# Patient Record
Sex: MALE | Race: WHITE | NOT HISPANIC OR LATINO | Employment: FULL TIME | URBAN - METROPOLITAN AREA
[De-identification: names, ages, dates, MRNs, and addresses within clinical notes are randomized per-mention and may not be internally consistent; named-entity substitution may affect disease eponyms.]

---

## 2017-01-18 ENCOUNTER — APPOINTMENT (EMERGENCY)
Dept: RADIOLOGY | Facility: HOSPITAL | Age: 42
End: 2017-01-18
Payer: COMMERCIAL

## 2017-01-18 ENCOUNTER — HOSPITAL ENCOUNTER (EMERGENCY)
Facility: HOSPITAL | Age: 42
Discharge: HOME | End: 2017-01-18
Attending: EMERGENCY MEDICINE | Admitting: EMERGENCY MEDICINE
Payer: COMMERCIAL

## 2017-01-18 VITALS
HEART RATE: 104 BPM | DIASTOLIC BLOOD PRESSURE: 60 MMHG | RESPIRATION RATE: 16 BRPM | TEMPERATURE: 99.7 F | WEIGHT: 207.23 LBS | OXYGEN SATURATION: 98 % | SYSTOLIC BLOOD PRESSURE: 128 MMHG

## 2017-01-18 DIAGNOSIS — R05.4 COUGH SYNCOPE: Primary | ICD-10-CM

## 2017-01-18 DIAGNOSIS — R74.8 ELEVATED LIVER ENZYMES: ICD-10-CM

## 2017-01-18 DIAGNOSIS — J11.1 INFLUENZA-LIKE ILLNESS: ICD-10-CM

## 2017-01-18 DIAGNOSIS — E86.0 DEHYDRATION: ICD-10-CM

## 2017-01-18 DIAGNOSIS — R55 COUGH SYNCOPE: Primary | ICD-10-CM

## 2017-01-18 LAB
ALBUMIN SERPL BCP-MCNC: 3.9 G/DL (ref 3.5–5)
ALP SERPL-CCNC: 70 U/L (ref 46–116)
ALT SERPL W P-5'-P-CCNC: 98 U/L (ref 12–78)
ANION GAP SERPL CALCULATED.3IONS-SCNC: 12 MMOL/L (ref 4–13)
AST SERPL W P-5'-P-CCNC: 46 U/L (ref 5–45)
BASOPHILS # BLD AUTO: 0.02 THOUSANDS/ΜL (ref 0–0.1)
BASOPHILS NFR BLD AUTO: 0 % (ref 0–1)
BILIRUB SERPL-MCNC: 0.5 MG/DL (ref 0.2–1)
BUN SERPL-MCNC: 15 MG/DL (ref 5–25)
CALCIUM SERPL-MCNC: 8.9 MG/DL (ref 8.3–10.1)
CHLORIDE SERPL-SCNC: 101 MMOL/L (ref 100–108)
CO2 SERPL-SCNC: 25 MMOL/L (ref 21–32)
CREAT SERPL-MCNC: 1.14 MG/DL (ref 0.6–1.3)
EOSINOPHIL # BLD AUTO: 0.01 THOUSAND/ΜL (ref 0–0.61)
EOSINOPHIL NFR BLD AUTO: 0 % (ref 0–6)
ERYTHROCYTE [DISTWIDTH] IN BLOOD BY AUTOMATED COUNT: 13.1 % (ref 11.6–15.1)
EXT BILIRUBIN, UA: NORMAL
EXT BLOOD URINE: NORMAL
EXT GLUCOSE, UA: NORMAL
EXT KETONES: NORMAL
EXT NITRITE, UA: NORMAL
EXT PH, UA: 5
EXT PROTEIN, UA: NORMAL
EXT SPECIFIC GRAVITY, UA: 1.02
EXT UROBILINOGEN: NORMAL
FLUAV AG SPEC QL IA: NEGATIVE
FLUBV AG SPEC QL IA: NEGATIVE
GFR SERPL CREATININE-BSD FRML MDRD: >60 ML/MIN/1.73SQ M
GLUCOSE SERPL-MCNC: 137 MG/DL (ref 65–140)
HCT VFR BLD AUTO: 44.3 % (ref 36.5–49.3)
HGB BLD-MCNC: 15.2 G/DL (ref 12–17)
LIPASE SERPL-CCNC: 113 U/L (ref 73–393)
LYMPHOCYTES # BLD AUTO: 0.58 THOUSANDS/ΜL (ref 0.6–4.47)
LYMPHOCYTES NFR BLD AUTO: 8 % (ref 14–44)
MCH RBC QN AUTO: 29 PG (ref 26.8–34.3)
MCHC RBC AUTO-ENTMCNC: 34.3 G/DL (ref 31.4–37.4)
MCV RBC AUTO: 84 FL (ref 82–98)
MONOCYTES # BLD AUTO: 0.77 THOUSAND/ΜL (ref 0.17–1.22)
MONOCYTES NFR BLD AUTO: 11 % (ref 4–12)
NEUTROPHILS # BLD AUTO: 5.65 THOUSANDS/ΜL (ref 1.85–7.62)
NEUTS SEG NFR BLD AUTO: 81 % (ref 43–75)
PLATELET # BLD AUTO: 201 THOUSANDS/UL (ref 149–390)
PMV BLD AUTO: 9.1 FL (ref 8.9–12.7)
POTASSIUM SERPL-SCNC: 3.9 MMOL/L (ref 3.5–5.3)
PROT SERPL-MCNC: 8.2 G/DL (ref 6.4–8.2)
RBC # BLD AUTO: 5.25 MILLION/UL (ref 3.88–5.62)
SODIUM SERPL-SCNC: 138 MMOL/L (ref 136–145)
WBC # BLD AUTO: 7.03 THOUSAND/UL (ref 4.31–10.16)
WBC # BLD EST: NORMAL 10*3/UL

## 2017-01-18 PROCEDURE — 87798 DETECT AGENT NOS DNA AMP: CPT | Performed by: EMERGENCY MEDICINE

## 2017-01-18 PROCEDURE — 96374 THER/PROPH/DIAG INJ IV PUSH: CPT

## 2017-01-18 PROCEDURE — 87400 INFLUENZA A/B EACH AG IA: CPT | Performed by: EMERGENCY MEDICINE

## 2017-01-18 PROCEDURE — 96361 HYDRATE IV INFUSION ADD-ON: CPT

## 2017-01-18 PROCEDURE — 36415 COLL VENOUS BLD VENIPUNCTURE: CPT | Performed by: EMERGENCY MEDICINE

## 2017-01-18 PROCEDURE — 85025 COMPLETE CBC W/AUTO DIFF WBC: CPT | Performed by: EMERGENCY MEDICINE

## 2017-01-18 PROCEDURE — 83690 ASSAY OF LIPASE: CPT | Performed by: EMERGENCY MEDICINE

## 2017-01-18 PROCEDURE — 81002 URINALYSIS NONAUTO W/O SCOPE: CPT | Performed by: EMERGENCY MEDICINE

## 2017-01-18 PROCEDURE — 71010 HB CHEST X-RAY 1 VIEW FRONTAL (PORTABLE): CPT

## 2017-01-18 PROCEDURE — 80053 COMPREHEN METABOLIC PANEL: CPT | Performed by: EMERGENCY MEDICINE

## 2017-01-18 PROCEDURE — 99285 EMERGENCY DEPT VISIT HI MDM: CPT

## 2017-01-18 PROCEDURE — 96375 TX/PRO/DX INJ NEW DRUG ADDON: CPT

## 2017-01-18 RX ORDER — ONDANSETRON 4 MG/1
4 TABLET, ORALLY DISINTEGRATING ORAL EVERY 8 HOURS PRN
Qty: 10 TABLET | Refills: 0 | Status: SHIPPED | OUTPATIENT
Start: 2017-01-18 | End: 2018-07-30 | Stop reason: ALTCHOICE

## 2017-01-18 RX ORDER — LOPERAMIDE HYDROCHLORIDE 2 MG/1
2 TABLET ORAL 4 TIMES DAILY PRN
COMMUNITY
End: 2019-06-23

## 2017-01-18 RX ORDER — KETOROLAC TROMETHAMINE 30 MG/ML
15 INJECTION, SOLUTION INTRAMUSCULAR; INTRAVENOUS ONCE
Status: COMPLETED | OUTPATIENT
Start: 2017-01-18 | End: 2017-01-18

## 2017-01-18 RX ORDER — ACETAMINOPHEN 325 MG/1
650 TABLET ORAL ONCE
Status: COMPLETED | OUTPATIENT
Start: 2017-01-18 | End: 2017-01-18

## 2017-01-18 RX ORDER — OSELTAMIVIR PHOSPHATE 75 MG/1
75 CAPSULE ORAL EVERY 12 HOURS
Qty: 10 CAPSULE | Refills: 0 | Status: SHIPPED | OUTPATIENT
Start: 2017-01-18 | End: 2017-01-23

## 2017-01-18 RX ORDER — ONDANSETRON 2 MG/ML
4 INJECTION INTRAMUSCULAR; INTRAVENOUS ONCE
Status: COMPLETED | OUTPATIENT
Start: 2017-01-18 | End: 2017-01-18

## 2017-01-18 RX ADMIN — ONDANSETRON 4 MG: 2 INJECTION INTRAMUSCULAR; INTRAVENOUS at 15:57

## 2017-01-18 RX ADMIN — ACETAMINOPHEN 650 MG: 325 TABLET ORAL at 15:56

## 2017-01-18 RX ADMIN — KETOROLAC TROMETHAMINE 15 MG: 30 INJECTION, SOLUTION INTRAMUSCULAR at 16:52

## 2017-01-18 RX ADMIN — SODIUM CHLORIDE 1000 ML: 0.9 INJECTION, SOLUTION INTRAVENOUS at 17:31

## 2017-01-18 RX ADMIN — SODIUM CHLORIDE 1000 ML: 0.9 INJECTION, SOLUTION INTRAVENOUS at 15:51

## 2017-01-19 LAB
FLUAV AG SPEC QL: DETECTED
FLUBV AG SPEC QL: ABNORMAL
RSV B RNA SPEC QL NAA+PROBE: ABNORMAL

## 2017-02-01 ENCOUNTER — APPOINTMENT (EMERGENCY)
Dept: RADIOLOGY | Facility: HOSPITAL | Age: 42
End: 2017-02-01
Payer: COMMERCIAL

## 2017-02-01 ENCOUNTER — HOSPITAL ENCOUNTER (EMERGENCY)
Facility: HOSPITAL | Age: 42
Discharge: HOME | End: 2017-02-02
Attending: EMERGENCY MEDICINE
Payer: COMMERCIAL

## 2017-02-01 ENCOUNTER — APPOINTMENT (EMERGENCY)
Dept: CT IMAGING | Facility: HOSPITAL | Age: 42
End: 2017-02-01
Payer: COMMERCIAL

## 2017-02-01 VITALS
TEMPERATURE: 98 F | OXYGEN SATURATION: 98 % | RESPIRATION RATE: 18 BRPM | SYSTOLIC BLOOD PRESSURE: 138 MMHG | WEIGHT: 220 LBS | DIASTOLIC BLOOD PRESSURE: 71 MMHG | HEART RATE: 81 BPM

## 2017-02-01 DIAGNOSIS — S06.0X9A CONCUSSION: ICD-10-CM

## 2017-02-01 DIAGNOSIS — S16.1XXA CERVICAL STRAIN, INITIAL ENCOUNTER: ICD-10-CM

## 2017-02-01 DIAGNOSIS — S00.03XA CONTUSION OF SCALP, INITIAL ENCOUNTER: ICD-10-CM

## 2017-02-01 DIAGNOSIS — S20.219A CHEST WALL CONTUSION: ICD-10-CM

## 2017-02-01 DIAGNOSIS — R07.81 PLEURODYNIA: ICD-10-CM
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
## 2017-02-01 DIAGNOSIS — S06.0X9A CONCUSSION WITH LOSS OF CONSCIOUSNESS: ICD-10-CM

## 2017-02-01 DIAGNOSIS — S22.32XA LEFT RIB FRACTURE: ICD-10-CM

## 2017-02-01 LAB
ANION GAP SERPL CALCULATED.3IONS-SCNC: 9 MMOL/L (ref 4–13)
BASOPHILS # BLD AUTO: 0.01 THOUSANDS/ΜL (ref 0–0.1)
BASOPHILS NFR BLD AUTO: 0 % (ref 0–1)
BUN SERPL-MCNC: 15 MG/DL (ref 5–25)
CALCIUM SERPL-MCNC: 8.7 MG/DL (ref 8.3–10.1)
CHLORIDE SERPL-SCNC: 104 MMOL/L (ref 100–108)
CO2 SERPL-SCNC: 25 MMOL/L (ref 21–32)
CREAT SERPL-MCNC: 0.95 MG/DL (ref 0.6–1.3)
EOSINOPHIL # BLD AUTO: 0.03 THOUSAND/ΜL (ref 0–0.61)
EOSINOPHIL NFR BLD AUTO: 0 % (ref 0–6)
ERYTHROCYTE [DISTWIDTH] IN BLOOD BY AUTOMATED COUNT: 13 % (ref 11.6–15.1)
GFR SERPL CREATININE-BSD FRML MDRD: >60 ML/MIN/1.73SQ M
GLUCOSE SERPL-MCNC: 147 MG/DL (ref 65–140)
HCT VFR BLD AUTO: 42.8 % (ref 36.5–49.3)
HGB BLD-MCNC: 15 G/DL (ref 12–17)
LYMPHOCYTES # BLD AUTO: 1.21 THOUSANDS/ΜL (ref 0.6–4.47)
LYMPHOCYTES NFR BLD AUTO: 9 % (ref 14–44)
MCH RBC QN AUTO: 30.2 PG (ref 26.8–34.3)
MCHC RBC AUTO-ENTMCNC: 35 G/DL (ref 31.4–37.4)
MCV RBC AUTO: 86 FL (ref 82–98)
MONOCYTES # BLD AUTO: 0.88 THOUSAND/ΜL (ref 0.17–1.22)
MONOCYTES NFR BLD AUTO: 6 % (ref 4–12)
NEUTROPHILS # BLD AUTO: 11.62 THOUSANDS/ΜL (ref 1.85–7.62)
NEUTS SEG NFR BLD AUTO: 85 % (ref 43–75)
NRBC BLD AUTO-RTO: 0 /100 WBCS
PLATELET # BLD AUTO: 235 THOUSANDS/UL (ref 149–390)
PMV BLD AUTO: 9 FL (ref 8.9–12.7)
POTASSIUM SERPL-SCNC: 3.7 MMOL/L (ref 3.5–5.3)
RBC # BLD AUTO: 4.97 MILLION/UL (ref 3.88–5.62)
SODIUM SERPL-SCNC: 138 MMOL/L (ref 136–145)
WBC # BLD AUTO: 13.75 THOUSAND/UL (ref 4.31–10.16)

## 2017-02-01 PROCEDURE — 72125 CT NECK SPINE W/O DYE: CPT

## 2017-02-01 PROCEDURE — 96374 THER/PROPH/DIAG INJ IV PUSH: CPT

## 2017-02-01 PROCEDURE — 71260 CT THORAX DX C+: CPT

## 2017-02-01 PROCEDURE — 73110 X-RAY EXAM OF WRIST: CPT

## 2017-02-01 PROCEDURE — 86901 BLOOD TYPING SEROLOGIC RH(D): CPT | Performed by: EMERGENCY MEDICINE

## 2017-02-01 PROCEDURE — 86900 BLOOD TYPING SEROLOGIC ABO: CPT | Performed by: EMERGENCY MEDICINE

## 2017-02-01 PROCEDURE — 96375 TX/PRO/DX INJ NEW DRUG ADDON: CPT

## 2017-02-01 PROCEDURE — 85025 COMPLETE CBC W/AUTO DIFF WBC: CPT | Performed by: EMERGENCY MEDICINE

## 2017-02-01 PROCEDURE — 36415 COLL VENOUS BLD VENIPUNCTURE: CPT | Performed by: EMERGENCY MEDICINE

## 2017-02-01 PROCEDURE — 74177 CT ABD & PELVIS W/CONTRAST: CPT

## 2017-02-01 PROCEDURE — 86850 RBC ANTIBODY SCREEN: CPT | Performed by: EMERGENCY MEDICINE

## 2017-02-01 PROCEDURE — 80048 BASIC METABOLIC PNL TOTAL CA: CPT | Performed by: EMERGENCY MEDICINE

## 2017-02-01 PROCEDURE — 70450 CT HEAD/BRAIN W/O DYE: CPT

## 2017-02-01 RX ORDER — FENTANYL CITRATE 50 UG/ML
50 INJECTION, SOLUTION INTRAMUSCULAR; INTRAVENOUS ONCE
Status: COMPLETED | OUTPATIENT
Start: 2017-02-01 | End: 2017-02-01

## 2017-02-01 RX ORDER — KETOROLAC TROMETHAMINE 30 MG/ML
INJECTION, SOLUTION INTRAMUSCULAR; INTRAVENOUS
Status: COMPLETED
Start: 2017-02-01 | End: 2017-02-01

## 2017-02-01 RX ORDER — KETOROLAC TROMETHAMINE 30 MG/ML
30 INJECTION, SOLUTION INTRAMUSCULAR; INTRAVENOUS ONCE
Status: COMPLETED | OUTPATIENT
Start: 2017-02-01 | End: 2017-02-01

## 2017-02-01 RX ADMIN — KETOROLAC TROMETHAMINE 30 MG: 30 INJECTION, SOLUTION INTRAMUSCULAR; INTRAVENOUS at 23:43

## 2017-02-01 RX ADMIN — FENTANYL CITRATE 50 MCG: 50 INJECTION INTRAMUSCULAR; INTRAVENOUS at 22:39

## 2017-02-01 RX ADMIN — IOHEXOL 100 ML: 350 INJECTION, SOLUTION INTRAVENOUS at 23:20

## 2017-02-01 RX ADMIN — KETOROLAC TROMETHAMINE 30 MG: 30 INJECTION, SOLUTION INTRAMUSCULAR at 23:43

## 2017-02-02 LAB
ABO GROUP BLD: NORMAL
BLD GP AB SCN SERPL QL: NEGATIVE
RH BLD: POSITIVE

## 2017-02-02 PROCEDURE — 99285 EMERGENCY DEPT VISIT HI MDM: CPT

## 2017-02-02 RX ORDER — METHOCARBAMOL 750 MG/1
750 TABLET, FILM COATED ORAL 3 TIMES DAILY PRN
Qty: 42 TABLET | Refills: 0 | Status: SHIPPED | OUTPATIENT
Start: 2017-02-02 | End: 2018-07-30 | Stop reason: ALTCHOICE

## 2017-02-02 RX ORDER — TRAMADOL HYDROCHLORIDE 50 MG/1
50 TABLET ORAL EVERY 6 HOURS PRN
Qty: 10 TABLET | Refills: 0 | Status: SHIPPED | OUTPATIENT
Start: 2017-02-02 | End: 2017-02-02

## 2017-02-02 RX ORDER — NAPROXEN 500 MG/1
500 TABLET ORAL 2 TIMES DAILY PRN
Qty: 14 TABLET | Refills: 0 | Status: SHIPPED | OUTPATIENT
Start: 2017-02-02 | End: 2018-07-30 | Stop reason: ALTCHOICE

## 2017-02-02 RX ORDER — OXYCODONE HYDROCHLORIDE AND ACETAMINOPHEN 5; 325 MG/1; MG/1
1 TABLET ORAL EVERY 4 HOURS PRN
Qty: 20 TABLET | Refills: 0 | Status: SHIPPED | OUTPATIENT
Start: 2017-02-02 | End: 2017-02-12

## 2017-02-07 ENCOUNTER — ALLSCRIPTS OFFICE VISIT (OUTPATIENT)
Dept: OTHER | Facility: OTHER | Age: 42
End: 2017-02-07

## 2017-02-21 ENCOUNTER — ALLSCRIPTS OFFICE VISIT (OUTPATIENT)
Dept: OTHER | Facility: OTHER | Age: 42
End: 2017-02-21

## 2017-03-28 ENCOUNTER — ALLSCRIPTS OFFICE VISIT (OUTPATIENT)
Dept: OTHER | Facility: OTHER | Age: 42
End: 2017-03-28

## 2017-03-28 DIAGNOSIS — S06.0X9A CONCUSSION WITH LOSS OF CONSCIOUSNESS: ICD-10-CM

## 2017-05-09 ENCOUNTER — ALLSCRIPTS OFFICE VISIT (OUTPATIENT)
Dept: OTHER | Facility: OTHER | Age: 42
End: 2017-05-09

## 2017-05-09 DIAGNOSIS — M79.645 PAIN OF FINGER OF LEFT HAND: ICD-10-CM

## 2017-05-09 DIAGNOSIS — M79.672 PAIN OF LEFT FOOT: ICD-10-CM

## 2017-05-09 DIAGNOSIS — M79.644 PAIN OF FINGER OF RIGHT HAND: ICD-10-CM

## 2017-05-09 DIAGNOSIS — M54.16 RADICULOPATHY OF LUMBAR REGION: ICD-10-CM

## 2017-05-16 ENCOUNTER — TRANSCRIBE ORDERS (OUTPATIENT)
Dept: ADMINISTRATIVE | Facility: HOSPITAL | Age: 42
End: 2017-05-16

## 2017-05-16 DIAGNOSIS — M54.5 LOW BACK PAIN, UNSPECIFIED BACK PAIN LATERALITY, UNSPECIFIED CHRONICITY, WITH SCIATICA PRESENCE UNSPECIFIED: Primary | ICD-10-CM

## 2017-05-29 ENCOUNTER — GENERIC CONVERSION - ENCOUNTER (OUTPATIENT)
Dept: OTHER | Facility: OTHER | Age: 42
End: 2017-05-29

## 2017-05-30 ENCOUNTER — HOSPITAL ENCOUNTER (OUTPATIENT)
Dept: RADIOLOGY | Age: 42
Discharge: HOME | End: 2017-05-30
Payer: COMMERCIAL

## 2017-05-30 ENCOUNTER — TRANSCRIBE ORDERS (OUTPATIENT)
Dept: ADMINISTRATIVE | Age: 42
End: 2017-05-30

## 2017-05-30 DIAGNOSIS — M79.645 PAIN OF FINGER OF LEFT HAND: ICD-10-CM

## 2017-05-30 DIAGNOSIS — M79.644 PAIN OF FINGER OF RIGHT HAND: ICD-10-CM

## 2017-05-30 DIAGNOSIS — M54.5 LOW BACK PAIN, UNSPECIFIED BACK PAIN LATERALITY, UNSPECIFIED CHRONICITY, WITH SCIATICA PRESENCE UNSPECIFIED: ICD-10-CM

## 2017-05-30 DIAGNOSIS — M79.672 PAIN OF LEFT FOOT: ICD-10-CM

## 2017-05-30 PROCEDURE — 73630 X-RAY EXAM OF FOOT: CPT

## 2017-05-30 PROCEDURE — 73140 X-RAY EXAM OF FINGER(S): CPT

## 2017-05-30 PROCEDURE — 72148 MRI LUMBAR SPINE W/O DYE: CPT

## 2017-05-31 ENCOUNTER — GENERIC CONVERSION - ENCOUNTER (OUTPATIENT)
Dept: OTHER | Facility: OTHER | Age: 42
End: 2017-05-31

## 2017-06-12 ENCOUNTER — ALLSCRIPTS OFFICE VISIT (OUTPATIENT)
Dept: OTHER | Facility: OTHER | Age: 42
End: 2017-06-12

## 2017-07-31 ENCOUNTER — ALLSCRIPTS OFFICE VISIT (OUTPATIENT)
Dept: OTHER | Facility: OTHER | Age: 42
End: 2017-07-31

## 2017-08-07 ENCOUNTER — GENERIC CONVERSION - ENCOUNTER (OUTPATIENT)
Dept: OTHER | Facility: OTHER | Age: 42
End: 2017-08-07
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
## 2018-01-11 NOTE — MISCELLANEOUS
Message  Return to work or school:   Ketty Peters is under my professional care  He was seen in my office on 07-31-17       EXCUSE FROM WORK 07-31 [facility] 08-04-17          Signatures   Electronically signed by : Viktor Foster, ; Aug  7 2017  1:29PM EST                       (Author)

## 2018-01-12 VITALS
RESPIRATION RATE: 16 BRPM | TEMPERATURE: 97.5 F | HEART RATE: 74 BPM | DIASTOLIC BLOOD PRESSURE: 72 MMHG | BODY MASS INDEX: 32.89 KG/M2 | WEIGHT: 197.4 LBS | HEIGHT: 65 IN | SYSTOLIC BLOOD PRESSURE: 130 MMHG

## 2018-01-13 VITALS
TEMPERATURE: 98.2 F | RESPIRATION RATE: 12 BRPM | WEIGHT: 201 LBS | BODY MASS INDEX: 33.49 KG/M2 | SYSTOLIC BLOOD PRESSURE: 118 MMHG | HEART RATE: 64 BPM | HEIGHT: 65 IN | DIASTOLIC BLOOD PRESSURE: 72 MMHG

## 2018-01-13 NOTE — MISCELLANEOUS
Message  Return to work or school:   Dimitri Guzman is under my professional care  He was seen in my office on 2/21/17    He is not able to return to work until 2/23/17   He is able to work with limitations (light duty only)  May return to work for 3 hours a day of light duty  No lifting greater than 5-10 lbs  No driving until follow up cognitive evaluation or while taking pain medication  Mary Mitchell DNP, NICKIE  Future Appointments    Signatures   Electronically signed by :  Evander Kawasaki; Feb 22 2017  3:18PM EST                       (Author)

## 2018-01-14 VITALS
SYSTOLIC BLOOD PRESSURE: 122 MMHG | HEART RATE: 96 BPM | BODY MASS INDEX: 32.15 KG/M2 | WEIGHT: 193 LBS | DIASTOLIC BLOOD PRESSURE: 80 MMHG | HEIGHT: 65 IN | TEMPERATURE: 98.2 F | RESPIRATION RATE: 16 BRPM

## 2018-01-14 VITALS
TEMPERATURE: 96.2 F | DIASTOLIC BLOOD PRESSURE: 72 MMHG | BODY MASS INDEX: 32.99 KG/M2 | WEIGHT: 198 LBS | RESPIRATION RATE: 16 BRPM | SYSTOLIC BLOOD PRESSURE: 118 MMHG | HEIGHT: 65 IN | HEART RATE: 78 BPM

## 2018-01-14 VITALS
SYSTOLIC BLOOD PRESSURE: 130 MMHG | HEART RATE: 68 BPM | DIASTOLIC BLOOD PRESSURE: 85 MMHG | OXYGEN SATURATION: 97 % | RESPIRATION RATE: 16 BRPM

## 2018-01-15 VITALS
HEART RATE: 68 BPM | TEMPERATURE: 97.2 F | RESPIRATION RATE: 12 BRPM | BODY MASS INDEX: 34.66 KG/M2 | HEIGHT: 65 IN | DIASTOLIC BLOOD PRESSURE: 74 MMHG | WEIGHT: 208 LBS | SYSTOLIC BLOOD PRESSURE: 122 MMHG

## 2018-01-15 NOTE — RESULT NOTES
Verified Results  * MRI LUMBAR SPINE WO CONTRAST [id number] 01:03PM Avie Crew     Test Name Result Flag Reference   MRI LUMBAR SPINE [address] (Report)     This is a summary report  The complete report is available in the patient's medical record  If you cannot access the medical record, please contact the sending organization for a detailed fax or copy  MRI LUMBAR SPINE WITHOUT CONTRAST     INDICATION: Low back pain  Left-sided buttock pain  Radiculopathy  COMPARISON: None  TECHNIQUE: Sagittal T1, sagittal T2, sagittal inversion recovery, axial T1 and axial T2, coronal T2       IMAGE QUALITY: Diagnostic     FINDINGS:     ALIGNMENT: Normal alignment of the lumbar spine  No compression fracture  No spondylolysis or spondylolisthesis  No scoliosis  MARROW SIGNAL: Normal marrow signal is identified within the visualized bony structures  No discrete marrow lesion  DISTAL CORD AND CONUS: Normal size and signal within the distal cord and conus  The conus ends at the L1 level  PARASPINAL SOFT TISSUES: Paraspinal soft tissues are unremarkable  SACRUM: Normal signal within the sacrum  No evidence of insufficiency or stress fracture  LOWER THORACIC DISC SPACES: Normal disc height and signal  No disc herniation, canal stenosis or foraminal narrowing  LUMBAR DISC SPACES:        L1-L2: Normal      L2-L3: Normal      L3-L4: Normal      L4-L5: Normal      L5-S1: Small central protrusion type disc herniation with mild facet hypertrophy  No significant central or foraminal narrowing  IMPRESSION:     Small central protrusion type disc herniation L5-S1 identified without significant central or foraminal stenosis         Workstation performed: [id number]     Signed by:   Michel Albrecht DO   5/31/17       Signatures   Electronically signed by : JEMMA Parker ; Jun 5 2017 12:33PM EST                       (Author)

## 2018-07-30 ENCOUNTER — OFFICE VISIT (OUTPATIENT)
Dept: FAMILY MEDICINE CLINIC | Facility: CLINIC | Age: 43
End: 2018-07-30
Payer: COMMERCIAL

## 2018-07-30 VITALS
HEART RATE: 80 BPM | TEMPERATURE: 98.6 F | WEIGHT: 203 LBS | SYSTOLIC BLOOD PRESSURE: 102 MMHG | DIASTOLIC BLOOD PRESSURE: 62 MMHG | BODY MASS INDEX: 33.78 KG/M2

## 2018-07-30 DIAGNOSIS — J06.9 UPPER RESPIRATORY TRACT INFECTION, UNSPECIFIED TYPE: Primary | ICD-10-CM

## 2018-07-30 PROCEDURE — 99213 OFFICE O/P EST LOW 20 MIN: CPT | Performed by: FAMILY MEDICINE

## 2018-07-30 RX ORDER — AZITHROMYCIN 250 MG/1
TABLET, FILM COATED ORAL
Qty: 6 TABLET | Refills: 0 | Status: SHIPPED | OUTPATIENT
Start: 2018-07-30 | End: 2018-08-03

## 2018-07-30 NOTE — PROGRESS NOTES
Assessment/Plan:     Diagnoses and all orders for this visit:    Upper respiratory tract infection, unspecified type  -     azithromycin (ZITHROMAX) 250 mg tablet; 2 tablets day 1  1 tablet daily for days 2 through 5        Continue with symptom treatment for cough and nasal congestion  Recheck prn      Patient ID: Jerome Velasquez is a 43 y o  male  4 day history of nasal congestion with sinus pressure  Cough productive of yellow phlegm  He has been using prn DayQuil and NyQuil  Non smoker  No seasonal allergies  The following portions of the patient's history were reviewed and updated as appropriate: allergies, current medications, past family history, past medical history, past social history, past surgical history and problem list     Review of Systems   Constitutional: Positive for appetite change (decreased appetite)  Negative for chills and fever  HENT: Positive for rhinorrhea (yellow rhinorrhea)  Negative for ear pain and sore throat  See HPI   Respiratory: Negative for shortness of breath and wheezing  Cardiovascular: Negative for chest pain and palpitations  Gastrointestinal: Negative for diarrhea, nausea and vomiting  Musculoskeletal: Negative for myalgias  Skin: Negative for rash  Neurological: Negative for headaches  Hematological: Negative for adenopathy  Objective:      /62 (BP Location: Left arm, Patient Position: Sitting, Cuff Size: Large)   Pulse 80   Temp 98 6 °F (37 °C)   Wt 92 1 kg (203 lb)   BMI 33 78 kg/m²          Physical Exam   Constitutional: He appears well-developed and well-nourished  No distress  HENT:   Right Ear: Tympanic membrane normal    Left Ear: Tympanic membrane normal    Nose: Right sinus exhibits no maxillary sinus tenderness and no frontal sinus tenderness  Left sinus exhibits no maxillary sinus tenderness and no frontal sinus tenderness  Mouth/Throat: No posterior oropharyngeal erythema     Eyes: Conjunctivae are normal    Neck: No thyroid mass and no thyromegaly present  Cardiovascular: Normal rate, regular rhythm and normal heart sounds  Exam reveals no gallop  No murmur heard  Pulmonary/Chest: Breath sounds normal  He has no wheezes  He has no rales  Lymphadenopathy:     He has no cervical adenopathy  Skin: No rash noted  Nursing note and vitals reviewed
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
## 2018-08-13 ENCOUNTER — OFFICE VISIT (OUTPATIENT)
Dept: FAMILY MEDICINE CLINIC | Facility: CLINIC | Age: 43
End: 2018-08-13
Payer: COMMERCIAL

## 2018-08-13 VITALS
WEIGHT: 201 LBS | DIASTOLIC BLOOD PRESSURE: 76 MMHG | HEART RATE: 68 BPM | TEMPERATURE: 96.8 F | SYSTOLIC BLOOD PRESSURE: 110 MMHG | RESPIRATION RATE: 16 BRPM | BODY MASS INDEX: 33.45 KG/M2

## 2018-08-13 DIAGNOSIS — R74.01 ELEVATED ALT MEASUREMENT: ICD-10-CM

## 2018-08-13 DIAGNOSIS — R20.2 PARESTHESIAS: Primary | ICD-10-CM

## 2018-08-13 DIAGNOSIS — G89.29 CHRONIC BILATERAL LOW BACK PAIN WITH BILATERAL SCIATICA: ICD-10-CM

## 2018-08-13 DIAGNOSIS — M54.42 CHRONIC BILATERAL LOW BACK PAIN WITH BILATERAL SCIATICA: ICD-10-CM

## 2018-08-13 DIAGNOSIS — M51.9 LUMBAR DISC DISEASE: ICD-10-CM

## 2018-08-13 DIAGNOSIS — E78.1 HYPERTRIGLYCERIDEMIA: ICD-10-CM

## 2018-08-13 DIAGNOSIS — M47.816 FACET ARTHRITIS OF LUMBAR REGION: ICD-10-CM

## 2018-08-13 DIAGNOSIS — M54.41 CHRONIC BILATERAL LOW BACK PAIN WITH BILATERAL SCIATICA: ICD-10-CM

## 2018-08-13 PROBLEM — F07.81 POST CONCUSSION SYNDROME: Status: ACTIVE | Noted: 2017-05-09

## 2018-08-13 PROBLEM — M54.16 LEFT LUMBAR RADICULOPATHY: Status: ACTIVE | Noted: 2017-05-09

## 2018-08-13 PROBLEM — K76.0 FATTY LIVER: Status: ACTIVE | Noted: 2017-05-09

## 2018-08-13 PROBLEM — M65.4 DE QUERVAIN'S TENOSYNOVITIS: Status: ACTIVE | Noted: 2017-06-12

## 2018-08-13 PROCEDURE — 99214 OFFICE O/P EST MOD 30 MIN: CPT | Performed by: FAMILY MEDICINE

## 2018-08-25 LAB
ALBUMIN SERPL-MCNC: 4.5 G/DL (ref 3.6–5.1)
ALBUMIN/GLOB SERPL: 1.6 (CALC) (ref 1–2.5)
ALP SERPL-CCNC: 68 U/L (ref 40–115)
ALT SERPL-CCNC: 46 U/L (ref 9–46)
AST SERPL-CCNC: 26 U/L (ref 10–40)
BASOPHILS # BLD AUTO: 29 CELLS/UL (ref 0–200)
BASOPHILS NFR BLD AUTO: 0.5 %
BILIRUB SERPL-MCNC: 0.5 MG/DL (ref 0.2–1.2)
BUN SERPL-MCNC: 17 MG/DL (ref 7–25)
BUN/CREAT SERPL: ABNORMAL (CALC) (ref 6–22)
CALCIUM SERPL-MCNC: 9.5 MG/DL (ref 8.6–10.3)
CHLORIDE SERPL-SCNC: 104 MMOL/L (ref 98–110)
CHOLEST SERPL-MCNC: 183 MG/DL
CHOLEST/HDLC SERPL: 6.3 (CALC)
CO2 SERPL-SCNC: 26 MMOL/L (ref 20–32)
CREAT SERPL-MCNC: 0.91 MG/DL (ref 0.6–1.35)
EOSINOPHIL # BLD AUTO: 97 CELLS/UL (ref 15–500)
EOSINOPHIL NFR BLD AUTO: 1.7 %
ERYTHROCYTE [DISTWIDTH] IN BLOOD BY AUTOMATED COUNT: 13.3 % (ref 11–15)
GLOBULIN SER CALC-MCNC: 2.8 G/DL (CALC) (ref 1.9–3.7)
GLUCOSE SERPL-MCNC: 114 MG/DL (ref 65–99)
HCT VFR BLD AUTO: 45.2 % (ref 38.5–50)
HDLC SERPL-MCNC: 29 MG/DL
HGB BLD-MCNC: 14.8 G/DL (ref 13.2–17.1)
LYMPHOCYTES # BLD AUTO: 1459 CELLS/UL (ref 850–3900)
LYMPHOCYTES NFR BLD AUTO: 25.6 %
MCH RBC QN AUTO: 28 PG (ref 27–33)
MCHC RBC AUTO-ENTMCNC: 32.7 G/DL (ref 32–36)
MCV RBC AUTO: 85.6 FL (ref 80–100)
MONOCYTES # BLD AUTO: 479 CELLS/UL (ref 200–950)
MONOCYTES NFR BLD AUTO: 8.4 %
NEUTROPHILS # BLD AUTO: 3637 CELLS/UL (ref 1500–7800)
NEUTROPHILS NFR BLD AUTO: 63.8 %
NONHDLC SERPL-MCNC: 154 MG/DL (CALC)
PLATELET # BLD AUTO: 218 THOUSAND/UL (ref 140–400)
PMV BLD REES-ECKER: 9.2 FL (ref 7.5–12.5)
POTASSIUM SERPL-SCNC: 4.4 MMOL/L (ref 3.5–5.3)
PROT SERPL-MCNC: 7.3 G/DL (ref 6.1–8.1)
RBC # BLD AUTO: 5.28 MILLION/UL (ref 4.2–5.8)
SL AMB EGFR AFRICAN AMERICAN: 119 ML/MIN/1.73M2
SL AMB EGFR NON AFRICAN AMERICAN: 103 ML/MIN/1.73M2
SODIUM SERPL-SCNC: 138 MMOL/L (ref 135–146)
TRIGL SERPL-MCNC: 519 MG/DL
WBC # BLD AUTO: 5.7 THOUSAND/UL (ref 3.8–10.8)

## 2018-08-28 ENCOUNTER — HOSPITAL ENCOUNTER (OUTPATIENT)
Dept: MRI IMAGING | Facility: HOSPITAL | Age: 43
Discharge: HOME | End: 2018-08-28
Attending: FAMILY MEDICINE
Payer: COMMERCIAL

## 2018-08-28 DIAGNOSIS — M51.9 LUMBAR DISC DISEASE: ICD-10-CM

## 2018-08-28 DIAGNOSIS — M54.41 CHRONIC BILATERAL LOW BACK PAIN WITH BILATERAL SCIATICA: ICD-10-CM

## 2018-08-28 DIAGNOSIS — R20.2 PARESTHESIAS: ICD-10-CM

## 2018-08-28 DIAGNOSIS — M54.42 CHRONIC BILATERAL LOW BACK PAIN WITH BILATERAL SCIATICA: ICD-10-CM

## 2018-08-28 DIAGNOSIS — G89.29 CHRONIC BILATERAL LOW BACK PAIN WITH BILATERAL SCIATICA: ICD-10-CM

## 2018-08-28 PROCEDURE — 72148 MRI LUMBAR SPINE W/O DYE: CPT

## 2018-08-31 ENCOUNTER — TELEPHONE (OUTPATIENT)
Dept: FAMILY MEDICINE CLINIC | Facility: CLINIC | Age: 43
End: 2018-08-31

## 2018-08-31 NOTE — TELEPHONE ENCOUNTER
Call re: MRI Lumbar spine  Small central disc herniation at  L5-S1  Similar to appearance from 05/2017 he is scheduled for EMG is of lower extremities  He would have the option of seen pain management    Regarding his labs triglycerides 519 normal less than 150 blood sugar elevated at 114 less than 100 normal I would schedule a follow-up to review his labs and discuss treatment

## 2018-09-18 ENCOUNTER — OFFICE VISIT (OUTPATIENT)
Dept: FAMILY MEDICINE CLINIC | Facility: CLINIC | Age: 43
End: 2018-09-18
Payer: COMMERCIAL

## 2018-09-18 VITALS
HEART RATE: 72 BPM | RESPIRATION RATE: 16 BRPM | HEIGHT: 66 IN | SYSTOLIC BLOOD PRESSURE: 104 MMHG | DIASTOLIC BLOOD PRESSURE: 60 MMHG | TEMPERATURE: 96.7 F | WEIGHT: 205 LBS | BODY MASS INDEX: 32.95 KG/M2

## 2018-09-18 DIAGNOSIS — M54.16 LEFT LUMBAR RADICULOPATHY: ICD-10-CM

## 2018-09-18 DIAGNOSIS — G47.19 EXCESSIVE DAYTIME SLEEPINESS: ICD-10-CM

## 2018-09-18 DIAGNOSIS — M51.26 LUMBAR DISC HERNIATION: ICD-10-CM

## 2018-09-18 DIAGNOSIS — E78.1 HYPERTRIGLYCERIDEMIA: Primary | ICD-10-CM

## 2018-09-18 DIAGNOSIS — R73.01 IMPAIRED FASTING GLUCOSE: ICD-10-CM

## 2018-09-18 PROCEDURE — 99214 OFFICE O/P EST MOD 30 MIN: CPT | Performed by: FAMILY MEDICINE

## 2018-09-18 PROCEDURE — 3008F BODY MASS INDEX DOCD: CPT | Performed by: FAMILY MEDICINE

## 2018-09-18 PROCEDURE — 1036F TOBACCO NON-USER: CPT | Performed by: FAMILY MEDICINE

## 2018-09-18 PROCEDURE — 3725F SCREEN DEPRESSION PERFORMED: CPT | Performed by: FAMILY MEDICINE

## 2018-09-18 RX ORDER — OMEGA-3-ACID ETHYL ESTERS 1 G/1
2 CAPSULE, LIQUID FILLED ORAL 2 TIMES DAILY
Qty: 120 CAPSULE | Refills: 3 | Status: SHIPPED | OUTPATIENT
Start: 2018-09-18 | End: 2018-09-24 | Stop reason: ALTCHOICE

## 2018-09-18 RX ORDER — GABAPENTIN 300 MG/1
300 CAPSULE ORAL
Qty: 30 CAPSULE | Refills: 2 | Status: SHIPPED | OUTPATIENT
Start: 2018-09-18 | End: 2019-06-23
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
## 2018-09-21 ENCOUNTER — TELEPHONE (OUTPATIENT)
Dept: FAMILY MEDICINE CLINIC | Facility: CLINIC | Age: 43
End: 2018-09-21

## 2018-09-24 DIAGNOSIS — E78.1 HYPERTRIGLYCERIDEMIA: Primary | ICD-10-CM

## 2018-09-24 RX ORDER — FENOFIBRATE 145 MG/1
145 TABLET, COATED ORAL DAILY
Qty: 30 TABLET | Refills: 5 | Status: SHIPPED | OUTPATIENT
Start: 2018-09-24 | End: 2019-06-23

## 2018-09-24 NOTE — TELEPHONE ENCOUNTER
Call patient I sent in a script for Fenofibrate 145 mg once a day since Lovaza is not on his formulary

## 2018-10-15 ENCOUNTER — OFFICE VISIT (OUTPATIENT)
Dept: PAIN MEDICINE | Facility: CLINIC | Age: 43
End: 2018-10-15
Payer: COMMERCIAL

## 2018-10-15 VITALS
HEART RATE: 74 BPM | SYSTOLIC BLOOD PRESSURE: 116 MMHG | HEIGHT: 65 IN | TEMPERATURE: 98.2 F | DIASTOLIC BLOOD PRESSURE: 78 MMHG | BODY MASS INDEX: 33.82 KG/M2 | WEIGHT: 203 LBS

## 2018-10-15 DIAGNOSIS — M47.816 FACET DEGENERATION OF LUMBAR REGION: ICD-10-CM

## 2018-10-15 DIAGNOSIS — M51.17 INTERVERTEBRAL DISC DISORDER WITH RADICULOPATHY OF LUMBOSACRAL REGION: Primary | ICD-10-CM

## 2018-10-15 PROCEDURE — 99244 OFF/OP CNSLTJ NEW/EST MOD 40: CPT | Performed by: ANESTHESIOLOGY

## 2018-10-15 RX ORDER — DICLOFENAC SODIUM 75 MG/1
75 TABLET, DELAYED RELEASE ORAL 2 TIMES DAILY
Qty: 60 TABLET | Refills: 0 | Status: SHIPPED | OUTPATIENT
Start: 2018-10-15 | End: 2019-06-25 | Stop reason: ALTCHOICE
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
## 2018-10-17 ENCOUNTER — HOSPITAL ENCOUNTER (OUTPATIENT)
Dept: RADIOLOGY | Facility: CLINIC | Age: 43
Discharge: HOME | End: 2018-10-17
Payer: COMMERCIAL

## 2018-10-17 DIAGNOSIS — R20.2 PARESTHESIAS: ICD-10-CM

## 2018-10-17 DIAGNOSIS — M51.9 LUMBAR DISC DISEASE: ICD-10-CM

## 2018-10-17 PROCEDURE — 95909 NRV CNDJ TST 5-6 STUDIES: CPT | Performed by: PHYSICAL MEDICINE & REHABILITATION

## 2018-10-17 PROCEDURE — 95886 MUSC TEST DONE W/N TEST COMP: CPT | Performed by: PHYSICAL MEDICINE & REHABILITATION

## 2018-10-17 NOTE — PROCEDURES
Copy received from Patient Care     Procedures      Electromyogram and Nerve Conduction Velocity Procedure Note    HX:    This is a 78-year-old male referred by primary care for electrodiagnostic study of both lower extremities  He complains of low back pain and numbness in both legs which includes the lateral calves and shins and feet  Symptoms are worse on the left than the right symptoms have been present for about 9 months  He presents today for electrodiagnostic study he denies any weakness     PMH:    remote history of colon cancer treated on with surgery elevated blood glucos  Exam:   E diminished Achilles reflexes patellar reflexes symmetric there is no long tract signs, no motor deficits, no sensory deficits, no atrophy, fasciculations, or tremors are noted  Procedure:  Verbal informed consent was obtained as with all electrodiagnostic medicine patients  As with all patients this patient was informed that they may terminate the  examine at any time  Patient tolerated the procedure well with no adverse effects reported or observed  Findings:  Please see the [employer] data printout for any details    Studies of the bilateral peroneal motor fibers in the bilateral sural sensory fibers were entirely normal with temperature correction  The bilateral tibial H reflexes were easily obtained symmetric but both prolonged  Electromyography:  Paraspinals, gluteus medius, vastus lateralis, biceps femoris -long head, tibialis anterior, gastrocnemius -lateral head motor units were normal all muscles as was recruitment monopolar needle exploration failed to reveal any abnormal spontaneous potentials the following muscles bilaterally:  Conclusion:    1  There is no electrophysiologic dated indicated suggest an acute lumbosacral radiculopathy or plexopathy  2  The delayed S-1 /  Sciatic nerve mediated H reflexes are of uncertain origin    This can be seen in a very early polyneuropathy or a mild bilateral S1 root compression syndrome without axonal injury  Recommendations:       Careful clinical correlation but especially correlation with structure studies lumbar spine is advised    He may be reasonable to repeat study in 6-12 months to assess for any interval change    Keerthi Encarnacion DO  [employer], [employer]

## 2018-10-22 ENCOUNTER — HOSPITAL ENCOUNTER (OUTPATIENT)
Dept: SLEEP CENTER | Facility: CLINIC | Age: 43
Discharge: HOME | End: 2018-10-22
Payer: COMMERCIAL

## 2018-10-22 DIAGNOSIS — G47.19 EXCESSIVE DAYTIME SLEEPINESS: ICD-10-CM

## 2018-10-22 PROCEDURE — 95810 POLYSOM 6/> YRS 4/> PARAM: CPT

## 2018-10-23 DIAGNOSIS — G47.33 OSA (OBSTRUCTIVE SLEEP APNEA): Primary | ICD-10-CM

## 2018-10-23 NOTE — PROGRESS NOTES
Sleep Study Documentation    Pre-Sleep Study       Sleep testing procedure explained to patient:YES    Patient napped prior to study:NO    Caffeine:Dayshift worker after 12PM   Caffeine use:YES- soda  12 to 26 ounces    Alcohol:Dayshift workers after 5PM: Alcohol use:NO    Typical day for patient:YES       Study Documentation  Diagnostic   Snore:Mild  Supplemental O2: no    Minimum SaO2  86%  Baseline SaO2  94%    Obstructive respiratory events during REM sleep noted  EKG abnormalities: no     EEG abnormalities: no    Study Terminated:no    Patient classification: employed       Post-Sleep Study    Medication used at bedtime or during sleep study:NO    Patient reports time it took to fall asleep:30 to 60 minutes    Patient reports waking up during study:3 or more times  Patient reports returning to sleep in greater than 30 minutes  Patient reports sleeping 4 to 6 hours with dreaming  Patient reports sleep during study:typical    Patient rated sleepiness: Very sleepy or tired    PAP treatment:no

## 2018-10-25 ENCOUNTER — TELEPHONE (OUTPATIENT)
Dept: SLEEP CENTER | Facility: CLINIC | Age: 43
End: 2018-10-25

## 2018-10-25 NOTE — TELEPHONE ENCOUNTER
I spoke with pt, advised that sleep study reveals mild sleep apnea, Dr No Barraza recommend CON and CPAP machine      Scheduled CON with Dr No Barraza in [place] 12/5/18 at 9 am, DME set up 12/5 at 930 am     Script, study and data sheets given to [facility]

## 2018-10-26 ENCOUNTER — TELEPHONE (OUTPATIENT)
Dept: FAMILY MEDICINE CLINIC | Facility: CLINIC | Age: 43
End: 2018-10-26

## 2018-10-26 NOTE — TELEPHONE ENCOUNTER
Patient called back & was notified of message  He said sleep clinic reached out and he has appointment to be fitted with CPAP machine

## 2018-10-26 NOTE — TELEPHONE ENCOUNTER
Call sleep study shows mild obstructive sleep apnea   It is recommended he follow up with sleep clinic to discuss CPAP

## 2018-11-28 ENCOUNTER — TELEPHONE (OUTPATIENT)
Dept: FAMILY MEDICINE CLINIC | Facility: CLINIC | Age: 43
End: 2018-11-28

## 2018-11-28 DIAGNOSIS — G47.19 TRANSIENT DISORDER OF INITIATING OR MAINTAINING WAKEFULNESS: Primary | ICD-10-CM

## 2018-11-28 NOTE — TELEPHONE ENCOUNTER
Please enter ambulatory referral to sleep med for patient using DX [id number] [phone]   He is seeing them 12/05/18

## 2018-12-05 ENCOUNTER — OFFICE VISIT (OUTPATIENT)
Dept: SLEEP CENTER | Facility: CLINIC | Age: 43
End: 2018-12-05
Payer: COMMERCIAL

## 2018-12-05 VITALS
HEART RATE: 66 BPM | HEIGHT: 65 IN | WEIGHT: 201 LBS | BODY MASS INDEX: 33.49 KG/M2 | DIASTOLIC BLOOD PRESSURE: 64 MMHG | SYSTOLIC BLOOD PRESSURE: 120 MMHG

## 2018-12-05 DIAGNOSIS — G47.19 TRANSIENT DISORDER OF INITIATING OR MAINTAINING WAKEFULNESS: ICD-10-CM

## 2018-12-05 PROCEDURE — 99244 OFF/OP CNSLTJ NEW/EST MOD 40: CPT | Performed by: INTERNAL MEDICINE
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
## 2018-12-12 LAB — HBA1C MFR BLD HPLC: 6.2 %

## 2019-01-15 ENCOUNTER — TELEPHONE (OUTPATIENT)
Dept: SLEEP CENTER | Facility: CLINIC | Age: 44
End: 2019-01-15

## 2019-01-15 NOTE — TELEPHONE ENCOUNTER
PT called me, he feels like he cant breathe while using the machine  I asked if it was in the beginning or throughout the night, he said it was throughout the night he is having a hard time with the pressure  PT has not been using the machine due to this   Please advise, he is on 4-20cm

## 2019-01-24 DIAGNOSIS — G47.33 OSA (OBSTRUCTIVE SLEEP APNEA): Primary | ICD-10-CM

## 2019-01-24 NOTE — TELEPHONE ENCOUNTER
Advised patient that he should stop using his Apap until titration study, per Dr Carolina Yost  Patient requests Monday or Sunday nights for study  Scheduled first available Sunday night   Will place patient on CX list

## 2019-01-24 NOTE — TELEPHONE ENCOUNTER
The patient will need a positive airway pressure titration study  He can stop his APAP in the meantime

## 2019-02-03 ENCOUNTER — HOSPITAL ENCOUNTER (OUTPATIENT)
Dept: SLEEP CENTER | Facility: CLINIC | Age: 44
Discharge: HOME | End: 2019-02-03
Payer: COMMERCIAL

## 2019-02-03 DIAGNOSIS — G47.33 OSA (OBSTRUCTIVE SLEEP APNEA): ICD-10-CM

## 2019-02-03 PROCEDURE — 95811 POLYSOM 6/>YRS CPAP 4/> PARM: CPT

## 2019-02-04 NOTE — PROGRESS NOTES
Sleep Study Documentation    Pre-Sleep Study       Sleep testing procedure explained to patient:YES    Patient napped prior to study:YES- less than 30 minutes  Napped after 2PM: no    Caffeine:Dayshift worker after 12PM   Caffeine use:NO    Alcohol:Dayshift workers after 5PM: Alcohol use:NO    Typical day for patient:NO       Study Documentation  Treatment   Optimal PAP pressure: 5  Leak:Small  Snore:Eliminated  REM Obtained:yes  Supplemental O2: no    Minimum SaO2 90  Baseline SaO2 96  PAP mask tried (list all)standard res med mirage fx  PAP mask choice (final)standard res med mirage fx  PAP mask type:nasal  PAP pressure at which snoring was eliminated 5  Minimum SaO2 at final PAP pressure 90  Mode of Therapy:CPAP  ETCO2:No  CPAP changed to BiPAP:No    Mode of Therapy:CPAP    EKG abnormalities: no     EEG abnormalities: no    Study Terminated:no    Patient classification: employed       Post-Sleep Study    Medication used at bedtime or during sleep study:NO    Patient reports time it took to fall asleep:less than 20 minutes    Patient reports waking up during study:3 or more times  Patient reports returning to sleep in greater than 30 minutes  Patient reports sleeping 2 to 4 hours with dreaming  Patient reports sleep during study:typical    Patient rated sleepiness: Very sleepy or tired    PAP treatment:yes: Post PAP treatment patient reports feeling unchanged and would wear PAP mask at home

## 2019-02-06 ENCOUNTER — TELEPHONE (OUTPATIENT)
Dept: SLEEP CENTER | Facility: CLINIC | Age: 44
End: 2019-02-06

## 2019-02-06 DIAGNOSIS — G47.33 OSA (OBSTRUCTIVE SLEEP APNEA): Primary | ICD-10-CM

## 2019-02-06 NOTE — TELEPHONE ENCOUNTER
Discussed study results w/ pt- will give script for pressure change to [facility], scheduled compliance f/u for 3/19 @ 1:45 in [place]

## 2019-06-22 PROCEDURE — 99284 EMERGENCY DEPT VISIT MOD MDM: CPT

## 2019-06-22 PROCEDURE — 99284 EMERGENCY DEPT VISIT MOD MDM: CPT | Performed by: EMERGENCY MEDICINE

## 2019-06-23 ENCOUNTER — HOSPITAL ENCOUNTER (EMERGENCY)
Facility: HOSPITAL | Age: 44
Discharge: HOME | End: 2019-06-23
Attending: EMERGENCY MEDICINE | Admitting: EMERGENCY MEDICINE
Payer: COMMERCIAL

## 2019-06-23 ENCOUNTER — APPOINTMENT (EMERGENCY)
Dept: CT IMAGING | Facility: HOSPITAL | Age: 44
End: 2019-06-23
Payer: COMMERCIAL

## 2019-06-23 VITALS
HEIGHT: 65 IN | WEIGHT: 211.2 LBS | TEMPERATURE: 98.2 F | HEART RATE: 77 BPM | SYSTOLIC BLOOD PRESSURE: 117 MMHG | OXYGEN SATURATION: 97 % | DIASTOLIC BLOOD PRESSURE: 68 MMHG | RESPIRATION RATE: 18 BRPM | BODY MASS INDEX: 35.19 KG/M2

## 2019-06-23 DIAGNOSIS — R20.2 PARESTHESIAS: Primary | ICD-10-CM

## 2019-06-23 DIAGNOSIS — M54.5 ACUTE MIDLINE LOW BACK PAIN, WITH SCIATICA PRESENCE UNSPECIFIED: ICD-10-CM

## 2019-06-23 PROCEDURE — 96374 THER/PROPH/DIAG INJ IV PUSH: CPT

## 2019-06-23 PROCEDURE — 72128 CT CHEST SPINE W/O DYE: CPT

## 2019-06-23 PROCEDURE — NC001 PR NO CHARGE: Performed by: EMERGENCY MEDICINE

## 2019-06-23 PROCEDURE — 72131 CT LUMBAR SPINE W/O DYE: CPT

## 2019-06-23 RX ORDER — ACETAMINOPHEN 325 MG/1
975 TABLET ORAL ONCE
Status: COMPLETED | OUTPATIENT
Start: 2019-06-23 | End: 2019-06-23

## 2019-06-23 RX ORDER — KETOROLAC TROMETHAMINE 30 MG/ML
15 INJECTION, SOLUTION INTRAMUSCULAR; INTRAVENOUS ONCE
Status: COMPLETED | OUTPATIENT
Start: 2019-06-23 | End: 2019-06-23

## 2019-06-23 RX ORDER — IBUPROFEN 200 MG
600 TABLET ORAL EVERY 6 HOURS PRN
COMMUNITY

## 2019-06-23 RX ADMIN — DICLOFENAC 2 G: 10 GEL TOPICAL at 05:55

## 2019-06-23 RX ADMIN — KETOROLAC TROMETHAMINE 15 MG: 30 INJECTION, SOLUTION INTRAMUSCULAR at 05:59

## 2019-06-23 RX ADMIN — ACETAMINOPHEN 975 MG: 325 TABLET, FILM COATED ORAL at 05:54

## 2019-06-25 ENCOUNTER — OFFICE VISIT (OUTPATIENT)
Dept: FAMILY MEDICINE CLINIC | Facility: CLINIC | Age: 44
End: 2019-06-25
Payer: COMMERCIAL

## 2019-06-25 VITALS
WEIGHT: 209 LBS | RESPIRATION RATE: 16 BRPM | DIASTOLIC BLOOD PRESSURE: 66 MMHG | TEMPERATURE: 96.7 F | SYSTOLIC BLOOD PRESSURE: 114 MMHG | HEIGHT: 65 IN | BODY MASS INDEX: 34.82 KG/M2 | HEART RATE: 78 BPM
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
## 2019-06-25 DIAGNOSIS — M54.50 LUMBAR PAIN WITH RADIATION DOWN BOTH LEGS: Primary | ICD-10-CM

## 2019-06-25 DIAGNOSIS — M79.605 LUMBAR PAIN WITH RADIATION DOWN BOTH LEGS: Primary | ICD-10-CM

## 2019-06-25 DIAGNOSIS — M79.604 LUMBAR PAIN WITH RADIATION DOWN BOTH LEGS: Primary | ICD-10-CM

## 2019-06-25 DIAGNOSIS — Z23 NEED FOR VACCINATION: ICD-10-CM

## 2019-06-25 PROCEDURE — 90471 IMMUNIZATION ADMIN: CPT

## 2019-06-25 PROCEDURE — 3008F BODY MASS INDEX DOCD: CPT | Performed by: PHYSICIAN ASSISTANT

## 2019-06-25 PROCEDURE — 90715 TDAP VACCINE 7 YRS/> IM: CPT

## 2019-06-25 PROCEDURE — 99214 OFFICE O/P EST MOD 30 MIN: CPT | Performed by: PHYSICIAN ASSISTANT

## 2019-06-25 PROCEDURE — 1036F TOBACCO NON-USER: CPT | Performed by: PHYSICIAN ASSISTANT

## 2020-07-16 ENCOUNTER — OFFICE VISIT (OUTPATIENT)
Dept: FAMILY MEDICINE CLINIC | Facility: CLINIC | Age: 45
End: 2020-07-16
Payer: COMMERCIAL

## 2020-07-16 VITALS
HEART RATE: 92 BPM | SYSTOLIC BLOOD PRESSURE: 112 MMHG | TEMPERATURE: 97.2 F | WEIGHT: 213 LBS | RESPIRATION RATE: 16 BRPM | BODY MASS INDEX: 35.45 KG/M2 | DIASTOLIC BLOOD PRESSURE: 66 MMHG

## 2020-07-16 DIAGNOSIS — M72.2 PLANTAR FASCIITIS OF LEFT FOOT: Primary | ICD-10-CM

## 2020-07-16 DIAGNOSIS — E78.1 HYPERTRIGLYCERIDEMIA: ICD-10-CM

## 2020-07-16 DIAGNOSIS — R73.01 IMPAIRED FASTING GLUCOSE: ICD-10-CM

## 2020-07-16 DIAGNOSIS — N52.9 ERECTILE DYSFUNCTION, UNSPECIFIED ERECTILE DYSFUNCTION TYPE: ICD-10-CM

## 2020-07-16 DIAGNOSIS — R63.5 WEIGHT GAIN: ICD-10-CM

## 2020-07-16 PROCEDURE — 1036F TOBACCO NON-USER: CPT | Performed by: FAMILY MEDICINE

## 2020-07-16 PROCEDURE — 99214 OFFICE O/P EST MOD 30 MIN: CPT | Performed by: FAMILY MEDICINE

## 2020-07-16 RX ORDER — TADALAFIL 10 MG/1
10 TABLET ORAL DAILY PRN
Qty: 6 TABLET | Refills: 5 | Status: SHIPPED | OUTPATIENT
Start: 2020-07-16 | End: 2021-01-25 | Stop reason: SDUPTHER
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
## 2020-07-17 ENCOUNTER — TELEPHONE (OUTPATIENT)
Dept: FAMILY MEDICINE CLINIC | Facility: CLINIC | Age: 45
End: 2020-07-17

## 2020-07-17 NOTE — TELEPHONE ENCOUNTER
Call addressed multiple issues at his last visit  Not diarrhea issue  There are other tests for recurrent diarrhea including colonoscopy   Can try daily Cholestyramine powder daily this may allow him to cut down on his Imodium use

## 2020-07-18 LAB
ALBUMIN SERPL-MCNC: 4.5 G/DL (ref 3.6–5.1)
ALBUMIN/GLOB SERPL: 1.6 (CALC) (ref 1–2.5)
ALP SERPL-CCNC: 68 U/L (ref 36–130)
ALT SERPL-CCNC: 50 U/L (ref 9–46)
AST SERPL-CCNC: 27 U/L (ref 10–40)
BILIRUB SERPL-MCNC: 0.7 MG/DL (ref 0.2–1.2)
BUN SERPL-MCNC: 12 MG/DL (ref 7–25)
BUN/CREAT SERPL: ABNORMAL (CALC) (ref 6–22)
CALCIUM SERPL-MCNC: 9.1 MG/DL (ref 8.6–10.3)
CHLORIDE SERPL-SCNC: 104 MMOL/L (ref 98–110)
CHOLEST SERPL-MCNC: 182 MG/DL
CHOLEST/HDLC SERPL: 4.9 (CALC)
CO2 SERPL-SCNC: 28 MMOL/L (ref 20–32)
CREAT SERPL-MCNC: 0.97 MG/DL (ref 0.6–1.35)
GLOBULIN SER CALC-MCNC: 2.8 G/DL (CALC) (ref 1.9–3.7)
GLUCOSE SERPL-MCNC: 127 MG/DL (ref 65–99)
HBA1C MFR BLD: 7.3 % OF TOTAL HGB
HDLC SERPL-MCNC: 37 MG/DL
LDLC SERPL CALC-MCNC: 104 MG/DL (CALC)
NONHDLC SERPL-MCNC: 145 MG/DL (CALC)
POTASSIUM SERPL-SCNC: 4.2 MMOL/L (ref 3.5–5.3)
PROT SERPL-MCNC: 7.3 G/DL (ref 6.1–8.1)
SL AMB EGFR AFRICAN AMERICAN: 110 ML/MIN/1.73M2
SL AMB EGFR NON AFRICAN AMERICAN: 95 ML/MIN/1.73M2
SODIUM SERPL-SCNC: 138 MMOL/L (ref 135–146)
TRIGL SERPL-MCNC: 310 MG/DL
TSH SERPL-ACNC: 1.26 MIU/L (ref 0.4–4.5)

## 2020-07-18 PROCEDURE — 3051F HG A1C>EQUAL 7.0%<8.0%: CPT | Performed by: FAMILY MEDICINE

## 2020-07-20 RX ORDER — CHOLESTYRAMINE 4 G/9G
1 POWDER, FOR SUSPENSION ORAL
Qty: 100 PACKET | Refills: 5 | Status: CANCELLED | OUTPATIENT
Start: 2020-07-20

## 2020-07-20 RX ORDER — CHOLESTYRAMINE 4 G/9G
1 POWDER, FOR SUSPENSION ORAL
COMMUNITY
End: 2020-07-21

## 2020-07-20 NOTE — TELEPHONE ENCOUNTER
Call re labs  FBS and A1c consistent with type 2 DM  A1c 7 3  TGs 310 < 150 decreased from 519  Chemistry profileto  slightly elevated liver enzyme AST at 50 normal 9 to 46 likely secondary to diabetes and high triglycerides  Plan options start medication for type 2 DM versus diet therapy x 3 months  Repeat FBS, A1c LFTs and lipid profile  in 3 months  start Omega 3 fatty acids 1,000 mg  2 capsules/day to help lower triglycerides       Recent Results (from the past 168 hour(s))   Lipid panel    Collection Time: 07/17/20 10:50 AM   Result Value Ref Range    Total Cholesterol 182 <200 mg/dL    HDL 37 (L) > OR = 40 mg/dL    Triglycerides 310 (H) <150 mg/dL    LDL Calculated 104 (H) mg/dL (calc)    Chol HDLC Ratio 4 9 <5 0 (calc)    Non-HDL Cholesterol 145 (H) <130 mg/dL (calc)   Comprehensive metabolic panel    Collection Time: 07/17/20 10:50 AM   Result Value Ref Range    Glucose, Random 127 (H) 65 - 99 mg/dL    BUN 12 7 - 25 mg/dL    Creatinine 0 97 0 60 - 1 35 mg/dL    eGFR Non  95 > OR = 60 mL/min/1 73m2    eGFR  110 > OR = 60 mL/min/1 73m2    SL AMB BUN/CREATININE RATIO NOT APPLICABLE 6 - 22 (calc)    Sodium 138 135 - 146 mmol/L    Potassium 4 2 3 5 - 5 3 mmol/L    Chloride 104 98 - 110 mmol/L    CO2 28 20 - 32 mmol/L    Calcium 9 1 8 6 - 10 3 mg/dL    Protein, Total 7 3 6 1 - 8 1 g/dL    Albumin 4 5 3 6 - 5 1 g/dL    Globulin 2 8 1 9 - 3 7 g/dL (calc)    Albumin/Globulin Ratio 1 6 1 0 - 2 5 (calc)    TOTAL BILIRUBIN 0 7 0 2 - 1 2 mg/dL    Alkaline Phosphatase 68 36 - 130 U/L    AST 27 10 - 40 U/L    ALT 50 (H) 9 - 46 U/L   TSH, 3rd generation with Free T4 reflex    Collection Time: 07/17/20 10:50 AM   Result Value Ref Range    TSH W/RFX TO FREE T4 1 26 0 40 - 4 50 mIU/L   Hemoglobin A1c (w/out EAG)    Collection Time: 07/17/20 10:50 AM   Result Value Ref Range    Hemoglobin A1C 7 3 (H) <5 7 % of total Hgb     Lab Results   Component Value Date    CHOLESTEROL 182 07/17/2020 CHOLESTEROL 183 08/24/2018     Lab Results   Component Value Date    HDL 37 (L) 07/17/2020    HDL 29 (L) 08/24/2018     Lab Results   Component Value Date    TRIG 310 (H) 07/17/2020    TRIG 519 (H) 08/24/2018     No results found for: [facility]

## 2020-07-20 NOTE — TELEPHONE ENCOUNTER
Patient agreeable to the DM2 medications  Please send to [facility] in [place], I also prepped the Cholestyramine packets as well  He also scheduled an appointment with Rocky King to go over the  Newly diagnosed  Diabetes  Please place lab orders  Thank you

## 2020-07-21 ENCOUNTER — CLINICAL SUPPORT (OUTPATIENT)
Dept: FAMILY MEDICINE CLINIC | Facility: CLINIC | Age: 45
End: 2020-07-21

## 2020-07-21 DIAGNOSIS — E11.9 TYPE 2 DIABETES MELLITUS WITHOUT COMPLICATION, WITHOUT LONG-TERM CURRENT USE OF INSULIN (HCC): ICD-10-CM

## 2020-07-21 DIAGNOSIS — K52.9 CHRONIC DIARRHEA: Primary | ICD-10-CM

## 2020-07-21 RX ORDER — CHOLESTYRAMINE LIGHT 4 G/5.7G
4 POWDER, FOR SUSPENSION ORAL DAILY
Qty: 30 PACKET | Refills: 3 | Status: SHIPPED | OUTPATIENT
Start: 2020-07-21 | End: 2021-06-21

## 2020-07-21 RX ORDER — METFORMIN HYDROCHLORIDE 500 MG/1
500 TABLET, EXTENDED RELEASE ORAL
Qty: 90 TABLET | Refills: 0 | Status: SHIPPED | OUTPATIENT
Start: 2020-07-21 | End: 2022-06-21 | Stop reason: ALTCHOICE

## 2020-07-21 NOTE — PROGRESS NOTES
[address] [facility]   Ashanti Cruz, PharmD       Reason for visit: Appointment with 40y o  year old for management of T2DM monitoring efficacy and tolerability of anti-diabetic medications  Current DM Regimen:   None - just diagnosed yesterday    ASSESSMENT/PLAN                                                                                     1  Type 2 diabetes: goal A1c <6 5 based on age, duration, co-morbidities, and microvascular complications  Most recent A1c above goal 7 3 (July 2020) but not reflective of current treatment  Duration: 1 day since diagnosis  Microvascular complications: none noted  Macrovascular complications: none noted  (No) Aspirin (No) Statin (No) ACEI/ARB  Eye Exam:  overdue  Foot Exam: overdue     Most recent labs and diabetes goals discussed with patient in clinic today   MEDICATIONS: If PCP is in agreeance, start metformin 500mg once daily with dinner  o Will pend rx for PCP signature/concurrence   SMBG: not conducting at the moment   TLCs: Re-enforced the importance of a Diabetic Diet, exercise 30 minutes 5 days a week as tolerated, weight loss/control    REFERRALS PLACED: clinical pharmacy per CPA      2  Hyperlipidemia without clinical ASCVD event: 2018 ACC/AHA lipid guidelines recommend no statin  The 10-year ASCVD risk score (Dalila Irizarry et al , 2013) is: 1 8%    Values used to calculate the score:      Age: 40 years      Sex: Male      Is Non- : No      Diabetic: No      Tobacco smoker: No      Systolic Blood Pressure: 012 mmHg      Is BP treated: No      HDL Cholesterol: 37 mg/dL      Total Cholesterol: 182 mg/dL     Continue omega 3 for triglyceride reduction as prescribed      4  Medication Reconciliation: Medication list reviewed with pt at today's visit  Discrepancies as discussed below  - Medication list updated to reflect medications pt is currently taking      5   TLC:  - Medication adherence is excellent  - Eating habits are fair  - Exercise habits are poor    6  Preventative Care  Immunizations:seasonal flu in Fall at next PCP appt - October, PPSV23 at next PCP appt  Referrals:none  Labs:October repeat BMP, A1C, lipid panel, and urine microalbumin    Patient-specific goals to achieve prior to next visit:  1  Start metformin 500mg daily with dinner  2  Start journaling your meals to track patterns and identify easy changes that can be made    Follow-up with pharmacy in 2 weeks        SUBJECTIVE                                                                                                            Diagnosed with diabetes: July 2020  ASCVD History: none    Previous DM medications/tolerability:    none  He denies hypoglycemia and admits hyperglycemia symptoms  Recent symptoms include: increased fatigue, polydipsia and polyphagia  Past symptoms include: increased fatigue, polydipsia, polyphagia and polyuria Hypoglycemia treatment includes: glass of milk or juice        Eating habits:He has not seen dietician/nutritionist     Barriers to improving diet: works as a , around lots of carbs, irregular eating schedule    Exercise habits: He is not active  (Goal at least 150 minutes per week)  Previous habits: none   Barriers to improving exercise: busy, irregular work schedule       reports that he has never smoked  He has never used smokeless tobacco     Immunization History   Administered Date(s) Administered    Tdap 06/25/2019       Allergies:      Allergies   Allergen Reactions    Asa [Aspirin] GI Intolerance    Penicillin G     Penicillins        Current Outpatient Medications on File Prior to Visit   Medication Sig Dispense Refill    cholestyramine (QUESTRAN) 4 g packet Take 1 packet by mouth 3 (three) times a day with meals      OMEGA-3 FATTY ACIDS PO Take 1,000 mg by mouth 2 (two) times a day      diclofenac sodium (VOLTAREN) 1 % Apply 2 g topically 3 (three) times a day as needed (low back pain) 100 g 0    ibuprofen (MOTRIN) 200 mg tablet Take 600 mg by mouth every 6 (six) hours as needed for mild pain or moderate pain      Loperamide HCl (IMODIUM A-D PO) Take 1 tablet by mouth 4 (four) times a day      tadalafil (CIALIS) 10 MG tablet Take 1 tablet (10 mg total) by mouth daily as needed for erectile dysfunction 6 tablet 5     No current facility-administered medications on file prior to visit  I have reviewed the patient's allergies, medications and history as noted in the electronic medical record and updated as necessary  1  Medication Adherence: Medication list reviewed with patient, reports the following discrepancies/problems:   None noted    Misses doses:  no      2  Medication Efficacy:      SMBG readings not conducted         Social History     Tobacco Use   Smoking Status Never Smoker   Smokeless Tobacco Never Used     Social History     Substance and Sexual Activity   Alcohol Use No          OBJECTIVE                                                                                                          Vital Signs:   Wt Readings from Last 3 Encounters:   07/16/20 96 6 kg (213 lb)   06/25/19 94 8 kg (209 lb)   06/23/19 95 8 kg (211 lb 3 2 oz)      BP Readings from Last 3 Encounters:   07/16/20 112/66   06/25/19 114/66   06/23/19 117/68      Pulse Readings from Last 3 Encounters:   07/16/20 92   06/25/19 78   06/23/19 77           Pertinent Lab Data:   Patient was fasting for most recent labs  Hemoglobin A1C   Date Value   07/17/2020 7 3 % of total Hgb (H)   12/12/2018 6 2 % (H)   12/12/2018 6 2     Glucose (mg/dL)   Date Value   12/13/2014 126      Total Cholesterol (mg/dL)   Date Value   07/17/2020 182   08/24/2018 183     Triglycerides (mg/dL)   Date Value   07/17/2020 310 (H)   08/24/2018 519 (H)     HDL (mg/dL)   Date Value   07/17/2020 37 (L)   08/24/2018 29 (L)     LDL Calculated (mg/dL (calc))   Date Value   07/17/2020 104 (H)   08/24/2018      Comment:        LDL cholesterol not calculated  Triglyceride levels  greater than 400 mg/dL invalidate calculated LDL results  Reference range: <100     Desirable range <100 mg/dL for primary prevention;    <70 mg/dL for patients with CHD or diabetic patients   with > or = 2 CHD risk factors  LDL-C is now calculated using the Alex-Puente   calculation, which is a validated novel method providing   better accuracy than the Friedewald equation in the   estimation of LDL-C  Bert Zhao  [place] [id number];[phone]): [phone]   (http://[employer]/faq/ZKU487)       No components found for: CREATSERUM, CREATFLUID, CREATADULT, MICROALBUMIN, MICROALBUPOC, POTASSIUM  ALT (U/L)   Date Value   07/17/2020 50 (H)     AST (U/L)   Date Value   07/17/2020 27     Alkaline Phosphatase (U/L)   Date Value   07/17/2020 68      No results found for: [id number]    Microalbumin/Creatinine: No results found for: [facility]          Preventative Care:  Last dilated eye exam (annually): unknown  Last foot exam (annually): unknown  Last dental exam (every 6 months): unknown  Last microalbumin (annually, goal <30 mg/gm): unknown  Influenza vaccination status: Influenza: Risks and Benefits Discussed  Influenza Recommended Annually  Tetanus vaccination status:  last tetanus booster within 10 years  Pneumonia vaccination status: Pneumococcal: Risks and Benefits Discussed  Prevnar 13 due today  Hepatitis B vaccination status: unknown        Demographics  Interaction Method: Phone  Type of Intervention: New    Topic(s) Addressed  Diabetes    Intervention(s) Made    Pharmacologic:  Medication Initiation    Prevent or Manage Adverse Drug Reaction    Drug Interaction    Non-Pharmacologic:  Adherence Addressed    Preventive Care Gap Closure Recommendation    Other    Tool(s) Used  Not Applicable    Time Spent:   Time Spent in Direct Patient Care: 30 minutes    Time Spent in Care Coordination: 10 minutes    Recommendation(s) Accepted by the Patient/Caregiver:  All Accepted

## 2020-07-27 LAB
TESTOST FREE SERPL-MCNC: 56 PG/ML (ref 35–155)
TESTOST SERPL-MCNC: 249 NG/DL (ref 250–1100)

## 2020-08-03 ENCOUNTER — TELEPHONE (OUTPATIENT)
Dept: FAMILY MEDICINE CLINIC | Facility: CLINIC | Age: 45
End: 2020-08-03

## 2020-08-03 DIAGNOSIS — K52.9 CHRONIC DIARRHEA: ICD-10-CM

## 2020-08-03 DIAGNOSIS — R79.89 LOW TESTOSTERONE IN MALE: Primary | ICD-10-CM

## 2020-08-03 NOTE — TELEPHONE ENCOUNTER
Call re labs low normal testosterone at 249 range 250 to 1,100  "Free" testosterone normal at 56  This is a better indication of testosterone production  I would not recommend testosterone replacement  I would recheck levels in 3 months   Alternatively could see endocrinology     Recent Results (from the past 336 hour(s))   Testosterone, free, total    Collection Time: 07/22/20  8:11 AM   Result Value Ref Range    Testosterone, Total, LC/ (L) 250 - 1,100 ng/dL    Testosterone, Free 56 0 35 0 - 155 0 pg/mL

## 2020-08-03 NOTE — TELEPHONE ENCOUNTER
Patient called back & notified of message  He understands instructions  He wants a referral for ENDO & also for colonoscopy as discussed at last OV

## 2020-08-03 NOTE — TELEPHONE ENCOUNTER
Left message for patient to call the office  Referrals placed to Endocrinology-Dr Neftali Espinosa and to GastroenterologyDr  Yolis Nagy

## 2020-08-11 ENCOUNTER — CLINICAL SUPPORT (OUTPATIENT)
Dept: FAMILY MEDICINE CLINIC | Facility: CLINIC | Age: 45
End: 2020-08-11

## 2020-08-11 DIAGNOSIS — E11.9 TYPE 2 DIABETES MELLITUS WITHOUT COMPLICATION, WITHOUT LONG-TERM CURRENT USE OF INSULIN (HCC): Primary | ICD-10-CM

## 2020-08-11 NOTE — PROGRESS NOTES
[address]   Mukund Johnston, PharmD       Reason for visit: Appointment with 40y o  year old for management of T2DM monitoring efficacy and tolerability of anti-diabetic medications  Current DM Regimen:   Metformin 500mg daily at dinner    ASSESSMENT/PLAN                                                                                     1  Type 2 diabetes: goal A1c <6 5 based on age, duration, co-morbidities, and microvascular complications  Most recent A1c above goal 7 3 (July 2020) but not reflective of current treatment  Duration: July 2020  Microvascular complications: none noted  Macrovascular complications: none noted  (No) Aspirin (No) Statin (No) ACEI/ARB  Eye Exam:  overdue  Foot Exam: overdue     Most recent labs and diabetes goals discussed with patient in clinic today  o MEDICATIONS: Continue metformin 500mg once daily - patient wants to remain on low dose due to chronic diarrhea until next A1C in October   SMBG: not conducting at the moment   TLCs: Re-enforced the importance of a Diabetic Diet, exercise 30 minutes 5 days a week as tolerated, weight loss/control    REFERRALS PLACED: clinical pharmacy per CPA      2  Hyperlipidemia without clinical ASCVD event: 2018 ACC/AHA lipid guidelines recommend no statin  The 10-year ASCVD risk score (James Puentes et al , 2013) is: 3 4%    Values used to calculate the score:      Age: 40 years      Sex: Male      Is Non- : No      Diabetic: Yes      Tobacco smoker: No      Systolic Blood Pressure: 667 mmHg      Is BP treated: No      HDL Cholesterol: 37 mg/dL      Total Cholesterol: 182 mg/dL     Continue omega 3 for triglyceride reduction as prescribed      4  Medication Reconciliation: Medication list reviewed with pt at today's visit  Discrepancies as discussed below  - Medication list updated to reflect medications pt is currently taking      5   TLC:  - Medication adherence is excellent  - Eating habits are fair  - Exercise habits are poor    6  Preventative Care  Immunizations:seasonal flu in Fall at next PCP appt - October, PPSV23 at next PCP appt  Referrals:none  Labs:October repeat BMP, A1C, lipid panel, and urine microalbumin    Patient-specific goals to achieve prior to next visit:  1  Continue metformin 500mg daily with dinner  2  Start journaling your meals to track patterns and identify easy changes that can be made    Follow-up with pharmacy in 3 weeks          SUBJECTIVE                                                                                                            Diagnosed with diabetes: July 2020  ASCVD History: none    Previous DM medications/tolerability:    none  He denies hypoglycemia and admits hyperglycemia symptoms  Recent symptoms include: increased fatigue, polydipsia and polyphagia  Past symptoms include: increased fatigue, polydipsia, polyphagia and polyuria Hypoglycemia treatment includes: glass of milk or juice      Reports tolerating medication well, feels very tired after dose but is working on his portion control, limiting white/carbs/sugary things  Diarrhea is more management, patient has used Questran 2 times and it worked very well and is now only taking Immodium 4/day (previously was 6/day)      Eating habits:He has not seen dietician/nutritionist     Barriers to improving diet: works as a , around lots of carbs, irregular eating schedule    Exercise habits: He is not active  (Goal at least 150 minutes per week)  Previous habits: none   Barriers to improving exercise: busy, irregular work schedule       reports that he has never smoked  He has never used smokeless tobacco     Immunization History   Administered Date(s) Administered    Tdap 06/25/2019       Allergies:      Allergies   Allergen Reactions    Asa [Aspirin] GI Intolerance    Penicillin G     Penicillins        Current Outpatient Medications on File Prior to Visit   Medication Sig Dispense Refill    cholestyramine sugar free (QUESTRAN LIGHT) 4 g packet Take 1 packet (4 g total) by mouth daily 30 packet 3    ibuprofen (MOTRIN) 200 mg tablet Take 600 mg by mouth every 6 (six) hours as needed for mild pain or moderate pain      Loperamide HCl (IMODIUM A-D PO) Take 1 tablet by mouth 4 (four) times a day      OMEGA-3 FATTY ACIDS PO Take 1,000 mg by mouth 2 (two) times a day      tadalafil (CIALIS) 10 MG tablet Take 1 tablet (10 mg total) by mouth daily as needed for erectile dysfunction 6 tablet 5    [DISCONTINUED] metFORMIN (GLUCOPHAGE) 500 mg tablet Take 1 tablet (500 mg total) by mouth daily with breakfast 30 tablet 3    diclofenac sodium (VOLTAREN) 1 % Apply 2 g topically 3 (three) times a day as needed (low back pain) (Patient not taking: Reported on 8/11/2020) 100 g 0    metFORMIN (GLUCOPHAGE-XR) 500 mg 24 hr tablet Take 1 tablet (500 mg total) by mouth daily with dinner 90 tablet 0     No current facility-administered medications on file prior to visit  I have reviewed the patient's allergies, medications and history as noted in the electronic medical record and updated as necessary  1  Medication Adherence: Medication list reviewed with patient, reports the following discrepancies/problems:   None noted    Misses doses:  no      2  Medication Efficacy:      SMBG readings not conducted         Social History     Tobacco Use   Smoking Status Never Smoker   Smokeless Tobacco Never Used     Social History     Substance and Sexual Activity   Alcohol Use No          OBJECTIVE                                                                                                          Vital Signs:   Wt Readings from Last 3 Encounters:   07/16/20 96 6 kg (213 lb)   06/25/19 94 8 kg (209 lb)   06/23/19 95 8 kg (211 lb 3 2 oz)      BP Readings from Last 3 Encounters:   07/16/20 112/66   06/25/19 114/66   06/23/19 117/68      Pulse Readings from Last 3 Encounters:   07/16/20 92 06/25/19 78   06/23/19 77           Pertinent Lab Data:   Patient was fasting for most recent labs  Hemoglobin A1C   Date Value   07/17/2020 7 3 % of total Hgb (H)   12/12/2018 6 2 % (H)   12/12/2018 6 2     Glucose (mg/dL)   Date Value   12/13/2014 126      Total Cholesterol (mg/dL)   Date Value   07/17/2020 182   08/24/2018 183     Triglycerides (mg/dL)   Date Value   07/17/2020 310 (H)   08/24/2018 519 (H)     HDL (mg/dL)   Date Value   07/17/2020 37 (L)   08/24/2018 29 (L)     LDL Calculated (mg/dL (calc))   Date Value   07/17/2020 104 (H)   08/24/2018      Comment:        LDL cholesterol not calculated  Triglyceride levels  greater than 400 mg/dL invalidate calculated LDL results  Reference range: <100     Desirable range <100 mg/dL for primary prevention;    <70 mg/dL for patients with CHD or diabetic patients   with > or = 2 CHD risk factors  LDL-C is now calculated using the Alex-Puente   calculation, which is a validated novel method providing   better accuracy than the Friedewald equation in the   estimation of LDL-C  Leonardo Caballero et al  [facility]  9977;851(51): 3216-5562   (http://Cellfire/faq/XDP015)       No components found for: CREATSERUM, CREATFLUID, CREATADULT, MICROALBUMIN, MICROALBUPOC, POTASSIUM  ALT (U/L)   Date Value   07/17/2020 50 (H)     AST (U/L)   Date Value   07/17/2020 27     Alkaline Phosphatase (U/L)   Date Value   07/17/2020 68      No results found for: [id number]    Microalbumin/Creatinine: No results found for: [facility]          Preventative Care:  Last dilated eye exam (annually): unknown  Last foot exam (annually): unknown  Last dental exam (every 6 months): unknown  Last microalbumin (annually, goal <30 mg/gm): unknown  Influenza vaccination status: Influenza: Risks and Benefits Discussed  Influenza Recommended Annually  Tetanus vaccination status:  last tetanus booster within 10 years  Pneumonia vaccination status: Pneumococcal: Risks and Benefits Discussed  Prevnar 13 due today  Hepatitis B vaccination status: unknown    Demographics  Interaction Method: Phone  Type of Intervention: Follow-Up    Topic(s) Addressed  Diabetes    Intervention(s) Made    Pharmacologic:      Prevent or Manage Adverse Drug Reaction    Non-Pharmacologic:  Adherence Addressed    Preventive Care Gap Closure Recommendation    Other    Tool(s) Used  Not Applicable    Time Spent:   Time Spent in Direct Patient Care: 30 minutes    Time Spent in Care Coordination: 15 minutes    Recommendation(s) Accepted by the Patient/Caregiver:  All Accepted

## 2021-01-25 DIAGNOSIS — N52.9 ERECTILE DYSFUNCTION, UNSPECIFIED ERECTILE DYSFUNCTION TYPE: ICD-10-CM

## 2021-01-25 RX ORDER — TADALAFIL 10 MG/1
10 TABLET ORAL DAILY PRN
Qty: 6 TABLET | Refills: 5 | Status: SHIPPED | OUTPATIENT
Start: 2021-01-25 | End: 2021-04-18

## 2021-02-25 ENCOUNTER — TELEPHONE (OUTPATIENT)
Dept: PSYCHIATRY | Facility: CLINIC | Age: 46
End: 2021-02-25

## 2021-03-30 ENCOUNTER — APPOINTMENT (OUTPATIENT)
Dept: RADIOLOGY | Facility: MEDICAL CENTER | Age: 46
End: 2021-03-30
Payer: COMMERCIAL

## 2021-03-30 ENCOUNTER — OFFICE VISIT (OUTPATIENT)
Dept: URGENT CARE | Facility: MEDICAL CENTER | Age: 46
End: 2021-03-30
Payer: COMMERCIAL

## 2021-03-30 VITALS
DIASTOLIC BLOOD PRESSURE: 62 MMHG | HEIGHT: 65 IN | WEIGHT: 201 LBS | SYSTOLIC BLOOD PRESSURE: 113 MMHG | TEMPERATURE: 97.9 F | BODY MASS INDEX: 33.49 KG/M2 | RESPIRATION RATE: 18 BRPM | OXYGEN SATURATION: 99 % | HEART RATE: 69 BPM

## 2021-03-30 DIAGNOSIS — S89.91XA INJURY OF RIGHT KNEE, INITIAL ENCOUNTER: ICD-10-CM

## 2021-03-30 DIAGNOSIS — M25.461 KNEE EFFUSION, RIGHT: Primary | ICD-10-CM

## 2021-03-30 PROCEDURE — 99283 EMERGENCY DEPT VISIT LOW MDM: CPT | Performed by: PHYSICIAN ASSISTANT

## 2021-03-30 PROCEDURE — G0382 LEV 3 HOSP TYPE B ED VISIT: HCPCS | Performed by: PHYSICIAN ASSISTANT

## 2021-03-30 PROCEDURE — 99203 OFFICE O/P NEW LOW 30 MIN: CPT | Performed by: PHYSICIAN ASSISTANT

## 2021-03-30 PROCEDURE — 73564 X-RAY EXAM KNEE 4 OR MORE: CPT

## 2021-03-30 RX ORDER — NAPROXEN 500 MG/1
500 TABLET ORAL 2 TIMES DAILY WITH MEALS
Qty: 20 TABLET | Refills: 0 | Status: SHIPPED | OUTPATIENT
Start: 2021-03-30 | End: 2022-01-06

## 2021-03-30 NOTE — PATIENT INSTRUCTIONS
Right knee effusion  Rest, ice, elevate  Over the counter ibuprofen  Follow up with PCP in 3-5 days  Proceed to  ER if symptoms worsen  Ear Infection   WHAT YOU NEED TO KNOW:   An ear infection is also called otitis media  An ear infection may be caused by blocked or swollen eustachian tubes  Eustachian tubes connect the middle ear to the back of the nose and throat  They drain fluid from the middle ear  With an ear infection, fluid builds up and is infected by germs  The germs grow easily in fluid trapped behind the eardrum  DISCHARGE INSTRUCTIONS:   Call 911 or have someone call 911 for the following:   · You have a seizure  Return to the emergency department if:   · You have a fever and a stiff neck  Contact your healthcare provider if:   · Your ear pain gets worse or does not go away, even after treatment  · The outside of your ear is red or swollen  · You are vomiting or have diarrhea  · You have fluid coming from your ear  · You have questions or concerns about your condition or care  Medicines: You may  need any of the following:  · Acetaminophen  decreases pain and fever  It is available without a doctor's order  Ask how much to take and how often to take it  Follow directions  Read the labels of all other medicines you are using to see if they also contain acetaminophen, or ask your doctor or pharmacist  Acetaminophen can cause liver damage if not taken correctly  Do not use more than 4 grams (4,000 milligrams) total of acetaminophen in one day  · NSAIDs , such as ibuprofen, help decrease swelling, pain, and fever  This medicine is available with or without a doctor's order  NSAIDs can cause stomach bleeding or kidney problems in certain people  If you take blood thinner medicine, always ask your healthcare provider if NSAIDs are safe for you  Always read the medicine label and follow directions  · Ear drops  help treat your ear pain      · Antibiotics  help treat a bacterial infection that caused your ear infection  · Take your medicine as directed  Contact your healthcare provider if you think your medicine is not helping or if you have side effects  Tell him or her if you are allergic to any medicine  Keep a list of the medicines, vitamins, and herbs you take  Include the amounts, and when and why you take them  Bring the list or the pill bottles to follow-up visits  Carry your medicine list with you in case of an emergency  Heat or ice:   · Apply heat  on your ear for 15 to 20 minutes, 3 to 4 times a day or as directed  Heat helps decrease pain  · Apply ice  on your ear for 15 to 20 minutes, 3 to 4 times a day for 2 days or as directed  Use an ice pack, or put crushed ice in a plastic bag  Cover it with a towel before you apply it to your ear  Ice decreases swelling and pain  Prevent an ear infection:   · Wash your hands often  Use soap and water  Wash your hands after you use the bathroom, change a child's diapers, or sneeze  Wash your hands before you prepare or eat food  · Stay away from people who are ill  Some germs are easily and quickly spread through contact  Return to work or school: You may return to work or school when your fever is gone  Follow up with your healthcare provider as directed:  Write down your questions so you remember to ask them during your visits  © Copyright [address] Information is for End User's use only and may not be sold, redistributed or otherwise used for commercial purposes  All illustrations and images included in CareNotes® are the copyrighted property of A D A M , Inc  or [facility] Luis M Monique   The above information is an  only  It is not intended as medical advice for individual conditions or treatments  Talk to your doctor, nurse or pharmacist before following any medical regimen to see if it is safe and effective for you
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
## 2021-04-17 DIAGNOSIS — N52.9 ERECTILE DYSFUNCTION, UNSPECIFIED ERECTILE DYSFUNCTION TYPE: ICD-10-CM

## 2021-04-18 RX ORDER — TADALAFIL 10 MG/1
TABLET ORAL
Qty: 6 TABLET | Refills: 5 | Status: SHIPPED | OUTPATIENT
Start: 2021-04-18 | End: 2021-09-02

## 2021-06-20 DIAGNOSIS — K52.9 CHRONIC DIARRHEA: ICD-10-CM

## 2021-06-21 RX ORDER — CHOLESTYRAMINE LIGHT 4 G/5.7G
POWDER, FOR SUSPENSION ORAL
Qty: 30 PACKET | Refills: 3 | Status: SHIPPED | OUTPATIENT
Start: 2021-06-21 | End: 2022-01-18 | Stop reason: SDUPTHER

## 2021-09-02 ENCOUNTER — OFFICE VISIT (OUTPATIENT)
Dept: FAMILY MEDICINE CLINIC | Facility: CLINIC | Age: 46
End: 2021-09-02
Payer: COMMERCIAL

## 2021-09-02 VITALS
SYSTOLIC BLOOD PRESSURE: 118 MMHG | HEART RATE: 82 BPM | RESPIRATION RATE: 18 BRPM | DIASTOLIC BLOOD PRESSURE: 80 MMHG | OXYGEN SATURATION: 97 % | HEIGHT: 65 IN | TEMPERATURE: 97.3 F | BODY MASS INDEX: 34.32 KG/M2 | WEIGHT: 206 LBS

## 2021-09-02 DIAGNOSIS — E11.9 TYPE 2 DIABETES MELLITUS WITHOUT COMPLICATION, WITHOUT LONG-TERM CURRENT USE OF INSULIN (HCC): Primary | ICD-10-CM

## 2021-09-02 DIAGNOSIS — D22.9 ATYPICAL NEVUS: ICD-10-CM

## 2021-09-02 DIAGNOSIS — E66.09 CLASS 1 OBESITY DUE TO EXCESS CALORIES WITH SERIOUS COMORBIDITY AND BODY MASS INDEX (BMI) OF 34.0 TO 34.9 IN ADULT: ICD-10-CM

## 2021-09-02 DIAGNOSIS — N52.9 ERECTILE DYSFUNCTION, UNSPECIFIED ERECTILE DYSFUNCTION TYPE: ICD-10-CM

## 2021-09-02 DIAGNOSIS — Z11.59 NEED FOR HEPATITIS C SCREENING TEST: ICD-10-CM

## 2021-09-02 DIAGNOSIS — E78.1 HYPERTRIGLYCERIDEMIA: ICD-10-CM

## 2021-09-02 DIAGNOSIS — M25.561 CHRONIC PAIN OF RIGHT KNEE: ICD-10-CM

## 2021-09-02 DIAGNOSIS — G89.29 CHRONIC PAIN OF RIGHT KNEE: ICD-10-CM

## 2021-09-02 DIAGNOSIS — Z00.00 ROUTINE GENERAL MEDICAL EXAMINATION AT A HEALTH CARE FACILITY: ICD-10-CM

## 2021-09-02 PROBLEM — E66.811 CLASS 1 OBESITY DUE TO EXCESS CALORIES WITH SERIOUS COMORBIDITY AND BODY MASS INDEX (BMI) OF 34.0 TO 34.9 IN ADULT: Status: ACTIVE | Noted: 2021-09-02

## 2021-09-02 LAB — SL AMB POCT HEMOGLOBIN AIC: 6.4 (ref ?–6.5)

## 2021-09-02 PROCEDURE — 83036 HEMOGLOBIN GLYCOSYLATED A1C: CPT | Performed by: PHYSICIAN ASSISTANT

## 2021-09-02 PROCEDURE — 11300 SHAVE SKIN LESION 0.5 CM/<: CPT | Performed by: PHYSICIAN ASSISTANT

## 2021-09-02 PROCEDURE — 88305 TISSUE EXAM BY PATHOLOGIST: CPT | Performed by: PATHOLOGY

## 2021-09-02 PROCEDURE — 99396 PREV VISIT EST AGE 40-64: CPT | Performed by: PHYSICIAN ASSISTANT

## 2021-09-02 RX ORDER — TADALAFIL 10 MG/1
10 TABLET ORAL DAILY PRN
Qty: 20 TABLET | Refills: 1 | Status: SHIPPED | OUTPATIENT
Start: 2021-09-02 | End: 2021-10-08 | Stop reason: SDUPTHER

## 2021-09-02 NOTE — ASSESSMENT & PLAN NOTE
Currently diet controlled  Lab Results   Component Value Date    HGBA1C 6 4 09/02/2021   Pt self d/c metformin   Agreed to d/c for time being until A1C worsens  - Referral to Nutritionist to discuss diet changes  Performed foot exam today  Directed to get DM eye exam and request records

## 2021-09-02 NOTE — ASSESSMENT & PLAN NOTE
Wt Readings from Last 3 Encounters:   09/02/21 93 4 kg (206 lb)   03/30/21 91 2 kg (201 lb)   07/16/20 96 6 kg (213 lb)   Currently attempting diet change  Referral to Nutrionist  BMI Counseling: Body mass index is 34 28 kg/m²  The BMI is above normal  Nutrition recommendations include reducing portion sizes and decreasing overall calorie intake  Exercise recommendations include moderate aerobic physical activity for 150 minutes/week  Patient referred to nutritionist due to patient being obese
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
## 2021-09-08 LAB
ALBUMIN SERPL-MCNC: 4.5 G/DL (ref 3.6–5.1)
ALBUMIN/CREAT UR: 11 MCG/MG CREAT
ALBUMIN/GLOB SERPL: 1.5 (CALC) (ref 1–2.5)
ALP SERPL-CCNC: 53 U/L (ref 36–130)
ALT SERPL-CCNC: 39 U/L (ref 9–46)
AST SERPL-CCNC: 19 U/L (ref 10–40)
BASOPHILS # BLD AUTO: 29 CELLS/UL (ref 0–200)
BASOPHILS NFR BLD AUTO: 0.5 %
BILIRUB SERPL-MCNC: 0.4 MG/DL (ref 0.2–1.2)
BUN SERPL-MCNC: 13 MG/DL (ref 7–25)
BUN/CREAT SERPL: ABNORMAL (CALC) (ref 6–22)
CALCIUM SERPL-MCNC: 9.2 MG/DL (ref 8.6–10.3)
CHLORIDE SERPL-SCNC: 104 MMOL/L (ref 98–110)
CHOLEST SERPL-MCNC: 185 MG/DL
CHOLEST/HDLC SERPL: 5.1 (CALC)
CO2 SERPL-SCNC: 25 MMOL/L (ref 20–32)
CREAT SERPL-MCNC: 0.79 MG/DL (ref 0.6–1.35)
CREAT UR-MCNC: 148 MG/DL (ref 20–320)
EOSINOPHIL # BLD AUTO: 99 CELLS/UL (ref 15–500)
EOSINOPHIL NFR BLD AUTO: 1.7 %
ERYTHROCYTE [DISTWIDTH] IN BLOOD BY AUTOMATED COUNT: 13.1 % (ref 11–15)
GLOBULIN SER CALC-MCNC: 3 G/DL (CALC) (ref 1.9–3.7)
GLUCOSE SERPL-MCNC: 129 MG/DL (ref 65–99)
HCT VFR BLD AUTO: 46 % (ref 38.5–50)
HCV AB S/CO SERPL IA: 0.14
HCV AB SERPL QL IA: NORMAL
HDLC SERPL-MCNC: 36 MG/DL
HGB BLD-MCNC: 15.9 G/DL (ref 13.2–17.1)
LDLC SERPL DIRECT ASSAY-MCNC: 76 MG/DL
LYMPHOCYTES # BLD AUTO: 1508 CELLS/UL (ref 850–3900)
LYMPHOCYTES NFR BLD AUTO: 26 %
MCH RBC QN AUTO: 29.7 PG (ref 27–33)
MCHC RBC AUTO-ENTMCNC: 34.6 G/DL (ref 32–36)
MCV RBC AUTO: 85.8 FL (ref 80–100)
MICROALBUMIN UR-MCNC: 1.7 MG/DL
MONOCYTES # BLD AUTO: 441 CELLS/UL (ref 200–950)
MONOCYTES NFR BLD AUTO: 7.6 %
NEUTROPHILS # BLD AUTO: 3724 CELLS/UL (ref 1500–7800)
NEUTROPHILS NFR BLD AUTO: 64.2 %
NONHDLC SERPL-MCNC: 149 MG/DL (CALC)
PLATELET # BLD AUTO: 213 THOUSAND/UL (ref 140–400)
PMV BLD REES-ECKER: 9.1 FL (ref 7.5–12.5)
POTASSIUM SERPL-SCNC: 4 MMOL/L (ref 3.5–5.3)
PROT SERPL-MCNC: 7.5 G/DL (ref 6.1–8.1)
RBC # BLD AUTO: 5.36 MILLION/UL (ref 4.2–5.8)
SL AMB EGFR AFRICAN AMERICAN: 125 ML/MIN/1.73M2
SL AMB EGFR NON AFRICAN AMERICAN: 108 ML/MIN/1.73M2
SODIUM SERPL-SCNC: 137 MMOL/L (ref 135–146)
TRIGL SERPL-MCNC: 455 MG/DL
WBC # BLD AUTO: 5.8 THOUSAND/UL (ref 3.8–10.8)

## 2021-09-09 ENCOUNTER — TELEPHONE (OUTPATIENT)
Dept: ADMINISTRATIVE | Facility: OTHER | Age: 46
End: 2021-09-09

## 2021-09-09 NOTE — TELEPHONE ENCOUNTER
----- Message from Jamison De Souza PA-C sent at 9/9/2021  8:08 AM EDT -----  Diabetic foot exam care gap is showing not completed  A foot exam was performed at last visit on 7/2 and is seen in note  It was performed like all others which typically work  If there a reason why it is not working with this note? I would like to know if I am doing anything wrong

## 2021-09-09 NOTE — TELEPHONE ENCOUNTER
Upon review of the In Basket request we have noted that the Foot exam was done on 9/2/21  The Hm was not updated correctly  Because of this we are requesting that you forward this request/concern to the [employer] email  The Quality team members assigned to this email will be more than happy to assist you  Any additional questions or concerns should be emailed to the Practice Liaisons via [employer] email, please do not reply via In Basket      Thank you  Pam Dasilva

## 2021-09-13 ENCOUNTER — TELEPHONE (OUTPATIENT)
Dept: ADMINISTRATIVE | Facility: OTHER | Age: 46
End: 2021-09-13

## 2021-09-13 ENCOUNTER — EVALUATION (OUTPATIENT)
Dept: PHYSICAL THERAPY | Facility: CLINIC | Age: 46
End: 2021-09-13
Payer: COMMERCIAL

## 2021-09-13 DIAGNOSIS — M25.561 CHRONIC PAIN OF RIGHT KNEE: ICD-10-CM

## 2021-09-13 DIAGNOSIS — G89.29 CHRONIC PAIN OF RIGHT KNEE: ICD-10-CM

## 2021-09-13 PROCEDURE — 97162 PT EVAL MOD COMPLEX 30 MIN: CPT | Performed by: PHYSICAL THERAPIST

## 2021-09-13 PROCEDURE — 97110 THERAPEUTIC EXERCISES: CPT | Performed by: PHYSICAL THERAPIST

## 2021-09-13 NOTE — PROGRESS NOTES
PT EVALUATION    Today's date: 21  Patient name: Juana Altman  : 1975  MRN: [id number]  Referring provider: Mabel Wilkinson PA-C  Dx:   1  Chronic pain of right knee        Juana Altman is a 55 y o  male who presents with signs and symptoms consistent with referring diagnosis of subacute R knee pain  Patient presents with pain, decreased strength, decreased ROM and decreased joint mobility  Patients pain seems to be consistent with a meniscal pathology based on subjection information and objective testing as well as some medial ligamentous involvement  Will trial therapy and if unsuccessful considered imaging for further diagnosis and treatment  Due to these impairments, Patient has difficulty performing a/iadls, recreational activities and work-related activities  Patient would benefit from skilled physical therapy to address the impairments, improve their level of function, and to improve their overall quality of life  Impairments:    restricted ROM    decreased strength   pain with function   activity intolerance   weight bearing intolerance   abnormal gait     Prognosis:  Good  Positive and negative prognostic indicator(s):  none    Goals:    Short Term Goals: to be achieved by 4 weeks  1) Patient to be independent with basic HEP  2) Decrease pain to 3 and 4/10 at its worst   3) Increase LE strength by 1/2 MMT grade in all deficient planes  Long Term Goals: to be achieved by discharge  1) FOTO equal to or greater than indicating improvements with overall function  2) Patient will able to return to soccer with limited pain/discomfort with kicking in order to improve quality of life  3) Patient will able to return to higher level mountain biking with limited pain/discomfort improve quality of life  4) Stair negociation will improve to maximal level of function      Planned interventions:  home exercise program, patient education, manual therapy, flexibility, functional range of motion exercises, strengthening, abdominal trunk stabilization and balance and weight bearing training    Duration in visits:  8  Frequency: 1-2visits per week  Duration in weeks:  4-6    History of Current Injury: Patient notes that he was playing soccer with 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 July  Patient notes that he was teaching them to kick on the outside of the foot and gt his foot caught on the ground and he heard and felt a pop and then then had sharp pain that went up his leg  Patient notes that about 3 month prior to that I fell on the R knee while playing soccer and had fluid build up for a little while  Patientnotes that he got a weird spot on the top of the knee cap that was filled with pus  Patient denies getting it drained and has be resolving on its own  It the swelling is still there but it is about half the size  Patient notes that now he primarily has localized medial knee pain with some radiating pain up into the thigh  Patient notes that he has some points of the knee giving out when he pivots and turn at work  Patient denies locking or catching  Pain location: R medial   Pain descriptors: dull ache and with pressure/tightness  Pain at Currently: 3 9/10  Pain at Best: 2/10  Pain at Worst:  7-7 5/10       Aggravating factors: pivot and turning from a standing position, sleeping on his side, prolong standing/walking, kicking/passing soccer ball, mountain biking when he is putting weight through it, ascending/descending stairs, standing after sitting for awhile  Easing factors: ice, Biofreeze, hemp oil roller     Imaging: x-ray unremarkable not MRI at this time   Special Questions: Patient notes have numbness and burning on the medial knee  Hobbies/Interests: mountain biking, motorcycle riding, playing soccer  Occupation: [employer]   Patient goals: Patient reports goals for physical therapy would be decreased pain, increased strength, improved balance and return to recreation           Objective Observations     Additional Observation Details  Right knee Observation:   (+) slight swelling anterior knee   (+) scarring like area the size of quarter with a cluster of white pus looking substance; non-draining  (-) redness    (-) temperature changes       Tenderness     Right Knee   Tenderness in the MCL (distal), MCL (proximal) and medial joint line  Neurological Testing     Additional Neurological Details  (+) numbness anterior knee around the scar like structure R knee    Passive Range of Motion     Right Knee   Flexion: WFL  Extension: [facility]    Additional Passive Range of Motion Details  Pain in the medial R knee from max flexion to extension     Strength/Myotome Testing     Right Hip   Planes of Motion   Flexion: 5  Extension: 4  Abduction: 4  Adduction: 4  External rotation: 4    Isolated Muscles   Gluteus maximums: 4  Gluteus medius: 4    Left Knee   Flexion: 4+  Extension: 4+    Right Knee   Flexion: 4+  Extension: 4+    Tests     Right Knee   Positive Apley's compression, medial Bernardo, Thessaly's test at 5 degrees, Thessaly's test at 20 degrees and valgus stress test at 30 degrees  Negative anterior drawer, posterior drawer, varus stress test at 0 degrees and varus stress test at 30 degrees  Additional Tests Details  Meniscus Clusters:  History of Catching/Locking:   negative  JLT:   positive  Pain With OP Hyperextension:  positive  Pain With Max Passive Flexion:     positive  Pain or click with Bernardo's:   positive  If 5/5 92 3% chance, 3/5 75%     Bernardo's:    positive  JLT:   positive  91% SN/SP if both positive    Thessaly's:    positive  JLT:   positive  93/92%  SN/SP if both positive      Functional Assessment      Squat    Left tibial anterior translation beyond toes and right tibial anterior translation beyond toes  Precautions: cancer;  History of TBI (2017)       Manuals             Tibiofemoral distraction             patellar mobs              IASTM Neuro Re-Ed             SLS             SLS with hip iso on wall                                                                               Ther Ex             3-way hip              Leg extension machine              Knee flexion machine              bilateral leg press              Single leg press              Slider lunges                                        Ther Activity             Hip dips              Eccentric step downs              Gait Training                                       Modalities

## 2021-09-13 NOTE — TELEPHONE ENCOUNTER
Upon review of the In Basket request we were able to locate, review, and update the patient chart as requested for Diabetic Foot Exam     Any additional questions or concerns should be emailed to the Practice Liaisons via [email or web address] email, please do not reply via In Basket      Thank you  Zain Guillermo

## 2021-09-13 NOTE — TELEPHONE ENCOUNTER
----- Message from Kailey Coleman sent at 9/10/2021  3:38 PM EDT -----  Regarding: diabetic foot exam  09/10/21 3:39 PM    Hello, our patient Jing Bryan has had Diabetic Foot Exam completed/performed   Please assist in updating the patient chart by reviewing office notes in encounter section of EPIC The date of service is 09/02/2021    Thank you,  Kailey PEREYRA

## 2021-09-16 ENCOUNTER — APPOINTMENT (OUTPATIENT)
Dept: PHYSICAL THERAPY | Facility: CLINIC | Age: 46
End: 2021-09-16
Payer: COMMERCIAL

## 2021-09-20 ENCOUNTER — APPOINTMENT (OUTPATIENT)
Dept: PHYSICAL THERAPY | Facility: CLINIC | Age: 46
End: 2021-09-20
Payer: COMMERCIAL

## 2021-09-23 ENCOUNTER — OFFICE VISIT (OUTPATIENT)
Dept: PHYSICAL THERAPY | Facility: CLINIC | Age: 46
End: 2021-09-23
Payer: COMMERCIAL

## 2021-09-23 DIAGNOSIS — G89.29 CHRONIC PAIN OF RIGHT KNEE: Primary | ICD-10-CM

## 2021-09-23 DIAGNOSIS — M25.561 CHRONIC PAIN OF RIGHT KNEE: Primary | ICD-10-CM

## 2021-09-23 PROCEDURE — 97140 MANUAL THERAPY 1/> REGIONS: CPT | Performed by: PHYSICAL THERAPIST

## 2021-09-23 PROCEDURE — 97110 THERAPEUTIC EXERCISES: CPT | Performed by: PHYSICAL THERAPIST

## 2021-09-23 PROCEDURE — 97112 NEUROMUSCULAR REEDUCATION: CPT | Performed by: PHYSICAL THERAPIST

## 2021-09-23 NOTE — PROGRESS NOTES
Daily Note     Today's date: 2021  Patient name: Mauri Son  : 1975  MRN: [id number]  Referring provider: Iqra Khoury  Dx:   Encounter Diagnosis     ICD-10-CM    1  Chronic pain of right knee  M25 561     G89 29        Start Time: 830  Stop Time: 915  Total time in clinic (min): 45 minutes    Subjective: Patient reports, "I was able to do the exercises some of the days  The knee still hurts when I sleep on that side and when I pivot at work "       Objective: See treatment diary below      Assessment: Patient tolerated treatment well  Patient responded well to the introduction of exercise program this date  Patient had mild pain throughout session  Focused on knee stability and proximal hip strengthening in order to decrease pain and tension on medial knee  Patient noted decreased pain and discomfort post manual treatment  Patient would benefit from continued PT      Plan: Continue per plan of care  Precautions: cancer;  History of TBI (2017)       Manuals ACL            Tibiofemoral distraction             patellar mobs              IASTM              LLL 14W 4 min             Neuro Re-Ed             SLS             SLS with hip iso on wall  10x 5" hold                                                                              Ther Ex             3-way hip              Leg extension machine  22# single leg  3x10             Knee flexion machine  22# single leg  3x10             bilateral leg press              Single leg press  3x10 50#            Slider lunges  2x10 BWD    10x LAT            Side stepping  GTB 6x full // bars                          Ther Activity             Hip dips              Eccentric step downs              Gait Training                                       Modalities

## 2021-09-27 ENCOUNTER — OFFICE VISIT (OUTPATIENT)
Dept: PHYSICAL THERAPY | Facility: CLINIC | Age: 46
End: 2021-09-27
Payer: COMMERCIAL

## 2021-09-27 ENCOUNTER — APPOINTMENT (OUTPATIENT)
Dept: RADIOLOGY | Facility: AMBULARY SURGERY CENTER | Age: 46
End: 2021-09-27
Attending: ORTHOPAEDIC SURGERY
Payer: COMMERCIAL

## 2021-09-27 ENCOUNTER — CONSULT (OUTPATIENT)
Dept: OBGYN CLINIC | Facility: CLINIC | Age: 46
End: 2021-09-27
Payer: COMMERCIAL

## 2021-09-27 VITALS
BODY MASS INDEX: 34.32 KG/M2 | HEART RATE: 86 BPM | HEIGHT: 65 IN | SYSTOLIC BLOOD PRESSURE: 130 MMHG | DIASTOLIC BLOOD PRESSURE: 78 MMHG | WEIGHT: 206 LBS

## 2021-09-27 DIAGNOSIS — G89.29 CHRONIC PAIN OF RIGHT KNEE: ICD-10-CM

## 2021-09-27 DIAGNOSIS — M25.561 RIGHT KNEE PAIN, UNSPECIFIED CHRONICITY: Primary | ICD-10-CM

## 2021-09-27 DIAGNOSIS — G89.29 CHRONIC PAIN OF RIGHT KNEE: Primary | ICD-10-CM

## 2021-09-27 DIAGNOSIS — M25.561 CHRONIC PAIN OF RIGHT KNEE: Primary | ICD-10-CM

## 2021-09-27 DIAGNOSIS — M25.561 RIGHT KNEE PAIN, UNSPECIFIED CHRONICITY: ICD-10-CM

## 2021-09-27 DIAGNOSIS — M25.561 CHRONIC PAIN OF RIGHT KNEE: ICD-10-CM

## 2021-09-27 DIAGNOSIS — M17.11 ARTHRITIS OF RIGHT KNEE: ICD-10-CM

## 2021-09-27 PROCEDURE — 73562 X-RAY EXAM OF KNEE 3: CPT

## 2021-09-27 PROCEDURE — 97110 THERAPEUTIC EXERCISES: CPT | Performed by: PHYSICAL THERAPIST

## 2021-09-27 PROCEDURE — 97140 MANUAL THERAPY 1/> REGIONS: CPT | Performed by: PHYSICAL THERAPIST

## 2021-09-27 PROCEDURE — 99204 OFFICE O/P NEW MOD 45 MIN: CPT | Performed by: ORTHOPAEDIC SURGERY

## 2021-09-27 PROCEDURE — 3075F SYST BP GE 130 - 139MM HG: CPT | Performed by: ORTHOPAEDIC SURGERY

## 2021-09-27 PROCEDURE — 97530 THERAPEUTIC ACTIVITIES: CPT | Performed by: PHYSICAL THERAPIST

## 2021-09-27 PROCEDURE — 3078F DIAST BP <80 MM HG: CPT | Performed by: ORTHOPAEDIC SURGERY

## 2021-09-27 PROCEDURE — 97112 NEUROMUSCULAR REEDUCATION: CPT | Performed by: PHYSICAL THERAPIST

## 2021-09-27 NOTE — PROGRESS NOTES
Assessment:  1  Right knee pain, unspecified chronicity  XR knee 3 vw right non injury    T-Rom  Post Op Knee Brace   2  Chronic pain of right knee  Ambulatory referral to Orthopedic Surgery    T-Rom  Post Op Knee Brace   3  Arthritis of right knee       Patient Active Problem List   Diagnosis    Chronic diarrhea    De Quervain's tenosynovitis    Elevated ALT measurement    Fatty liver    Hypertriglyceridemia    Impingement syndrome of left shoulder    Left lumbar radiculopathy    Male erectile dysfunction    Plantar warts    Post concussion syndrome    Paresthesias    Excessive daytime sleepiness    PETTY (obstructive sleep apnea)    Type 2 diabetes mellitus without complication, without long-term current use of insulin (HCC)    Atypical nevus    Class 1 obesity due to excess calories with serious comorbidity and body mass index (BMI) of 34 0 to 34 9 in adult    Arthritis of right knee    Chronic pain of right knee           Plan        Highly suspicious of a medial collateral ligament tear there is a story that could the wound side with a possible medial meniscus tear but his exam today does not show that to be true  Talked about cortisone injection for diagnostic and treatment purposes we will hold off on that cortisone injection for now  In the meantime I have suggested that we use a T ROM brace locked in extension for 4 weeks he can put as much weight as he can tolerate on the leg but he has to wear the brace as much as possible including sleeping  He will come back to see us in 4 weeks will reexamine see if we can unlock the brace at that time at that time he will wear the brace for 2 more weeks at a unlock position and then eventually get away from the brace in its entirety  At the next visit we will also see if therapy is needed to rehabilitate his muscular strength because of being in the brace for the 4 weeks              Subjective:     Patient ID:    Chief Complaint:Jc Johnston 55 y o  male      HPI    Patient comes in today with regards to Knee  Patient was playing soccer leg rolled off the ball and all of his weight landed on the anterior superior aspect of the patella and quadriceps  This occurred back in April  Patient went to [facility] shortly thereafter  Patient was practicing soccer with his son in July and went to the ball and felt his knee pop  Medial aspect of the knee is where he felt that and he also felt numbness at that time  The patient reports that the pain is in the   Medial aspect the knee and has been going on for  Since July  The pain is rated at3 at its best and6 at its worst   The pain is described as  Heavy burning pin  Needle going up a hill on his bike pivoting on the knee and kicking the ball makes it worse  Biofreeze  CBD oil has made better  The patient has taken  nothing for treatment  The following portions of the patient's history were reviewed and updated as appropriate: allergies, current medications, past family history, past social history, past surgical history and problem list         Social History     Socioeconomic History    Marital status: /Civil Union     Spouse name: Not on file    Number of children: Not on file    Years of education: Not on file    Highest education level: Not on file   Occupational History    Not on file   Tobacco Use    Smoking status: Never Smoker    Smokeless tobacco: Never Used   Substance and Sexual Activity    Alcohol use:  Yes     Alcohol/week: 1 0 standard drinks     Types: 1 Cans of beer per week    Drug use: No    Sexual activity: Yes     Partners: Female     Birth control/protection: None   Other Topics Concern    Not on file   Social History Narrative    Not on file     Social Determinants of Health     Financial Resource Strain:     Difficulty of Paying Living Expenses:    Food Insecurity:     Worried About Running Out of Food in the Last Year:     [address] in the Last Year:    Transportation Needs:     Lack of Transportation (Medical):  Lack of Transportation (Non-Medical):    Physical Activity:     Days of Exercise per Week:     Minutes of Exercise per Session:    Stress:     Feeling of Stress :    Social Connections:     Frequency of Communication with Friends and Family:     Frequency of Social Gatherings with Friends and Family:     Attends Ten[employer] Services:     Active Member of Clubs or Organizations:     Attends Club or Organization Meetings:     Marital Status:    Intimate Partner Violence:     Fear of Current or Ex-Partner:     Emotionally Abused:     Physically Abused:     Sexually Abused:      Past Medical History:   Diagnosis Date    Cancer ([facility] )     colon    Traumatic brain injury ([facility] ) 2017    R/S 2017     Past Surgical History:   Procedure Laterality Date    APPENDECTOMY      COLON SURGERY       Allergies   Allergen Reactions    Asa [Aspirin] GI Intolerance    Penicillin G     Penicillins      Current Outpatient Medications on File Prior to Visit   Medication Sig Dispense Refill    cholestyramine sugar free (QUESTRAN LIGHT) 4 g packet TAKE 1 PACKET DAILY 30 packet 3    diclofenac sodium (VOLTAREN) 1 % Apply 2 g topically 3 (three) times a day as needed (low back pain) 100 g 0    ibuprofen (MOTRIN) 200 mg tablet Take 600 mg by mouth every 6 (six) hours as needed for mild pain or moderate pain      Loperamide HCl (IMODIUM A-D PO) Take 1 tablet by mouth 4 (four) times a day      metFORMIN (GLUCOPHAGE-XR) 500 mg 24 hr tablet Take 1 tablet (500 mg total) by mouth daily with dinner 90 tablet 0    naproxen (NAPROSYN) 500 mg tablet Take 1 tablet (500 mg total) by mouth 2 (two) times a day with meals for 10 days 20 tablet 0    OMEGA-3 FATTY ACIDS PO Take 1,000 mg by mouth 2 (two) times a day (Patient not taking: Reported on 9/2/2021)       No current facility-administered medications on file prior to visit                Objective:    Review of Systems   Constitutional: Positive for unexpected weight change  HENT: Negative  Eyes: Negative  Respiratory: Negative  Cardiovascular: Negative  Gastrointestinal: Negative  Negative for vomiting  Genitourinary: Negative  Musculoskeletal:        Please refer to HPI   Skin: Negative  Neurological: Positive for numbness  Hematological: Negative  Psychiatric/Behavioral: Negative  All other systems reviewed and are negative  Right Knee Exam     Tenderness   The patient is experiencing tenderness in the medial joint line  Range of Motion   The patient has normal right knee ROM  Tests   Bernardo:  Medial - positive Lateral - negative  Varus: negative Valgus: positive  Patellar apprehension: negative    Other   Erythema: absent  Sensation: normal  Pulse: present  Swelling: none  Effusion: effusion present    Comments: Thessaly test was negative bounce test causes some pain Bernardo's test pain was medially  Palpation was anterior medial and over the medial collateral ligament  Valgus stress also cause the same pain  Left Knee Exam     Muscle Strength   The patient has normal left knee strength  Range of Motion   The patient has normal left knee ROM  Physical Exam  Vitals and nursing note reviewed  Constitutional:       Appearance: He is well-developed  HENT:      Head: Normocephalic  Eyes:      Pupils: Pupils are equal, round, and reactive to light  Pulmonary:      Effort: Pulmonary effort is normal    Abdominal:      General: There is no distension  Musculoskeletal:      Cervical back: Neck supple  Right knee: Effusion present  Instability Tests: Medial Bernardo test positive  Lateral Bernardo test negative  Skin:     General: Skin is warm  Neurological:      Mental Status: He is alert and oriented to person, place, and time           Procedures  No Procedures performed today    I have personally reviewed pertinent films in PACS and my interpretation is No osseous deformity  Portions of the record may have been created with voice recognition software   Occasional wrong word or "sound a like" substitutions may have occurred due to the inherent limitations of voice recognition software   Read the chart carefully and recognize, using context, where substitutions have occurred

## 2021-09-27 NOTE — PROGRESS NOTES
Daily Note     Today's date: 2021  Patient name: Azra Amaya  : 1975  MRN: [id number]  Referring provider: Enrique Bennett  Dx:   Encounter Diagnosis     ICD-10-CM    1  Chronic pain of right knee  M25 561     G89 29        Start Time: 845  Stop Time: 930  Total time in clinic (min): 45 minutes    Subjective: Patient reports, "It was little tight after last time but nothing too terrible  I did have a mishap at work where I bumped into someone else and my knee twisted and then it was very painful the rest of the time  I am also having a lot of low back pain especially at night and I get numbness into my legs "       Objective: See treatment diary below      Assessment: Patient tolerated treatment well  Patient responded well to new exercises this date  Patient had mild pain throughout session  Focused on knee stability and proximal hip strengthening in order to decrease pain and tension on medial knee  Patient challenged with dynamic and single limb tasks  Patient noted decreased pain and discomfort post manual treatment  Will evaluate the lumbar spine next week for management of chronic symptoms and dysfunction  Patient would benefit from continued PT      Plan: Continue per plan of care  Precautions: cancer;  History of TBI (2017)       Manuals            Tibiofemoral distraction             patellar mobs              IASTM              LLL 14W 4 min  14W 4 min            Neuro Re-Ed             SLS  NV           SLS with hip iso on wall  10x 5" hold  10x 5" hold            SLS clamshells  RTB   2x10  RTB   2x10                                                                Ther Ex             3-way hip              Leg extension machine  22# single leg  3x10  22# single leg  3x10            Knee flexion machine  22# single leg  3x10  22# single leg  3x10            bilateral leg press              Single leg press  3x10 50# 3x10 50#           Slider lunges  2x10 BWD    10x LAT 2x10 BWD    10x LAT           Side stepping  GTB 6x full // bars  GTB 6x full // bars                         Ther Activity             Hip dips              Eccentric step downs              Bosu squats   2x10            Gait Training                                       Modalities

## 2021-10-01 ENCOUNTER — OFFICE VISIT (OUTPATIENT)
Dept: PHYSICAL THERAPY | Facility: CLINIC | Age: 46
End: 2021-10-01
Payer: COMMERCIAL

## 2021-10-01 DIAGNOSIS — G89.29 CHRONIC PAIN OF RIGHT KNEE: ICD-10-CM

## 2021-10-01 DIAGNOSIS — M25.561 CHRONIC PAIN OF RIGHT KNEE: ICD-10-CM

## 2021-10-01 DIAGNOSIS — G89.29 CHRONIC MIDLINE LOW BACK PAIN WITH BILATERAL SCIATICA: Primary | ICD-10-CM

## 2021-10-01 DIAGNOSIS — M54.42 CHRONIC MIDLINE LOW BACK PAIN WITH BILATERAL SCIATICA: Primary | ICD-10-CM

## 2021-10-01 DIAGNOSIS — M54.41 CHRONIC MIDLINE LOW BACK PAIN WITH BILATERAL SCIATICA: Primary | ICD-10-CM

## 2021-10-01 PROCEDURE — 97110 THERAPEUTIC EXERCISES: CPT | Performed by: PHYSICAL THERAPIST

## 2021-10-01 PROCEDURE — 97112 NEUROMUSCULAR REEDUCATION: CPT | Performed by: PHYSICAL THERAPIST

## 2021-10-01 PROCEDURE — 97140 MANUAL THERAPY 1/> REGIONS: CPT | Performed by: PHYSICAL THERAPIST

## 2021-10-04 ENCOUNTER — APPOINTMENT (OUTPATIENT)
Dept: PHYSICAL THERAPY | Facility: CLINIC | Age: 46
End: 2021-10-04
Payer: COMMERCIAL

## 2021-10-07 ENCOUNTER — OFFICE VISIT (OUTPATIENT)
Dept: PHYSICAL THERAPY | Facility: CLINIC | Age: 46
End: 2021-10-07
Payer: COMMERCIAL

## 2021-10-07 DIAGNOSIS — M54.42 CHRONIC MIDLINE LOW BACK PAIN WITH BILATERAL SCIATICA: ICD-10-CM

## 2021-10-07 DIAGNOSIS — M25.561 CHRONIC PAIN OF RIGHT KNEE: Primary | ICD-10-CM

## 2021-10-07 DIAGNOSIS — G89.29 CHRONIC MIDLINE LOW BACK PAIN WITH BILATERAL SCIATICA: ICD-10-CM

## 2021-10-07 DIAGNOSIS — M54.41 CHRONIC MIDLINE LOW BACK PAIN WITH BILATERAL SCIATICA: ICD-10-CM

## 2021-10-07 DIAGNOSIS — G89.29 CHRONIC PAIN OF RIGHT KNEE: Primary | ICD-10-CM

## 2021-10-07 PROCEDURE — 97110 THERAPEUTIC EXERCISES: CPT | Performed by: PHYSICAL THERAPIST

## 2021-10-07 PROCEDURE — 97530 THERAPEUTIC ACTIVITIES: CPT | Performed by: PHYSICAL THERAPIST

## 2021-10-07 PROCEDURE — 97112 NEUROMUSCULAR REEDUCATION: CPT | Performed by: PHYSICAL THERAPIST

## 2021-10-08 ENCOUNTER — OFFICE VISIT (OUTPATIENT)
Dept: PHYSICAL THERAPY | Facility: CLINIC | Age: 46
End: 2021-10-08
Payer: COMMERCIAL

## 2021-10-08 DIAGNOSIS — G89.29 CHRONIC MIDLINE LOW BACK PAIN WITH BILATERAL SCIATICA: ICD-10-CM

## 2021-10-08 DIAGNOSIS — G89.29 CHRONIC PAIN OF RIGHT KNEE: Primary | ICD-10-CM

## 2021-10-08 DIAGNOSIS — N52.9 ERECTILE DYSFUNCTION, UNSPECIFIED ERECTILE DYSFUNCTION TYPE: ICD-10-CM

## 2021-10-08 DIAGNOSIS — M54.42 CHRONIC MIDLINE LOW BACK PAIN WITH BILATERAL SCIATICA: ICD-10-CM

## 2021-10-08 DIAGNOSIS — M54.41 CHRONIC MIDLINE LOW BACK PAIN WITH BILATERAL SCIATICA: ICD-10-CM

## 2021-10-08 DIAGNOSIS — M25.561 CHRONIC PAIN OF RIGHT KNEE: Primary | ICD-10-CM

## 2021-10-08 PROCEDURE — 97530 THERAPEUTIC ACTIVITIES: CPT | Performed by: PHYSICAL THERAPIST

## 2021-10-08 PROCEDURE — 97110 THERAPEUTIC EXERCISES: CPT | Performed by: PHYSICAL THERAPIST

## 2021-10-08 PROCEDURE — 97112 NEUROMUSCULAR REEDUCATION: CPT | Performed by: PHYSICAL THERAPIST

## 2021-10-08 RX ORDER — TADALAFIL 10 MG/1
10 TABLET ORAL DAILY PRN
Qty: 20 TABLET | Refills: 1 | Status: SHIPPED | OUTPATIENT
Start: 2021-10-08 | End: 2021-12-23 | Stop reason: SDUPTHER

## 2021-10-12 ENCOUNTER — OFFICE VISIT (OUTPATIENT)
Dept: PHYSICAL THERAPY | Facility: CLINIC | Age: 46
End: 2021-10-12
Payer: COMMERCIAL

## 2021-10-12 ENCOUNTER — APPOINTMENT (OUTPATIENT)
Dept: PHYSICAL THERAPY | Facility: CLINIC | Age: 46
End: 2021-10-12
Payer: COMMERCIAL

## 2021-10-12 DIAGNOSIS — G89.29 CHRONIC MIDLINE LOW BACK PAIN WITH BILATERAL SCIATICA: ICD-10-CM

## 2021-10-12 DIAGNOSIS — M25.561 CHRONIC PAIN OF RIGHT KNEE: Primary | ICD-10-CM

## 2021-10-12 DIAGNOSIS — M54.41 CHRONIC MIDLINE LOW BACK PAIN WITH BILATERAL SCIATICA: ICD-10-CM

## 2021-10-12 DIAGNOSIS — G89.29 CHRONIC PAIN OF RIGHT KNEE: Primary | ICD-10-CM

## 2021-10-12 DIAGNOSIS — M54.42 CHRONIC MIDLINE LOW BACK PAIN WITH BILATERAL SCIATICA: ICD-10-CM

## 2021-10-12 PROCEDURE — 97112 NEUROMUSCULAR REEDUCATION: CPT

## 2021-10-12 PROCEDURE — 97110 THERAPEUTIC EXERCISES: CPT

## 2021-10-12 PROCEDURE — 97530 THERAPEUTIC ACTIVITIES: CPT

## 2021-10-15 ENCOUNTER — APPOINTMENT (OUTPATIENT)
Dept: PHYSICAL THERAPY | Facility: CLINIC | Age: 46
End: 2021-10-15
Payer: COMMERCIAL

## 2021-10-18 PROBLEM — M17.11 ARTHRITIS OF RIGHT KNEE: Status: ACTIVE | Noted: 2021-10-18

## 2021-10-18 PROBLEM — G89.29 CHRONIC PAIN OF RIGHT KNEE: Status: ACTIVE | Noted: 2021-10-18

## 2021-10-18 PROBLEM — M25.561 CHRONIC PAIN OF RIGHT KNEE: Status: ACTIVE | Noted: 2021-10-18

## 2021-10-19 ENCOUNTER — OFFICE VISIT (OUTPATIENT)
Dept: PHYSICAL THERAPY | Facility: CLINIC | Age: 46
End: 2021-10-19
Payer: COMMERCIAL

## 2021-10-19 DIAGNOSIS — G89.29 CHRONIC MIDLINE LOW BACK PAIN WITH BILATERAL SCIATICA: ICD-10-CM

## 2021-10-19 DIAGNOSIS — M54.42 CHRONIC MIDLINE LOW BACK PAIN WITH BILATERAL SCIATICA: ICD-10-CM

## 2021-10-19 DIAGNOSIS — M54.41 CHRONIC MIDLINE LOW BACK PAIN WITH BILATERAL SCIATICA: ICD-10-CM

## 2021-10-19 DIAGNOSIS — M25.561 CHRONIC PAIN OF RIGHT KNEE: Primary | ICD-10-CM

## 2021-10-19 DIAGNOSIS — G89.29 CHRONIC PAIN OF RIGHT KNEE: Primary | ICD-10-CM

## 2021-10-19 PROCEDURE — 97112 NEUROMUSCULAR REEDUCATION: CPT | Performed by: PHYSICAL THERAPIST

## 2021-10-19 PROCEDURE — 97530 THERAPEUTIC ACTIVITIES: CPT | Performed by: PHYSICAL THERAPIST

## 2021-10-19 PROCEDURE — 97110 THERAPEUTIC EXERCISES: CPT | Performed by: PHYSICAL THERAPIST

## 2021-10-21 ENCOUNTER — OFFICE VISIT (OUTPATIENT)
Dept: PHYSICAL THERAPY | Facility: CLINIC | Age: 46
End: 2021-10-21
Payer: COMMERCIAL

## 2021-10-21 DIAGNOSIS — M25.561 CHRONIC PAIN OF RIGHT KNEE: Primary | ICD-10-CM

## 2021-10-21 DIAGNOSIS — M54.42 CHRONIC MIDLINE LOW BACK PAIN WITH BILATERAL SCIATICA: ICD-10-CM

## 2021-10-21 DIAGNOSIS — G89.29 CHRONIC PAIN OF RIGHT KNEE: Primary | ICD-10-CM

## 2021-10-21 DIAGNOSIS — M54.41 CHRONIC MIDLINE LOW BACK PAIN WITH BILATERAL SCIATICA: ICD-10-CM

## 2021-10-21 DIAGNOSIS — G89.29 CHRONIC MIDLINE LOW BACK PAIN WITH BILATERAL SCIATICA: ICD-10-CM

## 2021-10-21 PROCEDURE — 97530 THERAPEUTIC ACTIVITIES: CPT | Performed by: PHYSICAL THERAPIST

## 2021-10-21 PROCEDURE — 97110 THERAPEUTIC EXERCISES: CPT | Performed by: PHYSICAL THERAPIST

## 2021-10-21 PROCEDURE — 97112 NEUROMUSCULAR REEDUCATION: CPT | Performed by: PHYSICAL THERAPIST

## 2021-10-26 ENCOUNTER — APPOINTMENT (OUTPATIENT)
Dept: PHYSICAL THERAPY | Facility: CLINIC | Age: 46
End: 2021-10-26
Payer: COMMERCIAL

## 2021-10-28 ENCOUNTER — OFFICE VISIT (OUTPATIENT)
Dept: PHYSICAL THERAPY | Facility: CLINIC | Age: 46
End: 2021-10-28
Payer: COMMERCIAL

## 2021-10-28 DIAGNOSIS — G89.29 CHRONIC MIDLINE LOW BACK PAIN WITH BILATERAL SCIATICA: ICD-10-CM

## 2021-10-28 DIAGNOSIS — M54.42 CHRONIC MIDLINE LOW BACK PAIN WITH BILATERAL SCIATICA: ICD-10-CM

## 2021-10-28 DIAGNOSIS — M25.561 CHRONIC PAIN OF RIGHT KNEE: Primary | ICD-10-CM

## 2021-10-28 DIAGNOSIS — G89.29 CHRONIC PAIN OF RIGHT KNEE: Primary | ICD-10-CM

## 2021-10-28 DIAGNOSIS — M54.41 CHRONIC MIDLINE LOW BACK PAIN WITH BILATERAL SCIATICA: ICD-10-CM

## 2021-10-28 PROCEDURE — 97112 NEUROMUSCULAR REEDUCATION: CPT | Performed by: PHYSICAL THERAPIST

## 2021-10-28 PROCEDURE — 97140 MANUAL THERAPY 1/> REGIONS: CPT | Performed by: PHYSICAL THERAPIST

## 2021-10-28 PROCEDURE — 97110 THERAPEUTIC EXERCISES: CPT | Performed by: PHYSICAL THERAPIST

## 2021-11-01 ENCOUNTER — OFFICE VISIT (OUTPATIENT)
Dept: PHYSICAL THERAPY | Facility: CLINIC | Age: 46
End: 2021-11-01
Payer: COMMERCIAL

## 2021-11-01 DIAGNOSIS — M54.42 CHRONIC MIDLINE LOW BACK PAIN WITH BILATERAL SCIATICA: Primary | ICD-10-CM

## 2021-11-01 DIAGNOSIS — G89.29 CHRONIC MIDLINE LOW BACK PAIN WITH BILATERAL SCIATICA: Primary | ICD-10-CM

## 2021-11-01 DIAGNOSIS — M54.41 CHRONIC MIDLINE LOW BACK PAIN WITH BILATERAL SCIATICA: Primary | ICD-10-CM

## 2021-11-01 PROCEDURE — 97112 NEUROMUSCULAR REEDUCATION: CPT | Performed by: PHYSICAL THERAPIST

## 2021-11-01 PROCEDURE — 97140 MANUAL THERAPY 1/> REGIONS: CPT | Performed by: PHYSICAL THERAPIST

## 2021-11-01 PROCEDURE — 97110 THERAPEUTIC EXERCISES: CPT | Performed by: PHYSICAL THERAPIST

## 2021-11-04 ENCOUNTER — OFFICE VISIT (OUTPATIENT)
Dept: PHYSICAL THERAPY | Facility: CLINIC | Age: 46
End: 2021-11-04
Payer: COMMERCIAL

## 2021-11-04 DIAGNOSIS — G89.29 CHRONIC MIDLINE LOW BACK PAIN WITH BILATERAL SCIATICA: Primary | ICD-10-CM

## 2021-11-04 DIAGNOSIS — M54.41 CHRONIC MIDLINE LOW BACK PAIN WITH BILATERAL SCIATICA: Primary | ICD-10-CM

## 2021-11-04 DIAGNOSIS — M25.561 CHRONIC PAIN OF RIGHT KNEE: ICD-10-CM

## 2021-11-04 DIAGNOSIS — M54.42 CHRONIC MIDLINE LOW BACK PAIN WITH BILATERAL SCIATICA: Primary | ICD-10-CM

## 2021-11-04 DIAGNOSIS — G89.29 CHRONIC PAIN OF RIGHT KNEE: ICD-10-CM

## 2021-11-04 PROCEDURE — 97112 NEUROMUSCULAR REEDUCATION: CPT | Performed by: PHYSICAL THERAPIST

## 2021-11-04 PROCEDURE — 97110 THERAPEUTIC EXERCISES: CPT | Performed by: PHYSICAL THERAPIST

## 2021-11-04 PROCEDURE — 97530 THERAPEUTIC ACTIVITIES: CPT | Performed by: PHYSICAL THERAPIST

## 2021-11-05 ENCOUNTER — APPOINTMENT (OUTPATIENT)
Dept: PHYSICAL THERAPY | Facility: CLINIC | Age: 46
End: 2021-11-05
Payer: COMMERCIAL

## 2021-11-08 ENCOUNTER — APPOINTMENT (OUTPATIENT)
Dept: PHYSICAL THERAPY | Facility: CLINIC | Age: 46
End: 2021-11-08
Payer: COMMERCIAL

## 2021-11-11 ENCOUNTER — APPOINTMENT (OUTPATIENT)
Dept: PHYSICAL THERAPY | Facility: CLINIC | Age: 46
End: 2021-11-11
Payer: COMMERCIAL

## 2021-11-29 ENCOUNTER — EVALUATION (OUTPATIENT)
Dept: PHYSICAL THERAPY | Facility: CLINIC | Age: 46
End: 2021-11-29
Payer: COMMERCIAL

## 2021-11-29 DIAGNOSIS — M54.41 CHRONIC MIDLINE LOW BACK PAIN WITH BILATERAL SCIATICA: Primary | ICD-10-CM

## 2021-11-29 DIAGNOSIS — G89.29 CHRONIC MIDLINE LOW BACK PAIN WITH BILATERAL SCIATICA: Primary | ICD-10-CM

## 2021-11-29 DIAGNOSIS — M54.42 CHRONIC MIDLINE LOW BACK PAIN WITH BILATERAL SCIATICA: Primary | ICD-10-CM

## 2021-11-29 DIAGNOSIS — M25.561 CHRONIC PAIN OF RIGHT KNEE: ICD-10-CM

## 2021-11-29 DIAGNOSIS — G89.29 CHRONIC PAIN OF RIGHT KNEE: ICD-10-CM

## 2021-11-29 PROCEDURE — 97110 THERAPEUTIC EXERCISES: CPT | Performed by: PHYSICAL THERAPIST

## 2021-11-29 PROCEDURE — 97112 NEUROMUSCULAR REEDUCATION: CPT | Performed by: PHYSICAL THERAPIST

## 2021-11-29 PROCEDURE — 97530 THERAPEUTIC ACTIVITIES: CPT | Performed by: PHYSICAL THERAPIST

## 2021-12-02 ENCOUNTER — OFFICE VISIT (OUTPATIENT)
Dept: PHYSICAL THERAPY | Facility: CLINIC | Age: 46
End: 2021-12-02
Payer: COMMERCIAL

## 2021-12-02 DIAGNOSIS — G89.29 CHRONIC PAIN OF RIGHT KNEE: ICD-10-CM

## 2021-12-02 DIAGNOSIS — G89.29 CHRONIC MIDLINE LOW BACK PAIN WITH BILATERAL SCIATICA: Primary | ICD-10-CM

## 2021-12-02 DIAGNOSIS — M54.42 CHRONIC MIDLINE LOW BACK PAIN WITH BILATERAL SCIATICA: Primary | ICD-10-CM

## 2021-12-02 DIAGNOSIS — M54.41 CHRONIC MIDLINE LOW BACK PAIN WITH BILATERAL SCIATICA: Primary | ICD-10-CM

## 2021-12-02 DIAGNOSIS — M25.561 CHRONIC PAIN OF RIGHT KNEE: ICD-10-CM

## 2021-12-02 PROCEDURE — 97112 NEUROMUSCULAR REEDUCATION: CPT | Performed by: PHYSICAL THERAPIST

## 2021-12-02 PROCEDURE — 97140 MANUAL THERAPY 1/> REGIONS: CPT | Performed by: PHYSICAL THERAPIST

## 2021-12-02 PROCEDURE — 97110 THERAPEUTIC EXERCISES: CPT | Performed by: PHYSICAL THERAPIST

## 2021-12-07 ENCOUNTER — OFFICE VISIT (OUTPATIENT)
Dept: PHYSICAL THERAPY | Facility: CLINIC | Age: 46
End: 2021-12-07
Payer: COMMERCIAL

## 2021-12-07 DIAGNOSIS — M54.41 CHRONIC MIDLINE LOW BACK PAIN WITH BILATERAL SCIATICA: Primary | ICD-10-CM

## 2021-12-07 DIAGNOSIS — G89.29 CHRONIC MIDLINE LOW BACK PAIN WITH BILATERAL SCIATICA: Primary | ICD-10-CM

## 2021-12-07 DIAGNOSIS — G89.29 CHRONIC PAIN OF RIGHT KNEE: ICD-10-CM

## 2021-12-07 DIAGNOSIS — M54.42 CHRONIC MIDLINE LOW BACK PAIN WITH BILATERAL SCIATICA: Primary | ICD-10-CM

## 2021-12-07 DIAGNOSIS — M25.561 CHRONIC PAIN OF RIGHT KNEE: ICD-10-CM

## 2021-12-07 PROCEDURE — 97140 MANUAL THERAPY 1/> REGIONS: CPT | Performed by: PHYSICAL THERAPIST

## 2021-12-07 PROCEDURE — 97110 THERAPEUTIC EXERCISES: CPT | Performed by: PHYSICAL THERAPIST

## 2021-12-07 PROCEDURE — 97112 NEUROMUSCULAR REEDUCATION: CPT | Performed by: PHYSICAL THERAPIST

## 2021-12-09 ENCOUNTER — OFFICE VISIT (OUTPATIENT)
Dept: PHYSICAL THERAPY | Facility: CLINIC | Age: 46
End: 2021-12-09
Payer: COMMERCIAL

## 2021-12-09 DIAGNOSIS — M54.41 CHRONIC MIDLINE LOW BACK PAIN WITH BILATERAL SCIATICA: ICD-10-CM

## 2021-12-09 DIAGNOSIS — M54.42 CHRONIC MIDLINE LOW BACK PAIN WITH BILATERAL SCIATICA: ICD-10-CM

## 2021-12-09 DIAGNOSIS — G89.29 CHRONIC PAIN OF RIGHT KNEE: Primary | ICD-10-CM

## 2021-12-09 DIAGNOSIS — M25.561 CHRONIC PAIN OF RIGHT KNEE: Primary | ICD-10-CM

## 2021-12-09 DIAGNOSIS — G89.29 CHRONIC MIDLINE LOW BACK PAIN WITH BILATERAL SCIATICA: ICD-10-CM

## 2021-12-09 PROCEDURE — 97112 NEUROMUSCULAR REEDUCATION: CPT | Performed by: PHYSICAL THERAPIST

## 2021-12-09 PROCEDURE — 97140 MANUAL THERAPY 1/> REGIONS: CPT | Performed by: PHYSICAL THERAPIST

## 2021-12-09 PROCEDURE — 97110 THERAPEUTIC EXERCISES: CPT | Performed by: PHYSICAL THERAPIST

## 2021-12-10 ENCOUNTER — TELEPHONE (OUTPATIENT)
Dept: OBGYN CLINIC | Facility: CLINIC | Age: 46
End: 2021-12-10

## 2021-12-10 LAB
LEFT EYE DIABETIC RETINOPATHY: NORMAL
RIGHT EYE DIABETIC RETINOPATHY: NORMAL
SEVERITY (EYE EXAM): NORMAL

## 2021-12-10 NOTE — TELEPHONE ENCOUNTER
Patient left a voicemail today stating that he is going to be 15-20 minutes late for his appt on Monday 12/13    Please advise  Pt can be reached at [phone]

## 2021-12-13 ENCOUNTER — OFFICE VISIT (OUTPATIENT)
Dept: OBGYN CLINIC | Facility: CLINIC | Age: 46
End: 2021-12-13
Payer: COMMERCIAL

## 2021-12-13 ENCOUNTER — APPOINTMENT (OUTPATIENT)
Dept: PHYSICAL THERAPY | Facility: CLINIC | Age: 46
End: 2021-12-13
Payer: COMMERCIAL

## 2021-12-13 VITALS — HEIGHT: 65 IN | WEIGHT: 206 LBS | BODY MASS INDEX: 34.32 KG/M2

## 2021-12-13 DIAGNOSIS — M17.11 ARTHRITIS OF RIGHT KNEE: Primary | ICD-10-CM

## 2021-12-13 PROCEDURE — 20610 DRAIN/INJ JOINT/BURSA W/O US: CPT | Performed by: ORTHOPAEDIC SURGERY

## 2021-12-13 PROCEDURE — 99212 OFFICE O/P EST SF 10 MIN: CPT | Performed by: ORTHOPAEDIC SURGERY

## 2021-12-13 RX ORDER — BETAMETHASONE SODIUM PHOSPHATE AND BETAMETHASONE ACETATE 3; 3 MG/ML; MG/ML
6 INJECTION, SUSPENSION INTRA-ARTICULAR; INTRALESIONAL; INTRAMUSCULAR; SOFT TISSUE
Status: COMPLETED | OUTPATIENT
Start: 2021-12-13 | End: 2021-12-13

## 2021-12-13 RX ORDER — BUPIVACAINE HYDROCHLORIDE 2.5 MG/ML
1 INJECTION, SOLUTION EPIDURAL; INFILTRATION; INTRACAUDAL
Status: COMPLETED | OUTPATIENT
Start: 2021-12-13 | End: 2021-12-13

## 2021-12-13 RX ORDER — LIDOCAINE HYDROCHLORIDE 10 MG/ML
1 INJECTION, SOLUTION INFILTRATION; PERINEURAL
Status: COMPLETED | OUTPATIENT
Start: 2021-12-13 | End: 2021-12-13

## 2021-12-13 RX ADMIN — LIDOCAINE HYDROCHLORIDE 1 ML: 10 INJECTION, SOLUTION INFILTRATION; PERINEURAL at 16:20

## 2021-12-13 RX ADMIN — BETAMETHASONE SODIUM PHOSPHATE AND BETAMETHASONE ACETATE 6 MG: 3; 3 INJECTION, SUSPENSION INTRA-ARTICULAR; INTRALESIONAL; INTRAMUSCULAR; SOFT TISSUE at 16:20

## 2021-12-13 RX ADMIN — BUPIVACAINE HYDROCHLORIDE 1 ML: 2.5 INJECTION, SOLUTION EPIDURAL; INFILTRATION; INTRACAUDAL at 16:20

## 2021-12-16 ENCOUNTER — OFFICE VISIT (OUTPATIENT)
Dept: PHYSICAL THERAPY | Facility: CLINIC | Age: 46
End: 2021-12-16
Payer: COMMERCIAL

## 2021-12-16 DIAGNOSIS — M54.42 CHRONIC MIDLINE LOW BACK PAIN WITH BILATERAL SCIATICA: ICD-10-CM

## 2021-12-16 DIAGNOSIS — M25.561 CHRONIC PAIN OF RIGHT KNEE: Primary | ICD-10-CM

## 2021-12-16 DIAGNOSIS — G89.29 CHRONIC MIDLINE LOW BACK PAIN WITH BILATERAL SCIATICA: ICD-10-CM

## 2021-12-16 DIAGNOSIS — G89.29 CHRONIC PAIN OF RIGHT KNEE: Primary | ICD-10-CM

## 2021-12-16 DIAGNOSIS — M54.41 CHRONIC MIDLINE LOW BACK PAIN WITH BILATERAL SCIATICA: ICD-10-CM

## 2021-12-16 PROCEDURE — 97110 THERAPEUTIC EXERCISES: CPT | Performed by: PHYSICAL THERAPIST

## 2021-12-16 PROCEDURE — 97112 NEUROMUSCULAR REEDUCATION: CPT | Performed by: PHYSICAL THERAPIST

## 2021-12-16 PROCEDURE — 97140 MANUAL THERAPY 1/> REGIONS: CPT | Performed by: PHYSICAL THERAPIST

## 2021-12-21 ENCOUNTER — APPOINTMENT (OUTPATIENT)
Dept: PHYSICAL THERAPY | Facility: CLINIC | Age: 46
End: 2021-12-21
Payer: COMMERCIAL

## 2021-12-23 ENCOUNTER — TELEMEDICINE (OUTPATIENT)
Dept: FAMILY MEDICINE CLINIC | Facility: CLINIC | Age: 46
End: 2021-12-23
Payer: COMMERCIAL

## 2021-12-23 DIAGNOSIS — Z20.822 EXPOSURE TO COVID-19 VIRUS: ICD-10-CM

## 2021-12-23 DIAGNOSIS — N52.9 ERECTILE DYSFUNCTION, UNSPECIFIED ERECTILE DYSFUNCTION TYPE: ICD-10-CM

## 2021-12-23 DIAGNOSIS — B34.9 VIRAL INFECTION, UNSPECIFIED: ICD-10-CM

## 2021-12-23 PROCEDURE — 99213 OFFICE O/P EST LOW 20 MIN: CPT | Performed by: PHYSICIAN ASSISTANT

## 2021-12-23 PROCEDURE — 87636 SARSCOV2 & INF A&B AMP PRB: CPT | Performed by: PHYSICIAN ASSISTANT

## 2021-12-23 RX ORDER — TADALAFIL 10 MG/1
10 TABLET ORAL DAILY PRN
Qty: 20 TABLET | Refills: 1 | Status: SHIPPED | OUTPATIENT
Start: 2021-12-23 | End: 2022-01-19 | Stop reason: SDUPTHER

## 2021-12-25 ENCOUNTER — NURSE TRIAGE (OUTPATIENT)
Dept: OTHER | Facility: OTHER | Age: 46
End: 2021-12-25

## 2021-12-27 ENCOUNTER — APPOINTMENT (EMERGENCY)
Dept: RADIOLOGY | Facility: HOSPITAL | Age: 46
End: 2021-12-27
Payer: COMMERCIAL

## 2021-12-27 ENCOUNTER — NURSE TRIAGE (OUTPATIENT)
Dept: OTHER | Facility: OTHER | Age: 46
End: 2021-12-27

## 2021-12-27 ENCOUNTER — HOSPITAL ENCOUNTER (EMERGENCY)
Facility: HOSPITAL | Age: 46
Discharge: HOME | End: 2021-12-27
Attending: EMERGENCY MEDICINE | Admitting: EMERGENCY MEDICINE
Payer: COMMERCIAL

## 2021-12-27 ENCOUNTER — TELEPHONE (OUTPATIENT)
Dept: FAMILY MEDICINE CLINIC | Facility: CLINIC | Age: 46
End: 2021-12-27

## 2021-12-27 VITALS
HEART RATE: 85 BPM | DIASTOLIC BLOOD PRESSURE: 81 MMHG | TEMPERATURE: 98.1 F | BODY MASS INDEX: 34.28 KG/M2 | SYSTOLIC BLOOD PRESSURE: 130 MMHG | HEIGHT: 65 IN | RESPIRATION RATE: 22 BRPM | OXYGEN SATURATION: 97 %

## 2021-12-27 DIAGNOSIS — U07.1 COVID-19: Primary | ICD-10-CM

## 2021-12-27 LAB
ALBUMIN SERPL BCP-MCNC: 3.3 G/DL (ref 3.5–5)
ALP SERPL-CCNC: 50 U/L (ref 46–116)
ALT SERPL W P-5'-P-CCNC: 39 U/L (ref 12–78)
ANION GAP SERPL CALCULATED.3IONS-SCNC: 12 MMOL/L (ref 4–13)
AST SERPL W P-5'-P-CCNC: 36 U/L (ref 5–45)
BASOPHILS # BLD AUTO: 0 THOUSANDS/ΜL (ref 0–0.1)
BASOPHILS NFR BLD AUTO: 0 % (ref 0–1)
BILIRUB SERPL-MCNC: 0.45 MG/DL (ref 0.2–1)
BUN SERPL-MCNC: 18 MG/DL (ref 5–25)
CALCIUM ALBUM COR SERPL-MCNC: 9.5 MG/DL (ref 8.3–10.1)
CALCIUM SERPL-MCNC: 8.9 MG/DL (ref 8.3–10.1)
CHLORIDE SERPL-SCNC: 101 MMOL/L (ref 100–108)
CO2 SERPL-SCNC: 24 MMOL/L (ref 21–32)
CREAT SERPL-MCNC: 1.08 MG/DL (ref 0.6–1.3)
EOSINOPHIL # BLD AUTO: 0 THOUSAND/ΜL (ref 0–0.61)
EOSINOPHIL NFR BLD AUTO: 0 % (ref 0–6)
ERYTHROCYTE [DISTWIDTH] IN BLOOD BY AUTOMATED COUNT: 12.9 % (ref 11.6–15.1)
GFR SERPL CREATININE-BSD FRML MDRD: 81 ML/MIN/1.73SQ M
GLUCOSE SERPL-MCNC: 250 MG/DL (ref 65–140)
HCT VFR BLD AUTO: 47.8 % (ref 36.5–49.3)
HGB BLD-MCNC: 16 G/DL (ref 12–17)
HOLD SPECIMEN: NORMAL
IMM GRANULOCYTES # BLD AUTO: 0.01 THOUSAND/UL (ref 0–0.2)
IMM GRANULOCYTES NFR BLD AUTO: 0 % (ref 0–2)
LYMPHOCYTES # BLD AUTO: 0.89 THOUSANDS/ΜL (ref 0.6–4.47)
LYMPHOCYTES NFR BLD AUTO: 19 % (ref 14–44)
MCH RBC QN AUTO: 28.8 PG (ref 26.8–34.3)
MCHC RBC AUTO-ENTMCNC: 33.5 G/DL (ref 31.4–37.4)
MCV RBC AUTO: 86 FL (ref 82–98)
MONOCYTES # BLD AUTO: 0.28 THOUSAND/ΜL (ref 0.17–1.22)
MONOCYTES NFR BLD AUTO: 6 % (ref 4–12)
NEUTROPHILS # BLD AUTO: 3.46 THOUSANDS/ΜL (ref 1.85–7.62)
NEUTS SEG NFR BLD AUTO: 75 % (ref 43–75)
NRBC BLD AUTO-RTO: 0 /100 WBCS
PLATELET # BLD AUTO: 186 THOUSANDS/UL (ref 149–390)
PMV BLD AUTO: 9.1 FL (ref 8.9–12.7)
POTASSIUM SERPL-SCNC: 3.5 MMOL/L (ref 3.5–5.3)
PROT SERPL-MCNC: 8 G/DL (ref 6.4–8.2)
RBC # BLD AUTO: 5.55 MILLION/UL (ref 3.88–5.62)
SODIUM SERPL-SCNC: 137 MMOL/L (ref 136–145)
WBC # BLD AUTO: 4.64 THOUSAND/UL (ref 4.31–10.16)

## 2021-12-27 PROCEDURE — 71045 X-RAY EXAM CHEST 1 VIEW: CPT

## 2021-12-27 PROCEDURE — 80053 COMPREHEN METABOLIC PANEL: CPT | Performed by: EMERGENCY MEDICINE

## 2021-12-27 PROCEDURE — 99284 EMERGENCY DEPT VISIT MOD MDM: CPT | Performed by: EMERGENCY MEDICINE

## 2021-12-27 PROCEDURE — 99283 EMERGENCY DEPT VISIT LOW MDM: CPT

## 2021-12-27 PROCEDURE — 36415 COLL VENOUS BLD VENIPUNCTURE: CPT

## 2021-12-27 PROCEDURE — 85025 COMPLETE CBC W/AUTO DIFF WBC: CPT | Performed by: EMERGENCY MEDICINE

## 2021-12-27 RX ORDER — DEXTROMETHORPHAN HYDROBROMIDE AND PROMETHAZINE HYDROCHLORIDE 15; 6.25 MG/5ML; MG/5ML
5 SOLUTION ORAL 4 TIMES DAILY PRN
Qty: 100 ML | Refills: 0 | Status: SHIPPED | OUTPATIENT
Start: 2021-12-27 | End: 2022-01-01

## 2021-12-27 NOTE — DISCHARGE INSTRUCTIONS
Please call your doctor within 24 hours to arrange for follow-up  You can purchase a home pulse-oximeter to monitor your oxygen levels at home  Return to the Emergency Department immediately if your find that your oxygen level drops below 92% for greater than 5 minutes  Regardless of your oxygen level, you should return for re-evaluation if your shortness of breath worsens       We would recommend taking the following medications:  - Vitamin D3 2000 IU daily  - Vitamin C 1g every 12 hours  - Multivitamin daily

## 2021-12-27 NOTE — ED PROVIDER NOTES
History  Chief Complaint   Patient presents with    Cough     states coughing so much he vomits, having a hard time keeping anything down, covid+ since 23rd, cough since friday      HPI     77-year-old unvaccinated male presenting for evaluation of worsening cough with post-tussive emesis in the setting of COVID-19  He tested positive 4 days ago  States that he has had fevers as high as 103°  Reports diffuse body aches and chest and back pain with coughing only  Reports mild shortness of breath  He has had a few episodes of posttussive emesis but was able to eat pizza immediately prior to arrival and has been drinking fluids  Denies diarrhea  No calf swelling or tenderness  Cough is dry and nonproductive  Reports a waxing and waning frontal headache  Prior to Admission Medications   Prescriptions Last Dose Informant Patient Reported? Taking?    Loperamide HCl (IMODIUM A-D PO)  Self Yes No   Sig: Take 1 tablet by mouth 4 (four) times a day   OMEGA-3 FATTY ACIDS PO   Yes No   Sig: Take 1,000 mg by mouth 2 (two) times a day   Patient not taking: Reported on 9/2/2021   cholestyramine sugar free (QUESTRAN LIGHT) 4 g packet   No No   Sig: TAKE 1 PACKET DAILY   diclofenac sodium (VOLTAREN) 1 %  Self No No   Sig: Apply 2 g topically 3 (three) times a day as needed (low back pain)   ibuprofen (MOTRIN) 200 mg tablet  Self Yes No   Sig: Take 600 mg by mouth every 6 (six) hours as needed for mild pain or moderate pain   metFORMIN (GLUCOPHAGE-XR) 500 mg 24 hr tablet   No No   Sig: Take 1 tablet (500 mg total) by mouth daily with dinner   naproxen (NAPROSYN) 500 mg tablet   No No   Sig: Take 1 tablet (500 mg total) by mouth 2 (two) times a day with meals for 10 days   tadalafil (CIALIS) 10 MG tablet   No No   Sig: Take 1 tablet (10 mg total) by mouth daily as needed for erectile dysfunction      Facility-Administered Medications: None       Past Medical History:   Diagnosis Date    [facility] )     colon    Traumatic brain injury ([facility] ) 2017    R/S 2017       Past Surgical History:   Procedure Laterality Date    APPENDECTOMY      COLON SURGERY         Family History   Problem Relation Age of Onset    Coronary artery disease Mother     Diabetes Mother     Hypertension Mother     Hyperlipidemia Mother     Coronary artery disease Father     Diabetes Father     Hypertension Father     Hyperlipidemia Father     Heart attack Father     Anemia Brother     No Known Problems Son     No Known Problems Son     No Known Problems Son      I have reviewed and agree with the history as documented  E-Cigarette/Vaping     E-Cigarette/Vaping Substances     Social History     Tobacco Use    Smoking status: Never Smoker    Smokeless tobacco: Never Used   Substance Use Topics    Alcohol use: Yes     Alcohol/week: 1 0 standard drink     Types: 1 Cans of beer per week    Drug use: No       Review of Systems   Constitutional: Positive for chills and fever  HENT: Negative for congestion  Eyes: Negative for visual disturbance  Respiratory: Positive for cough and shortness of breath (Mild)  Cardiovascular: Positive for chest pain ( with coughing only)  Negative for leg swelling  Gastrointestinal: Positive for vomiting ( posttussive only)  Negative for abdominal pain, diarrhea and nausea  Genitourinary: Negative for dysuria, flank pain and frequency  Musculoskeletal: Positive for myalgias ( diffuse)  Negative for arthralgias, back pain, neck pain and neck stiffness  Skin: Negative for rash  Neurological: Positive for headaches  Negative for weakness and numbness  Psychiatric/Behavioral: Negative for agitation, behavioral problems and confusion  Physical Exam  Physical Exam  Constitutional:       General: He is not in acute distress  Appearance: He is well-developed  He is not diaphoretic  HENT:      Head: Normocephalic and atraumatic        Right Ear: External ear normal       Left Ear: External ear normal       Nose: Nose normal    Eyes:      Conjunctiva/sclera: Conjunctivae normal    Cardiovascular:      Rate and Rhythm: Normal rate and regular rhythm  Heart sounds: Normal heart sounds  No murmur heard  No friction rub  No gallop  Pulmonary:      Effort: Pulmonary effort is normal  No respiratory distress  Breath sounds: Rales (Scattered) present  No wheezing  Comments: SpO2 97% at rest, 95% with ambulation  Abdominal:      General: Bowel sounds are normal  There is no distension  Palpations: Abdomen is soft  Tenderness: There is no abdominal tenderness  There is no guarding  Musculoskeletal:         General: No deformity  Normal range of motion  Cervical back: Normal range of motion and neck supple  Comments: No calf swelling or tenderness   Skin:     General: Skin is warm and dry  Neurological:      Mental Status: He is alert and oriented to person, place, and time  Motor: No abnormal muscle tone  Psychiatric:         Mood and Affect: Mood normal          Vital Signs  ED Triage Vitals [12/27/21 1131]   Temperature Pulse Respirations Blood Pressure SpO2   98 1 °F (36 7 °C) 85 22 130/81 97 %      Temp Source Heart Rate Source Patient Position - Orthostatic VS BP Location FiO2 (%)   Oral Monitor Sitting Left arm --      Pain Score       9           Vitals:    12/27/21 1131   BP: 130/81   Pulse: 85   Patient Position - Orthostatic VS: Sitting         Visual Acuity      ED Medications  Medications - No data to display    Diagnostic Studies  Results Reviewed     Procedure Component Value Units Date/Time    [place] draw [[id number]] Collected: 12/27/21 1139    Lab Status: Final result Specimen: Blood from Arm, Left Updated: 12/27/21 1301    Narrative: The following orders were created for panel order [place] draw    Procedure                               Abnormality         Status                     --------- -----------         ------                     Isabela Chavis Top on QVUA[[id number]]                           Final result               Naheed Schlein / Black tube on WDEV[[id number]]                       Final result                 Please view results for these tests on the individual orders      Comprehensive metabolic panel [[id number]]  (Abnormal) Collected: 12/27/21 1139    Lab Status: Final result Specimen: Blood from Arm, Left Updated: 12/27/21 1224     Sodium 137 mmol/L      Potassium 3 5 mmol/L      Chloride 101 mmol/L      CO2 24 mmol/L      ANION GAP 12 mmol/L      BUN 18 mg/dL      Creatinine 1 08 mg/dL      Glucose 250 mg/dL      Calcium 8 9 mg/dL      Corrected Calcium 9 5 mg/dL      AST 36 U/L      ALT 39 U/L      Alkaline Phosphatase 50 U/L      Total Protein 8 0 g/dL      Albumin 3 3 g/dL      Total Bilirubin 0 45 mg/dL      eGFR 81 ml/min/1 73sq m     Narrative:      National Kidney Disease Foundation guidelines for Chronic Kidney Disease (CKD):     Stage 1 with normal or high GFR (GFR > 90 mL/min/1 73 square meters)    Stage 2 Mild CKD (GFR = 60-89 mL/min/1 73 square meters)    Stage 3A Moderate CKD (GFR = 45-59 mL/min/1 73 square meters)    Stage 3B Moderate CKD (GFR = 30-44 mL/min/1 73 square meters)    Stage 4 Severe CKD (GFR = 15-29 mL/min/1 73 square meters)    Stage 5 End Stage CKD (GFR <15 mL/min/1 73 square meters)  Note: GFR calculation is accurate only with a steady state creatinine    CBC and differential [[id number]] Collected: 12/27/21 1139    Lab Status: Final result Specimen: Blood from Arm, Left Updated: 12/27/21 1158     WBC 4 64 Thousand/uL      RBC 5 55 Million/uL      Hemoglobin 16 0 g/dL      Hematocrit 47 8 %      MCV 86 fL      MCH 28 8 pg      MCHC 33 5 g/dL      RDW 12 9 %      MPV 9 1 fL      Platelets 314 Thousands/uL      nRBC 0 /100 WBCs      Neutrophils Relative 75 %      Immat GRANS % 0 %      Lymphocytes Relative 19 %      Monocytes Relative 6 %      Eosinophils Relative 0 %      Basophils Relative 0 %      Neutrophils Absolute 3 46 Thousands/µL      Immature Grans Absolute 0 01 Thousand/uL      Lymphocytes Absolute 0 89 Thousands/µL      Monocytes Absolute 0 28 Thousand/µL      Eosinophils Absolute 0 00 Thousand/µL      Basophils Absolute 0 00 Thousands/µL                  XR chest 1 view portable   Final Result by Kiko Manning MD (12/27 1644)      Bilateral opacities compatible with COVID-19                  Workstation performed: [id number]                    Procedures  Procedures         ED Course                               SBIRT 20yo+      Most Recent Value   SBIRT (25 yo +)    In order to provide better care to our patients, we are screening all of our patients for alcohol and drug use  Would it be okay to ask you these screening questions? Unable to answer at this time Filed at: 12/27/2021 1132                    MDM  Number of Diagnoses or Management Options  COVID-19: new and requires workup  Diagnosis management comments: Nontoxic  Afebrile and hemodynamically stable  Patient is satting well on room air both at rest and with ambulation without increased work of breathing  There are scattered rales on lung exam and faint infiltrates on chest x-ray consistent with COVID  Patient is tolerating oral intake and appears well hydrated  Labs are remarkable for hyperglycemia to 250 without evidence of DKA  Patient tells me that he has been diagnosed with type 2 diabetes in the past but that he has been managing it with his diet and recent blood work showed a normal blood glucose  I have encouraged him to follow-up with his primary care physician as soon as his quarantine period is over to have his blood sugar rechecked to assess need for starting an oral anti glycemic agent  No indication for hospital admission at this time  Patient does not meet current criteria for monoclonal antibody infusion  Will prescribe promethazine DM for management of cough    Return precautions and supportive care discussed  Amount and/or Complexity of Data Reviewed  Clinical lab tests: ordered and reviewed  Tests in the radiology section of CPT®: reviewed and ordered  Review and summarize past medical records: yes  Independent visualization of images, tracings, or specimens: yes    Patient Progress  Patient progress: stable           Disposition  Final diagnoses:   COVID-19     Time reflects when diagnosis was documented in both MDM as applicable and the Disposition within this note     Time User Action Codes Description Comment    12/27/2021  3:52 PM Kori Raymundo Add [U07 1] COVID-19       ED Disposition     ED Disposition Condition Date/Time Comment    Discharge Stable Mon Dec 27, 2021  3:52 PM 19 Melissa Doshi discharge to home/self care  Follow-up Information     Follow up With Specialties Details Why Contact Info Additional Information    [address] Emergency Department Emergency Medicine  As we discussed, return to the Emergency Department immediately for chest pain, trouble breathing, or failure to tolerate oral intake  [address] [facility] [id number] Emergency Department, [place], [place], [address] Beatrice Corley,Jeff [address], MD Family Medicine  Please follow-up with your doctor in 1 week for recheck of your blood sugar  If your blood sugar remains elevated you may need to be started on a medication for diabetes   [facility] [address]             Discharge Medication List as of 12/27/2021  4:04 PM      START taking these medications    Details   Promethazine-DM (PHENERGAN-DM) 6 25-15 mg/5 mL oral syrup Take 5 mL by mouth 4 (four) times a day as needed for cough for up to 5 days, Starting Mon 12/27/2021, Until Sat 1/1/2022 at 2359, Normal         CONTINUE these medications which have NOT CHANGED    Details   cholestyramine sugar free (QUESTRAN LIGHT) 4 g packet TAKE 1 PACKET DAILY, Normal      diclofenac sodium (VOLTAREN) 1 % Apply 2 g topically 3 (three) times a day as needed (low back pain), Starting Sun 6/23/2019, Print      ibuprofen (MOTRIN) 200 mg tablet Take 600 mg by mouth every 6 (six) hours as needed for mild pain or moderate pain, Historical Med      Loperamide HCl (IMODIUM A-D PO) Take 1 tablet by mouth 4 (four) times a day, Historical Med      metFORMIN (GLUCOPHAGE-XR) 500 mg 24 hr tablet Take 1 tablet (500 mg total) by mouth daily with dinner, Starting Tue 7/21/2020, Until Mon 10/19/2020, Normal      naproxen (NAPROSYN) 500 mg tablet Take 1 tablet (500 mg total) by mouth 2 (two) times a day with meals for 10 days, Starting Tue 3/30/2021, Until Fri 4/9/2021, Normal      OMEGA-3 FATTY ACIDS PO Take 1,000 mg by mouth 2 (two) times a day, Historical Med      tadalafil (CIALIS) 10 MG tablet Take 1 tablet (10 mg total) by mouth daily as needed for erectile dysfunction, Starting u 12/23/2021, Normal             No discharge procedures on file      PDMP Review     None          ED Provider  Electronically Signed by           Juan Frank MD  12/28/21 6102

## 2021-12-30 ENCOUNTER — TELEMEDICINE (OUTPATIENT)
Dept: FAMILY MEDICINE CLINIC | Facility: CLINIC | Age: 46
End: 2021-12-30
Payer: COMMERCIAL

## 2021-12-30 DIAGNOSIS — U07.1 COVID-19: Primary | ICD-10-CM

## 2021-12-30 PROCEDURE — G2012 BRIEF CHECK IN BY MD/QHP: HCPCS | Performed by: PHYSICIAN ASSISTANT

## 2021-12-30 RX ORDER — ALBUTEROL SULFATE 90 UG/1
2 AEROSOL, METERED RESPIRATORY (INHALATION) EVERY 6 HOURS PRN
Qty: 6.7 G | Refills: 5 | Status: SHIPPED | OUTPATIENT
Start: 2021-12-30

## 2021-12-30 RX ORDER — BUDESONIDE 180 UG/1
2 AEROSOL, POWDER RESPIRATORY (INHALATION) 2 TIMES DAILY
Qty: 1 EACH | Refills: 1 | Status: SHIPPED | OUTPATIENT
Start: 2021-12-30

## 2021-12-30 RX ORDER — BENZONATATE 200 MG/1
200 CAPSULE ORAL 3 TIMES DAILY PRN
Qty: 30 CAPSULE | Refills: 0 | Status: SHIPPED | OUTPATIENT
Start: 2021-12-30

## 2022-01-06 ENCOUNTER — OFFICE VISIT (OUTPATIENT)
Dept: FAMILY MEDICINE CLINIC | Facility: CLINIC | Age: 47
End: 2022-01-06

## 2022-01-06 VITALS
DIASTOLIC BLOOD PRESSURE: 74 MMHG | OXYGEN SATURATION: 98 % | HEIGHT: 65 IN | WEIGHT: 193.5 LBS | SYSTOLIC BLOOD PRESSURE: 122 MMHG | TEMPERATURE: 96.3 F | BODY MASS INDEX: 32.24 KG/M2 | HEART RATE: 77 BPM

## 2022-01-06 DIAGNOSIS — U07.1 COVID-19 VIRUS INFECTION: Primary | ICD-10-CM

## 2022-01-06 DIAGNOSIS — R25.1 TREMOR: ICD-10-CM

## 2022-01-06 DIAGNOSIS — R05.8 RECURRENT COUGH: ICD-10-CM

## 2022-01-06 PROCEDURE — 99214 OFFICE O/P EST MOD 30 MIN: CPT | Performed by: FAMILY MEDICINE

## 2022-01-06 PROCEDURE — 3078F DIAST BP <80 MM HG: CPT | Performed by: FAMILY MEDICINE

## 2022-01-06 PROCEDURE — 3074F SYST BP LT 130 MM HG: CPT | Performed by: FAMILY MEDICINE

## 2022-01-06 RX ORDER — GUAIFENESIN AND CODEINE PHOSPHATE 100; 10 MG/5ML; MG/5ML
10 SOLUTION ORAL 4 TIMES DAILY PRN
Qty: 118 ML | Refills: 0 | Status: SHIPPED | OUTPATIENT
Start: 2022-01-06

## 2022-01-06 RX ORDER — AZITHROMYCIN 250 MG/1
TABLET, FILM COATED ORAL
Qty: 6 TABLET | Refills: 0 | Status: SHIPPED | OUTPATIENT
Start: 2022-01-06 | End: 2022-01-10

## 2022-01-06 NOTE — PROGRESS NOTES
Assessment/Plan:     Diagnoses and all orders for this visit:    COVID-19 virus infection  -     azithromycin (ZITHROMAX) 250 mg tablet; Take 2 tablets today then 1 tablet daily x 4 days    Recurrent cough  -     guaifenesin-codeine (GUAIFENESIN AC) 100-10 MG/5ML liquid; Take 10 mL by mouth 4 (four) times a day as needed for cough    Tremor  -     TSH, 3rd generation with Free T4 reflex; Future          Continue with steroid inhaler daily along with p r n  albuterol MDI  Antibiotics to cover secondary infection script for as needed codeine cough suppressant  No work this week  He may return on 01/13/2022 if cough has improved  Check TSH evaluation of tremor if normal consider MRI of brain- suspect essential tremor     Patient ID: Wil Mesa is a 55 y o  male  Follow up visit  COV 19 infection with positive test 12/23/2021  He was seen in ER 12/27/2021 with persistent cough and vomiting  CXR showed Bilateral opacities compatible with COVID-19  He reports a persistent nonproductive cough  Short of breath with going up steps  He is currently using inhaled steroid along with as needed albuterol inhaler  Loss of taste and smell slowly improving  Decreased appetite  No further vomiting  Current medications reviewed  Type 2 DM on Metformin  mg daily  09/2021 A1c 6 4       Recent Results (from the past 504 hour(s))   COVID/FLU-Office Collect    Collection Time: 12/23/21  1:22 PM    Specimen: Nose; Nares   Result Value Ref Range    SARS-CoV-2 Positive (A) Negative    INFLUENZA A PCR Negative Negative    INFLUENZA B PCR Negative Negative   CBC and differential    Collection Time: 12/27/21 11:39 AM   Result Value Ref Range    WBC 4 64 4 31 - 10 16 Thousand/uL    RBC 5 55 3 88 - 5 62 Million/uL    Hemoglobin 16 0 12 0 - 17 0 g/dL    Hematocrit 47 8 36 5 - 49 3 %    MCV 86 82 - 98 fL    MCH 28 8 26 8 - 34 3 pg    MCHC 33 5 31 4 - 37 4 g/dL    RDW 12 9 11 6 - 15 1 %    MPV 9 1 8 9 - 12 7 fL Platelets 027 723 - 673 Thousands/uL    nRBC 0 /100 WBCs    Neutrophils Relative 75 43 - 75 %    Immat GRANS % 0 0 - 2 %    Lymphocytes Relative 19 14 - 44 %    Monocytes Relative 6 4 - 12 %    Eosinophils Relative 0 0 - 6 %    Basophils Relative 0 0 - 1 %    Neutrophils Absolute 3 46 1 85 - 7 62 Thousands/µL    Immature Grans Absolute 0 01 0 00 - 0 20 Thousand/uL    Lymphocytes Absolute 0 89 0 60 - 4 47 Thousands/µL    Monocytes Absolute 0 28 0 17 - 1 22 Thousand/µL    Eosinophils Absolute 0 00 0 00 - 0 61 Thousand/µL    Basophils Absolute 0 00 0 00 - 0 10 Thousands/µL   Comprehensive metabolic panel    Collection Time: 12/27/21 11:39 AM   Result Value Ref Range    Sodium 137 136 - 145 mmol/L    Potassium 3 5 3 5 - 5 3 mmol/L    Chloride 101 100 - 108 mmol/L    CO2 24 21 - 32 mmol/L    ANION GAP 12 4 - 13 mmol/L    BUN 18 5 - 25 mg/dL    Creatinine 1 08 0 60 - 1 30 mg/dL    Glucose 250 (H) 65 - 140 mg/dL    Calcium 8 9 8 3 - 10 1 mg/dL    Corrected Calcium 9 5 8 3 - 10 1 mg/dL    AST 36 5 - 45 U/L    ALT 39 12 - 78 U/L    Alkaline Phosphatase 50 46 - 116 U/L    Total Protein 8 0 6 4 - 8 2 g/dL    Albumin 3 3 (L) 3 5 - 5 0 g/dL    Total Bilirubin 0 45 0 20 - 1 00 mg/dL    eGFR 81 ml/min/1 73sq m   Green / Black tube on hold    Collection Time: 12/27/21 11:39 AM   Result Value Ref Range    Extra Tube Hold for add-ons  Procedure: XR chest 1 view portable    Result Date: 12/27/2021  Narrative: CHEST INDICATION:   cough, COVID +  COMPARISON:  X-ray 1/18/17 EXAM PERFORMED/VIEWS:  XR CHEST PORTABLE FINDINGS: Cardiomediastinal silhouette appears unremarkable  Scattered patchy hazy opacities noted in throughout  No pneumothorax  No large pleural effusion  Osseous structures appear within normal limits for patient age       Impression: Bilateral opacities compatible with COVID-19 Workstation performed: [id number]     Lab Results   Component Value Date    HGBA1C 6 4 09/02/2021           The following portions of the patient's history were reviewed and updated as appropriate: allergies, current medications, past family history, past medical history, past social history, past surgical history and problem list     Review of Systems   Constitutional: Positive for appetite change (see HPI ) and unexpected weight change (7 lb weight loss from last month )  Negative for chills and fever  HENT: Positive for congestion  Negative for ear pain, postnasal drip, rhinorrhea, sinus pain and sore throat  Respiratory: Positive for cough and shortness of breath  Negative for wheezing  See HPI    Cardiovascular: Negative for chest pain and palpitations  Gastrointestinal: Negative for diarrhea, nausea and vomiting  Musculoskeletal: Negative for myalgias  Skin: Negative for rash  Neurological: Positive for tremors  Negative for headaches  Persistent hand tremor right greater than left starting before COVID  Symptoms worse with trying to eat, write  No head tremor  No FH tremor  Hematological: Negative for adenopathy  Psychiatric/Behavioral: Positive for sleep disturbance (cough affecting sleep )  Objective:      /74 (BP Location: Left arm, Patient Position: Sitting, Cuff Size: Standard)   Pulse 77   Temp (!) 96 3 °F (35 7 °C)   Ht 5' 5" (1 651 m)   Wt 87 8 kg (193 lb 8 oz)   SpO2 98%   BMI 32 20 kg/m²     Wt Readings from Last 3 Encounters:   01/06/22 87 8 kg (193 lb 8 oz)   12/13/21 93 4 kg (206 lb)   09/27/21 93 4 kg (206 lb)      Physical Exam  Vitals and nursing note reviewed  Constitutional:       General: He is not in acute distress  Appearance: He is well-developed  HENT:      Right Ear: Tympanic membrane and ear canal normal       Left Ear: Tympanic membrane and ear canal normal       Nose:      Right Sinus: No maxillary sinus tenderness or frontal sinus tenderness  Left Sinus: No maxillary sinus tenderness or frontal sinus tenderness        Mouth/Throat:      Mouth: No oral lesions  Pharynx: No posterior oropharyngeal erythema  Eyes:      General: No scleral icterus  Conjunctiva/sclera: Conjunctivae normal    Neck:      Thyroid: No thyroid mass or thyromegaly  Cardiovascular:      Rate and Rhythm: Normal rate and regular rhythm  Heart sounds: Normal heart sounds  No murmur heard  No gallop  Pulmonary:      Effort: No respiratory distress  Breath sounds: Normal breath sounds  No wheezing or rales  Lymphadenopathy:      Cervical: No cervical adenopathy  Skin:     Findings: No rash  Neurological:      Mental Status: He is alert and oriented to person, place, and time  Motor: Tremor present  Coordination: Finger-Nose-Finger Test normal       Comments: + postural tremor  No rest tremor  No cog wheeling or rigidity   No head tremor    Psychiatric:         Mood and Affect: Mood normal          Behavior: Behavior normal

## 2022-01-10 ENCOUNTER — TELEPHONE (OUTPATIENT)
Dept: FAMILY MEDICINE CLINIC | Facility: CLINIC | Age: 47
End: 2022-01-10

## 2022-01-10 NOTE — TELEPHONE ENCOUNTER
Patient calling because we were supposed to write a letter regarding how he hurt his knee and back and how he needed light duty   patient would like to pick it up so we need to call him when done

## 2022-01-10 NOTE — TELEPHONE ENCOUNTER
Patient had only seen Abdirashid Kitchen for a virtual regarding covid can you please address this and see if we can do the letter or not?

## 2022-01-17 DIAGNOSIS — N52.9 ERECTILE DYSFUNCTION, UNSPECIFIED ERECTILE DYSFUNCTION TYPE: ICD-10-CM

## 2022-01-17 DIAGNOSIS — K52.9 CHRONIC DIARRHEA: ICD-10-CM

## 2022-01-18 ENCOUNTER — EVALUATION (OUTPATIENT)
Dept: PHYSICAL THERAPY | Facility: CLINIC | Age: 47
End: 2022-01-18
Payer: COMMERCIAL

## 2022-01-18 DIAGNOSIS — M54.42 CHRONIC LOW BACK PAIN WITH BILATERAL SCIATICA, UNSPECIFIED BACK PAIN LATERALITY: Primary | ICD-10-CM

## 2022-01-18 DIAGNOSIS — M54.41 CHRONIC LOW BACK PAIN WITH BILATERAL SCIATICA, UNSPECIFIED BACK PAIN LATERALITY: Primary | ICD-10-CM

## 2022-01-18 DIAGNOSIS — G89.29 CHRONIC LOW BACK PAIN WITH BILATERAL SCIATICA, UNSPECIFIED BACK PAIN LATERALITY: Primary | ICD-10-CM

## 2022-01-18 PROCEDURE — 97110 THERAPEUTIC EXERCISES: CPT | Performed by: PHYSICAL THERAPIST

## 2022-01-18 PROCEDURE — 97112 NEUROMUSCULAR REEDUCATION: CPT | Performed by: PHYSICAL THERAPIST

## 2022-01-18 PROCEDURE — 97140 MANUAL THERAPY 1/> REGIONS: CPT | Performed by: PHYSICAL THERAPIST

## 2022-01-18 RX ORDER — CHOLESTYRAMINE LIGHT 4 G/5.7G
4 POWDER, FOR SUSPENSION ORAL DAILY
Qty: 30 PACKET | Refills: 3 | Status: SHIPPED | OUTPATIENT
Start: 2022-01-18 | End: 2022-03-29 | Stop reason: SDUPTHER

## 2022-01-18 RX ORDER — CHOLESTYRAMINE LIGHT 4 G/5.7G
POWDER, FOR SUSPENSION ORAL
Qty: 30 PACKET | Refills: 3 | OUTPATIENT
Start: 2022-01-18

## 2022-01-18 NOTE — PROGRESS NOTES
Daily Note/Re-Evaluation     Today's date: 2022  Patient name: Mason Miguel  : 1975  MRN: [id number]  Referring provider: Meeta Mccarty  Dx:   Encounter Diagnosis     ICD-10-CM    1  Chronic low back pain with bilateral sciatica, unspecified back pain laterality  M54 41     G89 29     M54 42        Start Time: 1700  Stop Time: 1745  Total time in clinic (min): 45 minutes  ASSESSMENT:   Mason Miguel is a 55 y o  male who presents with signs and symptoms consistent with referring diagnosis of subacute R knee pain and chronic bilateral low back pain  Patient returns since last appointment on   Patient was unable to attend therapy secondary to him and his son being sick with covid  Re-evaluation was performed this date for the back since it is his primary complaint  Secondary to being inactive for a few weeks patient is having increased pain and dysfunction  Patient continues to have residual pain with recreational activities  Patient demonstrates anterior pelvic tilt and increased lumbar lordosis which may be contributing to patients pain  Patient demonstrates decreased lumbopelvic awareness as well as control which will be incorporated into POC  Due to these impairments, Patient has difficulty performing a/iadls, recreational activities and work-related activities  Patient would benefit from skilled physical therapy to address the impairments, improve their level of function, and to improve their overall quality of life  Impairments:    restricted ROM    decreased strength   pain with function   activity intolerance   weight bearing intolerance   abnormal gait     Prognosis:  Good  Positive and negative prognostic indicator(s):  none      Chronic LBP:   Short Term Goals: to be achieved by 4 weeks  1) Patient to be independent with basic HEP  MET  2) Decrease pain to 5/10 at its worst  PROGRESSING  3) Increase lumbar spine ROM by 25% in all deficient planes   MET  4) Improve flexibility to mild restrictions PROGRESSING    LTG: To be achieved at Discharge  1)Patient is independent with HEP  PROGRESSING  2)Return to pre-morbid work related activities  PROGRESSING  3)Improve tolerance to prolonged sitting to prior level of function  PROGRESSING  4) FOTO equal to or greater than target score indicating improvements with overall function PROGRESSING      Planned interventions:  home exercise program, patient education, manual therapy, flexibility, functional range of motion exercises, strengthening, abdominal trunk stabilization and balance and weight bearing training    Duration in visits:  8  Frequency: 1-2 visits per week  Duration in weeks:  4-6    Low Back Subjective: Patient reports that he was feeling pretty good until he got covid and wasn't doing much and wasn't able to do his exercises  Patient notes that his pain has increased and he continues to have pain with most daily and work activities  Pain location: central low back pain with radiating numbness/tingling into the bilateral buttocks, hamstring, and stops at the knee  Pain descriptors: sharp; shooting  Pain at Currently: 6 5/10  Pain at Best: 4/10  Pain at Worst: 8/10  Aggravating factors: bending over a lot; twisting; heavy activities, sitting for long periods of time   Easing factors: biofreeze (Bengay)     Patient goals: Patient reports goals for physical therapy would be decreased pain, increased strength, improved balance and return to recreation           Objective     Observations       Objective: See treatment diary below    Lumbar ROM:   Extension: 12 degrees  Flexion: 55 full; 45 degrees pain starts  Sidebeing R: 13 degrees; mild restriction; with tightness  Sidebending L: 10 degrees; moderate restriction; with tightness   Rotation R: mild restriction; without pain   Rotation L: moderate restriction; with out pain     Repeated movements:   Standing extension: peripheralization  Standing flexion: centralized; decreased numbness     TESTING:   Sensation: intact  Reflexes: WFL  Clonus: negative  Babinski: negative bilaterally  Myotomes: WNL    Slump: positive bilaterally   SLR R: negative   SLR L: positive for central LBP  Crossed SLR: negative bilaterally     90-90 hamstring length: 40 degrees bilaterally     Lumbar Joint Mobility:   Hypomobile throughout lumbar spine      Hypertonicity in the bilateral paraspinals          Plan: Continue per plan of care  Precautions: cancer;  History of TBI (2017)     Manuals 11/29 12/2 12/7 12/9 12/16 1/18 11/4   Gapping   GR 4-5     ACL Gr V JK  ACL ACL ACL                 ACL   Bolster thoracolumbar junction mob   ACL Gr V JK ACL  ACL                    Neuro Re-Ed             Multifidi press             SLR+ABD          3x10    pball bridges           3x10    Dead bugs           2x10 with red ball   core push throughs (mini crunches)             Posterior pelvic tilts in supine   5" 10x 5" 10x 5" 10x 5" 10x 5" 10x 5" 10x                    Ther Ex             Bicycle    5' lvl 3           Single knee to chest    10" hold 10x          90-90 hamstring stretch              LTR on ball With pball 10x10" With pball 10x10" With pball 10x10" With pball 10x10" With pball 10x10" With pball 10x10"    With pball 10x10"   pball roll outs FWD and lat 20x 10" hold  FWD and lat 20x 10" hold FWD and lat 20x 10" hold FWD and lat 20x 10" hold FWD and lat 20x 10" hold FWD and lat 20x 10" hold    FWD 20x 10" hold    pedro luis pose BWD and LAT           10" 10x ea    bridges             pball roll ins  5" 30x 5" 30x 5" x 30 5" x 30 5" x 30 5" x 30    5" 30x   Piriformis stretch              zulema rotations             Ther Activity             Dead lifts              Anuradha Larve carries              Gait Training                                       Modalities

## 2022-01-19 DIAGNOSIS — N52.9 ERECTILE DYSFUNCTION, UNSPECIFIED ERECTILE DYSFUNCTION TYPE: ICD-10-CM

## 2022-01-19 RX ORDER — TADALAFIL 10 MG/1
10 TABLET ORAL DAILY PRN
Qty: 20 TABLET | Refills: 1 | Status: SHIPPED | OUTPATIENT
Start: 2022-01-19 | End: 2022-03-29 | Stop reason: SDUPTHER

## 2022-01-20 ENCOUNTER — APPOINTMENT (OUTPATIENT)
Dept: PHYSICAL THERAPY | Facility: CLINIC | Age: 47
End: 2022-01-20
Payer: COMMERCIAL

## 2022-01-25 ENCOUNTER — OFFICE VISIT (OUTPATIENT)
Dept: PHYSICAL THERAPY | Facility: CLINIC | Age: 47
End: 2022-01-25
Payer: COMMERCIAL

## 2022-01-25 DIAGNOSIS — M54.41 CHRONIC LOW BACK PAIN WITH BILATERAL SCIATICA, UNSPECIFIED BACK PAIN LATERALITY: Primary | ICD-10-CM

## 2022-01-25 DIAGNOSIS — M54.42 CHRONIC LOW BACK PAIN WITH BILATERAL SCIATICA, UNSPECIFIED BACK PAIN LATERALITY: Primary | ICD-10-CM

## 2022-01-25 DIAGNOSIS — G89.29 CHRONIC LOW BACK PAIN WITH BILATERAL SCIATICA, UNSPECIFIED BACK PAIN LATERALITY: Primary | ICD-10-CM

## 2022-01-25 PROCEDURE — 97112 NEUROMUSCULAR REEDUCATION: CPT | Performed by: PHYSICAL THERAPIST

## 2022-01-25 PROCEDURE — 97110 THERAPEUTIC EXERCISES: CPT | Performed by: PHYSICAL THERAPIST

## 2022-01-25 PROCEDURE — 97530 THERAPEUTIC ACTIVITIES: CPT | Performed by: PHYSICAL THERAPIST

## 2022-01-25 NOTE — PROGRESS NOTES
Daily Note    Today's date: 2022  Patient name: Luis Alberto Huynh  : 1975  MRN: [id number]  Referring provider: Lazarus Otter  Dx:   Encounter Diagnosis     ICD-10-CM    1  Chronic low back pain with bilateral sciatica, unspecified back pain laterality  M54 41     G89 29     M54 42        Start Time: 1615  Stop Time: 1700  Total time in clinic (min): 45 minutes  Subjective: Pt reports, "Honestly the back feels pretty good  The knee I think I tweaked it a little bit and now its sore "       Objective: See treatment diary below      Assessment: Patient tolerated treatment well  Focused on lumbar POC    Patient had no complaints of pain throughout session  Patient would benefit from continued PT  Plan: Continue per plan of care  Precautions: cancer;  History of TBI (2017)     Manuals    Gapping   GR 4-5     ACL Gr V JK  ACL ACL ACL                 ACL   Bolster thoracolumbar junction mob   ACL Gr V JK ACL  ACL                    Neuro Re-Ed             Multifidi press             SLR+ABD          3x10    pball bridges           3x10    Dead bugs           2x10 with red ball   core push throughs (mini crunches)             Posterior pelvic tilts in supine   5" 10x 5" 10x 5" 10x 5" 10x 5" 10x 5" 10x 5" 10x                   Ther Ex             Bicycle    5' lvl 3           Single knee to chest    10" hold 10x          90-90 hamstring stretch              LTR on ball With pball 10x10" With pball 10x10" With pball 10x10" With pball 10x10" With pball 10x10" With pball 10x10" With pball 10x10"   With pball 10x10"   pball roll outs FWD and lat 20x 10" hold  FWD and lat 20x 10" hold FWD and lat 20x 10" hold FWD and lat 20x 10" hold FWD and lat 20x 10" hold FWD and lat 20x 10" hold FWD and lat 20x 10" hold   FWD 20x 10" hold    pedro luis pose BWD and LAT           10" 10x ea    bridges             pball roll ins  5" 30x 5" 30x 5" x 30 5" x 30 5" x 30 5" x 30 5" x 30   5" 30x   Piriformis stretch              zulema rotations             Ther Activity             Dead lifts              Elvin Chill carries              Squats with TrA       On bosu 3x10       Gait Training                                       Modalities

## 2022-02-01 ENCOUNTER — OFFICE VISIT (OUTPATIENT)
Dept: PHYSICAL THERAPY | Facility: CLINIC | Age: 47
End: 2022-02-01
Payer: COMMERCIAL

## 2022-02-01 DIAGNOSIS — M54.42 CHRONIC LOW BACK PAIN WITH BILATERAL SCIATICA, UNSPECIFIED BACK PAIN LATERALITY: Primary | ICD-10-CM

## 2022-02-01 DIAGNOSIS — G89.29 CHRONIC LOW BACK PAIN WITH BILATERAL SCIATICA, UNSPECIFIED BACK PAIN LATERALITY: Primary | ICD-10-CM

## 2022-02-01 DIAGNOSIS — M54.41 CHRONIC LOW BACK PAIN WITH BILATERAL SCIATICA, UNSPECIFIED BACK PAIN LATERALITY: Primary | ICD-10-CM

## 2022-02-01 PROCEDURE — 97112 NEUROMUSCULAR REEDUCATION: CPT | Performed by: PHYSICAL THERAPIST

## 2022-02-01 PROCEDURE — 97530 THERAPEUTIC ACTIVITIES: CPT | Performed by: PHYSICAL THERAPIST

## 2022-02-01 PROCEDURE — 97110 THERAPEUTIC EXERCISES: CPT | Performed by: PHYSICAL THERAPIST

## 2022-02-01 NOTE — PROGRESS NOTES
Daily Note    Today's date: 2022  Patient name: Espnioza Vaughn  : 1975  MRN: [id number]  Referring provider: Shiloh Dunbar  Dx:   Encounter Diagnosis     ICD-10-CM    1  Chronic low back pain with bilateral sciatica, unspecified back pain laterality  M54 41     G89 29     M54 42        Start Time: 1615  Stop Time: 1700  Total time in clinic (min): 45 minutes  Subjective: Pt reports, "My back is really really stiff today  The knee is doing okay as well, I wasn't on it a lot today "       Objective: See treatment diary below      Assessment: Patient tolerated treatment well  Focused on lumbar mobility this date  Patient noted decreased stiffness and pain post session  Patient would benefit from continued PT  Plan: Continue per plan of care  Precautions: cancer;  History of TBI (2017)     Manuals      Gapping   GR 4-5     ACL Gr V JK  ACL ACL ACL  ACL                  Bolster thoracolumbar junction mob   ACL Gr V JK ACL  ACL                    Neuro Re-Ed             Multifidi press             SLR+ABD             pball bridges              Dead bugs              core push throughs (mini crunches)             Posterior pelvic tilts in supine   5" 10x 5" 10x 5" 10x 5" 10x 5" 10x 5" 10x 5" 10x 5" 10x                  Ther Ex             Bicycle    5' lvl 3      recumbent lvl 3 5 min      Single knee to chest    10" hold 10x          90-90 hamstring stretch              LTR on ball With pball 10x10" With pball 10x10" With pball 10x10" With pball 10x10" With pball 10x10" With pball 10x10" With pball 10x10" With pball 10x10"     pball roll outs FWD and lat 20x 10" hold  FWD and lat 20x 10" hold FWD and lat 20x 10" hold FWD and lat 20x 10" hold FWD and lat 20x 10" hold FWD and lat 20x 10" hold FWD and lat 20x 10" hold FWD and lat 20x 10" hold     pedro luis pose BWD and LAT         10" 10x ea      bridges             pball roll ins  5" 30x 5" 30x 5" x 30 5" x 30 5" x 30 5" x 30 5" x 30 5" x 30     Piriformis stretch              zulema rotations             Ther Activity             Dead lifts              Zack Plaster carries              Squats with TrA       On bosu 3x10  On bosu 3x10      Gait Training                                       Modalities

## 2022-02-07 ENCOUNTER — TELEPHONE (OUTPATIENT)
Dept: OBGYN CLINIC | Facility: HOSPITAL | Age: 47
End: 2022-02-07

## 2022-02-07 NOTE — TELEPHONE ENCOUNTER
Patient is calling to speak with the fitter from [facility] in reference to a knee brace        Lena Rhoades [phone] after 345 pm

## 2022-02-08 ENCOUNTER — OFFICE VISIT (OUTPATIENT)
Dept: PHYSICAL THERAPY | Facility: CLINIC | Age: 47
End: 2022-02-08
Payer: COMMERCIAL

## 2022-02-08 ENCOUNTER — TELEPHONE (OUTPATIENT)
Dept: FAMILY MEDICINE CLINIC | Facility: CLINIC | Age: 47
End: 2022-02-08

## 2022-02-08 DIAGNOSIS — M54.42 CHRONIC LOW BACK PAIN WITH BILATERAL SCIATICA, UNSPECIFIED BACK PAIN LATERALITY: Primary | ICD-10-CM

## 2022-02-08 DIAGNOSIS — G89.29 CHRONIC LOW BACK PAIN WITH BILATERAL SCIATICA, UNSPECIFIED BACK PAIN LATERALITY: Primary | ICD-10-CM

## 2022-02-08 DIAGNOSIS — M54.41 CHRONIC LOW BACK PAIN WITH BILATERAL SCIATICA, UNSPECIFIED BACK PAIN LATERALITY: Primary | ICD-10-CM

## 2022-02-08 PROCEDURE — 97110 THERAPEUTIC EXERCISES: CPT | Performed by: PHYSICAL THERAPIST

## 2022-02-08 PROCEDURE — 97140 MANUAL THERAPY 1/> REGIONS: CPT | Performed by: PHYSICAL THERAPIST

## 2022-02-08 NOTE — PROGRESS NOTES
Daily Note    Today's date: 2022  Patient name: Nancy Noriega  : 1975  MRN: [id number]  Referring provider: Ashwini Gilbert  Dx:   Encounter Diagnosis     ICD-10-CM    1  Chronic low back pain with bilateral sciatica, unspecified back pain laterality  M54 41     G89 29     M54 42        Start Time: 1615  Stop Time: 1700  Total time in clinic (min): 45 minutes  Subjective: Pt reports, "I am in so much pain  My back has been killing me  At work I have been working on a machine that I have to stand all day  It was been making my my legs numb in the back and all the way down to my feet  I don't know how much ill be able to do today "       Objective: See treatment diary below      Assessment: Patient tolerated treatment well  Focused on pain management this date with manual treatment  Patient noted decreased stiffness and pain post session  Will continue to progress as able  Patient would benefit from continued PT  Plan: Continue per plan of care  Precautions: cancer;  History of TBI (2017)     Manuals     Gapping   GR 4-5     ACL Gr V JK  ACL ACL ACL  ACL ACL                 Bolster thoracolumbar junction mob   ACL Gr V JK ACL  ACL                    Neuro Re-Ed             Multifidi press             SLR+ABD             pball bridges              Dead bugs              core push throughs (mini crunches)             Posterior pelvic tilts in supine   5" 10x 5" 10x 5" 10x 5" 10x 5" 10x 5" 10x 5" 10x 5" 10x                  Ther Ex             Bicycle    5' lvl 3      recumbent lvl 3 5 min  recumbent lvl 3 5 min     Single knee to chest    10" hold 10x          90-90 hamstring stretch              LTR on ball With pball 10x10" With pball 10x10" With pball 10x10" With pball 10x10" With pball 10x10" With pball 10x10" With pball 10x10" With pball 10x10"     pball roll outs FWD and lat 20x 10" hold  FWD and lat 20x 10" hold FWD and lat 20x 10" hold FWD and lat 20x 10" hold FWD and lat 20x 10" hold FWD and lat 20x 10" hold FWD and lat 20x 10" hold FWD and lat 20x 10" hold     pedro luis pose BWD and LAT         10" 10x ea      bridges             pball roll ins  5" 30x 5" 30x 5" x 30 5" x 30 5" x 30 5" x 30 5" x 30 5" x 30     Piriformis stretch              zulema rotations             Ther Activity             Dead lifts              Dortha Brad carries              Squats with TrA       On bosu 3x10  On bosu 3x10      Gait Training                                       Modalities

## 2022-02-15 ENCOUNTER — OFFICE VISIT (OUTPATIENT)
Dept: PHYSICAL THERAPY | Facility: CLINIC | Age: 47
End: 2022-02-15
Payer: COMMERCIAL

## 2022-02-15 DIAGNOSIS — M54.42 CHRONIC LOW BACK PAIN WITH BILATERAL SCIATICA, UNSPECIFIED BACK PAIN LATERALITY: Primary | ICD-10-CM

## 2022-02-15 DIAGNOSIS — G89.29 CHRONIC LOW BACK PAIN WITH BILATERAL SCIATICA, UNSPECIFIED BACK PAIN LATERALITY: Primary | ICD-10-CM

## 2022-02-15 DIAGNOSIS — M54.41 CHRONIC LOW BACK PAIN WITH BILATERAL SCIATICA, UNSPECIFIED BACK PAIN LATERALITY: Primary | ICD-10-CM

## 2022-02-15 DIAGNOSIS — M17.11 ARTHRITIS OF RIGHT KNEE: ICD-10-CM

## 2022-02-15 PROCEDURE — 97110 THERAPEUTIC EXERCISES: CPT | Performed by: PHYSICAL THERAPIST

## 2022-02-15 PROCEDURE — 97112 NEUROMUSCULAR REEDUCATION: CPT | Performed by: PHYSICAL THERAPIST

## 2022-02-15 PROCEDURE — 97140 MANUAL THERAPY 1/> REGIONS: CPT | Performed by: PHYSICAL THERAPIST

## 2022-02-15 NOTE — PROGRESS NOTES
Daily Note    Today's date: 2/15/2022  Patient name: Tl Bertrand  : 1975  MRN: [id number]  Referring provider: Sarika Claros  Dx:   Encounter Diagnosis     ICD-10-CM    1  Chronic low back pain with bilateral sciatica, unspecified back pain laterality  M54 41     G89 29     M54 42    2  Arthritis of right knee  M17 11        Start Time: 1615  Stop Time: 1700  Total time in clinic (min): 45 minutes  Subjective: Pt reports, "IThe back is doing better just tight  And I got the brace for the knee so it seems to be doing better  I think overall Im ready to see if I can manage with doing the exercises and stretches at home and see how it goes "       Objective: See treatment diary below      Assessment: Patient tolerated treatment well  Patient noted decreased stiffness and pain post session  Patient will be discharged at this time and be seen on an as needed basis  Plan: Continue per plan of care  Precautions: cancer;  History of TBI (2017)     Manuals 11/29  12/2 12/7 12/9 12/16 1/18  1/25  2/1 2/8 2/15    Gapping   GR 4-5     ACL Gr V JK  ACL ACL ACL  ACL ACL ACL                 Bolster thoracolumbar junction mob   ACL Gr V JK ACL  ACL  ACL                  Neuro Re-Ed             Single leg stance standing clamshells           BTB 3x10 ea   SLR+ABD             pball bridges              Dead bugs              core push throughs (mini crunches)             Posterior pelvic tilts in supine   5" 10x 5" 10x 5" 10x 5" 10x 5" 10x 5" 10x 5" 10x 5" 10x                  Ther Ex             Bicycle    5' lvl 3      recumbent lvl 3 5 min  recumbent lvl 3 5 min  recumbent lvl 3 5 min    Single knee to chest    10" hold 10x          90-90 hamstring stretch              LTR on ball With pball 10x10" With pball 10x10" With pball 10x10" With pball 10x10" With pball 10x10" With pball 10x10" With pball 10x10" With pball 10x10"  With pball 10x10"   pball roll outs FWD and lat 20x 10" hold  FWD and lat 20x 10" hold FWD and lat 20x 10" hold FWD and lat 20x 10" hold FWD and lat 20x 10" hold FWD and lat 20x 10" hold FWD and lat 20x 10" hold FWD and lat 20x 10" hold  FWD and lat 20x 10" hold   pedro luis pose BWD and LAT         10" 10x ea      bridges             pball roll ins  5" 30x 5" 30x 5" x 30 5" x 30 5" x 30 5" x 30 5" x 30 5" x 30  5" x 30   Piriformis stretch              zulema rotations             Ther Activity             Dead lifts              Nori Saber carries              Squats with TrA       On bosu 3x10  On bosu 3x10   On bosu 3x10    Gait Training                                       Modalities

## 2022-02-22 ENCOUNTER — OFFICE VISIT (OUTPATIENT)
Dept: OBGYN CLINIC | Facility: MEDICAL CENTER | Age: 47
End: 2022-02-22
Payer: COMMERCIAL

## 2022-02-22 VITALS
HEART RATE: 85 BPM | BODY MASS INDEX: 33.49 KG/M2 | SYSTOLIC BLOOD PRESSURE: 120 MMHG | HEIGHT: 65 IN | DIASTOLIC BLOOD PRESSURE: 78 MMHG | WEIGHT: 201 LBS

## 2022-02-22 DIAGNOSIS — R20.8 NUMBNESS OF LEFT FOOT: ICD-10-CM

## 2022-02-22 DIAGNOSIS — M54.16 LEFT LUMBAR RADICULOPATHY: Primary | ICD-10-CM

## 2022-02-22 DIAGNOSIS — M51.26 BULGING LUMBAR DISC: ICD-10-CM

## 2022-02-22 PROCEDURE — 3078F DIAST BP <80 MM HG: CPT | Performed by: EMERGENCY MEDICINE

## 2022-02-22 PROCEDURE — 3074F SYST BP LT 130 MM HG: CPT | Performed by: EMERGENCY MEDICINE

## 2022-02-22 PROCEDURE — 99214 OFFICE O/P EST MOD 30 MIN: CPT | Performed by: EMERGENCY MEDICINE

## 2022-02-22 RX ORDER — METHYLPREDNISOLONE 4 MG/1
TABLET ORAL
Qty: 1 EACH | Refills: 0 | Status: SHIPPED | OUTPATIENT
Start: 2022-02-22 | End: 2022-03-29 | Stop reason: ALTCHOICE

## 2022-02-22 NOTE — PROGRESS NOTES
Assessment/Plan:    Diagnoses and all orders for this visit:    Left lumbar radiculopathy  -     Ambulatory Referral to Orthopedic Surgery  -     methylPREDNISolone 4 MG tablet therapy pack; Use as directed on package  -     Ambulatory Referral to Pain Management; Future    Bulging lumbar disc  -     XR spine lumbar 2 or 3 views injury; Future    Numbness of left foot  -     methylPREDNISolone 4 MG tablet therapy pack; Use as directed on package  -     Ambulatory Referral to Pain Management; Future    Patient to f/u with Pain Management as MARIE L5 was previously recommended but patient did not follow through  Reviewed previous MRI Lumbar spine  Start medrol dose pack    Return if symptoms worsen or fail to improve  Chief Complaint:     Chief Complaint   Patient presents with    Spine - Pain       Subjective:   Patient ID: Minoo Flores is a 55 y o  male  Patient new to me presents referred by PCP Dr Shannan Hicks for Left lumbar radiculopathy  He notes pain across his lower back and radiating down left leg,  N/T left foot   He has been participating in PT for his chronic back pain  Taking Ibuprofen   Hx Pain Management evaluation for lower back pain and b/l leg symptoms AMRIE of L5 was ordered  Last HA1C 6 4      Review of Systems    The following portions of the patient's chart were reviewed and updated as appropriate:      Allergie  Allergies   Allergen Reactions    Asa [Aspirin] GI Intolerance    Penicillin G     Penicillins    s   Allergen Reactions    Asa [Aspirin] GI Intolerance    Penicillin G     Penicillins       Diagnosis Date    Cancer [facility])     colon    Traumatic brain injury ([facility] [address] ) 2017    R/S 2017       Past Surgical History:   Procedure Laterality Date    APPENDECTOMY      COLON SURGERY         Social History     Socioeconomic History    Marital status: /Civil Union     Spouse name: Not on file    Number of children: Not on file    Years of education: Not on file   Guy Pert Highest education level: Not on file   Occupational History    Not on file   Tobacco Use    Smoking status: Never Smoker    Smokeless tobacco: Never Used   Substance and Sexual Activity    Alcohol use:  Yes     Alcohol/week: 1 0 standard drink     Types: 1 Cans of beer per week    Drug use: No    Sexual activity: Yes     Partners: Female     Birth control/protection: None   Other Topics Concern    Not on file   Social History Narrative    Not on file     Social Determinants of Health     Financial Resource Strain: Not on file   Food Insecurity: Not on file   Transportation Needs: Not on file   Physical Activity: Not on file   Stress: Not on file   Social Connections: Not on file   Intimate Partner Violence: Not on file   Housing Stability: Not on file       Family History   Problem Relation Age of Onset    Coronary artery disease Mother     Diabetes Mother     Hypertension Mother     Hyperlipidemia Mother     Coronary artery disease Father     Diabetes Father     Hypertension Father     Hyperlipidemia Father     Heart attack Father     Anemia Brother     No Known Problems Son     No Known Problems Son     No Known Problems Son        Medications:    Current Outpatient Medications:     albuterol (Proventil HFA) 90 mcg/act inhaler, Inhale 2 puffs every 6 (six) hours as needed for wheezing, Disp: 6 7 g, Rfl: 5    benzonatate (TESSALON) 200 MG capsule, Take 1 capsule (200 mg total) by mouth 3 (three) times a day as needed for cough, Disp: 30 capsule, Rfl: 0    budesonide (Pulmicort Flexhaler) 180 MCG/ACT inhaler, Inhale 2 puffs 2 (two) times a day Rinse mouth after use , Disp: 1 each, Rfl: 1    cholestyramine sugar free (QUESTRAN LIGHT) 4 g packet, Take 1 packet (4 g total) by mouth daily, Disp: 30 packet, Rfl: 3    diclofenac sodium (VOLTAREN) 1 %, Apply 2 g topically 3 (three) times a day as needed (low back pain), Disp: 100 g, Rfl: 0    guaifenesin-codeine (GUAIFENESIN AC) 100-10 MG/5ML liquid, Take 10 mL by mouth 4 (four) times a day as needed for cough, Disp: 118 mL, Rfl: 0    ibuprofen (MOTRIN) 200 mg tablet, Take 600 mg by mouth every 6 (six) hours as needed for mild pain or moderate pain, Disp: , Rfl:     Loperamide HCl (IMODIUM A-D PO), Take 1 tablet by mouth 4 (four) times a day, Disp: , Rfl:     OMEGA-3 FATTY ACIDS PO, Take 1,000 mg by mouth 2 (two) times a day  , Disp: , Rfl:     tadalafil (CIALIS) 10 MG tablet, Take 1 tablet (10 mg total) by mouth daily as needed for erectile dysfunction, Disp: 20 tablet, Rfl: 1    metFORMIN (GLUCOPHAGE-XR) 500 mg 24 hr tablet, Take 1 tablet (500 mg total) by mouth daily with dinner, Disp: 90 tablet, Rfl: 0    methylPREDNISolone 4 MG tablet therapy pack, Use as directed on package, Disp: 1 each, Rfl: 0    Patient Active Problem List   Diagnosis    Chronic diarrhea    De Quervain's tenosynovitis    Elevated ALT measurement    Fatty liver    Hypertriglyceridemia    Impingement syndrome of left shoulder    Left lumbar radiculopathy    Male erectile dysfunction    Plantar warts    Post concussion syndrome    Paresthesias    Excessive daytime sleepiness    PETTY (obstructive sleep apnea)    Type 2 diabetes mellitus without complication, without long-term current use of insulin (HCC)    Atypical nevus    Class 1 obesity due to excess calories with serious comorbidity and body mass index (BMI) of 34 0 to 34 9 in adult    Arthritis of right knee    Chronic pain of right knee       Objective:  /78   Pulse 85   Ht 5' 5" (1 651 m)   Wt 91 2 kg (201 lb)   BMI 33 45 kg/m²     Back Exam     Muscle Strength   The patient has normal back strength      Tests   Straight leg raise left: negative    Other   Toe walk: normal  Heel walk: normal  Gait: normal             Physical Exam  Musculoskeletal:      Lumbar back: Negative left straight leg raise test            Neurologic Exam    Procedures    I have personally reviewed the written report of the pertinent studies  MRI Lumbar Spine 2018  IMPRESSION:     Small central disc protrusion without canal stenosis or foraminal narrowing    No significant change

## 2022-03-03 ENCOUNTER — OFFICE VISIT (OUTPATIENT)
Dept: OBGYN CLINIC | Facility: CLINIC | Age: 47
End: 2022-03-03
Payer: COMMERCIAL

## 2022-03-03 VITALS
WEIGHT: 201 LBS | HEART RATE: 87 BPM | BODY MASS INDEX: 33.49 KG/M2 | DIASTOLIC BLOOD PRESSURE: 80 MMHG | SYSTOLIC BLOOD PRESSURE: 120 MMHG | HEIGHT: 65 IN

## 2022-03-03 DIAGNOSIS — G89.29 CHRONIC PAIN OF RIGHT KNEE: ICD-10-CM

## 2022-03-03 DIAGNOSIS — M25.561 CHRONIC PAIN OF RIGHT KNEE: ICD-10-CM

## 2022-03-03 DIAGNOSIS — M17.11 ARTHRITIS OF RIGHT KNEE: Primary | ICD-10-CM

## 2022-03-03 PROCEDURE — 99213 OFFICE O/P EST LOW 20 MIN: CPT | Performed by: ORTHOPAEDIC SURGERY

## 2022-03-03 PROCEDURE — 20610 DRAIN/INJ JOINT/BURSA W/O US: CPT | Performed by: ORTHOPAEDIC SURGERY

## 2022-03-03 RX ORDER — LIDOCAINE HYDROCHLORIDE 10 MG/ML
1 INJECTION, SOLUTION INFILTRATION; PERINEURAL
Status: COMPLETED | OUTPATIENT
Start: 2022-03-03 | End: 2022-03-03

## 2022-03-03 RX ORDER — BUPIVACAINE HYDROCHLORIDE 2.5 MG/ML
2 INJECTION, SOLUTION INFILTRATION; PERINEURAL
Status: COMPLETED | OUTPATIENT
Start: 2022-03-03 | End: 2022-03-03

## 2022-03-03 RX ORDER — BETAMETHASONE SODIUM PHOSPHATE AND BETAMETHASONE ACETATE 3; 3 MG/ML; MG/ML
12 INJECTION, SUSPENSION INTRA-ARTICULAR; INTRALESIONAL; INTRAMUSCULAR; SOFT TISSUE
Status: COMPLETED | OUTPATIENT
Start: 2022-03-03 | End: 2022-03-03

## 2022-03-03 RX ADMIN — BETAMETHASONE SODIUM PHOSPHATE AND BETAMETHASONE ACETATE 12 MG: 3; 3 INJECTION, SUSPENSION INTRA-ARTICULAR; INTRALESIONAL; INTRAMUSCULAR; SOFT TISSUE at 15:02

## 2022-03-03 RX ADMIN — BUPIVACAINE HYDROCHLORIDE 2 ML: 2.5 INJECTION, SOLUTION INFILTRATION; PERINEURAL at 15:02

## 2022-03-03 RX ADMIN — LIDOCAINE HYDROCHLORIDE 1 ML: 10 INJECTION, SOLUTION INFILTRATION; PERINEURAL at 15:02

## 2022-03-03 NOTE — PROGRESS NOTES
Assessment/Plan:  1  Arthritis of right knee     2  Chronic pain of right knee         Scribe Attestation    I,:  Tonny Vaughan am acting as a scribe while in the presence of the attending physician :       I,:  Chiquis Blackmon, DO personally performed the services described in this documentation    as scribed in my presence :         Abdirashid Bone is a pleasant 55year old who presents the office today for a follow-up evaluation of his right knee osteoarthritis  He underwent a right knee corticosteroid injection on 12/13/2021 which provided him with 2 5 months of pain relief  A repeat right knee corticosteroid injection was offered to the patient at today's visit  We discussed the risks and benefits of corticosteroid injection today in the office and he did elect to proceed  The right knee corticosteroid injection was administered without issue and well tolerated by the patient  This was done under sterile technique  Post injection instructions were provided  He understands can be repeated no sooner than three months  I discussed with the patient that if he sees no relief from this corticosteroid injection, he should call the office and we will submit for pre authorization for  viscosupplementation of Euflexxa  He may perform his activities of daily living as tolerated  I will follow-up with him as needed  He understood and had no further questions  Subjective:   Patient ID: Espinoza Vaughn is a 55 y o  male who presents the office today for a follow-up evaluation of his right knee osteoarthritis  He underwent a right knee corticosteroid injection on 12/13/2021  He states that he was provided with 2 5 months of pain relief  He states that he will experience a daily intermittent sharp pain over the medial aspect of his right knee  He states that he would like a repeat right knee corticosteroid injection at today's visit  Review of Systems   Constitutional: Positive for activity change   Negative for chills, fever and unexpected weight change  HENT: Negative for hearing loss, nosebleeds and sore throat  Eyes: Negative for pain, redness and visual disturbance  Respiratory: Negative for cough, shortness of breath and wheezing  Cardiovascular: Negative for chest pain, palpitations and leg swelling  Gastrointestinal: Negative for abdominal pain, nausea and vomiting  Endocrine: Negative for polyphagia and polyuria  Genitourinary: Negative for dysuria and hematuria  Musculoskeletal: Positive for arthralgias and myalgias  Negative for joint swelling  Skin: Negative for rash and wound  Neurological: Negative for dizziness, numbness and headaches  Psychiatric/Behavioral: Negative for decreased concentration  The patient is not nervous/anxious  Past Medical History:   Diagnosis Date    Cancer [facility])     colon    Traumatic brain injury ([facility] [address] ) 2017    R/S 2017       Past Surgical History:   Procedure Laterality Date    APPENDECTOMY      COLON SURGERY         Family History   Problem Relation Age of Onset    Coronary artery disease Mother     Diabetes Mother     Hypertension Mother     Hyperlipidemia Mother     Coronary artery disease Father     Diabetes Father     Hypertension Father     Hyperlipidemia Father     Heart attack Father     Anemia Brother     No Known Problems Son     No Known Problems Son     No Known Problems Son        Social History     Occupational History    Not on file   Tobacco Use    Smoking status: Never Smoker    Smokeless tobacco: Never Used   Vaping Use    Vaping Use: Not on file   Substance and Sexual Activity    Alcohol use:  Yes     Alcohol/week: 1 0 standard drink     Types: 1 Cans of beer per week    Drug use: No    Sexual activity: Yes     Partners: Female     Birth control/protection: None         Current Outpatient Medications:     albuterol (Proventil HFA) 90 mcg/act inhaler, Inhale 2 puffs every 6 (six) hours as needed for wheezing, Disp: 6 7 g, Rfl: 5    benzonatate (TESSALON) 200 MG capsule, Take 1 capsule (200 mg total) by mouth 3 (three) times a day as needed for cough, Disp: 30 capsule, Rfl: 0    budesonide (Pulmicort Flexhaler) 180 MCG/ACT inhaler, Inhale 2 puffs 2 (two) times a day Rinse mouth after use , Disp: 1 each, Rfl: 1    cholestyramine sugar free (QUESTRAN LIGHT) 4 g packet, Take 1 packet (4 g total) by mouth daily, Disp: 30 packet, Rfl: 3    diclofenac sodium (VOLTAREN) 1 %, Apply 2 g topically 3 (three) times a day as needed (low back pain), Disp: 100 g, Rfl: 0    guaifenesin-codeine (GUAIFENESIN AC) 100-10 MG/5ML liquid, Take 10 mL by mouth 4 (four) times a day as needed for cough, Disp: 118 mL, Rfl: 0    ibuprofen (MOTRIN) 200 mg tablet, Take 600 mg by mouth every 6 (six) hours as needed for mild pain or moderate pain, Disp: , Rfl:     Loperamide HCl (IMODIUM A-D PO), Take 1 tablet by mouth 4 (four) times a day, Disp: , Rfl:     methylPREDNISolone 4 MG tablet therapy pack, Use as directed on package, Disp: 1 each, Rfl: 0    OMEGA-3 FATTY ACIDS PO, Take 1,000 mg by mouth 2 (two) times a day  , Disp: , Rfl:     tadalafil (CIALIS) 10 MG tablet, Take 1 tablet (10 mg total) by mouth daily as needed for erectile dysfunction, Disp: 20 tablet, Rfl: 1    metFORMIN (GLUCOPHAGE-XR) 500 mg 24 hr tablet, Take 1 tablet (500 mg total) by mouth daily with dinner, Disp: 90 tablet, Rfl: 0    Allergies   Allergen Reactions    Asa [Aspirin] GI Intolerance    Penicillin G     Penicillins        Objective:  Vitals:    03/03/22 1440   BP: 120/80   Pulse: 87       Right Knee Exam     Tenderness   The patient is experiencing tenderness in the medial joint line      Range of Motion   Extension: 0   Flexion: 120     Tests   Varus: negative Valgus: negative    Other   Erythema: absent  Scars: absent  Sensation: normal  Pulse: present  Swelling: none  Effusion: no effusion present    Comments:  Negative patellofemoral grind            Physical Exam  Vitals reviewed  Constitutional:       Appearance: He is well-developed  HENT:      Head: Normocephalic and atraumatic  Eyes:      Conjunctiva/sclera: Conjunctivae normal       Pupils: Pupils are equal, round, and reactive to light  Cardiovascular:      Rate and Rhythm: Normal rate  Pulmonary:      Effort: Pulmonary effort is normal  No respiratory distress  Musculoskeletal:      Cervical back: Normal range of motion and neck supple  Right knee: No effusion  Comments: As noted in HPI   Skin:     General: Skin is warm and dry  Neurological:      Mental Status: He is alert and oriented to person, place, and time  Psychiatric:         Behavior: Behavior normal      Large joint arthrocentesis: R knee  Universal Protocol:  Consent: Verbal consent obtained  Risks and benefits: risks, benefits and alternatives were discussed  Consent given by: patient  Time out: Immediately prior to procedure a "time out" was called to verify the correct patient, procedure, equipment, support staff and site/side marked as required  Timeout called at: 3/3/2022 3:01 PM   Site marked: the operative site was marked  Supporting Documentation  Indications: pain   Procedure Details  Location: knee - R knee  Preparation: Patient was prepped and draped in the usual sterile fashion  Needle size: 22 G  Ultrasound guidance: no  Approach: anterolateral  Medications administered: 2 mL bupivacaine 0 25 %; 1 mL lidocaine 1 %; 12 mg betamethasone acetate-betamethasone sodium phosphate 6 (3-3) mg/mL    Patient tolerance: patient tolerated the procedure well with no immediate complications  Dressing:  Sterile dressing applied          I have personally reviewed pertinent films in PACS and my interpretation is as follows: No new images reviewed today

## 2022-03-29 ENCOUNTER — TELEPHONE (OUTPATIENT)
Dept: OBGYN CLINIC | Facility: CLINIC | Age: 47
End: 2022-03-29

## 2022-03-29 ENCOUNTER — OFFICE VISIT (OUTPATIENT)
Dept: FAMILY MEDICINE CLINIC | Facility: CLINIC | Age: 47
End: 2022-03-29
Payer: COMMERCIAL

## 2022-03-29 VITALS
DIASTOLIC BLOOD PRESSURE: 70 MMHG | TEMPERATURE: 96.5 F | SYSTOLIC BLOOD PRESSURE: 120 MMHG | BODY MASS INDEX: 33.24 KG/M2 | HEIGHT: 65 IN | WEIGHT: 199.5 LBS | OXYGEN SATURATION: 97 % | HEART RATE: 76 BPM

## 2022-03-29 DIAGNOSIS — L30.9 ECZEMA, UNSPECIFIED TYPE: Primary | ICD-10-CM

## 2022-03-29 DIAGNOSIS — M17.11 ARTHRITIS OF RIGHT KNEE: Primary | ICD-10-CM

## 2022-03-29 DIAGNOSIS — E11.9 TYPE 2 DIABETES MELLITUS WITHOUT COMPLICATION, WITHOUT LONG-TERM CURRENT USE OF INSULIN (HCC): ICD-10-CM

## 2022-03-29 DIAGNOSIS — K52.9 CHRONIC DIARRHEA: ICD-10-CM

## 2022-03-29 DIAGNOSIS — L29.9 ITCHING: ICD-10-CM

## 2022-03-29 DIAGNOSIS — N52.9 ERECTILE DYSFUNCTION, UNSPECIFIED ERECTILE DYSFUNCTION TYPE: ICD-10-CM

## 2022-03-29 LAB — SL AMB POCT HEMOGLOBIN AIC: 6.3 (ref ?–6.5)

## 2022-03-29 PROCEDURE — 3078F DIAST BP <80 MM HG: CPT | Performed by: NURSE PRACTITIONER

## 2022-03-29 PROCEDURE — 3074F SYST BP LT 130 MM HG: CPT | Performed by: NURSE PRACTITIONER

## 2022-03-29 PROCEDURE — 83036 HEMOGLOBIN GLYCOSYLATED A1C: CPT | Performed by: NURSE PRACTITIONER

## 2022-03-29 PROCEDURE — 3044F HG A1C LEVEL LT 7.0%: CPT | Performed by: NURSE PRACTITIONER

## 2022-03-29 PROCEDURE — 99214 OFFICE O/P EST MOD 30 MIN: CPT | Performed by: NURSE PRACTITIONER

## 2022-03-29 RX ORDER — PREDNISONE 10 MG/1
TABLET ORAL
Qty: 26 TABLET | Refills: 0 | Status: SHIPPED | OUTPATIENT
Start: 2022-03-29 | End: 2022-06-21 | Stop reason: ALTCHOICE

## 2022-03-29 RX ORDER — HYDROXYZINE HYDROCHLORIDE 10 MG/1
10 TABLET, FILM COATED ORAL EVERY 6 HOURS PRN
Qty: 30 TABLET | Refills: 0 | Status: SHIPPED | OUTPATIENT
Start: 2022-03-29

## 2022-03-29 RX ORDER — TADALAFIL 10 MG/1
10 TABLET ORAL DAILY PRN
Qty: 20 TABLET | Refills: 1 | Status: SHIPPED | OUTPATIENT
Start: 2022-03-29

## 2022-03-29 RX ORDER — CHOLESTYRAMINE LIGHT 4 G/5.7G
4 POWDER, FOR SUSPENSION ORAL DAILY
Qty: 30 PACKET | Refills: 3 | Status: SHIPPED | OUTPATIENT
Start: 2022-03-29

## 2022-03-29 NOTE — TELEPHONE ENCOUNTER
Dr Barnhart  RE: Gel Injections  #: [phone]      Patient called into the office , he is looking to see if the gel injections can be ordered for his right knee       Please call patient back

## 2022-03-29 NOTE — PROGRESS NOTES
Assessment/Plan:     Diagnoses and all orders for this visit:    Eczema, unspecified type  -     predniSONE 10 mg tablet; Take 3 tabs twice a day x2 days, then 2 tabs twice a day x2 days, then 1 tab twice a day x2 days, then 1 tablet daily x2 days    Itching  -     hydrOXYzine HCL (ATARAX) 10 mg tablet; Take 1 tablet (10 mg total) by mouth every 6 (six) hours as needed for itching    Type 2 diabetes mellitus without complication, without long-term current use of insulin ([facility])  -     POCT hemoglobin A1c    Erectile dysfunction, unspecified erectile dysfunction type  -     tadalafil (CIALIS) 10 MG tablet; Take 1 tablet (10 mg total) by mouth daily as needed for erectile dysfunction    Chronic diarrhea  -     cholestyramine sugar free (QUESTRAN LIGHT) 4 g packet; Take 1 packet (4 g total) by mouth daily    #1 Eczema  Discussed with patient plan to treat with a tapering dose of steroids to decrease inflammation systematically  #2 Itching  Discussed with patient plan to treat with hydroxyzine as needed for itching - patient made aware that it may cause drowsiness  #3 Erectile dysfunction  Patient requesting refill on tadalafil - medication continues to be helpful without adverse reactions  #4 Chronic diarrhea  Patient requesting refill on cholestyramine for chronic diarrhea - medication continues to be helpful without adverse reactions    Patient instructed to call if no improvement in 72 hours or symptoms worsen    Subjective:      Patient ID: Espinoza Vaughn is a 55 y o  male  55 y o male presenting with rash on top of bilateral feet, legs, arms, chest and left side of buttocks for the past week  He has been using moisturizer but the rash is itchy so there are multiple open sores from scratching          Family History   Problem Relation Age of Onset    Coronary artery disease Mother     Diabetes Mother     Hypertension Mother     Hyperlipidemia Mother     Coronary artery disease Father     Diabetes Father  Hypertension Father     Hyperlipidemia Father     Heart attack Father     Anemia Brother     No Known Problems Son     No Known Problems Son     No Known Problems Son      Social History     Socioeconomic History    Marital status: /Civil Union     Spouse name: Not on file    Number of children: Not on file    Years of education: Not on file    Highest education level: Not on file   Occupational History    Not on file   Tobacco Use    Smoking status: Never Smoker    Smokeless tobacco: Never Used   Vaping Use    Vaping Use: Not on file   Substance and Sexual Activity    Alcohol use:  Yes     Alcohol/week: 1 0 standard drink     Types: 1 Cans of beer per week    Drug use: No    Sexual activity: Yes     Partners: Female     Birth control/protection: None   Other Topics Concern    Not on file   Social History Narrative    Not on file     Social Determinants of Health     Financial Resource Strain: Not on file   Food Insecurity: Not on file   Transportation Needs: Not on file   Physical Activity: Not on file   Stress: Not on file   Social Connections: Not on file   Intimate Partner Violence: Not on file   Housing Stability: Not on file     E-Cigarette/Vaping     E-Cigarette/Vaping Substances    Nicotine No     THC No     CBD No     Flavoring No     Other No     Unknown No      Past Medical History:   Diagnosis Date    Cancer ([place] [address] )     colon    Traumatic brain injury ([place] [address] ) 2017    R/S 2017     Past Surgical History:   Procedure Laterality Date    APPENDECTOMY      COLON SURGERY       Allergies   Allergen Reactions    Asa [Aspirin] GI Intolerance    Penicillin G     Penicillins        Current Outpatient Medications:     albuterol (Proventil HFA) 90 mcg/act inhaler, Inhale 2 puffs every 6 (six) hours as needed for wheezing, Disp: 6 7 g, Rfl: 5    benzonatate (TESSALON) 200 MG capsule, Take 1 capsule (200 mg total) by mouth 3 (three) times a day as needed for cough, Disp: 30 capsule, Rfl: 0    budesonide (Pulmicort Flexhaler) 180 MCG/ACT inhaler, Inhale 2 puffs 2 (two) times a day Rinse mouth after use , Disp: 1 each, Rfl: 1    cholestyramine sugar free (QUESTRAN LIGHT) 4 g packet, Take 1 packet (4 g total) by mouth daily, Disp: 30 packet, Rfl: 3    diclofenac sodium (VOLTAREN) 1 %, Apply 2 g topically 3 (three) times a day as needed (low back pain), Disp: 100 g, Rfl: 0    guaifenesin-codeine (GUAIFENESIN AC) 100-10 MG/5ML liquid, Take 10 mL by mouth 4 (four) times a day as needed for cough, Disp: 118 mL, Rfl: 0    hydrOXYzine HCL (ATARAX) 10 mg tablet, Take 1 tablet (10 mg total) by mouth every 6 (six) hours as needed for itching, Disp: 30 tablet, Rfl: 0    ibuprofen (MOTRIN) 200 mg tablet, Take 600 mg by mouth every 6 (six) hours as needed for mild pain or moderate pain, Disp: , Rfl:     Loperamide HCl (IMODIUM A-D PO), Take 1 tablet by mouth 4 (four) times a day, Disp: , Rfl:     metFORMIN (GLUCOPHAGE-XR) 500 mg 24 hr tablet, Take 1 tablet (500 mg total) by mouth daily with dinner, Disp: 90 tablet, Rfl: 0    OMEGA-3 FATTY ACIDS PO, Take 1,000 mg by mouth 2 (two) times a day  , Disp: , Rfl:     predniSONE 10 mg tablet, Take 3 tabs twice a day x2 days, then 2 tabs twice a day x2 days, then 1 tab twice a day x2 days, then 1 tablet daily x2 days, Disp: 26 tablet, Rfl: 0    tadalafil (CIALIS) 10 MG tablet, Take 1 tablet (10 mg total) by mouth daily as needed for erectile dysfunction, Disp: 20 tablet, Rfl: 1    Review of Systems   Constitutional: Negative  Respiratory: Negative  Cardiovascular: Negative  Gastrointestinal: Negative  Musculoskeletal: Negative  Skin: Positive for rash  Neurological: Negative  Psychiatric/Behavioral: Negative          Objective:    /70 (BP Location: Left arm, Patient Position: Sitting, Cuff Size: Standard)   Pulse 76   Temp (!) 96 5 °F (35 8 °C)   Ht 5' 5" (1 651 m)   Wt 90 5 kg (199 lb 8 oz)   SpO2 97%   BMI 33 20 kg/m² (Reviewed)     Physical Exam  Vitals reviewed  Constitutional:       General: He is not in acute distress  Appearance: He is well-developed and well-groomed  He is not ill-appearing  HENT:      Head: Normocephalic and atraumatic  Eyes:      General: Lids are normal       Extraocular Movements: Extraocular movements intact  Conjunctiva/sclera: Conjunctivae normal       Pupils: Pupils are equal, round, and reactive to light  Neck:      Trachea: Trachea and phonation normal    Cardiovascular:      Rate and Rhythm: Normal rate and regular rhythm  Pulses: Normal pulses  Heart sounds: Normal heart sounds  Pulmonary:      Effort: Pulmonary effort is normal       Breath sounds: Normal breath sounds  Skin:     General: Skin is warm and dry  Capillary Refill: Capillary refill takes less than 2 seconds  Findings: Rash present  Neurological:      General: No focal deficit present  Mental Status: He is alert and oriented to person, place, and time  Psychiatric:         Mood and Affect: Mood normal          Behavior: Behavior normal  Behavior is cooperative  Thought Content:  Thought content normal

## 2022-03-29 NOTE — TELEPHONE ENCOUNTER
LM on indentified patient VM to let him know order is in and he should be getting a phone call for scheduling

## 2022-03-30 ENCOUNTER — TELEPHONE (OUTPATIENT)
Dept: ADMINISTRATIVE | Facility: OTHER | Age: 47
End: 2022-03-30

## 2022-03-30 NOTE — TELEPHONE ENCOUNTER
----- Message from [id number] sent at 3/29/2022 11:38 AM EDT -----  Regarding: DM Eye Exam  03/29/22 11:38 AM    Hello, our patient Martinez Epperson has had Diabetic Eye Exam completed/performed  Please assist in updating the patient chart by making an External outreach to [facility] located in [place]  The date of service is December 2021        Thank you,  NICKIE Marr  [facility]

## 2022-03-30 NOTE — LETTER
Diabetic Eye Exam Form    Date Requested: 22  Patient: Mary Nath  Patient : 1975   Referring Provider: Irma Bray MD      DIABETIC Eye Exam Date _______________________________    Type of Exam MUST be documented for Diabetic Eye Exams  Please CHECK ONE  Dilated Retinal Exam      Optomap-Iris Exam      Fundus Photography Completed       Left Eye - Please check Retinopathy AND Type or No Retinopathy      Exam did show retinopathy    Exam did not show retinopathy         Mild     Proliferative           Moderate    Severe            None         Right Eye - Please check Retinopathy AND Type or No Retinopathy     Exam did show retinopathy    Exam did not show retinopathy         Mild     Proliferative        Moderate    Severe        None       Comments _Dec 2021 _________________________________________________________    Practice Providing Exam ______________________________________________    Exam Performed By (print name) _______________________________________      Provider Signature ___________________________________________________    These reports are needed for  compliance  Please fax this completed form and a copy of the Diabetic Eye Exam report to our office located at [place] [address] as soon as possible via [phone] lora Vasquez: Phone [phone]    We thank you for your assistance in treating our mutual patient

## 2022-03-31 NOTE — TELEPHONE ENCOUNTER
Upon review of the In Basket request we were able to locate, review, and update the patient chart as requested for Diabetic Eye Exam     Any additional questions or concerns should be emailed to the Practice Liaisons via [email or web address] email, please do not reply via In Basket      Thank you  Ester Jensen

## 2022-04-22 ENCOUNTER — CONSULT (OUTPATIENT)
Dept: PAIN MEDICINE | Facility: CLINIC | Age: 47
End: 2022-04-22
Payer: COMMERCIAL

## 2022-04-22 VITALS
BODY MASS INDEX: 32.82 KG/M2 | WEIGHT: 197 LBS | SYSTOLIC BLOOD PRESSURE: 144 MMHG | HEIGHT: 65 IN | DIASTOLIC BLOOD PRESSURE: 91 MMHG | RESPIRATION RATE: 18 BRPM | HEART RATE: 77 BPM

## 2022-04-22 DIAGNOSIS — M54.16 LUMBAR RADICULOPATHY: ICD-10-CM

## 2022-04-22 DIAGNOSIS — M51.17 INTERVERTEBRAL DISC DISORDER WITH RADICULOPATHY OF LUMBOSACRAL REGION: Primary | ICD-10-CM

## 2022-04-22 PROCEDURE — 99244 OFF/OP CNSLTJ NEW/EST MOD 40: CPT | Performed by: ANESTHESIOLOGY

## 2022-04-22 PROCEDURE — 3077F SYST BP >= 140 MM HG: CPT | Performed by: ANESTHESIOLOGY

## 2022-04-22 PROCEDURE — 3080F DIAST BP >= 90 MM HG: CPT | Performed by: ANESTHESIOLOGY

## 2022-04-22 RX ORDER — PREGABALIN 75 MG/1
75 CAPSULE ORAL 2 TIMES DAILY
Qty: 60 CAPSULE | Refills: 1 | Status: SHIPPED | OUTPATIENT
Start: 2022-04-22

## 2022-04-22 NOTE — PROGRESS NOTES
Assessment  1  Intervertebral disc disorder with radiculopathy of lumbosacral region    2  Lumbar radiculopathy        Plan  The patient's symptoms, history/physical are consistent with pain that is multifactorial in origin but predominantly the result of his L5-S1 degenerative disc disease which is leading to radicular symptoms into the legs  Given worsening symptoms and lack of response to 6 weeks of physical therapy, I will order an updated MRI of the lumbar spine to evaluate further  Advised him we will call with results and discuss treatment moving forward which may include epidural steroid injection versus proceeding with medial branch blocks in anticipation of radiofrequency ablation  For now, I will start him on Lyrica 75 mg twice daily to help with neuropathic symptoms  He was advised to start at bedtime initially for 5 days before proceeding to the b i d  dosing  My impressions and treatment recommendations were discussed in detail with the patient who verbalized understanding and had no further questions  Discharge instructions were provided  I personally saw and examined the patient and I agree with the above discussed plan of care  Orders Placed This Encounter   Procedures    MRI lumbar spine without contrast     Standing Status:   Future     Standing Expiration Date:   4/22/2026     Scheduling Instructions: There is no preparation for this test  Please leave your jewelry and valuables at home, wedding rings are the exception  Magnetic nail polish must be removed prior to arrival for your test  Please bring your insurance cards, a form of photo ID and a list of your medications with you  Arrive 15 minutes prior to your appointment time in order to register  Please bring any prior CT or MRI studies of this area that were not performed at a [facility] facility  To schedule this appointment, please contact Central Scheduling at [phone]              Prior to your appointment, please make sure you complete the MRI Screening Form when you e-Check in for your appointment  This will be available starting 7 days before your appointment in [address]  You may receive an e-mail with an activation code if you do not have a [employer] account  If you do not have access to a device, we will complete your screening at your appointment  Order Specific Question:   What is the patient's sedation requirement? Answer:   No Sedation     Order Specific Question:   Release to patient through [employer]     Answer:   Immediate     Order Specific Question:   Is order priority selected as STAT? Answer:   No     Order Specific Question:   Reason for Exam (FREE TEXT)     Answer:   worsening lbp into legs unresponsive to 6 weeks of PT     New Medications Ordered This Visit   Medications    pregabalin (LYRICA) 75 mg capsule     Sig: Take 1 capsule (75 mg total) by mouth 2 (two) times a day     Dispense:  60 capsule     Refill:  1       History of Present Illness    Sania Chambers is a 55 y o  male referred by Dr Jannette Sebastian for left lower back and leg pain that has been present for 5 years  Symptoms are moderate to severe rated 6-9/10 on a numeric rating scale located in the lower back and hip and travel down the leg with numbness more on the left  Symptoms are described to be sharp shooting with numbness  He feels weakness at times  Symptoms are aggravated with positioning including standing, bending, sitting, walking  The patient had to leave his job at [employer] because he was unable to the sit all day  He has now been working at a [employer] but is unable to work 40 hours per week because of the pain in his back  Treatment history has included physical therapy which has provided no relief  Heat/ice provides moderate relief  He uses ibuprofen as needed which provided some relief      I have personally reviewed and/or updated the patient's past medical history, past surgical history, family history, social history, current medications, allergies, and vital signs today  Review of Systems   Constitutional: Negative for fever and unexpected weight change  HENT: Negative for trouble swallowing  Eyes: Negative for visual disturbance  Respiratory: Negative for shortness of breath and wheezing  Cardiovascular: Negative for chest pain and palpitations  Gastrointestinal: Negative for constipation, diarrhea, nausea and vomiting  Endocrine: Negative for cold intolerance, heat intolerance and polydipsia  Genitourinary: Negative for difficulty urinating and frequency  Musculoskeletal: Positive for back pain  Negative for arthralgias, gait problem, joint swelling and myalgias  Numbness B/L Foot   Skin: Negative for rash  Neurological: Negative for dizziness, seizures, syncope, weakness and headaches  Hematological: Does not bruise/bleed easily  Psychiatric/Behavioral: Negative for dysphoric mood  All other systems reviewed and are negative        Patient Active Problem List   Diagnosis    Chronic diarrhea    De Quervain's tenosynovitis    Elevated ALT measurement    Fatty liver    Hypertriglyceridemia    Impingement syndrome of left shoulder    Left lumbar radiculopathy    Male erectile dysfunction    Plantar warts    Post concussion syndrome    Paresthesias    Excessive daytime sleepiness    PETTY (obstructive sleep apnea)    Type 2 diabetes mellitus without complication, without long-term current use of insulin (HCC)    Atypical nevus    Class 1 obesity due to excess calories with serious comorbidity and body mass index (BMI) of 34 0 to 34 9 in adult    Arthritis of right knee    Chronic pain of right knee       Past Medical History:   Diagnosis Date    Cancer [facility])     colon    Traumatic brain injury ([facility] [address] ) 2017    R/S 2017       Past Surgical History:   Procedure Laterality Date    APPENDECTOMY      COLON SURGERY         Family History   Problem Relation Age of Onset    Coronary artery disease Mother     Diabetes Mother     Hypertension Mother     Hyperlipidemia Mother     Coronary artery disease Father     Diabetes Father     Hypertension Father     Hyperlipidemia Father     Heart attack Father     Anemia Brother     No Known Problems Son     No Known Problems Son     No Known Problems Son        Social History     Occupational History    Not on file   Tobacco Use    Smoking status: Never Smoker    Smokeless tobacco: Never Used   Vaping Use    Vaping Use: Never used   Substance and Sexual Activity    Alcohol use: Yes     Alcohol/week: 1 0 standard drink     Types: 1 Cans of beer per week    Drug use: No    Sexual activity: Yes     Partners: Female     Birth control/protection: None       Current Outpatient Medications on File Prior to Visit   Medication Sig    albuterol (Proventil HFA) 90 mcg/act inhaler Inhale 2 puffs every 6 (six) hours as needed for wheezing    benzonatate (TESSALON) 200 MG capsule Take 1 capsule (200 mg total) by mouth 3 (three) times a day as needed for cough    budesonide (Pulmicort Flexhaler) 180 MCG/ACT inhaler Inhale 2 puffs 2 (two) times a day Rinse mouth after use      cholestyramine sugar free (QUESTRAN LIGHT) 4 g packet Take 1 packet (4 g total) by mouth daily    diclofenac sodium (VOLTAREN) 1 % Apply 2 g topically 3 (three) times a day as needed (low back pain)    guaifenesin-codeine (GUAIFENESIN AC) 100-10 MG/5ML liquid Take 10 mL by mouth 4 (four) times a day as needed for cough    hydrOXYzine HCL (ATARAX) 10 mg tablet Take 1 tablet (10 mg total) by mouth every 6 (six) hours as needed for itching    ibuprofen (MOTRIN) 200 mg tablet Take 600 mg by mouth every 6 (six) hours as needed for mild pain or moderate pain    Loperamide HCl (IMODIUM A-D PO) Take 1 tablet by mouth 4 (four) times a day    metFORMIN (GLUCOPHAGE-XR) 500 mg 24 hr tablet Take 1 tablet (500 mg total) by mouth daily with dinner  OMEGA-3 FATTY ACIDS PO Take 1,000 mg by mouth 2 (two) times a day      predniSONE 10 mg tablet Take 3 tabs twice a day x2 days, then 2 tabs twice a day x2 days, then 1 tab twice a day x2 days, then 1 tablet daily x2 days    tadalafil (CIALIS) 10 MG tablet Take 1 tablet (10 mg total) by mouth daily as needed for erectile dysfunction     No current facility-administered medications on file prior to visit  Allergies   Allergen Reactions    Asa [Aspirin] GI Intolerance    Penicillin G     Penicillins        Physical Exam    /91   Pulse 77   Resp 18   Ht 5' 5" (1 651 m)   Wt 89 4 kg (197 lb)   BMI 32 78 kg/m²     Constitutional: normal, well developed, well nourished, alert, in no distress and non-toxic and no overt pain behavior  Eyes: anicteric  HEENT: grossly intact  Neck: supple, symmetric, trachea midline and no masses   Pulmonary:even and unlabored  Cardiovascular:No edema or pitting edema present  Skin:Normal without rashes or lesions and well hydrated  Psychiatric:Mood and affect appropriate  Neurologic:Cranial Nerves II-XII grossly intact  Musculoskeletal:normal     Lumbar Spine Exam  Appearance:  Normal lordosis  Palpation/Tenderness:  left lumbar paraspinal tenderness  right lumbar paraspinal tenderness  left sacroiliac joint tenderness  right sacroiliac joint tenderness  Range of Motion:  Flexion:   Moderately limited  with pain  Extension:  Moderately limited  with pain  Motor Strength:  Left hip flexion:  5/5  Left hip extension:  5/5  Right hip flexion:  5/5  Right hip extension:  5/5  Left knee flexion:  5/5  Left knee extension:  5/5  Right knee flexion:  5/5  Right knee extension:  5/5  Left foot dorsiflexion:  5/5  Left foot plantar flexion:  5/5  Right foot dorsiflexion:  5/5  Right foot plantar flexion:  5/5  Reflexes:  Left Patellar:  1+   Right Patellar:  1+   Left Achilles:  1+   Right Achilles:  1+     Imaging    MRI LUMBAR SPINE WITHOUT CONTRAST (8/28/2018)     INDICATION: R20 2: Paresthesia of skin  M54 42: Lumbago with sciatica, left side  M54 41: Lumbago with sciatica, right side  G89 29: Other chronic pain  M51 9: Unspecified thoracic, thoracolumbar and lumbosacral intervertebral disc disorder      COMPARISON:  5/30/2017     TECHNIQUE:  Sagittal T1, sagittal T2, sagittal inversion recovery, axial T1 and axial T2, coronal T2        IMAGE QUALITY:  Diagnostic     FINDINGS:     ALIGNMENT:  Normal alignment of the lumbar spine  No compression fracture  No spondylolysis or spondylolisthesis  No scoliosis      MARROW SIGNAL:  Normal marrow signal is identified within the visualized bony structures  No discrete marrow lesion      DISTAL CORD AND CONUS:  Normal size and signal within the distal cord and conus  The conus ends at the L1 level      PARASPINAL SOFT TISSUES:  Paraspinal soft tissues are unremarkable      SACRUM:  Normal signal within the sacrum  No evidence of insufficiency or stress fracture      LOWER THORACIC DISC SPACES:  Normal disc height and signal   No disc herniation, canal stenosis or foraminal narrowing      LUMBAR DISC SPACES:     L1-L2:  Normal      L2-L3:  Normal      L3-L4:  Normal      L4-L5:  Normal      L5-S1:  There is a small central disc herniation with associated annular fissure, similar to prior examination  Minimal mass effect upon the ventral aspect of the thecal sac without discrete nerve impingement    No foraminal narrowing

## 2022-04-22 NOTE — PATIENT INSTRUCTIONS
Pregabalin (By mouth)   Pregabalin ([id number])  Treats nerve and muscle pain, including fibromyalgia  Also treats partial-onset seizures  Brand Name(s): Lyrica, Lyrica CR   There may be other brand names for this medicine  When This Medicine Should Not Be Used: This medicine is not right for everyone  Do not use it if you had an allergic reaction to pregabalin  How to Use This Medicine:   Capsule, Liquid, Long Acting Tablet  · Take your medicine as directed  Your dose may need to be changed several times to find what works best for you  · Extended-release tablet: Swallow the extended-release tablet whole  Do not crush, break, or chew it  Take it after an evening meal   · Oral liquid: Measure the oral liquid medicine with a marked measuring spoon, oral syringe, or medicine cup  · This medicine should come with a Medication Guide  Ask your pharmacist for a copy if you do not have one  · Missed dose: Take a dose as soon as you remember  If it is almost time for your next dose, wait until then and take a regular dose  Do not take extra medicine to make up for a missed dose  If you miss a dose of the extended-release tablet after your evening meal, take it before bedtime after a snack  If you miss the dose before bedtime, take it after your morning meal  If you do not take the dose the following morning, then take the next dose at your regular time after your evening meal  Do not take 2 doses at the same time  · Store the medicine in a closed container at room temperature, away from heat, moisture, and direct light  Drugs and Foods to Avoid:   Ask your doctor or pharmacist before using any other medicine, including over-the-counter medicines, vitamins, and herbal products  · Some medicines can affect how pregabalin works  Tell your doctor if you are using any of the following:   ? ACE inhibitor (including benazepril, enalapril, lisinopril, quinapril, ramipril)  ?  Oral diabetes medicine (including metformin, pioglitazone, rosiglitazone)  · Do not drink alcohol while you are using this medicine  · Tell your doctor if you use anything else that makes you sleepy  Some examples are allergy medicine, narcotic pain medicine, and alcohol  Tell your doctor if you are also using oxycodone, lorazepam, or zolpidem  Warnings While Using This Medicine:   · Tell your doctor if you are pregnant or breastfeeding, or if you have kidney disease, heart failure, heart rhythm problems, lung or breathing problems, a bleeding disorder, diabetes, sores or skin problems, or a low blood platelet count  Tell your doctor if you have a history of angioedema (severe swelling), alcohol or drug abuse, depression, or other mood problems  · This medicine may cause the following problems:   ? Angioedema (severe swelling), which may be life-threatening  ? Changes in mood or behavior, including suicidal thoughts or behavior  ? Respiratory depression (serious breathing problem that can be life-threatening), when used with narcotic pain medicines  ? Peripheral edema (swelling of your hands, ankles, feet, or lower legs)  ? Increased risk for cancer and bleeding  ? Serious muscle problems  ? Heart rhythm changes  · This medicine may make you dizzy or drowsy  It may also cause blurry or double vision  Do not drive or do anything else that could be dangerous until you know how this medicine affects you  · Do not stop using this medicine suddenly  Your doctor will need to slowly decrease your dose before you stop it completely  · Your doctor will do lab tests at regular visits to check on the effects of this medicine  Keep all appointments  · Keep all medicine out of the reach of children  Never share your medicine with anyone    Possible Side Effects While Using This Medicine:   Call your doctor right away if you notice any of these side effects:  · Allergic reaction: Itching or hives, swelling in your face or hands, swelling or tingling in your mouth or throat, chest tightness, trouble breathing  · Blistering, peeling, red skin rash  · Blue lips, fingernails, or skin, trouble breathing, chest pain  · Blurry or double vision  · Fever, chills, cough, sore throat, body aches  · Muscle pain, tenderness, or weakness, general feeling of illness  · Rapid weight gain, swelling in your hands, ankles, or feet  · Severe dizziness or drowsiness  · Sudden mood changes, unusual moods or behavior, including extreme happiness or depression, thoughts or attempts of killing oneself  · Swelling in your throat, head, or neck  · Uneven heartbeat  · Unusual bleeding, bruising, or weakness  If you notice these less serious side effects, talk with your doctor:   · Confusion, trouble concentrating  · Constipation  · Dry mouth  If you notice other side effects that you think are caused by this medicine, tell your doctor  Call your doctor for medical advice about side effects  You may report side effects to FDA at 9-756-FDA-5568    © Copyright [employer] 2022 Information is for End User's use only and may not be sold, redistributed or otherwise used for commercial purposes  The above information is an  only  It is not intended as medical advice for individual conditions or treatments  Talk to your doctor, nurse or pharmacist before following any medical regimen to see if it is safe and effective for you  Core Strengthening Exercises   WHAT YOU NEED TO KNOW:   What do I need to know about core strengthening exercises? Core strengthening exercises help heal and strengthen muscles of your hips, back, and abdomen to prevent reinjury  They are beginning exercises to help support your spine  Ask your healthcare provider if you need to see a physical therapist for more advanced exercises  · Do the exercises on a mat or firm surface  (not on a bed) to support your spine and avoid low back pain       · Do the exercises in the same order every time  to train your muscles to work together  Your healthcare provider will show you how to perform these exercises  Do them every day, or as directed by your healthcare provider  · Move slowly and smoothly  Avoid fast or jerky motions  · Stop if you feel pain  It is normal to feel some discomfort at first  Regular exercise will help decrease your discomfort over time  How do I perform core strengthening exercises safely? Hold each exercise for 5 seconds  When you can do the exercise without pain for 5 seconds, increase your hold to 10 to 15 seconds  When you can do the exercise without pain for 10 to 15 seconds, add the next exercise  Increase the time you hold each exercise, or repeat the exercises as directed  As you do each exercise, breathe normally  Do not hold your breath  · Abdominal bracing:  Lie on your back with your knees bent and feet flat on the floor  Place your arms in a relaxed position beside your body  Pull your belly button in toward your spine  Do not flatten or arch your back  Tighten the abdominal muscles below your belly button  Hold for 5 seconds  Begin all of your exercises with abdominal bracing  You can also practice abdominal bracing throughout the day while you are sitting or standing  · Bridging:  Lie on your back with your knees bent and feet flat on the floor  Rest your arms at your side  Tighten your buttocks, and then lift your hips 1 inch off the floor  Hold for 5 seconds  When you can do this exercise without pain for 10 seconds, increase the distance you lift your hips  A good goal is to be able to lift your hips so that your shoulders, hips, and knees are in a straight line  · Curl up:  Lie on your back with your knees bent and feet flat on the floor  Place your hands, palms down, underneath the curve in your lower back  Next, with your elbows on the floor, lift your shoulders and chest 2 to 3 inches  Keep your head in line with your shoulders   Hold this position for 5 seconds  When you can do this exercise without pain for 10 to 15 seconds, you may add a rotation  While your shoulders and chest are lifted off the ground, turn slightly to the left and hold  Repeat on the other side  · Dead bug:  Lie on your back with your knees bent and feet flat on the floor  Place your arms in a relaxed position beside your body  Begin with abdominal bracing  Next, raise one leg, keeping your knee bent  Hold for 5 seconds  Repeat with the other leg  When you can do this exercise without pain for 10 to 15 seconds, you may raise one straight leg and hold  Repeat with the other leg  · Quadruped:  Place your hands and knees on the floor  Keep your wrists directly below your shoulders and your knees directly below your hips  Pull your belly button in toward your spine  Do not flatten or arch your back  Tighten your abdominal muscles below your belly button  Hold for 5 seconds  When you can do this exercise without pain for 10 to 15 seconds, you may extend one arm and hold  Repeat on the other side  · Side Bridge:      ¨ Standing side bridge:  Stand next to a wall and extend one arm toward the wall  Place your palm flat on the wall with your fingers pointing upward  Begin with abdominal bracing  Next, without moving your feet, slowly bend your arm to 90 degrees  Hold for 5 seconds  Repeat on the other side  When you can do this exercise without pain for 10 to 15 seconds, you may do the bent leg side bridge on the floor  ¨ Bent leg side bridge:  Lie on one side with your legs, hips, and shoulders in a straight line  Prop yourself up onto your forearm so your elbow is directly below your shoulder  Bend your knees back to 90 degrees  Begin with abdominal bracing  Next, lift your hips and balance yourself on your forearm and knees  Hold for 5 seconds  Repeat on the other side   When you can do this exercise without pain for 10 to 15 seconds, you may do the straight leg side bridge on the floor  ¨ Straight leg side bridge:  Lie on one side with your legs, hips, and shoulders in a straight line  Prop yourself up onto your forearm so your elbow is directly below your shoulder  Begin with abdominal bracing  Lift your hips off the floor and balance yourself on your forearm and the outside of your flexed foot  Do not let your ankle bend sideways  Hold for 5 seconds  Repeat on the other side  When you can do this exercise without pain for 10 to 15 seconds, ask your healthcare provider for more advanced exercises  When should I contact my healthcare provider? · Your pain becomes worse  · You have new pain  · You have questions or concerns about your condition, care, or exercise program   CARE AGREEMENT:   You have the right to help plan your care  Learn about your health condition and how it may be treated  Discuss treatment options with your caregivers to decide what care you want to receive  You always have the right to refuse treatment  The above information is an  only  It is not intended as medical advice for individual conditions or treatments  Talk to your doctor, nurse or pharmacist before following any medical regimen to see if it is safe and effective for you  © 2016 [employer] is for End User's use only and may not be sold, redistributed or otherwise used for commercial purposes  All illustrations and images included in CareNotes® are the copyrighted property of [employer]  or Fredy Osorio

## 2022-05-11 ENCOUNTER — TELEPHONE (OUTPATIENT)
Dept: PSYCHIATRY | Facility: CLINIC | Age: 47
End: 2022-05-11

## 2022-05-11 NOTE — TELEPHONE ENCOUNTER
Patient's name had been listed on talk therapy wait list  This writer contacted patient and lvm advising to return call to Intake Dept

## 2022-05-12 NOTE — TELEPHONE ENCOUNTER
Patient returned phone call  Patient stated that he was not looking for therapy, but instead that he wanted therapy for his son  Patient stated "I am at work, let me call you back " Nothing further to report

## 2022-05-12 NOTE — TELEPHONE ENCOUNTER
Patient returned call on this writer's Teams #  This writer returned call and lvm advising for a call back

## 2022-05-31 ENCOUNTER — HOSPITAL ENCOUNTER (OUTPATIENT)
Dept: MRI IMAGING | Facility: HOSPITAL | Age: 47
Discharge: HOME | End: 2022-05-31
Attending: ANESTHESIOLOGY
Payer: COMMERCIAL

## 2022-05-31 DIAGNOSIS — M51.17 INTERVERTEBRAL DISC DISORDER WITH RADICULOPATHY OF LUMBOSACRAL REGION: ICD-10-CM

## 2022-05-31 PROCEDURE — 72148 MRI LUMBAR SPINE W/O DYE: CPT

## 2022-05-31 PROCEDURE — G1004 CDSM NDSC: HCPCS

## 2022-06-01 ENCOUNTER — TELEPHONE (OUTPATIENT)
Dept: PAIN MEDICINE | Facility: CLINIC | Age: 47
End: 2022-06-01

## 2022-06-01 NOTE — TELEPHONE ENCOUNTER
Let patient know that MRI lumbar spine showed primarily arthritic changes at L3-4, L4-5, L5-S1 with small central disc protrusion L5-S1  Based on his exam and consultation an MRI would recommend proceeding with bilateral L3-5 medial branch blocks in anticipation of radiofrequency ablation

## 2022-06-01 NOTE — TELEPHONE ENCOUNTER
S/w pt, advised of the same  Educated pt about MBB / RFA and mailed information pamphlets to home address  Pt verbalized understanding and agreeable to plan  Please reach out to schedule, thank you

## 2022-06-21 ENCOUNTER — HOSPITAL ENCOUNTER (OUTPATIENT)
Dept: RADIOLOGY | Facility: CLINIC | Age: 47
Discharge: HOME | End: 2022-06-21
Attending: ANESTHESIOLOGY | Admitting: ANESTHESIOLOGY
Payer: COMMERCIAL

## 2022-06-21 VITALS
TEMPERATURE: 98 F | SYSTOLIC BLOOD PRESSURE: 125 MMHG | RESPIRATION RATE: 20 BRPM | DIASTOLIC BLOOD PRESSURE: 80 MMHG | OXYGEN SATURATION: 97 % | HEART RATE: 65 BPM

## 2022-06-21 DIAGNOSIS — M47.816 LUMBAR SPONDYLOSIS: ICD-10-CM

## 2022-06-21 RX ORDER — BUPIVACAINE HCL/PF 2.5 MG/ML
10 VIAL (ML) INJECTION ONCE
Status: COMPLETED | OUTPATIENT
Start: 2022-06-21 | End: 2022-06-21

## 2022-06-21 RX ADMIN — BUPIVACAINE HYDROCHLORIDE 3 ML: 2.5 INJECTION, SOLUTION EPIDURAL; INFILTRATION; INTRACAUDAL at 10:28

## 2022-06-21 NOTE — DISCHARGE INSTR - LAB

## 2022-06-21 NOTE — H&P
History of Present Illness:  The patient is a 55 y o  male who presents with complaints of lower back pain is here today for diagnostic bilateral L3-5 medial branch blocks    Patient Active Problem List   Diagnosis    Chronic diarrhea    De Quervain's tenosynovitis    Elevated ALT measurement    Fatty liver    Hypertriglyceridemia    Impingement syndrome of left shoulder    Left lumbar radiculopathy    Male erectile dysfunction    Plantar warts    Post concussion syndrome    Paresthesias    Excessive daytime sleepiness    PETTY (obstructive sleep apnea)    Type 2 diabetes mellitus without complication, without long-term current use of insulin (HCC)    Atypical nevus    Class 1 obesity due to excess calories with serious comorbidity and body mass index (BMI) of 34 0 to 34 9 in adult    Arthritis of right knee    Chronic pain of right knee       Past Medical History:   Diagnosis Date    Cancer [facility])     colon    Traumatic brain injury ([facility] [address] ) 2017    R/S 2017       Past Surgical History:   Procedure Laterality Date    APPENDECTOMY      COLON SURGERY           Current Outpatient Medications:     albuterol (Proventil HFA) 90 mcg/act inhaler, Inhale 2 puffs every 6 (six) hours as needed for wheezing, Disp: 6 7 g, Rfl: 5    benzonatate (TESSALON) 200 MG capsule, Take 1 capsule (200 mg total) by mouth 3 (three) times a day as needed for cough, Disp: 30 capsule, Rfl: 0    budesonide (Pulmicort Flexhaler) 180 MCG/ACT inhaler, Inhale 2 puffs 2 (two) times a day Rinse mouth after use , Disp: 1 each, Rfl: 1    cholestyramine sugar free (QUESTRAN LIGHT) 4 g packet, Take 1 packet (4 g total) by mouth daily, Disp: 30 packet, Rfl: 3    diclofenac sodium (VOLTAREN) 1 %, Apply 2 g topically 3 (three) times a day as needed (low back pain), Disp: 100 g, Rfl: 0    guaifenesin-codeine (GUAIFENESIN AC) 100-10 MG/5ML liquid, Take 10 mL by mouth 4 (four) times a day as needed for cough, Disp: 118 mL, Rfl: 0   hydrOXYzine HCL (ATARAX) 10 mg tablet, Take 1 tablet (10 mg total) by mouth every 6 (six) hours as needed for itching, Disp: 30 tablet, Rfl: 0    ibuprofen (MOTRIN) 200 mg tablet, Take 600 mg by mouth every 6 (six) hours as needed for mild pain or moderate pain, Disp: , Rfl:     Loperamide HCl (IMODIUM A-D PO), Take 1 tablet by mouth 4 (four) times a day, Disp: , Rfl:     OMEGA-3 FATTY ACIDS PO, Take 1,000 mg by mouth 2 (two) times a day  , Disp: , Rfl:     pregabalin (LYRICA) 75 mg capsule, Take 1 capsule (75 mg total) by mouth 2 (two) times a day (Patient not taking: Reported on 6/21/2022), Disp: 60 capsule, Rfl: 1    tadalafil (CIALIS) 10 MG tablet, Take 1 tablet (10 mg total) by mouth daily as needed for erectile dysfunction, Disp: 20 tablet, Rfl: 1  No current facility-administered medications for this encounter  Allergies   Allergen Reactions    Asa [Aspirin] GI Intolerance    Penicillin G     Penicillins        Physical Exam:   Vitals:    06/21/22 1019   BP: 125/78   Pulse: 65   Resp: 20   Temp: 98 °F (36 7 °C)   SpO2: 95%     General: Awake, Alert, Oriented x 3, Mood and affect appropriate  Respiratory: Respirations even and unlabored  Cardiovascular: Peripheral pulses intact; no edema  Musculoskeletal Exam:  Lower back pain    ASA Score: 3    Patient/Chart Verification  Patient ID Verified: Verbal  Consents Confirmed: To be obtained in the Pre-Procedure area  Interval H&P(within 24 hr) Complete (required for Outpatients and Surgery Admit only): To be obtained in the Pre-Procedure area  Allergies Reviewed: Yes  Anticoag/NSAID held?: NA  Currently on antibiotics?: No    Assessment:   1   Lumbar spondylosis        Plan: B/L L3-5 MBB#1

## 2022-06-23 ENCOUNTER — TELEPHONE (OUTPATIENT)
Dept: PAIN MEDICINE | Facility: CLINIC | Age: 47
End: 2022-06-23

## 2022-06-23 ENCOUNTER — TRANSCRIBE ORDERS (OUTPATIENT)
Dept: PAIN MEDICINE | Facility: CLINIC | Age: 47
End: 2022-06-23

## 2022-06-23 NOTE — TELEPHONE ENCOUNTER
S/w pt, advised of the same  Pt verbalized understanding and agreeable to LESI  Please reach out to schedule, thank you

## 2022-06-23 NOTE — TELEPHONE ENCOUNTER
----- Message from Keyon Barron sent at 6/23/2022  3:34 PM EDT -----  Regarding: Pain diary  Hi Dr Estrella Lomeli,    Pt faxed his pain diary over, it is scanned in media for you      Yogesh Rose

## 2022-06-23 NOTE — TELEPHONE ENCOUNTER
Unfortunately, no relief with the MBB so not a candidate for the RFA  Would recommend proceeding with LESI to help with inflammation and pain    If amenable, send to scheduling team to schedule

## 2022-07-18 NOTE — TELEPHONE ENCOUNTER
Pt scheduled for 8/25/22  Not diabetic or on blood thinners  Gave verbal instructions for the procedure  [facility]   Pt understood

## 2022-08-25 ENCOUNTER — HOSPITAL ENCOUNTER (OUTPATIENT)
Dept: RADIOLOGY | Facility: CLINIC | Age: 47
End: 2022-08-25
Payer: COMMERCIAL

## 2022-08-25 VITALS
DIASTOLIC BLOOD PRESSURE: 76 MMHG | SYSTOLIC BLOOD PRESSURE: 127 MMHG | OXYGEN SATURATION: 93 % | TEMPERATURE: 97.3 F | HEART RATE: 75 BPM | RESPIRATION RATE: 20 BRPM

## 2022-08-25 DIAGNOSIS — M47.816 LUMBAR SPONDYLOSIS: ICD-10-CM

## 2022-08-25 PROCEDURE — 62323 NJX INTERLAMINAR LMBR/SAC: CPT | Performed by: ANESTHESIOLOGY

## 2022-08-25 RX ORDER — BUPIVACAINE HCL/PF 2.5 MG/ML
2 VIAL (ML) INJECTION ONCE
Status: COMPLETED | OUTPATIENT
Start: 2022-08-25 | End: 2022-08-25

## 2022-08-25 RX ORDER — METHYLPREDNISOLONE ACETATE 80 MG/ML
80 INJECTION, SUSPENSION INTRA-ARTICULAR; INTRALESIONAL; INTRAMUSCULAR; PARENTERAL; SOFT TISSUE ONCE
Status: COMPLETED | OUTPATIENT
Start: 2022-08-25 | End: 2022-08-25

## 2022-08-25 RX ADMIN — BUPIVACAINE HYDROCHLORIDE 2 ML: 2.5 INJECTION, SOLUTION EPIDURAL; INFILTRATION; INTRACAUDAL at 10:15

## 2022-08-25 RX ADMIN — METHYLPREDNISOLONE ACETATE 80 MG: 80 INJECTION, SUSPENSION INTRA-ARTICULAR; INTRALESIONAL; INTRAMUSCULAR; PARENTERAL; SOFT TISSUE at 10:15

## 2022-08-25 RX ADMIN — IOHEXOL 1 ML: 300 INJECTION, SOLUTION INTRAVENOUS at 10:14

## 2022-08-25 NOTE — H&P
History of Present Illness:  The patient is a 52 y o  male who presents with complaints of lower back pain is here today for lumbar epidural steroid injection    Patient Active Problem List   Diagnosis    Chronic diarrhea    De Quervain's tenosynovitis    Elevated ALT measurement    Fatty liver    Hypertriglyceridemia    Impingement syndrome of left shoulder    Left lumbar radiculopathy    Male erectile dysfunction    Plantar warts    Post concussion syndrome    Paresthesias    Excessive daytime sleepiness    PETTY (obstructive sleep apnea)    Type 2 diabetes mellitus without complication, without long-term current use of insulin (HCC)    Atypical nevus    Class 1 obesity due to excess calories with serious comorbidity and body mass index (BMI) of 34 0 to 34 9 in adult    Arthritis of right knee    Chronic pain of right knee       Past Medical History:   Diagnosis Date    Cancer [facility])     colon    Traumatic [facility] injury ([facility] [address] ) 2017    R/S 2017       Past Surgical History:   Procedure Laterality Date    APPENDECTOMY      COLON SURGERY           Current Outpatient Medications:     albuterol (Proventil HFA) 90 mcg/act inhaler, Inhale 2 puffs every 6 (six) hours as needed for wheezing, Disp: 6 7 g, Rfl: 5    benzonatate (TESSALON) 200 MG capsule, Take 1 capsule (200 mg total) by mouth 3 (three) times a day as needed for cough, Disp: 30 capsule, Rfl: 0    budesonide (Pulmicort Flexhaler) 180 MCG/ACT inhaler, Inhale 2 puffs 2 (two) times a day Rinse mouth after use , Disp: 1 each, Rfl: 1    cholestyramine sugar free (QUESTRAN LIGHT) 4 g packet, Take 1 packet (4 g total) by mouth daily, Disp: 30 packet, Rfl: 3    diclofenac sodium (VOLTAREN) 1 %, Apply 2 g topically 3 (three) times a day as needed (low back pain), Disp: 100 g, Rfl: 0    guaifenesin-codeine (GUAIFENESIN AC) 100-10 MG/5ML liquid, Take 10 mL by mouth 4 (four) times a day as needed for cough, Disp: 118 mL, Rfl: 0    hydrOXYzine HCL (ATARAX) 10 mg tablet, Take 1 tablet (10 mg total) by mouth every 6 (six) hours as needed for itching, Disp: 30 tablet, Rfl: 0    ibuprofen (MOTRIN) 200 mg tablet, Take 600 mg by mouth every 6 (six) hours as needed for mild pain or moderate pain, Disp: , Rfl:     Loperamide HCl (IMODIUM A-D PO), Take 1 tablet by mouth 4 (four) times a day, Disp: , Rfl:     OMEGA-3 FATTY ACIDS PO, Take 1,000 mg by mouth 2 (two) times a day  , Disp: , Rfl:     pregabalin (LYRICA) 75 mg capsule, Take 1 capsule (75 mg total) by mouth 2 (two) times a day (Patient not taking: Reported on 6/21/2022), Disp: 60 capsule, Rfl: 1    tadalafil (CIALIS) 10 MG tablet, Take 1 tablet (10 mg total) by mouth daily as needed for erectile dysfunction, Disp: 20 tablet, Rfl: 1  No current facility-administered medications for this encounter  Allergies   Allergen Reactions    Asa [Aspirin] GI Intolerance    Penicillin G     Penicillins        Physical Exam:   Vitals:    08/25/22 1002   BP: 120/80   Pulse: 79   Resp: 20   Temp: (!) 97 3 °F (36 3 °C)   SpO2: 95%     General: Awake, Alert, Oriented x 3, Mood and affect appropriate  Respiratory: Respirations even and unlabored  Cardiovascular: Peripheral pulses intact; no edema  Musculoskeletal Exam:  Lower back pain    ASA Score: 3    Patient/Chart Verification  Patient ID Verified: Verbal  Consents Confirmed: To be obtained in the Pre-Procedure area  Interval H&P(within 24 hr) Complete (required for Outpatients and Surgery Admit only): To be obtained in the Pre-Procedure area  Allergies Reviewed: Yes  Anticoag/NSAID held?: NA  Currently on antibiotics?: No    Assessment:   1   Lumbar spondylosis        Plan: MITCH

## 2022-08-25 NOTE — DISCHARGE INSTR - LAB
Epidural Steroid Injection   WHAT YOU NEED TO KNOW:   An epidural steroid injection (MARIE) is a procedure to inject steroid medicine into the epidural space  The epidural space is between your spinal cord and vertebrae  Steroids reduce inflammation and fluid buildup in your spine that may be causing pain  You may be given pain medicine along with the steroids  ACTIVITY  Do not drive or operate machinery today  No strenuous activity today - bending, lifting, etc   You may resume normal activites starting tomorrow - start slowly and as tolerated  You may shower today, but no tub baths or hot tubs  You may have numbness for several hours from the local anesthetic  Please use caution and common sense, especially with weight-bearing activities  CARE OF THE INJECTION SITE  If you have soreness or pain, apply ice to the area today (20 minutes on/20 minutes off)  Starting tomorrow, you may use warm, moist heat or ice if needed  You may have an increase or change in your discomfort for 36-48 hours after your treatment  Apply ice and continue with any pain medication you have been prescribed  Notify the [facility] if you have any of the following: redness, drainage, swelling, headache, stiff neck or fever above 100°F     SPECIAL INSTRUCTIONS  Our office will contact you in approximately 7 days for a progress report  MEDICATIONS  Continue to take all routine medications  Our office may have instructed you to hold some medications  As no general anesthesia was used in today's procedure, you should not experience any side effects related to anesthesia  If you are diabetic, the steroids used in today's injection may temporarily increase your blood sugar levels after the first few days after your injection  Please keep a close eye on your sugars and alert the doctor who manages your diabetes if your sugars are significantly high from your baseline or you are symptomatic       If you have a problem specifically related to your procedure, please call our office at [phone]  Problems not related to your procedure should be directed to your primary care physician

## 2022-09-01 ENCOUNTER — TELEPHONE (OUTPATIENT)
Dept: PAIN MEDICINE | Facility: CLINIC | Age: 47
End: 2022-09-01

## 2022-09-01 NOTE — TELEPHONE ENCOUNTER
1st attempt call,unable to leave msg to call back with % of improvement and pain level  'call could not be completed at this time, hang up and try again later"

## 2022-10-07 DIAGNOSIS — N52.9 ERECTILE DYSFUNCTION, UNSPECIFIED ERECTILE DYSFUNCTION TYPE: ICD-10-CM

## 2022-10-07 DIAGNOSIS — K52.9 CHRONIC DIARRHEA: ICD-10-CM

## 2022-10-07 RX ORDER — CHOLESTYRAMINE LIGHT 4 G/5.7G
4 POWDER, FOR SUSPENSION ORAL DAILY
Qty: 30 PACKET | Refills: 3 | Status: SHIPPED | OUTPATIENT
Start: 2022-10-07

## 2022-10-07 RX ORDER — TADALAFIL 10 MG/1
10 TABLET ORAL DAILY PRN
Qty: 20 TABLET | Refills: 1 | Status: SHIPPED | OUTPATIENT
Start: 2022-10-07

## 2022-10-26 ENCOUNTER — OFFICE VISIT (OUTPATIENT)
Dept: FAMILY MEDICINE CLINIC | Facility: CLINIC | Age: 47
End: 2022-10-26
Payer: COMMERCIAL

## 2022-10-26 VITALS
TEMPERATURE: 97.2 F | BODY MASS INDEX: 34.82 KG/M2 | OXYGEN SATURATION: 97 % | HEART RATE: 76 BPM | SYSTOLIC BLOOD PRESSURE: 132 MMHG | HEIGHT: 65 IN | WEIGHT: 209 LBS | DIASTOLIC BLOOD PRESSURE: 80 MMHG

## 2022-10-26 DIAGNOSIS — Z00.00 ANNUAL PHYSICAL EXAM: Primary | ICD-10-CM

## 2022-10-26 DIAGNOSIS — G47.19 EXCESSIVE DAYTIME SLEEPINESS: ICD-10-CM

## 2022-10-26 DIAGNOSIS — E78.1 HYPERTRIGLYCERIDEMIA: ICD-10-CM

## 2022-10-26 DIAGNOSIS — Z11.4 SCREENING FOR HIV (HUMAN IMMUNODEFICIENCY VIRUS): ICD-10-CM

## 2022-10-26 DIAGNOSIS — Z12.12 SCREENING FOR COLORECTAL CANCER: ICD-10-CM

## 2022-10-26 DIAGNOSIS — Z12.11 SCREENING FOR COLORECTAL CANCER: ICD-10-CM

## 2022-10-26 DIAGNOSIS — E66.09 CLASS 1 OBESITY DUE TO EXCESS CALORIES WITH SERIOUS COMORBIDITY AND BODY MASS INDEX (BMI) OF 34.0 TO 34.9 IN ADULT: ICD-10-CM

## 2022-10-26 DIAGNOSIS — E11.9 TYPE 2 DIABETES MELLITUS WITHOUT COMPLICATION, WITHOUT LONG-TERM CURRENT USE OF INSULIN (HCC): ICD-10-CM

## 2022-10-26 PROBLEM — F07.81 POST CONCUSSION SYNDROME: Status: RESOLVED | Noted: 2017-05-09 | Resolved: 2022-10-26

## 2022-10-26 LAB — SL AMB POCT HEMOGLOBIN AIC: 6.9 (ref ?–6.5)

## 2022-10-26 PROCEDURE — 99396 PREV VISIT EST AGE 40-64: CPT | Performed by: FAMILY MEDICINE

## 2022-10-26 PROCEDURE — 83036 HEMOGLOBIN GLYCOSYLATED A1C: CPT | Performed by: FAMILY MEDICINE

## 2022-10-26 RX ORDER — METFORMIN HYDROCHLORIDE 500 MG/1
1000 TABLET, EXTENDED RELEASE ORAL
Qty: 180 TABLET | Refills: 1 | Status: SHIPPED | OUTPATIENT
Start: 2022-10-26 | End: 2023-04-24

## 2022-10-26 RX ORDER — ATORVASTATIN CALCIUM 20 MG/1
20 TABLET, FILM COATED ORAL EVERY EVENING
Qty: 90 TABLET | Refills: 1 | Status: SHIPPED | OUTPATIENT
Start: 2022-10-26 | End: 2023-04-24

## 2022-10-26 NOTE — PROGRESS NOTES
ADULT ANNUAL [facility]    NAME: Yadi Avina  AGE: 52 y o  SEX: male  : 1975     DATE: 10/26/2022     Assessment and Plan:     Problem List Items Addressed This Visit        Endocrine    Type 2 diabetes mellitus without complication, without long-term current use of insulin (HCC)    Relevant Medications    metFORMIN (GLUCOPHAGE-XR) 500 mg 24 hr tablet    atorvastatin (LIPITOR) 20 mg tablet    Other Relevant Orders    Comprehensive metabolic panel    Microalbumin / creatinine urine ratio    Hemoglobin A1C       Other    Hypertriglyceridemia    Relevant Medications    atorvastatin (LIPITOR) 20 mg tablet    Other Relevant Orders    Lipid panel    Excessive daytime sleepiness    Relevant Orders    TSH, 3rd generation with Free T4 reflex    Class 1 obesity due to excess calories with serious comorbidity and body mass index (BMI) of 34 0 to 34 9 in adult    Relevant Orders    CBC and differential      Other Visit Diagnoses     Annual physical exam    -  Primary    Screening for colorectal cancer        Relevant Orders    Ambulatory referral for Colonoscopy    Screening for HIV (human immunodeficiency virus)        Relevant Orders    HIV 1/2 Antigen/Antibody (4th Generation) w Reflex [facility]    BMI 34 0-34 9,adult              Immunizations and preventive care screenings were discussed with patient today  Appropriate education was printed on patient's after visit summary  Counseling:  Alcohol/drug use: discussed moderation in alcohol intake, the recommendations for healthy alcohol use, and avoidance of illicit drug use  Dental Health: discussed importance of regular tooth brushing, flossing, and dental visits  Injury prevention: discussed safety/seat belts, safety helmets, smoke detectors, carbon dioxide detectors, and smoking near bedding or upholstery    Sexual health: discussed sexually transmitted diseases, partner selection, use of condoms, avoidance of unintended pregnancy, and contraceptive alternatives  Exercise: the importance of regular exercise/physical activity was discussed  Recommend exercise 3-5 times per week for at least 30 minutes  Will start metformin 500 mg daily for 2 weeks  Can then increase to 1000 mg daily if tolerated  Obtain blood work follow-up in 3 months      Return in 3 months (on 1/26/2023)  Chief Complaint:     Chief Complaint   Patient presents with   • Annual Exam      History of Present Illness:     Adult Annual Physical   Patient here for a comprehensive physical exam  The patient reports no problems  Diet and Physical Activity  Diet/Nutrition: poor diet, high fat diet and limited fruits/vegetables  Exercise: no formal exercise  Depression Screening  PHQ-2/9 Depression Screening    Little interest or pleasure in doing things: 1 - several days  Feeling down, depressed, or hopeless: 1 - several days  PHQ-2 Score: 2  PHQ-2 Interpretation: Negative depression screen       General Health  Sleep: gets 7-8 hours of sleep on average and snores loudly  Hearing: normal - bilateral   Vision: no vision problems  Dental: regular dental visits   Health  Symptoms include: erectile dysfunction     Review of Systems:     Review of Systems   Constitutional: Positive for fatigue  Negative for fever  HENT: Negative for sore throat  Eyes: Negative for visual disturbance  Respiratory: Negative for cough, chest tightness and shortness of breath  Cardiovascular: Negative for chest pain, palpitations and leg swelling  Gastrointestinal: Negative for abdominal pain, constipation, diarrhea and nausea  Endocrine: Negative for cold intolerance and heat intolerance  Genitourinary: Negative for flank pain  Musculoskeletal: Negative for back pain and neck pain  Skin: Negative for rash  Neurological: Negative for headaches     Psychiatric/Behavioral: Negative for behavioral problems and confusion  Past Medical History:     Past Medical History:   Diagnosis Date   • Cancer ([facility] [address] )     colon   • Post concussion syndrome 5/9/2017   • Traumatic brain injury 2017    R/S 2017      Past Surgical History:     Past Surgical History:   Procedure Laterality Date   • APPENDECTOMY     • COLON SURGERY        Family History:     Family History   Problem Relation Age of Onset   • Coronary artery disease Mother    • Diabetes Mother    • Hypertension Mother    • Hyperlipidemia Mother    • Coronary artery disease Father    • Diabetes Father    • Hypertension Father    • Hyperlipidemia Father    • Heart attack Father    • Anemia Brother    • No Known Problems Son    • No Known Problems Son    • No Known Problems Son       Social History:     Social History     Socioeconomic History   • Marital status: /Civil Union     Spouse name: None   • Number of children: None   • Years of education: None   • Highest education level: None   Occupational History   • None   Tobacco Use   • Smoking status: Never Smoker   • Smokeless tobacco: Never Used   Vaping Use   • Vaping Use: Never used   Substance and Sexual Activity   • Alcohol use:  Yes     Alcohol/week: 1 0 standard drink     Types: 1 Cans of beer per week   • Drug use: No   • Sexual activity: Yes     Partners: Female     Birth control/protection: None   Other Topics Concern   • None   Social History Narrative   • None     Social Determinants of Health     Financial Resource Strain: Not on file   Food Insecurity: Not on file   Transportation Needs: Not on file   Physical Activity: Not on file   Stress: Not on file   Social Connections: Not on file   Intimate Partner Violence: Not on file   Housing Stability: Not on file      Current Medications:     Current Outpatient Medications   Medication Sig Dispense Refill   • atorvastatin (LIPITOR) 20 mg tablet Take 1 tablet (20 mg total) by mouth every evening 90 tablet 1   • cholestyramine sugar free (QUESTRAN LIGHT) 4 g packet Take 1 packet (4 g total) by mouth daily 30 packet 3   • Loperamide HCl (IMODIUM A-D PO) Take 1 tablet by mouth 4 (four) times a day     • metFORMIN (GLUCOPHAGE-XR) 500 mg 24 hr tablet Take 2 tablets (1,000 mg total) by mouth daily with breakfast 180 tablet 1   • tadalafil (CIALIS) 10 MG tablet Take 1 tablet (10 mg total) by mouth daily as needed for erectile dysfunction 20 tablet 1   • albuterol (Proventil HFA) 90 mcg/act inhaler Inhale 2 puffs every 6 (six) hours as needed for wheezing (Patient not taking: Reported on 10/26/2022) 6 7 g 5   • budesonide (Pulmicort Flexhaler) 180 MCG/ACT inhaler Inhale 2 puffs 2 (two) times a day Rinse mouth after use  (Patient not taking: Reported on 10/26/2022) 1 each 1   • diclofenac sodium (VOLTAREN) 1 % Apply 2 g topically 3 (three) times a day as needed (low back pain) (Patient not taking: Reported on 10/26/2022) 100 g 0   • guaifenesin-codeine (GUAIFENESIN AC) 100-10 MG/5ML liquid Take 10 mL by mouth 4 (four) times a day as needed for cough (Patient not taking: Reported on 10/26/2022) 118 mL 0   • hydrOXYzine HCL (ATARAX) 10 mg tablet Take 1 tablet (10 mg total) by mouth every 6 (six) hours as needed for itching (Patient not taking: Reported on 10/26/2022) 30 tablet 0   • ibuprofen (MOTRIN) 200 mg tablet Take 600 mg by mouth every 6 (six) hours as needed for mild pain or moderate pain (Patient not taking: Reported on 10/26/2022)     • OMEGA-3 FATTY ACIDS PO Take 1,000 mg by mouth 2 (two) times a day   (Patient not taking: Reported on 10/26/2022)     • pregabalin (LYRICA) 75 mg capsule Take 1 capsule (75 mg total) by mouth 2 (two) times a day (Patient not taking: No sig reported) 60 capsule 1     No current facility-administered medications for this visit  Allergies:      Allergies   Allergen Reactions   • Asa [Aspirin] GI Intolerance   • Penicillin G    • Penicillins       Physical Exam:     /80 (BP Location: Left arm, Patient Position: Sitting, Cuff Size: Standard)   Pulse 76   Temp (!) 97 2 °F (36 2 °C)   Ht 5' 5" (1 651 m)   Wt 94 8 kg (209 lb)   SpO2 97%   BMI 34 78 kg/m²     Physical Exam  Vitals and nursing note reviewed  Constitutional:       General: He is not in acute distress  Appearance: Normal appearance  He is well-developed  HENT:      Head: Normocephalic and atraumatic  Right Ear: Tympanic membrane normal  There is no impacted cerumen  Left Ear: Tympanic membrane normal  There is no impacted cerumen  Nose: No congestion  Mouth/Throat:      Mouth: Mucous membranes are moist    Eyes:      Extraocular Movements: Extraocular movements intact  Conjunctiva/sclera: Conjunctivae normal       Pupils: Pupils are equal, round, and reactive to light  Cardiovascular:      Rate and Rhythm: Normal rate and regular rhythm  Heart sounds: Normal heart sounds  No murmur heard  Pulmonary:      Effort: Pulmonary effort is normal       Breath sounds: Normal breath sounds  Abdominal:      General: Abdomen is flat  Bowel sounds are normal       Palpations: Abdomen is soft  Musculoskeletal:         General: No swelling  Normal range of motion  Cervical back: Normal range of motion  Skin:     General: Skin is warm and dry  Findings: No rash  Neurological:      General: No focal deficit present  Mental Status: He is alert and oriented to person, place, and time  Sensory: No sensory deficit  Deep Tendon Reflexes: Reflexes normal    Psychiatric:         Mood and Affect: Mood normal          Speech: Speech normal          Behavior: Behavior normal          Thought Content:  Thought content normal           Traci Johns MD  [id number]

## 2022-10-26 NOTE — PATIENT INSTRUCTIONS
Wellness Visit for Adults   AMBULATORY CARE:   A wellness visit  is when you see your healthcare provider to get screened for health problems  Your healthcare provider will also give you advice on how to stay healthy  Write down your questions so you remember to ask them  Ask your healthcare provider how often you should have a wellness visit  What happens at a wellness visit:  Your healthcare provider will ask about your health, and your family history of health problems  This includes high blood pressure, heart disease, and cancer  He or she will ask if you have symptoms that concern you, if you smoke, and about your mood  You may also be asked about your intake of medicines, supplements, food, and alcohol  Any of the following may be done: Your weight  will be checked  Your height may also be checked so your body mass index (BMI) can be calculated  Your BMI shows if you are at a healthy weight  Your blood pressure  and heart rate will be checked  Your temperature may also be checked  Blood and urine tests  may be done  Blood tests may be done to check your cholesterol levels  Abnormal cholesterol levels increase your risk for heart disease and stroke  You may also need a blood or urine test to check for diabetes if you are at increased risk  Urine tests may be done to look for signs of an infection or kidney disease  A physical exam  includes checking your heartbeat and lungs with a stethoscope  Your healthcare provider may also check your skin to look for sun damage  Screening tests  may be recommended  A screening test is done to check for diseases that may not cause symptoms  The screening tests you may need depend on your age, gender, family history, and lifestyle habits  For example, colorectal screening may be recommended if you are 48years old or older  Screening tests you need if you are a woman:   A Pap smear  is used to screen for cervical cancer   Pap smears are usually done every 3 to 5 years depending on your age  You may need them more often if you have had abnormal Pap smear test results in the past  Ask your healthcare provider how often you should have a Pap smear  A mammogram  is an x-ray of your breasts to screen for breast cancer  Experts recommend mammograms every 2 years starting at age 48 years  You may need a mammogram at age 52 years or younger if you have an increased risk for breast cancer  Talk to your healthcare provider about when you should start having mammograms and how often you need them  Vaccines you may need:   Get an influenza vaccine  every year  The influenza vaccine protects you from the flu  Several types of viruses cause the flu  The viruses change over time, so new vaccines are made each year  Get a tetanus-diphtheria (Td) booster vaccine  every 10 years  This vaccine protects you against tetanus and diphtheria  Tetanus is a severe infection that may cause painful muscle spasms and lockjaw  Diphtheria is a severe bacterial infection that causes a thick covering in the back of your mouth and throat  Get a human papillomavirus (HPV) vaccine  if you are female and aged 23 to 32 or male 23 to 24 and never received it  This vaccine protects you from HPV infection  HPV is the most common infection spread by sexual contact  HPV may also cause vaginal, penile, and anal cancers  Get a pneumococcal vaccine  if you are aged 72 years or older  The pneumococcal vaccine is an injection given to protect you from pneumococcal disease  Pneumococcal disease is an infection caused by pneumococcal bacteria  The infection may cause pneumonia, meningitis, or an ear infection  Get a shingles vaccine  if you are 60 or older, even if you have had shingles before  The shingles vaccine is an injection to protect you from the varicella-zoster virus  This is the same virus that causes chickenpox   Shingles is a painful rash that develops in people who had chickenpox or have been exposed to the virus  How to eat healthy:  My Plate is a model for planning healthy meals  It shows the types and amounts of foods that should go on your plate  Fruits and vegetables make up about half of your plate, and grains and protein make up the other half  A serving of dairy is included on the side of your plate  The amount of calories and serving sizes you need depends on your age, gender, weight, and height  Examples of healthy foods are listed below:  Eat a variety of vegetables  such as dark green, red, and orange vegetables  You can also include canned vegetables low in sodium (salt) and frozen vegetables without added butter or sauces  Eat a variety of fresh fruits , canned fruit in 100% juice, frozen fruit, and dried fruit  Include whole grains  At least half of the grains you eat should be whole grains  Examples include whole-wheat bread, wheat pasta, brown rice, and whole-grain cereals such as oatmeal     Eat a variety of protein foods such as seafood (fish and shellfish), lean meat, and poultry without skin (turkey and chicken)  Examples of lean meats include pork leg, shoulder, or tenderloin, and beef round, sirloin, tenderloin, and extra lean ground beef  Other protein foods include eggs and egg substitutes, beans, peas, soy products, nuts, and seeds  Choose low-fat dairy products such as skim or 1% milk or low-fat yogurt, cheese, and cottage cheese  Limit unhealthy fats  such as butter, hard margarine, and shortening  Exercise:  Exercise at least 30 minutes per day on most days of the week  Some examples of exercise include walking, biking, dancing, and swimming  You can also fit in more physical activity by taking the stairs instead of the elevator or parking farther away from stores  Include muscle strengthening activities 2 days each week  Regular exercise provides many health benefits   It helps you manage your weight, and decreases your risk for type 2 diabetes, heart disease, stroke, and high blood pressure  Exercise can also help improve your mood  Ask your healthcare provider about the best exercise plan for you  General health and safety guidelines:   Do not smoke  Nicotine and other chemicals in cigarettes and cigars can cause lung damage  Ask your healthcare provider for information if you currently smoke and need help to quit  E-cigarettes or smokeless tobacco still contain nicotine  Talk to your healthcare provider before you use these products  Limit alcohol  A drink of alcohol is 12 ounces of beer, 5 ounces of wine, or 1½ ounces of liquor  Lose weight, if needed  Being overweight increases your risk of certain health conditions  These include heart disease, high blood pressure, type 2 diabetes, and certain types of cancer  Protect your skin  Do not sunbathe or use tanning beds  Use sunscreen with a SPF 15 or higher  Apply sunscreen at least 15 minutes before you go outside  Reapply sunscreen every 2 hours  Wear protective clothing, hats, and sunglasses when you are outside  Drive safely  Always wear your seatbelt  Make sure everyone in your car wears a seatbelt  A seatbelt can save your life if you are in an accident  Do not use your cell phone when you are driving  This could distract you and cause an accident  Pull over if you need to make a call or send a text message  Practice safe sex  Use latex condoms if are sexually active and have more than one partner  Your healthcare provider may recommend screening tests for sexually transmitted infections (STIs)  Wear helmets, lifejackets, and protective gear  Always wear a helmet when you ride a bike or motorcycle, go skiing, or play sports that could cause a head injury  Wear protective equipment when you play sports  Wear a lifejacket when you are on a boat or doing water sports      © Copyright [employer] 2022 Information is for End User's use only and may not be sold, redistributed or otherwise used for commercial purposes  All illustrations and images included in CareNotes® are the copyrighted property of A D A M [employer] Inc  or [place]  The above information is an  only  It is not intended as medical advice for individual conditions or treatments  Talk to your doctor, nurse or pharmacist before following any medical regimen to see if it is safe and effective for you  Diabetes and Exercise   AMBULATORY CARE:   How exercise will help you manage diabetes:  Physical activity, such as exercise, can help keep your blood sugar level steady or improve insulin resistance  Activity can help decrease your risk for heart disease, and help you lose weight  Exercise can also help lower your A1c  Your diabetes care team will help you create an exercise plan  The plan will be based on the type of diabetes you have and your starting fitness level  Call your local emergency number (911 in the [address] [facility]) if:   You have chest pain or shortness of breath  Seek care immediately if:   You have a low blood sugar level and it does not improve with treatment  Symptoms are trouble thinking, a pounding heartbeat, and sweating  Your blood sugar level is above 240 mg/dL and does not come down within 15 minutes of treatment  You have blurred or double vision  Your breath has a fruity, sweet smell, or your breathing is shallow  Call your doctor or diabetes care team if:   You have ketones in your blood or urine  You have a fever  Your blood sugar levels are higher than your target goals  You often have low blood sugar levels  Your skin is red, dry, warm, or swollen  You have a wound that does not heal     You have trouble coping with diabetes, or you feel anxious or depressed  You have questions or concerns about your condition or care      Tips to help you create and meet your exercise goals:   Set a goal for 150 minutes (2 5 hours) of moderate to vigorous aerobic activity each week  Aerobic activity helps your heart stay strong  Aerobic activity includes walking, bicycling, dancing, swimming, and raking leaves  Spread aerobic activity over 3 to 5 days  Do not take more than 2 days off in a row  It is best to do at least 10 minutes at a time and 30 minutes each day  You can work up to these goals  Remember that any activity is better than no activity  Over time, you can make exercise more intense or last longer  You can also add more days of exercise as your fitness level improves  Your diabetes care team can help you make a step-by-step plan to achieve your goals  Set a strength training goal of 2 to 3 times a week  Take at least 1 day off in between strength training sessions  Strength training helps you keep the muscles you have and build new muscles  Strength training includes lifting weights, climbing stairs, yoga, and tad chi          Older adults should include balance training 2 to 3 times each week  These include walking backwards, standing on one foot, and walking heel to toe in a straight line  Other healthy exercise tips:   Stretch before and after you exercise to prevent injury  Drink water or liquids that do not contain sugar before, during, and after exercise  Ask your dietitian or healthcare provider which liquids you should drink when you exercise  Do not sit for longer than 30 minutes at a time during your day  If you cannot walk around, at least stand up  This will help you stay active and keep your blood circulating  Exercise and blood sugar levels:  Check your blood sugar level before and after exercise, if you use insulin  Healthcare providers may tell you to change the amount of insulin you take or food you eat  If your blood sugar level is high, check your blood or urine for ketones before you exercise  Do not exercise if your blood sugar level is high and you have ketones       If your blood sugar level is less than 100 mg/dL, have a carbohydrate snack before you exercise  Examples are 4 to 6 crackers, ½ banana, 8 ounces (1 cup) of milk, or 4 ounces (½ cup) of juice  Follow up with your doctor or diabetes care team as directed:  Write down your questions so you remember to ask them during your visits  © Copyright [employer] 2022 Information is for End User's use only and may not be sold, redistributed or otherwise used for commercial purposes  All illustrations and images included in CareNotes® are the copyrighted property of [employer] M , Inc  or [address] [place] [address] [facility]  The above information is an  only  It is not intended as medical advice for individual conditions or treatments  Talk to your doctor, nurse or pharmacist before following any medical regimen to see if it is safe and effective for you  Meal Planning with Diabetes Exchanges   AMBULATORY CARE:   Diabetes exchanges  are servings of food that contain similar amounts of carbohydrate, fat, protein, and calories within a food group  The exchanges can be used to develop a healthy meal plan that helps to keep your blood sugar within the recommended levels  A meal plan with the right amount of carbohydrates is especially important  Your blood sugar naturally rises after you eat carbohydrates  Too many carbohydrates in 1 meal or snack can raise your blood sugar level  Carbohydrates are found in starches, fruit, milk, yogurt, and sweets  Call your doctor if:   You have high blood sugar levels during a certain time of day, or almost all of the time  You often have low blood sugar levels  You have questions or concerns about your condition or care  Create a meal plan with exchanges:  A dietitian will work with you to develop a healthy meal plan that is right for you  This meal plan will include the amount of exchanges you can have from each food group throughout the day   Follow your meal plan by keeping track of the amount of exchanges you eat for each meal and snack  Your meal plan will be based on your age, weight, blood sugar levels, medicine, and activity level  Starch food group exchanges:  Each exchange below contains about 15 grams of carbohydrate , 3 grams of protein, 1 gram of fat, and 80 calories  1 ounce of white, whole wheat or rye bread (1 slice)    1 ounce of bagel (about ¼ of a bagel)    1 6-inch flour or corn tortilla or 1 4-inch pancake (about ¼ inch thick)    ? cup of cooked pasta or rice    ¾ cup of dry, ready-to-eat cereal with no sugar added     ½ cup of cooked cereal, such as oatmeal    3 emmanuel cracker squares or 8 animal crackers    6 saltine-type crackers or     3 cups of popcorn or ¾ ounce of pretzels     Starchy vegetables and cooked legumes:      ½ cup of corn, green peas, sweet potatoes, or mashed potatoes     ¼ of a large baked potato     1 cup of acorn, butternut squash, or pumpkin     ½ cup of beans, lentils, or peas (such as leal, kidney, or black-eyed)    ? cup of lima beans    Fruit group exchanges:  Each exchange contains about 15 grams of carbohydrate  and 60 calories  1 small (4 ounce) apple, banana orange, or nectarine    ½ cup of canned or fresh fruit    ½ cup (4 ounces) of unsweetened fruit juice    2 tablespoons of dried fruit    Milk group exchanges:  Each exchange contains about 12 grams of carbohydrate  and 8 grams of protein  The amount of fat and calories in each serving depends on the type of milk (such as whole, low-fat, or fat-free)  1 cup fat-free or low-fat milk    ¾ cup of plain, nonfat yogurt    1 cup fat-free, flavored yogurt with artificial (no calorie) sweetener    Non-starchy vegetable group exchanges:  Each exchange contains about 5 grams of carbohydrate , 2 grams of protein, and 25 calories  Examples include beets, broccoli, cabbage, carrots, cauliflower, cucumber, mushrooms, tomatoes, and zucchini    ½ cup of cooked vegetables or 1 cup of raw vegetables     ½ cup of vegetable juice    Meat and meat substitute group exchanges:  Each exchange of a lean meat  listed below contains about 7 grams of protein, 0 to 3 grams of fat, and 45 calories  The meat and meat substitutes food group does not contain any carbohydrates  Medium and high-fat meats have more calories  1 ounce of chicken or turkey without skin, or 1 ounce of fish (not breaded or fried)     1 ounce of lean beef, pork, or lamb     1-inch cube or 1 ounce of low-fat cheese     2 egg whites or ¼ cup of egg substitute     ½ cup of tofu    Sweets, desserts, and other carbohydrate group exchanges:   Sweets and other desserts:  Each exchange has about 15 grams of carbohydrate   1 ounce of zaire food cake or 2-inch square cake (unfrosted)    2 small cookies     ½ cup of sugar-free, fat-free ice cream    1 tablespoon of syrup, jam, jelly, table sugar, or honey    Combination foods:     1 cup of an entrée, such as lasagna, spaghetti with meatballs, macaroni and cheese, and chili with beans (each serving counts as 2 carbohydrate exchanges )     1 cup of tomato or vegetable beef soup (each serving counts as 1 carbohydrate exchange )    Fat group exchanges:  Each exchange contains 5 grams of fat and 45 calories  1 teaspoon of oil (such as canola, olive, or corn oil)     6 almonds or cashews, 10 peanuts, or 4 pecan halves     2 tablespoons of avocado     ½ tablespoon of peanut butter     1 teaspoon of regular margarine or 2 teaspoons of low-fat margarine     1 teaspoon of regular butter or 1 tablespoon of low-fat butter     1 teaspoon of regular mayonnaise or 1 tablespoon of low-fat mayonnaise     1 tablespoon of regular salad dressing or 2 tablespoons of low-fat salad dressing    Free foods: The foods on this list are called free foods because they have very few calories  Free foods usually do not increase your blood sugar if you limit them    1 tablespoon of catsup or taco sauce     ¼ cup of salsa     2 tablespoons of sugar-free syrup or 2 teaspoons of light jam or jelly     1 tablespoon of fat-free salad dressing     4 tablespoons of fat-free margarine or fat-free mayonnaise     Sugar-free drinks: diet soda, sugar-free drink mixes, or mineral water     Low-sodium bouillon or fat-free broth     Mustard     Seasonings such as spices, herbs, and garlic     Sugar-free gelatin without added fruit    Other healthy nutrition guidelines:   Limit drinks with sugar substitutes  Your dietitian or healthcare provider will encourage you to drink water  Water helps your kidneys to function properly  Ask how much water you should drink every day  Eat more fiber  Choose foods that are good sources of fiber, such as fruits, vegetables, and whole grains  Cereals that contain 5 or more grams of fiber per serving are good sources of fiber  Legumes such as garbanzo, leal beans, kidney beans, and lentils are also good sources  Limit fat  Ask your dietitian or healthcare provider how much fat you should eat each day  Choose foods low in fat, saturated fat, trans fat, and cholesterol  Examples include turkey or chicken without the skin, fish, lean cuts of meat, and beans  Low-fat dairy foods, such as low-fat or fat-free milk and low-fat yogurt are also good choices  Omega-3 fatty acids are healthy fats that are found in canola oil, soybean oil and fatty fish  [place], albacore tuna, and sardines are good sources of omega 3 fatty acids  Eat 2 servings of these types of fish each week  Do not eat fried fish  Limit sugar  Sugar and sweets must be counted toward the carbohydrate exchanges that you can have within your meal plan  Limit sugar and sweets because they are usually also high in calories and fat  Eat smaller portions of sweets by sharing a dessert or asking for a child-size portion at a restaurant  Limit sodium  (salt) to about 2,300 mg per day  You may need to eat even less sodium if you have certain medical conditions   Foods high in sodium include soy sauce, potato chips, and soup  Limit alcohol  Ask your healthcare provider if it is safe for you to drink alcohol  If alcohol is safe for you to have, eat a meal when you drink alcohol  If you drink alcohol on an empty stomach, your blood sugar may drop to a low level  Women 21 years or older and men 72 years or older should limit alcohol to 1 drink a day  Men aged 24 to 59 years should limit alcohol to 2 drinks a day  A drink of alcohol is 5 ounces of wine, 12 ounces of beer, or 1½ ounces of liquor  Other ways to manage your diabetes:   Control your blood sugar level  Test your blood sugar level regularly and keep a record of the results  Ask your healthcare provider when and how often to test your blood sugar  You may need to check your blood sugar level at least 3 times each day  Talk to your healthcare provider about your weight  Ask if you need to lose weight, and how much you need to lose  If you are overweight, you may need to make other changes to lose weight  Ask your healthcare provider to help you create a weight loss program      Get regular physical activity  Physical activity can help decrease your blood sugar level  It can also help to decrease your risk for heart disease and help you lose weight  Adults should have moderate intensity physical activity for at least 150 minutes every week  Spread the amount of activity over at least 3 days a week  Do not skip more than 2 days in a row  Children should get at least 60 minutes of moderate physical activity on most days of the week  Examples of moderate physical activity include brisk walking, running, and swimming  Do not sit for longer than 30 minutes  Work with your healthcare provider to create a plan for physical activity  © Copyright [employer] 2022 Information is for End User's use only and may not be sold, redistributed or otherwise used for commercial purposes   All illustrations and images included in [facility] are the copyrighted property of A [employer] M , Inc  or [facility]   The above information is an  only  It is not intended as medical advice for individual conditions or treatments  Talk to your doctor, nurse or pharmacist before following any medical regimen to see if it is safe and effective for you

## 2022-12-06 ENCOUNTER — OFFICE VISIT (OUTPATIENT)
Dept: PAIN MEDICINE | Facility: CLINIC | Age: 47
End: 2022-12-06

## 2022-12-06 VITALS
BODY MASS INDEX: 34.49 KG/M2 | WEIGHT: 207 LBS | RESPIRATION RATE: 16 BRPM | HEIGHT: 65 IN | SYSTOLIC BLOOD PRESSURE: 130 MMHG | HEART RATE: 76 BPM | DIASTOLIC BLOOD PRESSURE: 90 MMHG

## 2022-12-06 DIAGNOSIS — M51.16 INTERVERTEBRAL DISC DISORDER WITH RADICULOPATHY OF LUMBAR REGION: ICD-10-CM

## 2022-12-06 DIAGNOSIS — G89.4 CHRONIC PAIN SYNDROME: Primary | ICD-10-CM

## 2022-12-06 RX ORDER — DULOXETIN HYDROCHLORIDE 30 MG/1
30 CAPSULE, DELAYED RELEASE ORAL DAILY
Qty: 30 CAPSULE | Refills: 2 | Status: SHIPPED | OUTPATIENT
Start: 2022-12-06

## 2022-12-06 NOTE — PROGRESS NOTES
Assessment:  1  Chronic pain syndrome    2  Intervertebral disc disorder with radiculopathy of lumbar region        Plan:  The patient is a 52year old male with a history of chronic pain secondary to low back pain, lumbar intervertebral disc disorder with radiculopathy and lumbar spondylosis who presents to the office with worsening bilateral low back pain and pain, numbness and tingling into the left lower extremity  At this time, to decrease the inflammation and provide him relief, I instructed patient we can repeat a lumbar epidural steroid injection utilizing a transforaminal approach  Patient would like to proceed  I instructed our  will schedule him  I will also start him on Cymbalta 30mg daily to help with his neuropathic pain  Patient was previously on Lyrica, however the patient said this medication caused him excessive drowsiness  I informed the patient about this medication and potential side effects  A script was sent to the patient's pharmacy with instructions to call out office with any adverse effects  My impressions and treatment recommendations were discussed in detail with the patient who verbalized understanding and had no further questions  Discharge instructions were provided  I personally saw and examined the patient and I agree with the above discussed plan of care  Orders Placed This Encounter   Procedures   • [place] spine and pain procedure     Dr Ann Marie Lozoya     Standing Status:   Future     Standing Expiration Date:   12/6/2026     Order Specific Question:   Reason for Exam:     Answer:   Left L5-S1 TFESI     Order Specific Question:   Anticoagulant hold needed?      Answer:   No     New Medications Ordered This Visit   Medications   • DULoxetine (CYMBALTA) 30 mg delayed release capsule     Sig: Take 1 capsule (30 mg total) by mouth daily     Dispense:  30 capsule     Refill:  2       History of Present Illness:  Stephie Hinton is a 52 y o  male with a history of chronic pain secondary to low back pain, lumbar intervertebral disc disorder with radiculopathy and lumbar spondylosis  He was last seen on 8/25/2020 where he underwent an L5 LESI which provided him little relief of his low back pain symptoms  He presents to the office with worsening bilateral low back pain little relief of his low back pain symptoms  He presents to the office with worsening bilateral low back pain and numbness, tingling and cramping into the left lower extremity stopping at the left foot  He states his pain is worse since last office visit and constant  He rates the quality of his pain as sharp, throbbing, shooting, numbness and is currently rating it a 9/10 on a numeric scale  Current pain medications include ibuprofen and Tylenol as needed which is providing him little relief  He also uses heat and Biofreeze  I have personally reviewed and/or updated the patient's past medical history, past surgical history, family history, social history, current medications, allergies, and vital signs today  Review of Systems   Respiratory: Negative for shortness of breath  Cardiovascular: Negative for chest pain  Gastrointestinal: Negative for constipation, diarrhea, nausea and vomiting  Musculoskeletal: Positive for back pain and gait problem  Negative for arthralgias, joint swelling and myalgias  Skin: Negative for rash  Neurological: Negative for dizziness, seizures and weakness  All other systems reviewed and are negative        Patient Active Problem List   Diagnosis   • Chronic diarrhea   • De Quervain's tenosynovitis   • Fatty liver   • Hypertriglyceridemia   • Impingement syndrome of left shoulder   • Left lumbar radiculopathy   • Male erectile dysfunction   • Plantar warts   • Paresthesias   • Excessive daytime sleepiness   • PETTY (obstructive sleep apnea)   • Type 2 diabetes mellitus without complication, without long-term current use of insulin (HCC)   • Class 1 obesity due to excess calories with serious comorbidity and body mass index (BMI) of 34 0 to 34 9 in adult   • Arthritis of right knee   • Chronic pain of right knee       Past Medical History:   Diagnosis Date   • Cancer ([facility] [address] )     colon   • Post concussion syndrome 5/9/2017   • Traumatic brain injury 2017    R/S 2017       Past Surgical History:   Procedure Laterality Date   • APPENDECTOMY     • COLON SURGERY         Family History   Problem Relation Age of Onset   • Coronary artery disease Mother    • Diabetes Mother    • Hypertension Mother    • Hyperlipidemia Mother    • Coronary artery disease Father    • Diabetes Father    • Hypertension Father    • Hyperlipidemia Father    • Heart attack Father    • Anemia Brother    • No Known Problems Son    • No Known Problems Son    • No Known Problems Son        Social History     Occupational History   • Not on file   Tobacco Use   • Smoking status: Never   • Smokeless tobacco: Never   Vaping Use   • Vaping Use: Never used   Substance and Sexual Activity   • Alcohol use:  Yes     Alcohol/week: 1 0 standard drink     Types: 1 Cans of beer per week   • Drug use: No   • Sexual activity: Yes     Partners: Female     Birth control/protection: None       Current Outpatient Medications on File Prior to Visit   Medication Sig   • cholestyramine sugar free (QUESTRAN LIGHT) 4 g packet Take 1 packet (4 g total) by mouth daily   • Loperamide HCl (IMODIUM A-D PO) Take 1 tablet by mouth 4 (four) times a day   • metFORMIN (GLUCOPHAGE-XR) 500 mg 24 hr tablet Take 2 tablets (1,000 mg total) by mouth daily with breakfast   • tadalafil (CIALIS) 10 MG tablet Take 1 tablet (10 mg total) by mouth daily as needed for erectile dysfunction   • albuterol (Proventil HFA) 90 mcg/act inhaler Inhale 2 puffs every 6 (six) hours as needed for wheezing (Patient not taking: Reported on 10/26/2022)   • atorvastatin (LIPITOR) 20 mg tablet Take 1 tablet (20 mg total) by mouth every evening (Patient not taking: Reported on 12/6/2022)   • budesonide (Pulmicort Flexhaler) 180 MCG/ACT inhaler Inhale 2 puffs 2 (two) times a day Rinse mouth after use  (Patient not taking: Reported on 10/26/2022)   • diclofenac sodium (VOLTAREN) 1 % Apply 2 g topically 3 (three) times a day as needed (low back pain) (Patient not taking: Reported on 10/26/2022)   • guaifenesin-codeine (GUAIFENESIN AC) 100-10 MG/5ML liquid Take 10 mL by mouth 4 (four) times a day as needed for cough (Patient not taking: Reported on 10/26/2022)   • hydrOXYzine HCL (ATARAX) 10 mg tablet Take 1 tablet (10 mg total) by mouth every 6 (six) hours as needed for itching (Patient not taking: Reported on 10/26/2022)   • ibuprofen (MOTRIN) 200 mg tablet Take 600 mg by mouth every 6 (six) hours as needed for mild pain or moderate pain (Patient not taking: Reported on 10/26/2022)   • OMEGA-3 FATTY ACIDS PO Take 1,000 mg by mouth 2 (two) times a day   (Patient not taking: Reported on 10/26/2022)   • pregabalin (LYRICA) 75 mg capsule Take 1 capsule (75 mg total) by mouth 2 (two) times a day (Patient not taking: Reported on 6/21/2022)     No current facility-administered medications on file prior to visit  Allergies   Allergen Reactions   • Asa [Aspirin] GI Intolerance   • Penicillin G    • Penicillins        Physical Exam:    /90   Pulse 76   Resp 16   Ht 5' 5" (1 651 m)   Wt 93 9 kg (207 lb)   BMI 34 45 kg/m²     Constitutional:normal, well developed, well nourished, alert, in no distress and non-toxic and no overt pain behavior    Eyes:anicteric  HEENT:grossly intact  Neck:supple, symmetric, trachea midline and no masses   Pulmonary:even and unlabored  Cardiovascular:No edema or pitting edema present  Skin:Normal without rashes or lesions and well hydrated  Psychiatric:Mood and affect appropriate  Neurologic:Cranial Nerves II-XII grossly intact  Musculoskeletal:normal     Lumbar Spine Exam    Appearance:  Normal lordosis  Palpation/Tenderness:  left lumbar paraspinal tenderness  right lumbar paraspinal tenderness  Sensory:  no sensory deficits noted  Range of Motion:  Flexion:  Minimally limited  with pain  Extension:  Minimally limited  with pain  Motor Strength:  Left hip flexion:  5/5  Right hip flexion:  5/5  Left knee flexion:  5/5  Left knee extension:  5/5  Right knee flexion:  5/5  Right knee extension:  5/5  Left foot dorsiflexion:  5/5  Left foot plantar flexion:  5/5  Right foot dorsiflexion:  5/5  Right foot plantar flexion:  5/5    Imaging

## 2022-12-16 ENCOUNTER — HOSPITAL ENCOUNTER (EMERGENCY)
Facility: HOSPITAL | Age: 47
Discharge: HOME | End: 2022-12-16
Attending: EMERGENCY MEDICINE

## 2022-12-16 ENCOUNTER — APPOINTMENT (EMERGENCY)
Dept: RADIOLOGY | Facility: HOSPITAL | Age: 47
End: 2022-12-16

## 2022-12-16 VITALS
RESPIRATION RATE: 16 BRPM | DIASTOLIC BLOOD PRESSURE: 61 MMHG | HEART RATE: 73 BPM | SYSTOLIC BLOOD PRESSURE: 132 MMHG | TEMPERATURE: 98.2 F | OXYGEN SATURATION: 97 %

## 2022-12-16 DIAGNOSIS — S93.409A ANKLE SPRAIN: Primary | ICD-10-CM

## 2022-12-16 DIAGNOSIS — M54.9 BACK PAIN: ICD-10-CM

## 2022-12-16 RX ORDER — NAPROXEN 250 MG/1
500 TABLET ORAL ONCE
Status: COMPLETED | OUTPATIENT
Start: 2022-12-16 | End: 2022-12-16

## 2022-12-16 RX ORDER — NAPROXEN 500 MG/1
500 TABLET ORAL 2 TIMES DAILY WITH MEALS
Qty: 30 TABLET | Refills: 0 | Status: SHIPPED | OUTPATIENT
Start: 2022-12-16 | End: 2023-01-05 | Stop reason: ALTCHOICE

## 2022-12-16 RX ADMIN — NAPROXEN 500 MG: 250 TABLET ORAL at 17:54

## 2022-12-16 NOTE — Clinical Note
Martita Mirza was seen and treated in our emergency department on 12/16/2022  No work until cleared by Family Doctor/Orthopedics    Requires rest of left leg, preferably non weight bearing  Diagnosis:     Abdias Carreon  may return to work on return date  He may return on this date: 12/20/2022         If you have any questions or concerns, please don't hesitate to call        Charly Carter PA-C    ______________________________           _______________          _______________  Hospital Representative                              Date                                Time

## 2022-12-19 ENCOUNTER — HOSPITAL ENCOUNTER (OUTPATIENT)
Dept: RADIOLOGY | Facility: HOSPITAL | Age: 47
Discharge: HOME | End: 2022-12-19

## 2022-12-19 ENCOUNTER — OFFICE VISIT (OUTPATIENT)
Dept: OBGYN CLINIC | Facility: CLINIC | Age: 47
End: 2022-12-19

## 2022-12-19 VITALS
BODY MASS INDEX: 34.45 KG/M2 | HEART RATE: 93 BPM | DIASTOLIC BLOOD PRESSURE: 83 MMHG | SYSTOLIC BLOOD PRESSURE: 137 MMHG | HEIGHT: 65 IN | OXYGEN SATURATION: 97 %

## 2022-12-19 DIAGNOSIS — M79.605 LEFT LEG PAIN: ICD-10-CM

## 2022-12-19 DIAGNOSIS — M79.605 LEFT LEG PAIN: Primary | ICD-10-CM

## 2022-12-19 DIAGNOSIS — S93.409A ANKLE SPRAIN: ICD-10-CM

## 2022-12-19 NOTE — PROGRESS NOTES
Assessment/Plan   Diagnoses and all orders for this visit:    Ankle sprain  - CAM boot - d/c crutches  - Start PT      - wean out of boot  - Ice as needed  - Work: sedentary  - Follow up with Dr Ulysses Finney in 2 weeks          Subjective   Patient ID: Grisel Celestin is a 52 y o  male  Vitals:    12/19/22 1604   BP: 137/83   Pulse: 93   SpO2: 97%     48yo male comes in for an evaluation of his left ankle  He was injured when he fell at work yesterday when he slipped on water  He describes an external rotation motion  He was seen in the ED yesterday, but this note is not available yet  Ankle xrays were normal   He was treated with an aircast and crutches  Allergic to asa  The pain is dull in character, moderate in severity, pain does not radiate and is not associated with numbness  He states he is a  and cannot work          The following portions of the patient's history were reviewed and updated as appropriate: allergies, current medications, past family history, past medical history, past social history, past surgical history and problem list     Review of Systems  Ortho Exam  Past Medical History:   Diagnosis Date   • Cancer ([facility] [address] )     colon   • Post concussion syndrome 5/9/2017   • Traumatic brain injury 2017    R/S 2017     Past Surgical History:   Procedure Laterality Date   • APPENDECTOMY     • COLON SURGERY       Family History   Problem Relation Age of Onset   • Coronary artery disease Mother    • Diabetes Mother    • Hypertension Mother    • Hyperlipidemia Mother    • Coronary artery disease Father    • Diabetes Father    • Hypertension Father    • Hyperlipidemia Father    • Heart attack Father    • Anemia Brother    • No Known Problems Son    • No Known Problems Son    • No Known Problems Son      Social History     Occupational History   • Not on file   Tobacco Use   • Smoking status: Never   • Smokeless tobacco: Never   Vaping Use   • Vaping Use: Never used   Substance and Sexual Activity   • Alcohol use: Yes     Alcohol/week: 1 0 standard drink     Types: 1 Cans of beer per week   • Drug use: No   • Sexual activity: Yes     Partners: Female     Birth control/protection: None       Review of Systems   Constitutional: Negative  HENT: Negative  Eyes: Negative  Respiratory: Negative  Cardiovascular: Negative  Gastrointestinal: Negative  Endocrine: Negative  Genitourinary: Negative  Musculoskeletal: As below      Allergic/Immunologic: Negative  Neurological: Negative  Hematological: Negative  Psychiatric/Behavioral: Negative  Objective   Physical Exam        I have personally reviewed pertinent films in PACS and my interpretation is no acute displaced fracture        · Constitutional: Awake, Alert, Oriented  · Eyes: EOMI  · Psych: Mood and affect appropriate  · Heart: regular rate   · Lungs: No audible wheezing  · Abdomen: No guarding  · Lymph: no lymphedema            • left ankle:  - Appearance  • Swelling: minimal   No ecchymosis, erythema or discoloration   - Palpation  • + non-specific tenderness about the entire ankle area  - ROM  • Dorsiflexion: 5, Plantarflexion: 20, Inversion: 5 and Eversion: 5  - Special Tests  • Negative anterior drawer  - Motor  • normal 5/5 in all planes  - NVI distally

## 2022-12-19 NOTE — PATIENT INSTRUCTIONS
Ice Pack Application   WHAT YOU NEED TO KNOW:   Ice can be used to decrease swelling and pain after an injury or surgery  Common injuries that may benefit from ice therapy are sprains, strains, and bruises  The use of ice is most effective in the first 1 to 3 days after an injury  DISCHARGE INSTRUCTIONS:   How to apply ice:   Fill a bag with crushed ice about half full  Remove the air from the bag before you close it  You can also use a bag of frozen vegetables  Wrap the ice pack in a cloth to protect your skin from frostbite or other injury  Put the ice over the injured area for 20 to 30 minutes or as long as directed  Check your skin after about 30 seconds for color changes or blistering  Remove the ice if you notice skin changes or you feel burning or numbness in the area  Throw the ice pack away after use  Apply ice to your injured area 4 times each day or as directed  Ask your healthcare provider how many days you should apply ice  Contact your healthcare provider if:   You see blisters, whitening of your skin, or a bluish color to your skin after using ice  You feel burning or numbness when using ice  You have questions about the use of ice packs  © 2017 [employer]  Information is for End User's use only and may not be sold, redistributed or otherwise used for commercial purposes  All illustrations and images included in CareNotes® are the copyrighted property of [employer]  or Fredy Osorio  The above information is an  only  It is not intended as medical advice for individual conditions or treatments  Talk to your doctor, nurse or pharmacist before following any medical regimen to see if it is safe and effective for you

## 2022-12-19 NOTE — LETTER
December 19, 2022     Patient: Belén Lake  YOB: 1975  Date of Visit: 12/19/2022      To Whom it May Concern:    Sathish Baer is under my professional care  Smiley Blevins was seen in my office on 12/19/2022  Smiley Blevins may return to work with limitations : sedentary (sitting) duty  If work is unable to accommodate this, then remain out until follow up in 2 weeks       If you have any questions or concerns, please don't hesitate to call  Sincerely,          Melody Triana PA-C        CC: Reanna Dinh

## 2022-12-20 NOTE — ED PROVIDER NOTES
History  Chief Complaint   Patient presents with   • Leg Pain     Pt slipped at work and heard pop in L ankle/knee  Did not hit head/ no loc/ no blood thinners  Pt also c/o lower back pain     52year old male presenting with left ankle and knee pain after slipping at work  Patient denies any history, blood thinners, syncope  Patient states he simply tripped and fell and landed on his left leg and he heard a "pop"  Patient states that he is able to walk on it but it does hurt him quite a bit  Denies any obvious deformities, bleeding, numbness or tingling in the extremity  Denies any other injuries  Denies any chest pain, shortness of breath, nausea, vomiting, abdominal pain  Prior to Admission Medications   Prescriptions Last Dose Informant Patient Reported? Taking? DULoxetine (CYMBALTA) 30 mg delayed release capsule   No No   Sig: Take 1 capsule (30 mg total) by mouth daily   Loperamide HCl (IMODIUM A-D PO)   Yes No   Sig: Take 1 tablet by mouth 4 (four) times a day   OMEGA-3 FATTY ACIDS PO   Yes No   Sig: Take 1,000 mg by mouth 2 (two) times a day     Patient not taking: Reported on 10/26/2022   albuterol (Proventil HFA) 90 mcg/act inhaler   No No   Sig: Inhale 2 puffs every 6 (six) hours as needed for wheezing   Patient not taking: Reported on 10/26/2022   atorvastatin (LIPITOR) 20 mg tablet   No No   Sig: Take 1 tablet (20 mg total) by mouth every evening   Patient not taking: Reported on 12/6/2022   budesonide (Pulmicort Flexhaler) 180 MCG/ACT inhaler   No No   Sig: Inhale 2 puffs 2 (two) times a day Rinse mouth after use     Patient not taking: Reported on 10/26/2022   cholestyramine sugar free (QUESTRAN LIGHT) 4 g packet   No No   Sig: Take 1 packet (4 g total) by mouth daily   diclofenac sodium (VOLTAREN) 1 %   No No   Sig: Apply 2 g topically 3 (three) times a day as needed (low back pain)   Patient not taking: Reported on 10/26/2022   guaifenesin-codeine (GUAIFENESIN AC) 100-10 MG/5ML liquid No No   Sig: Take 10 mL by mouth 4 (four) times a day as needed for cough   Patient not taking: Reported on 10/26/2022   hydrOXYzine HCL (ATARAX) 10 mg tablet   No No   Sig: Take 1 tablet (10 mg total) by mouth every 6 (six) hours as needed for itching   Patient not taking: Reported on 10/26/2022   ibuprofen (MOTRIN) 200 mg tablet   Yes No   Sig: Take 600 mg by mouth every 6 (six) hours as needed for mild pain or moderate pain   Patient not taking: Reported on 10/26/2022   metFORMIN (GLUCOPHAGE-XR) 500 mg 24 hr tablet   No No   Sig: Take 2 tablets (1,000 mg total) by mouth daily with breakfast   tadalafil (CIALIS) 10 MG tablet   No No   Sig: Take 1 tablet (10 mg total) by mouth daily as needed for erectile dysfunction      Facility-Administered Medications: None       Past Medical History:   Diagnosis Date   • Cancer ([facility] [address] )     colon   • Post concussion syndrome 5/9/2017   • Traumatic brain injury 2017    R/S 2017       Past Surgical History:   Procedure Laterality Date   • APPENDECTOMY     • COLON SURGERY         Family History   Problem Relation Age of Onset   • Coronary artery disease Mother    • Diabetes Mother    • Hypertension Mother    • Hyperlipidemia Mother    • Coronary artery disease Father    • Diabetes Father    • Hypertension Father    • Hyperlipidemia Father    • Heart attack Father    • Anemia Brother    • No Known Problems Son    • No Known Problems Son    • No Known Problems Son      I have reviewed and agree with the history as documented  E-Cigarette/Vaping   • E-Cigarette Use Never User      E-Cigarette/Vaping Substances   • Nicotine No    • THC No    • CBD No    • Flavoring No    • Other No    • Unknown No      Social History     Tobacco Use   • Smoking status: Never   • Smokeless tobacco: Never   Vaping Use   • Vaping Use: Never used   Substance Use Topics   • Alcohol use:  Yes     Alcohol/week: 1 0 standard drink     Types: 1 Cans of beer per week   • Drug use: No       Review of Systems Constitutional: Negative for chills and fever  HENT: Negative for ear pain and sore throat  Eyes: Negative for pain and visual disturbance  Respiratory: Negative for cough and shortness of breath  Cardiovascular: Negative for chest pain and palpitations  Gastrointestinal: Negative for abdominal pain, blood in stool, constipation, diarrhea, nausea and vomiting  Genitourinary: Negative for dysuria, flank pain and hematuria  Musculoskeletal: Positive for arthralgias (left knee and ankle)  Negative for back pain  Skin: Negative for color change, pallor, rash and wound  Neurological: Negative for dizziness, tremors, seizures, syncope, weakness, light-headedness, numbness and headaches  All other systems reviewed and are negative  Physical Exam  Physical Exam  Vitals and nursing note reviewed  Constitutional:       General: He is not in acute distress  Appearance: He is well-developed  He is not ill-appearing  HENT:      Head: Normocephalic and atraumatic  Eyes:      Conjunctiva/sclera: Conjunctivae normal    Cardiovascular:      Rate and Rhythm: Normal rate and regular rhythm  Pulses: Normal pulses  Heart sounds: Normal heart sounds  No murmur heard  No friction rub  No gallop  Pulmonary:      Effort: Pulmonary effort is normal  No respiratory distress  Breath sounds: Normal breath sounds  No stridor  No wheezing, rhonchi or rales  Chest:      Chest wall: No tenderness  Abdominal:      Palpations: Abdomen is soft  Tenderness: There is no abdominal tenderness  Musculoskeletal:         General: Tenderness and signs of injury present  No swelling or deformity  Cervical back: Neck supple  Right lower leg: No edema  Left lower leg: No edema  Comments: Left ankle and left knee     Skin:     General: Skin is warm and dry  Capillary Refill: Capillary refill takes less than 2 seconds  Neurological:      Mental Status: He is alert  Psychiatric:         Mood and Affect: Mood normal          Vital Signs  ED Triage Vitals [12/16/22 1531]   Temperature Pulse Respirations Blood Pressure SpO2   98 2 °F (36 8 °C) 73 16 132/61 97 %      Temp Source Heart Rate Source Patient Position - Orthostatic VS BP Location FiO2 (%)   Oral Monitor Sitting Right arm --      Pain Score       9           Vitals:    12/16/22 1531   BP: 132/61   Pulse: 73   Patient Position - Orthostatic VS: Sitting         Visual Acuity      ED Medications  Medications   naproxen (NAPROSYN) tablet 500 mg (500 mg Oral Given 12/16/22 7014)       Diagnostic Studies  Results Reviewed     None                 XR spine lumbar 2 or 3 views injury   ED Interpretation by Andrae Robb PA-C (12/16 1805)   No acute fractures or dislocations      Final Result by Marilyn Bills MD (12/17 7576)         1  No acute osseous abnormality  2   Calcified gallstone  Workstation performed: [id number]         XR knee 4+ vw left injury   ED Interpretation by Andrae Robb PA-C (12/16 1805)   No acute fractures      Final Result by Virgil Dillon MD (12/16 1839)      No acute osseous abnormality  Workstation performed: [id number]         XR ankle 3+ views LEFT   Final Result by Virgil Dillon MD (12/16 1838)      No acute osseous abnormality  Workstation performed: [id number]                    Procedures  Procedures         ED Course                               SBIRT 20yo+    Flowsheet Row Most Recent Value   SBIRT (23 yo +)    In order to provide better care to our patients, we are screening all of our patients for alcohol and drug use  Would it be okay to ask you these screening questions?  Unable to answer at this time Filed at: 12/16/2022 1755                    [facility]  Number of Diagnoses or Management Options  Ankle sprain  Back pain  Diagnosis management comments: 17-year-old male presenting with left ankle, left knee, back pain after falling at work   Bridget Best to rule out fractures  Naproxen for pain  Patient states that he feels better with naproxen  X-ray showed no fractures or dislocations  Patient states that he does not want a splint  Have patient follow-up with PCP and prescribed naproxen to his pharmacy  Patient's vitals, lab/imaging results, diagnosis, and treatment plan were discussed with the patient  All new/changed medications were discussed with patient, specifically, route of administration, how often and when to take, and where they can be picked up  Strict return precautions as well as close follow up with PCP was discussed with the patient and the patient was agreeable to my recommendations  Patient verbally acknowledged understanding of the above communications  Disposition  Final diagnoses: Ankle sprain   Back pain     Time reflects when diagnosis was documented in both MDM as applicable and the Disposition within this note     Time User Action Codes Description Comment    12/16/2022  7:01 PM Reyes Nails Add [S07 846O] Ankle sprain     12/16/2022  7:01 PM Reyes Velezl Add [M54 9] Back pain       ED Disposition     ED Disposition   Discharge    Condition   Stable    Date/Time   Fri Dec 16, 2022  6:59 PM    Comment   19 Melissa Doshi discharge to home/self care                 Follow-up Information     Follow up With Specialties Details Why Contact Info Additional Information    [facility] Emergency Department Emergency Medicine Go to  If symptoms worsen [address] Emergency Department, Mansoor [place] [place]io Cesar, [address] MD [facility] Medicine Schedule an appointment as soon as possible for a visit  As needed [address] [facility] [id number] [place], [place] [address], DO Orthopedic Surgery Schedule an appointment as soon as possible for a visit   [address] [facility] [address] [facility] [address]  [phone]             Discharge Medication List as of 12/16/2022  7:01 PM      START taking these medications    Details   naproxen (Naprosyn) 500 mg tablet Take 1 tablet (500 mg total) by mouth 2 (two) times a day with meals, Starting Fri 12/16/2022, Normal         CONTINUE these medications which have NOT CHANGED    Details   albuterol (Proventil HFA) 90 mcg/act inhaler Inhale 2 puffs every 6 (six) hours as needed for wheezing, Starting Thu 12/30/2021, Normal      atorvastatin (LIPITOR) 20 mg tablet Take 1 tablet (20 mg total) by mouth every evening, Starting Wed 10/26/2022, Until Mon 4/24/2023, Normal      budesonide (Pulmicort Flexhaler) 180 MCG/ACT inhaler Inhale 2 puffs 2 (two) times a day Rinse mouth after use , Starting Thu 12/30/2021, Normal      cholestyramine sugar free (QUESTRAN LIGHT) 4 g packet Take 1 packet (4 g total) by mouth daily, Starting Fri 10/7/2022, Normal      diclofenac sodium (VOLTAREN) 1 % Apply 2 g topically 3 (three) times a day as needed (low back pain), Starting Sun 6/23/2019, Print      DULoxetine (CYMBALTA) 30 mg delayed release capsule Take 1 capsule (30 mg total) by mouth daily, Starting Tue 12/6/2022, Normal      guaifenesin-codeine (GUAIFENESIN AC) 100-10 MG/5ML liquid Take 10 mL by mouth 4 (four) times a day as needed for cough, Starting Thu 1/6/2022, Normal      hydrOXYzine HCL (ATARAX) 10 mg tablet Take 1 tablet (10 mg total) by mouth every 6 (six) hours as needed for itching, Starting Tue 3/29/2022, Normal      ibuprofen (MOTRIN) 200 mg tablet Take 600 mg by mouth every 6 (six) hours as needed for mild pain or moderate pain, Historical Med      Loperamide HCl (IMODIUM A-D PO) Take 1 tablet by mouth 4 (four) times a day, Historical Med      metFORMIN (GLUCOPHAGE-XR) 500 mg 24 hr tablet Take 2 tablets (1,000 mg total) by mouth daily with breakfast, Starting Wed 10/26/2022, Until Mon 4/24/2023, Normal      OMEGA-3 FATTY ACIDS PO Take 1,000 mg by mouth 2 (two) times a day  , Historical Med      tadalafil (CIALIS) 10 MG tablet Take 1 tablet (10 mg total) by mouth daily as needed for erectile dysfunction, Starting Fri 10/7/2022, Normal                 PDMP Review     None          ED Provider  Electronically Signed by           Kenna Boast, PA-C  12/20/22 7205

## 2023-01-03 ENCOUNTER — TELEPHONE (OUTPATIENT)
Dept: OBGYN CLINIC | Facility: HOSPITAL | Age: 48
End: 2023-01-03

## 2023-01-03 NOTE — TELEPHONE ENCOUNTER
Caller: Augustin Dumas from [facility]    Doctor/Office: Adrien Vee    #: [phone]    Work or school note: Work    Return to work/school date: Caller would like a note stating the patient's diagnosis  Also, can he return to full duty now, since it has been 2 weeks? Fax #: [phone]    Is there any restrictions that need to be updated? If so, what?  at your discretion

## 2023-01-04 NOTE — TELEPHONE ENCOUNTER
Caller: mauricio oliveira    Doctor:faisal    Reason for call: calling to inform Employer is able to accommodate any type of sitting situation if need be      Call back#: n/a

## 2023-01-05 ENCOUNTER — OFFICE VISIT (OUTPATIENT)
Dept: PODIATRY | Facility: CLINIC | Age: 48
End: 2023-01-05

## 2023-01-05 VITALS
OXYGEN SATURATION: 96 % | HEART RATE: 80 BPM | BODY MASS INDEX: 34.49 KG/M2 | HEIGHT: 65 IN | DIASTOLIC BLOOD PRESSURE: 87 MMHG | WEIGHT: 207 LBS | SYSTOLIC BLOOD PRESSURE: 135 MMHG

## 2023-01-05 DIAGNOSIS — S93.492A SPRAIN OF ANTERIOR TALOFIBULAR LIGAMENT OF LEFT ANKLE, INITIAL ENCOUNTER: Primary | ICD-10-CM

## 2023-01-05 DIAGNOSIS — S93.402A MODERATE ANKLE SPRAIN, LEFT, INITIAL ENCOUNTER: ICD-10-CM

## 2023-01-05 RX ORDER — DICLOFENAC SODIUM 75 MG/1
75 TABLET, DELAYED RELEASE ORAL 2 TIMES DAILY
Qty: 30 TABLET | Refills: 2 | Status: SHIPPED | OUTPATIENT
Start: 2023-01-05

## 2023-01-05 NOTE — LETTER
January 5, 2023     Patient: Raghavendra Orellana  YOB: 1975  Date of Visit: 1/5/2023      To Whom it May Concern:    Christina Harper is under my professional care  Brandi Hamilton was seen in my office on 1/5/2023  Brandi Hamilton may return to work on February 1st, 2023       If you have any questions or concerns, please don't hesitate to call           Sincerely,          Magdalena Ramires DPM        CC: No Recipients

## 2023-01-05 NOTE — PROGRESS NOTES
Assessment/Plan:    X-rays of the patient's left ankle taken on December 16, 2022 were personally reviewed and interpreted by myself  Some mild scattered arthritic changes noted throughout the ankle and hindfoot  No fractures or dislocations noted  The patient's clinical examination is consistent with a persistent swelling and moderate to severe tenderness to palpation to the lateral ankle structures as well as the Achilles tendon  Given the patient's symptomatology and persistent pain and swelling, and a negative x-ray findings, recommended an MRI of the patient's left ankle to rule out structural injury to the lateral ankle ligaments and the Achilles tendon  He will continue splinting with a cam boot  Use of the cam boot does reduce some of his pain  He was taking naproxen but it did not help with his pain and swelling, we will try diclofenac 75 mg enteric-coated to be taken twice daily as needed for pain of the left foot and ankle  Ace compression was also applied to the patient's left ankle for edema control  Recommend follow-up in 2 to 3 weeks to review results of the MRI  Diagnoses and all orders for this visit:    Sprain of anterior talofibular ligament of left ankle, initial encounter  -     MRI ankle/heel left wo contrast; Future  -     diclofenac (VOLTAREN) 75 mg EC tablet; Take 1 tablet (75 mg total) by mouth 2 (two) times a day    Moderate ankle sprain, left, initial encounter          Subjective:      Patient ID: Rosmery Logan is a 52 y o  male  The patient presents today for his initial consultation with [facility] podiatry with a chief complaint of a painful left lateral ankle sprain sustained in mid December  The injury occurred at work when he slipped on some water on the floor and twisted his left ankle  He was subsequently evaluated in the ER where x-rays were taken and were negative for fracture or dislocation    He was subsequently evaluated by orthopedics and subsequently referred to podiatry for further management  He notes significant pain to his lateral left ankle and is currently taking naproxen which does not seem to help  The boot does relieve some of his pain, but he still notes significant discomfort when he is on his left foot for any significant period of time        The following portions of the patient's history were reviewed and updated as appropriate: allergies, current medications, past family history, past medical history, past social history, past surgical history and problem list       PAST MEDICAL HISTORY:  Past Medical History:   Diagnosis Date   • Cancer ([facility] [address] )     colon   • Post concussion syndrome 5/9/2017   • Traumatic brain injury 2017    R/S 2017       PAST SURGICAL HISTORY:  Past Surgical History:   Procedure Laterality Date   • APPENDECTOMY     • COLON SURGERY          ALLERGIES:  Asa [aspirin], Penicillin g, and Penicillins    MEDICATIONS:  Current Outpatient Medications   Medication Sig Dispense Refill   • cholestyramine sugar free (QUESTRAN LIGHT) 4 g packet Take 1 packet (4 g total) by mouth daily 30 packet 3   • diclofenac (VOLTAREN) 75 mg EC tablet Take 1 tablet (75 mg total) by mouth 2 (two) times a day 30 tablet 2   • DULoxetine (CYMBALTA) 30 mg delayed release capsule Take 1 capsule (30 mg total) by mouth daily 30 capsule 2   • Loperamide HCl (IMODIUM A-D PO) Take 1 tablet by mouth 4 (four) times a day     • metFORMIN (GLUCOPHAGE-XR) 500 mg 24 hr tablet Take 2 tablets (1,000 mg total) by mouth daily with breakfast 180 tablet 1   • tadalafil (CIALIS) 10 MG tablet Take 1 tablet (10 mg total) by mouth daily as needed for erectile dysfunction 20 tablet 1   • albuterol (Proventil HFA) 90 mcg/act inhaler Inhale 2 puffs every 6 (six) hours as needed for wheezing (Patient not taking: Reported on 10/26/2022) 6 7 g 5   • atorvastatin (LIPITOR) 20 mg tablet Take 1 tablet (20 mg total) by mouth every evening (Patient not taking: Reported on 12/6/2022) 90 tablet 1   • budesonide (Pulmicort Flexhaler) 180 MCG/ACT inhaler Inhale 2 puffs 2 (two) times a day Rinse mouth after use  (Patient not taking: Reported on 10/26/2022) 1 each 1   • diclofenac sodium (VOLTAREN) 1 % Apply 2 g topically 3 (three) times a day as needed (low back pain) (Patient not taking: Reported on 10/26/2022) 100 g 0   • guaifenesin-codeine (GUAIFENESIN AC) 100-10 MG/5ML liquid Take 10 mL by mouth 4 (four) times a day as needed for cough (Patient not taking: Reported on 10/26/2022) 118 mL 0   • hydrOXYzine HCL (ATARAX) 10 mg tablet Take 1 tablet (10 mg total) by mouth every 6 (six) hours as needed for itching (Patient not taking: Reported on 10/26/2022) 30 tablet 0   • ibuprofen (MOTRIN) 200 mg tablet Take 600 mg by mouth every 6 (six) hours as needed for mild pain or moderate pain (Patient not taking: Reported on 10/26/2022)     • OMEGA-3 FATTY ACIDS PO Take 1,000 mg by mouth 2 (two) times a day   (Patient not taking: Reported on 10/26/2022)       No current facility-administered medications for this visit  SOCIAL HISTORY:  Social History     Socioeconomic History   • Marital status: Single     Spouse name: None   • Number of children: None   • Years of education: None   • Highest education level: None   Occupational History   • None   Tobacco Use   • Smoking status: Never   • Smokeless tobacco: Never   Vaping Use   • Vaping Use: Never used   Substance and Sexual Activity   • Alcohol use:  Yes     Alcohol/week: 1 0 standard drink     Types: 1 Cans of beer per week   • Drug use: No   • Sexual activity: Yes     Partners: Female     Birth control/protection: None   Other Topics Concern   • None   Social History Narrative   • None     Social Determinants of Health     Financial Resource Strain: Not on file   Food Insecurity: Not on file   Transportation Needs: Not on file   Physical Activity: Not on file   Stress: Not on file   Social Connections: Not on file   Intimate Partner Violence: Not on file   Housing Stability: Not on file        Review of Systems   Constitutional: Negative  HENT: Negative  Eyes: Negative  Respiratory: Negative  Cardiovascular: Negative  Endocrine: Negative  Musculoskeletal: Negative  Neurological: Negative  Hematological: Negative  Psychiatric/Behavioral: Negative  Objective:      /87 (BP Location: Left arm, Patient Position: Standing, Cuff Size: Standard)   Pulse 80   Ht 5' 5" (1 651 m)   Wt 93 9 kg (207 lb)   SpO2 96%   BMI 34 45 kg/m²          Physical Exam  Vitals reviewed  Constitutional:       Appearance: Normal appearance  HENT:      Head: Normocephalic and atraumatic  Nose: Nose normal    Eyes:      Conjunctiva/sclera: Conjunctivae normal       Pupils: Pupils are equal, round, and reactive to light  Cardiovascular:      Pulses:           Dorsalis pedis pulses are 2+ on the left side  Posterior tibial pulses are 2+ on the left side  Pulmonary:      Effort: Pulmonary effort is normal    Feet:      Comments: There is some mild +1 pitting edema diffusely to the lateral left ankle without ecchymosis nor erythema; there is moderate to severe tenderness to palpation of the syndesmosis, the ATFL, CFL, PT FL, the peroneal tendons, and the Achilles tendon  There is no tenderness to the deltoid ligament nor the posterior tibial tendon  Skin:     General: Skin is warm  Capillary Refill: Capillary refill takes less than 2 seconds  Neurological:      General: No focal deficit present  Mental Status: He is alert and oriented to person, place, and time  Psychiatric:         Mood and Affect: Mood normal          Behavior: Behavior normal          Thought Content:  Thought content normal

## 2023-01-05 NOTE — LETTER
January 5, 2023     Donna Ortiz, [address] [address]    Patient: Raghavendra Orellana   YOB: 1975   Date of Visit: 1/5/2023       Dear Dr Nelson Blake:    Thank you for referring Christina Harper to me for evaluation  Below are my notes for this consultation  If you have questions, please do not hesitate to call me  I look forward to following your patient along with you  Sincerely,        Magdalena Ramires DPM        CC: No Recipients  Jozef Szymanski  1/5/2023  1:04 PM  Signed  Assessment/Plan:    X-rays of the patient's left ankle taken on December 16, 2022 were personally reviewed and interpreted by myself  Some mild scattered arthritic changes noted throughout the ankle and hindfoot  No fractures or dislocations noted  The patient's clinical examination is consistent with a persistent swelling and moderate to severe tenderness to palpation to the lateral ankle structures as well as the Achilles tendon  Given the patient's symptomatology and persistent pain and swelling, and a negative x-ray findings, recommended an MRI of the patient's left ankle to rule out structural injury to the lateral ankle ligaments and the Achilles tendon  He will continue splinting with a cam boot  Use of the cam boot does reduce some of his pain  He was taking naproxen but it did not help with his pain and swelling, we will try diclofenac 75 mg enteric-coated to be taken twice daily as needed for pain of the left foot and ankle  Ace compression was also applied to the patient's left ankle for edema control  Recommend follow-up in 2 to 3 weeks to review results of the MRI  Diagnoses and all orders for this visit:    Sprain of anterior talofibular ligament of left ankle, initial encounter  -     MRI ankle/heel left wo contrast; Future  -     diclofenac (VOLTAREN) 75 mg EC tablet;  Take 1 tablet (75 mg total) by mouth 2 (two) times a day    Moderate ankle sprain, left, initial encounter         Subjective:     Patient ID: Shawn Blum is a 52 y o  male  The patient presents today for his initial consultation with [facility] podiatry with a chief complaint of a painful left lateral ankle sprain sustained in mid December  The injury occurred at work when he slipped on some water on the floor and twisted his left ankle  He was subsequently evaluated in the ER where x-rays were taken and were negative for fracture or dislocation  He was subsequently evaluated by orthopedics and subsequently referred to podiatry for further management  He notes significant pain to his lateral left ankle and is currently taking naproxen which does not seem to help  The boot does relieve some of his pain, but he still notes significant discomfort when he is on his left foot for any significant period of time        The following portions of the patient's history were reviewed and updated as appropriate: allergies, current medications, past family history, past medical history, past social history, past surgical history and problem list       PAST MEDICAL HISTORY:  Past Medical History:   Diagnosis Date   • Cancer ([facility] [address] )     colon   • Post concussion syndrome 5/9/2017   • Traumatic brain injury 2017    R/S 2017       PAST SURGICAL HISTORY:  Past Surgical History:   Procedure Laterality Date   • APPENDECTOMY     • COLON SURGERY          ALLERGIES:  Asa [aspirin], Penicillin g, and Penicillins    MEDICATIONS:  Current Outpatient Medications   Medication Sig Dispense Refill   • cholestyramine sugar free (QUESTRAN LIGHT) 4 g packet Take 1 packet (4 g total) by mouth daily 30 packet 3   • diclofenac (VOLTAREN) 75 mg EC tablet Take 1 tablet (75 mg total) by mouth 2 (two) times a day 30 tablet 2   • DULoxetine (CYMBALTA) 30 mg delayed release capsule Take 1 capsule (30 mg total) by mouth daily 30 capsule 2   • Loperamide HCl (IMODIUM A-D PO) Take 1 tablet by mouth 4 (four) times a day     • metFORMIN (GLUCOPHAGE-XR) 500 mg 24 hr tablet Take 2 tablets (1,000 mg total) by mouth daily with breakfast 180 tablet 1   • tadalafil (CIALIS) 10 MG tablet Take 1 tablet (10 mg total) by mouth daily as needed for erectile dysfunction 20 tablet 1   • albuterol (Proventil HFA) 90 mcg/act inhaler Inhale 2 puffs every 6 (six) hours as needed for wheezing (Patient not taking: Reported on 10/26/2022) 6 7 g 5   • atorvastatin (LIPITOR) 20 mg tablet Take 1 tablet (20 mg total) by mouth every evening (Patient not taking: Reported on 12/6/2022) 90 tablet 1   • budesonide (Pulmicort Flexhaler) 180 MCG/ACT inhaler Inhale 2 puffs 2 (two) times a day Rinse mouth after use  (Patient not taking: Reported on 10/26/2022) 1 each 1   • diclofenac sodium (VOLTAREN) 1 % Apply 2 g topically 3 (three) times a day as needed (low back pain) (Patient not taking: Reported on 10/26/2022) 100 g 0   • guaifenesin-codeine (GUAIFENESIN AC) 100-10 MG/5ML liquid Take 10 mL by mouth 4 (four) times a day as needed for cough (Patient not taking: Reported on 10/26/2022) 118 mL 0   • hydrOXYzine HCL (ATARAX) 10 mg tablet Take 1 tablet (10 mg total) by mouth every 6 (six) hours as needed for itching (Patient not taking: Reported on 10/26/2022) 30 tablet 0   • ibuprofen (MOTRIN) 200 mg tablet Take 600 mg by mouth every 6 (six) hours as needed for mild pain or moderate pain (Patient not taking: Reported on 10/26/2022)     • OMEGA-3 FATTY ACIDS PO Take 1,000 mg by mouth 2 (two) times a day   (Patient not taking: Reported on 10/26/2022)       No current facility-administered medications for this visit         SOCIAL HISTORY:  Social History     Socioeconomic History   • Marital status: Single     Spouse name: None   • Number of children: None   • Years of education: None   • Highest education level: None   Occupational History   • None   Tobacco Use   • Smoking status: Never   • Smokeless tobacco: Never   Vaping Use   • Vaping Use: Never used   Substance and Sexual Activity   • Alcohol use: Yes     Alcohol/week: 1 0 standard drink     Types: 1 Cans of beer per week   • Drug use: No   • Sexual activity: Yes     Partners: Female     Birth control/protection: None   Other Topics Concern   • None   Social History Narrative   • None     Social Determinants of Health     Financial Resource Strain: Not on file   Food Insecurity: Not on file   Transportation Needs: Not on file   Physical Activity: Not on file   Stress: Not on file   Social Connections: Not on file   Intimate Partner Violence: Not on file   Housing Stability: Not on file        Review of Systems   Constitutional: Negative  HENT: Negative  Eyes: Negative  Respiratory: Negative  Cardiovascular: Negative  Endocrine: Negative  Musculoskeletal: Negative  Neurological: Negative  Hematological: Negative  Psychiatric/Behavioral: Negative  Objective:      /87 (BP Location: Left arm, Patient Position: Standing, Cuff Size: Standard)   Pulse 80   Ht 5' 5" (1 651 m)   Wt 93 9 kg (207 lb)   SpO2 96%   BMI 34 45 kg/m²         Physical Exam  Vitals reviewed  Constitutional:       Appearance: Normal appearance  HENT:      Head: Normocephalic and atraumatic  Nose: Nose normal    Eyes:      Conjunctiva/sclera: Conjunctivae normal       Pupils: Pupils are equal, round, and reactive to light  Cardiovascular:      Pulses:           Dorsalis pedis pulses are 2+ on the left side  Posterior tibial pulses are 2+ on the left side  Pulmonary:      Effort: Pulmonary effort is normal    Feet:      Comments: There is some mild +1 pitting edema diffusely to the lateral left ankle without ecchymosis nor erythema; there is moderate to severe tenderness to palpation of the syndesmosis, the ATFL, CFL, PT FL, the peroneal tendons, and the Achilles tendon  There is no tenderness to the deltoid ligament nor the posterior tibial tendon  Skin:     General: Skin is warm        Capillary Refill: Capillary refill takes less than 2 seconds  Neurological:      General: No focal deficit present  Mental Status: He is alert and oriented to person, place, and time  Psychiatric:         Mood and Affect: Mood normal          Behavior: Behavior normal          Thought Content:  Thought content normal

## 2023-01-10 ENCOUNTER — HOSPITAL ENCOUNTER (OUTPATIENT)
Dept: RADIOLOGY | Facility: CLINIC | Age: 48
Discharge: HOME | End: 2023-01-10
Admitting: ANESTHESIOLOGY

## 2023-01-10 VITALS
HEART RATE: 76 BPM | TEMPERATURE: 97 F | RESPIRATION RATE: 20 BRPM | SYSTOLIC BLOOD PRESSURE: 131 MMHG | DIASTOLIC BLOOD PRESSURE: 86 MMHG | OXYGEN SATURATION: 93 %

## 2023-01-10 DIAGNOSIS — M51.16 INTERVERTEBRAL DISC DISORDER WITH RADICULOPATHY OF LUMBAR REGION: ICD-10-CM

## 2023-01-10 RX ORDER — BUPIVACAINE HCL/PF 2.5 MG/ML
2 VIAL (ML) INJECTION ONCE
Status: COMPLETED | OUTPATIENT
Start: 2023-01-10 | End: 2023-01-10

## 2023-01-10 RX ORDER — METHYLPREDNISOLONE ACETATE 80 MG/ML
80 INJECTION, SUSPENSION INTRA-ARTICULAR; INTRALESIONAL; INTRAMUSCULAR; PARENTERAL; SOFT TISSUE ONCE
Status: COMPLETED | OUTPATIENT
Start: 2023-01-10 | End: 2023-01-10

## 2023-01-10 RX ORDER — 0.9 % SODIUM CHLORIDE 0.9 %
4 VIAL (ML) INJECTION ONCE
Status: COMPLETED | OUTPATIENT
Start: 2023-01-10 | End: 2023-01-10

## 2023-01-10 RX ADMIN — Medication 4 ML: at 09:41

## 2023-01-10 RX ADMIN — BUPIVACAINE HYDROCHLORIDE 2 ML: 2.5 INJECTION, SOLUTION EPIDURAL; INFILTRATION; INTRACAUDAL at 09:41

## 2023-01-10 RX ADMIN — METHYLPREDNISOLONE ACETATE 80 MG: 80 INJECTION, SUSPENSION INTRA-ARTICULAR; INTRALESIONAL; INTRAMUSCULAR; PARENTERAL; SOFT TISSUE at 09:41

## 2023-01-10 RX ADMIN — IOHEXOL 1 ML: 300 INJECTION, SOLUTION INTRAVENOUS at 09:41

## 2023-01-10 NOTE — H&P
History of Present Illness: The patient is a 52 y o  male who presents with complaints of left low back and leg pain and is here today for left L5-S1 transforaminal epidural steroid injection    Past Medical History:   Diagnosis Date   • Cancer ([facility] [address] )     colon   • Post concussion syndrome 5/9/2017   • Traumatic brain injury 2017    R/S 2017       Past Surgical History:   Procedure Laterality Date   • APPENDECTOMY     • COLON SURGERY           Current Outpatient Medications:   •  albuterol (Proventil HFA) 90 mcg/act inhaler, Inhale 2 puffs every 6 (six) hours as needed for wheezing (Patient not taking: Reported on 10/26/2022), Disp: 6 7 g, Rfl: 5  •  atorvastatin (LIPITOR) 20 mg tablet, Take 1 tablet (20 mg total) by mouth every evening (Patient not taking: Reported on 12/6/2022), Disp: 90 tablet, Rfl: 1  •  budesonide (Pulmicort Flexhaler) 180 MCG/ACT inhaler, Inhale 2 puffs 2 (two) times a day Rinse mouth after use   (Patient not taking: Reported on 10/26/2022), Disp: 1 each, Rfl: 1  •  cholestyramine sugar free (QUESTRAN LIGHT) 4 g packet, Take 1 packet (4 g total) by mouth daily, Disp: 30 packet, Rfl: 3  •  diclofenac (VOLTAREN) 75 mg EC tablet, Take 1 tablet (75 mg total) by mouth 2 (two) times a day, Disp: 30 tablet, Rfl: 2  •  diclofenac sodium (VOLTAREN) 1 %, Apply 2 g topically 3 (three) times a day as needed (low back pain) (Patient not taking: Reported on 10/26/2022), Disp: 100 g, Rfl: 0  •  DULoxetine (CYMBALTA) 30 mg delayed release capsule, Take 1 capsule (30 mg total) by mouth daily, Disp: 30 capsule, Rfl: 2  •  guaifenesin-codeine (GUAIFENESIN AC) 100-10 MG/5ML liquid, Take 10 mL by mouth 4 (four) times a day as needed for cough (Patient not taking: Reported on 10/26/2022), Disp: 118 mL, Rfl: 0  •  hydrOXYzine HCL (ATARAX) 10 mg tablet, Take 1 tablet (10 mg total) by mouth every 6 (six) hours as needed for itching (Patient not taking: Reported on 10/26/2022), Disp: 30 tablet, Rfl: 0  •  ibuprofen (MOTRIN) 200 mg tablet, Take 600 mg by mouth every 6 (six) hours as needed for mild pain or moderate pain (Patient not taking: Reported on 10/26/2022), Disp: , Rfl:   •  Loperamide HCl (IMODIUM A-D PO), Take 1 tablet by mouth 4 (four) times a day, Disp: , Rfl:   •  metFORMIN (GLUCOPHAGE-XR) 500 mg 24 hr tablet, Take 2 tablets (1,000 mg total) by mouth daily with breakfast, Disp: 180 tablet, Rfl: 1  •  OMEGA-3 FATTY ACIDS PO, Take 1,000 mg by mouth 2 (two) times a day   (Patient not taking: Reported on 10/26/2022), Disp: , Rfl:   •  tadalafil (CIALIS) 10 MG tablet, Take 1 tablet (10 mg total) by mouth daily as needed for erectile dysfunction, Disp: 20 tablet, Rfl: 1  No current facility-administered medications for this encounter  Allergies   Allergen Reactions   • Asa [Aspirin] GI Intolerance   • Penicillin G    • Penicillins        Physical Exam:   Vitals:    01/10/23 0919   BP: 121/78   Pulse: 71   Resp: 20   Temp: (!) 97 °F (36 1 °C)   SpO2: 97%     General: Awake, Alert, Oriented x 3, Mood and affect appropriate  Respiratory: Respirations even and unlabored  Cardiovascular: Peripheral pulses intact; no edema  Musculoskeletal Exam: Left lower back pain    ASA Score: 3    Patient/Chart Verification  Patient ID Verified: Verbal  ID Band Applied: No  Consents Confirmed: Procedural  H&P( within 30 days) Verified: To be obtained in the Pre-Procedure area  Interval H&P(within 24 hr) Complete (required for Outpatients and Surgery Admit only): To be obtained in the Pre-Procedure area  Allergies Reviewed: Yes  Anticoag/NSAID held?: No  Currently on antibiotics?: No  Pre-op Lab/Test Results Available: N/A    Assessment:   1   Intervertebral disc disorder with radiculopathy of lumbar region        Plan: Left L5-S1 TFESI

## 2023-01-10 NOTE — DISCHARGE INSTRUCTIONS
Epidural Steroid Injection   WHAT YOU NEED TO KNOW:   An epidural steroid injection (MARIE) is a procedure to inject steroid medicine into the epidural space  The epidural space is between your spinal cord and vertebrae  Steroids reduce inflammation and fluid buildup in your spine that may be causing pain  You may be given pain medicine along with the steroids  ACTIVITY  Do not drive or operate machinery today  No strenuous activity today - bending, lifting, etc   You may resume normal activites starting tomorrow - start slowly and as tolerated  You may shower today, but no tub baths or hot tubs  You may have numbness for several hours from the local anesthetic  Please use caution and common sense, especially with weight-bearing activities  CARE OF THE INJECTION SITE  If you have soreness or pain, apply ice to the area today (20 minutes on/20 minutes off)  Starting tomorrow, you may use warm, moist heat or ice if needed  You may have an increase or change in your discomfort for 36-48 hours after your treatment  Apply ice and continue with any pain medication you have been prescribed  Notify the [facility] if you have any of the following: redness, drainage, swelling, headache, stiff neck or fever above 100°F     SPECIAL INSTRUCTIONS  Our office will contact you in approximately 7 days for a progress report  MEDICATIONS  Continue to take all routine medications  Our office may have instructed you to hold some medications  As no general anesthesia was used in today's procedure, you should not experience any side effects related to anesthesia  If you are diabetic, the steroids used in today's injection may temporarily increase your blood sugar levels after the first few days after your injection  Please keep a close eye on your sugars and alert the doctor who manages your diabetes if your sugars are significantly high from your baseline or you are symptomatic       If you have a problem specifically related to your procedure, please call our office at [phone]  Problems not related to your procedure should be directed to your primary care physician

## 2023-01-17 ENCOUNTER — TELEPHONE (OUTPATIENT)
Dept: RADIOLOGY | Facility: MEDICAL CENTER | Age: 48
End: 2023-01-17

## 2023-01-19 ENCOUNTER — HOSPITAL ENCOUNTER (OUTPATIENT)
Dept: RADIOLOGY | Age: 48
Discharge: HOME | End: 2023-01-19

## 2023-01-19 DIAGNOSIS — S93.492A SPRAIN OF ANTERIOR TALOFIBULAR LIGAMENT OF LEFT ANKLE, INITIAL ENCOUNTER: ICD-10-CM

## 2023-01-25 ENCOUNTER — PREP FOR PROCEDURE (OUTPATIENT)
Dept: OBGYN CLINIC | Facility: CLINIC | Age: 48
End: 2023-01-25

## 2023-01-25 ENCOUNTER — OFFICE VISIT (OUTPATIENT)
Dept: PODIATRY | Facility: CLINIC | Age: 48
End: 2023-01-25

## 2023-01-25 VITALS
HEART RATE: 85 BPM | BODY MASS INDEX: 34.45 KG/M2 | SYSTOLIC BLOOD PRESSURE: 138 MMHG | OXYGEN SATURATION: 97 % | HEIGHT: 65 IN | DIASTOLIC BLOOD PRESSURE: 88 MMHG

## 2023-01-25 DIAGNOSIS — S93.432D SYNDESMOTIC DISRUPTION OF LEFT ANKLE, SUBSEQUENT ENCOUNTER: Primary | ICD-10-CM

## 2023-01-25 DIAGNOSIS — M25.572 PAIN IN LEFT ANKLE AND JOINTS OF LEFT FOOT: ICD-10-CM

## 2023-01-25 DIAGNOSIS — M77.52 BURSITIS OF POSTERIOR HEEL, LEFT: ICD-10-CM

## 2023-01-25 NOTE — PROGRESS NOTES
Assessment/Plan:     The patient's MRI report was reviewed with him in detail  The pertinent images were reviewed with the patient as well  Findings were consistent with syndesmotic injury with torn interosseous membrane and anterior tibiofibular ligament  The images were personally reviewed and interpreted as well  I also noted some mild retrocalcaneal bursitis that is consistent with the discomfort that the patient feels clinically to his posterior heel     's clinical examination is consistent with persistent tenderness to the area of the syndesmotic ligament to the anterior ankle, there is tenderness with palpation and lateral squeeze of the retrocalcaneal bursa consistent with bursitis  Treatment options were discussed with the patient to include continued guarded weightbearing in a cam boot for 4 to 6 weeks followed by course of physical therapy, or surgical intervention with stabilization of the syndesmotic ligament with an internal brace  The patient would like to proceed with surgical intervention  We discussed the potential benefits of a steroid injection to the retrocalcaneal bursa while in the OR and he is agreeable  Informed consent was obtained and we will initiate preop scheduling and testing for left ankle surgery  Diagnoses and all orders for this visit:    Syndesmotic disruption of left ankle, subsequent encounter    Bursitis of posterior heel, left    Pain in left ankle and joints of left foot          Subjective:     Patient ID: Michael White is a 52 y o  male  The patient presents today for follow-up of left lateral ankle pain  The patient has been ambulating in a cam boot which still helps with his left lower extremity pain  He still notes discomfort to the left ankle as well as the posterior aspect of his left heel  He presents for review of his MRI  Review of Systems   Constitutional: Negative  HENT: Negative  Eyes: Negative  Respiratory: Negative  Cardiovascular: Negative  Endocrine: Negative  Musculoskeletal: Negative  Neurological: Negative  Hematological: Negative  Psychiatric/Behavioral: Negative  Objective:     Physical Exam  Vitals reviewed  Constitutional:       Appearance: Normal appearance  HENT:      Head: Normocephalic and atraumatic  Nose: Nose normal    Eyes:      Conjunctiva/sclera: Conjunctivae normal       Pupils: Pupils are equal, round, and reactive to light  Cardiovascular:      Pulses:           Dorsalis pedis pulses are 2+ on the left side  Posterior tibial pulses are 2+ on the left side  Pulmonary:      Effort: Pulmonary effort is normal    Feet:      Comments: There is persistent tenderness palpation of the area of the syndesmosis of the anterolateral left ankle; there is moderate tenderness palpation of the retrocalcaneal bursa of the posterior left heel; there is no erythema nor ecchymosis no active edema nor calor to the left foot and ankle; stress eversion of the left ankle does produce pain at the level of the syndesmosis  Skin:     General: Skin is warm  Capillary Refill: Capillary refill takes less than 2 seconds  Neurological:      General: No focal deficit present  Mental Status: He is alert and oriented to person, place, and time  Psychiatric:         Mood and Affect: Mood normal          Behavior: Behavior normal          Thought Content:  Thought content normal

## 2023-01-25 NOTE — LETTER
January 25, 2023     Patient: John Livingston  YOB: 1975  Date of Visit: 1/25/2023      To Whom it May Concern:    Sallie Urrutia is under my professional care  Christi Roland was seen in my office on 1/25/2023  Christi Roland will be scheduled for left ankle surgery  He will be out of work for 8 weeks from the surgery date  If you have any questions or concerns, please don't hesitate to call           Sincerely,          Mauricio Samuels DPM        CC: No Recipients

## 2023-01-26 ENCOUNTER — TELEPHONE (OUTPATIENT)
Dept: OBGYN CLINIC | Facility: CLINIC | Age: 48
End: 2023-01-26

## 2023-01-26 NOTE — TELEPHONE ENCOUNTER
Per patient original forms are required for Domestic Relations  Originals sent via Inter office mail to [facility]  Please forward completed forms to [address]  Paperwork also uploaded to my chart

## 2023-01-30 DIAGNOSIS — N52.9 ERECTILE DYSFUNCTION, UNSPECIFIED ERECTILE DYSFUNCTION TYPE: ICD-10-CM

## 2023-01-30 RX ORDER — TADALAFIL 10 MG/1
TABLET ORAL
Qty: 20 TABLET | Refills: 1 | Status: SHIPPED | OUTPATIENT
Start: 2023-01-30

## 2023-02-02 ENCOUNTER — TELEPHONE (OUTPATIENT)
Dept: PAIN MEDICINE | Facility: CLINIC | Age: 48
End: 2023-02-02

## 2023-02-02 NOTE — TELEPHONE ENCOUNTER
Left message for patient to call and schedule an appointment with Dr Allan Angeles for a follow up  Dr Logan Burris it is fine to use same day spot

## 2023-02-07 ENCOUNTER — OFFICE VISIT (OUTPATIENT)
Dept: PAIN MEDICINE | Facility: CLINIC | Age: 48
End: 2023-02-07

## 2023-02-07 VITALS
DIASTOLIC BLOOD PRESSURE: 91 MMHG | WEIGHT: 212 LBS | BODY MASS INDEX: 35.32 KG/M2 | HEART RATE: 78 BPM | SYSTOLIC BLOOD PRESSURE: 130 MMHG | HEIGHT: 65 IN | RESPIRATION RATE: 18 BRPM

## 2023-02-07 DIAGNOSIS — M46.1 SACROILIITIS (HCC): ICD-10-CM

## 2023-02-07 DIAGNOSIS — G89.4 CHRONIC PAIN SYNDROME: Primary | ICD-10-CM

## 2023-02-07 DIAGNOSIS — M54.16 LUMBAR RADICULOPATHY: ICD-10-CM

## 2023-02-07 NOTE — PROGRESS NOTES
Assessment:  1  Chronic pain syndrome    2  Sacroiliitis ([address] )    3  Lumbar radiculopathy        Plan:  The patient is a 77-year-old male with a history of chronic pain secondary to low back pain, lumbar intervertebral disorder with radiculopathy and lumbar spondylosis who presents to the office with ongoing bilateral low back pain that is unchanged since his last office visit and unrelieved after undergoing a left-sided L5-S1 TFESI on 1/10/2023  On exam, the patient is tender with palpation over bilateral SI joints as well as having positive provocative maneuvers for sacroiliitis  I instructed patient he would likely benefit from bilateral SI joint injections to decrease inflammation and provide him relief  Patient would like to proceed  I instructed her  will schedule him  Complete risks and benefits including bleeding, infection, tissue reaction, nerve injury and allergic reaction were discussed  The approach was demonstrated using models and literature was provided  Verbal and written consent was obtained  My impressions and treatment recommendations were discussed in detail with the patient who verbalized understanding and had no further questions  Discharge instructions were provided  I personally saw and examined the patient and I agree with the above discussed plan of care  Orders Placed This Encounter   Procedures   • FL spine and pain procedure     Dr Marielos Bowden     Standing Status:   Future     Standing Expiration Date:   2/7/2027     Order Specific Question:   Reason for Exam:     Answer:   Bilateral SI joint injections     Order Specific Question:   Anticoagulant hold needed? Answer:   No     No orders of the defined types were placed in this encounter  History of Present Illness:  Ruy Rodriguez is a 52 y o  male with a history of chronic pain secondary to low back pain, lumbar intervertebral disc disorder with radiculopathy and lumbar spondylosis    He was last seen on 1/10/2023 where he underwent a left-sided L5-S1 TFESI which provided him no relief of his left-sided low back pain symptoms  He presents to the office with ongoing bilateral low back pain  His left ankle is also in a boot as he fell on 12/19/2022  He is scheduled for left ankle surgery on 3/10/2023 with Dr Jose Clarke     He states his pain is the same since last office visit and constant  He rates quality of his pain as shooting, numbness and pin/needles and is currently rating it an 8/10 on a numeric scale  Current pain medications include diclofenac 75 mg twice a day, Cymbalta 30 mg daily and Tylenol as needed  He states his medication regimen is providing him mild relief of his pain  I have personally reviewed and/or updated the patient's past medical history, past surgical history, family history, social history, current medications, allergies, and vital signs today  Review of Systems   Respiratory: Negative for shortness of breath  Cardiovascular: Negative for chest pain  Gastrointestinal: Negative for constipation, diarrhea, nausea and vomiting  Musculoskeletal: Positive for back pain  Negative for arthralgias, gait problem, joint swelling and myalgias  Skin: Negative for rash  Neurological: Negative for dizziness, seizures and weakness  All other systems reviewed and are negative        Patient Active Problem List   Diagnosis   • Chronic diarrhea   • De Quervain's tenosynovitis   • Fatty liver   • Hypertriglyceridemia   • Impingement syndrome of left shoulder   • Left lumbar radiculopathy   • Male erectile dysfunction   • Plantar warts   • Paresthesias   • Excessive daytime sleepiness   • PETTY (obstructive sleep apnea)   • Type 2 diabetes mellitus without complication, without long-term current use of insulin (HCC)   • Class 1 obesity due to excess calories with serious comorbidity and body mass index (BMI) of 34 0 to 34 9 in adult   • Arthritis of right knee   • Chronic pain of right knee       Past Medical History:   Diagnosis Date   • Cancer [facility])     colon   • Post concussion syndrome 5/9/2017   • Traumatic brain injury 2017    R/S 2017       Past Surgical History:   Procedure Laterality Date   • APPENDECTOMY     • COLON SURGERY         Family History   Problem Relation Age of Onset   • Coronary artery disease Mother    • Diabetes Mother    • Hypertension Mother    • Hyperlipidemia Mother    • Coronary artery disease Father    • Diabetes Father    • Hypertension Father    • Hyperlipidemia Father    • Heart attack Father    • Anemia Brother    • No Known Problems Son    • No Known Problems Son    • No Known Problems Son        Social History     Occupational History   • Not on file   Tobacco Use   • Smoking status: Never   • Smokeless tobacco: Never   Vaping Use   • Vaping Use: Never used   Substance and Sexual Activity   • Alcohol use:  Yes     Alcohol/week: 1 0 standard drink     Types: 1 Cans of beer per week   • Drug use: No   • Sexual activity: Yes     Partners: Female     Birth control/protection: None       Current Outpatient Medications on File Prior to Visit   Medication Sig   • cholestyramine sugar free (QUESTRAN LIGHT) 4 g packet Take 1 packet (4 g total) by mouth daily   • diclofenac (VOLTAREN) 75 mg EC tablet Take 1 tablet (75 mg total) by mouth 2 (two) times a day   • DULoxetine (CYMBALTA) 30 mg delayed release capsule Take 1 capsule (30 mg total) by mouth daily   • Loperamide HCl (IMODIUM A-D PO) Take 1 tablet by mouth 4 (four) times a day   • metFORMIN (GLUCOPHAGE-XR) 500 mg 24 hr tablet Take 2 tablets (1,000 mg total) by mouth daily with breakfast   • tadalafil (CIALIS) 10 MG tablet TAKE ONE TABLET BY MOUTH EVERY DAY AS NEEDED FOR ED   • albuterol (Proventil HFA) 90 mcg/act inhaler Inhale 2 puffs every 6 (six) hours as needed for wheezing (Patient not taking: Reported on 10/26/2022)   • atorvastatin (LIPITOR) 20 mg tablet Take 1 tablet (20 mg total) by mouth every evening (Patient not taking: Reported on 12/6/2022)   • budesonide (Pulmicort Flexhaler) 180 MCG/ACT inhaler Inhale 2 puffs 2 (two) times a day Rinse mouth after use  (Patient not taking: Reported on 10/26/2022)   • diclofenac sodium (VOLTAREN) 1 % Apply 2 g topically 3 (three) times a day as needed (low back pain) (Patient not taking: Reported on 10/26/2022)   • guaifenesin-codeine (GUAIFENESIN AC) 100-10 MG/5ML liquid Take 10 mL by mouth 4 (four) times a day as needed for cough (Patient not taking: Reported on 10/26/2022)   • hydrOXYzine HCL (ATARAX) 10 mg tablet Take 1 tablet (10 mg total) by mouth every 6 (six) hours as needed for itching (Patient not taking: Reported on 10/26/2022)   • ibuprofen (MOTRIN) 200 mg tablet Take 600 mg by mouth every 6 (six) hours as needed for mild pain or moderate pain (Patient not taking: Reported on 10/26/2022)   • OMEGA-3 FATTY ACIDS PO Take 1,000 mg by mouth 2 (two) times a day   (Patient not taking: Reported on 10/26/2022)     No current facility-administered medications on file prior to visit  Allergies   Allergen Reactions   • Asa [Aspirin] GI Intolerance   • Penicillin G    • Penicillins        Physical Exam:    /91   Pulse 78   Resp 18   Ht 5' 5" (1 651 m)   Wt 96 2 kg (212 lb)   BMI 35 28 kg/m²     Constitutional:normal, well developed, well nourished, alert, in no distress and non-toxic and no overt pain behavior    Eyes:anicteric  HEENT:grossly intact  Neck:supple, symmetric, trachea midline and no masses   Pulmonary:even and unlabored  Cardiovascular:No edema or pitting edema present  Skin:Normal without rashes or lesions and well hydrated  Psychiatric:Mood and affect appropriate  Neurologic:Cranial Nerves II-XII grossly intact  Musculoskeletal:antalgic     Lumbar Spine Exam    Appearance:  Normal lordosis  Palpation/Tenderness:  left sacroiliac joint tenderness  right sacroiliac joint tenderness  Sensory:  no sensory deficits noted  Range of Motion:  Flexion: Minimally limited  with pain  Extension:  Minimally limited  with pain  Motor Strength:  Left hip flexion:  5/5  Right hip flexion:  5/5  Left knee flexion:  5/5  Left knee extension:  5/5  Right knee flexion:  5/5  Right knee extension:  5/5  Left foot dorsiflexion:  5/5  Left foot plantar flexion:  5/5  Right foot dorsiflexion:  5/5  Right foot plantar flexion:  5/5  Special Tests:  Left Straight Leg Test:  positive  Right Straight Leg Test:  positive  Left Javon's Maneuver:  positive  Right Javon's Maneuver:  positive  Left Gaenslen's Test:  positive  Right Gaenslen's Test:  positive  Left Pelvic Distraction Test:  positive  Right Pelvic Distraction Test:  positive      Imaging

## 2023-02-09 RX ORDER — CHLORHEXIDINE GLUCONATE 0.12 MG/ML
15 RINSE ORAL ONCE
OUTPATIENT
Start: 2023-02-09 | End: 2023-02-09

## 2023-02-10 ENCOUNTER — TELEPHONE (OUTPATIENT)
Dept: OBGYN CLINIC | Facility: CLINIC | Age: 48
End: 2023-02-10

## 2023-02-10 NOTE — TELEPHONE ENCOUNTER
Called pt to let him know that there is pre-op blood work needing to be done  Pt stated he will have it done on Monday, 2/13/23

## 2023-02-13 ENCOUNTER — APPOINTMENT (OUTPATIENT)
Dept: LAB | Facility: CLINIC | Age: 48
End: 2023-02-13

## 2023-02-13 DIAGNOSIS — Z11.4 SCREENING FOR HIV (HUMAN IMMUNODEFICIENCY VIRUS): ICD-10-CM

## 2023-02-13 DIAGNOSIS — E11.9 TYPE 2 DIABETES MELLITUS WITHOUT COMPLICATION, WITHOUT LONG-TERM CURRENT USE OF INSULIN (HCC): ICD-10-CM

## 2023-02-13 DIAGNOSIS — E78.1 HYPERTRIGLYCERIDEMIA: ICD-10-CM

## 2023-02-13 DIAGNOSIS — M25.572 PAIN IN LEFT ANKLE AND JOINTS OF LEFT FOOT: ICD-10-CM

## 2023-02-13 DIAGNOSIS — E66.09 CLASS 1 OBESITY DUE TO EXCESS CALORIES WITH SERIOUS COMORBIDITY AND BODY MASS INDEX (BMI) OF 34.0 TO 34.9 IN ADULT: ICD-10-CM

## 2023-02-13 DIAGNOSIS — G47.19 EXCESSIVE DAYTIME SLEEPINESS: ICD-10-CM

## 2023-02-13 DIAGNOSIS — S93.432D SYNDESMOTIC DISRUPTION OF LEFT ANKLE, SUBSEQUENT ENCOUNTER: ICD-10-CM

## 2023-02-13 LAB
ALBUMIN SERPL BCP-MCNC: 4 G/DL (ref 3.5–5)
ALP SERPL-CCNC: 63 U/L (ref 46–116)
ALT SERPL W P-5'-P-CCNC: 101 U/L (ref 12–78)
ANION GAP SERPL CALCULATED.3IONS-SCNC: 9 MMOL/L (ref 4–13)
AST SERPL W P-5'-P-CCNC: 38 U/L (ref 5–45)
BASOPHILS # BLD AUTO: 0.04 THOUSANDS/ÂΜL (ref 0–0.1)
BASOPHILS NFR BLD AUTO: 1 % (ref 0–1)
BILIRUB SERPL-MCNC: 0.53 MG/DL (ref 0.2–1)
BUN SERPL-MCNC: 11 MG/DL (ref 5–25)
CALCIUM SERPL-MCNC: 9.6 MG/DL (ref 8.3–10.1)
CHLORIDE SERPL-SCNC: 103 MMOL/L (ref 96–108)
CHOLEST SERPL-MCNC: 202 MG/DL
CO2 SERPL-SCNC: 24 MMOL/L (ref 21–32)
CREAT SERPL-MCNC: 0.84 MG/DL (ref 0.6–1.3)
CREAT UR-MCNC: 195 MG/DL
EOSINOPHIL # BLD AUTO: 0.11 THOUSAND/ÂΜL (ref 0–0.61)
EOSINOPHIL NFR BLD AUTO: 2 % (ref 0–6)
ERYTHROCYTE [DISTWIDTH] IN BLOOD BY AUTOMATED COUNT: 12.6 % (ref 11.6–15.1)
EST. AVERAGE GLUCOSE BLD GHB EST-MCNC: 223 MG/DL
GFR SERPL CREATININE-BSD FRML MDRD: 104 ML/MIN/1.73SQ M
GLUCOSE P FAST SERPL-MCNC: 216 MG/DL (ref 65–99)
HBA1C MFR BLD: 9.4 %
HCT VFR BLD AUTO: 48.4 % (ref 36.5–49.3)
HDLC SERPL-MCNC: 35 MG/DL
HGB BLD-MCNC: 16.2 G/DL (ref 12–17)
HIV 1+2 AB+HIV1 P24 AG SERPL QL IA: NORMAL
HIV 2 AB SERPL QL IA: NORMAL
HIV1 AB SERPL QL IA: NORMAL
HIV1 P24 AG SERPL QL IA: NORMAL
IMM GRANULOCYTES # BLD AUTO: 0.02 THOUSAND/UL (ref 0–0.2)
IMM GRANULOCYTES NFR BLD AUTO: 0 % (ref 0–2)
LYMPHOCYTES # BLD AUTO: 1.59 THOUSANDS/ÂΜL (ref 0.6–4.47)
LYMPHOCYTES NFR BLD AUTO: 26 % (ref 14–44)
MCH RBC QN AUTO: 28.7 PG (ref 26.8–34.3)
MCHC RBC AUTO-ENTMCNC: 33.5 G/DL (ref 31.4–37.4)
MCV RBC AUTO: 86 FL (ref 82–98)
MICROALBUMIN UR-MCNC: 270 MG/L (ref 0–20)
MICROALBUMIN/CREAT 24H UR: 138 MG/G CREATININE (ref 0–30)
MONOCYTES # BLD AUTO: 0.44 THOUSAND/ÂΜL (ref 0.17–1.22)
MONOCYTES NFR BLD AUTO: 7 % (ref 4–12)
NEUTROPHILS # BLD AUTO: 3.92 THOUSANDS/ÂΜL (ref 1.85–7.62)
NEUTS SEG NFR BLD AUTO: 64 % (ref 43–75)
NONHDLC SERPL-MCNC: 167 MG/DL
NRBC BLD AUTO-RTO: 0 /100 WBCS
PLATELET # BLD AUTO: 243 THOUSANDS/UL (ref 149–390)
PMV BLD AUTO: 9.4 FL (ref 8.9–12.7)
POTASSIUM SERPL-SCNC: 3.9 MMOL/L (ref 3.5–5.3)
PROT SERPL-MCNC: 7.9 G/DL (ref 6.4–8.4)
RBC # BLD AUTO: 5.64 MILLION/UL (ref 3.88–5.62)
SODIUM SERPL-SCNC: 136 MMOL/L (ref 135–147)
TRIGL SERPL-MCNC: 454 MG/DL
TSH SERPL DL<=0.05 MIU/L-ACNC: 1.47 UIU/ML (ref 0.45–4.5)
WBC # BLD AUTO: 6.12 THOUSAND/UL (ref 4.31–10.16)

## 2023-02-15 ENCOUNTER — OFFICE VISIT (OUTPATIENT)
Dept: FAMILY MEDICINE CLINIC | Facility: CLINIC | Age: 48
End: 2023-02-15

## 2023-02-15 VITALS
SYSTOLIC BLOOD PRESSURE: 130 MMHG | OXYGEN SATURATION: 98 % | HEART RATE: 78 BPM | TEMPERATURE: 97.5 F | RESPIRATION RATE: 17 BRPM | DIASTOLIC BLOOD PRESSURE: 78 MMHG

## 2023-02-15 DIAGNOSIS — E11.9 TYPE 2 DIABETES MELLITUS WITHOUT COMPLICATION, WITHOUT LONG-TERM CURRENT USE OF INSULIN (HCC): ICD-10-CM

## 2023-02-15 DIAGNOSIS — Z01.818 PRE-OP EXAMINATION: Primary | ICD-10-CM

## 2023-02-15 RX ORDER — METFORMIN HYDROCHLORIDE 500 MG/1
500 TABLET, EXTENDED RELEASE ORAL
Qty: 90 TABLET | Refills: 0 | Status: SHIPPED | OUTPATIENT
Start: 2023-02-15 | End: 2023-02-22

## 2023-02-15 RX ORDER — METFORMIN HYDROCHLORIDE 500 MG/1
1000 TABLET, EXTENDED RELEASE ORAL
Qty: 180 TABLET | Refills: 1 | Status: SHIPPED | OUTPATIENT
Start: 2023-02-15 | End: 2023-02-15

## 2023-02-15 RX ORDER — ATORVASTATIN CALCIUM 20 MG/1
20 TABLET, FILM COATED ORAL EVERY EVENING
Qty: 90 TABLET | Refills: 1 | Status: SHIPPED | OUTPATIENT
Start: 2023-02-15 | End: 2023-08-14

## 2023-02-15 RX ORDER — GLIPIZIDE 2.5 MG/1
2.5 TABLET, EXTENDED RELEASE ORAL DAILY
Qty: 90 TABLET | Refills: 1 | Status: SHIPPED | OUTPATIENT
Start: 2023-02-15 | End: 2023-02-22 | Stop reason: ALTCHOICE

## 2023-02-15 NOTE — ASSESSMENT & PLAN NOTE
From a cardiac standpoint patient may proceed with surgery  His hemoglobin A1c is currently 9 3 which places him at an elevated risk  Unable to tolerate metformin  Starting patient on Januvia and glipizide  We will also refer to an endocrinologist   Will defer to podiatrist in regards to proceeding with surgery at current A1c level

## 2023-02-15 NOTE — PROGRESS NOTES
PRE-OPERATIVE EVALUATION  [facility]    NAME: Hari Troncoso  AGE: 52 y o  SEX: male  : 1975     DATE: 2023    [facility] Pre-Operative Evaluation      Chief Complaint: had concerns including Pre-op Exam (Left ankle repair)  Surgery: Left Ankle repair   Anticipated Date of Surgery: 3/10/23  Referring Provider: Dr Brian Haddad      History of Present Illness:     Hari Troncoso is a 52 y o  male who presents to the office today for a preoperative consultation at the request of the surgeon for the procedure above  Current anti-platelet/anti-coagulation medications that the patient is prescribed includes: none  Assessment of Cardiac Risk:  Denies unstable or severe angina or MI in the last 6 weeks or history of stent placement in the last year   Denies decompensated heart failure (e g  New onset heart failure, NYHA functional class IV heart failure, or worsening existing heart failure)  Denies significant arrhythmias such as high grade AV block, symptomatic ventricular arrhythmia, newly recognized ventricular tachycardia, supraventricular tachycardia with resting heart rate >100, or symptomatic bradycardia  Denies severe heart valve disease including aortic stenosis or symptomatic mitral stenosis     Exercise Capacity:  Able to walk 4 blocks without symptoms?: Yes  Able to walk 2 flights without symptoms?: Yes    Prior Anesthesia Reactions: No     Personal history of venous thromboembolic disease? No    History of steroid use for >2 weeks within last year? No    Review of Systems:     Review of Systems   Constitutional: Negative for fatigue and fever  HENT: Negative for sore throat  Eyes: Negative for visual disturbance  Respiratory: Negative for cough, chest tightness and shortness of breath  Cardiovascular: Negative for chest pain, palpitations and leg swelling  Gastrointestinal: Negative for abdominal pain, constipation, diarrhea and nausea     Endocrine: Positive for polydipsia  Negative for cold intolerance and heat intolerance  Genitourinary: Negative for flank pain  Musculoskeletal: Negative for back pain and neck pain  Left ankle pain   Skin: Negative for rash  Neurological: Negative for headaches  Psychiatric/Behavioral: Negative for behavioral problems and confusion  Current Problem List:     Patient Active Problem List   Diagnosis   • Chronic diarrhea   • De Quervain's tenosynovitis   • Fatty liver   • Hypertriglyceridemia   • Impingement syndrome of left shoulder   • Left lumbar radiculopathy   • Male erectile dysfunction   • Plantar warts   • Paresthesias   • Excessive daytime sleepiness   • PETTY (obstructive sleep apnea)   • Type 2 diabetes mellitus without complication, without long-term current use of insulin (HCC)   • Class 1 obesity due to excess calories with serious comorbidity and body mass index (BMI) of 34 0 to 34 9 in adult   • Arthritis of right knee   • Chronic pain of right knee   • Pre-op examination     Allergies:      Allergies   Allergen Reactions   • Asa [Aspirin] GI Intolerance   • Penicillin G    • Penicillins      Medications:       Current Outpatient Medications:   •  atorvastatin (LIPITOR) 20 mg tablet, Take 1 tablet (20 mg total) by mouth every evening, Disp: 90 tablet, Rfl: 1  •  cholestyramine sugar free (QUESTRAN LIGHT) 4 g packet, Take 1 packet (4 g total) by mouth daily, Disp: 30 packet, Rfl: 3  •  DULoxetine (CYMBALTA) 30 mg delayed release capsule, Take 1 capsule (30 mg total) by mouth daily, Disp: 30 capsule, Rfl: 2  •  Empagliflozin (Jardiance) 25 MG TABS, Take 1 tablet (25 mg total) by mouth daily, Disp: 90 tablet, Rfl: 1  •  glipiZIDE (GLUCOTROL XL) 2 5 mg 24 hr tablet, Take 1 tablet (2 5 mg total) by mouth daily, Disp: 90 tablet, Rfl: 1  •  ibuprofen (MOTRIN) 200 mg tablet, Take 600 mg by mouth every 6 (six) hours as needed for mild pain or moderate pain, Disp: , Rfl:   •  metFORMIN (GLUCOPHAGE-XR) 500 mg 24 hr tablet, Take 1 tablet (500 mg total) by mouth daily with breakfast, Disp: 90 tablet, Rfl: 0  •  tadalafil (CIALIS) 10 MG tablet, TAKE ONE TABLET BY MOUTH EVERY DAY AS NEEDED FOR ED, Disp: 20 tablet, Rfl: 1  Past Medical History:       Past Medical History:   Diagnosis Date   • Cancer ([facility] [address] )     colon   • Post concussion syndrome 5/9/2017   • Traumatic brain injury 2017    R/S 2017        Past Surgical History:   Procedure Laterality Date   • APPENDECTOMY     • COLON SURGERY          Family History   Problem Relation Age of Onset   • Coronary artery disease Mother    • Diabetes Mother    • Hypertension Mother    • Hyperlipidemia Mother    • Coronary artery disease Father    • Diabetes Father    • Hypertension Father    • Hyperlipidemia Father    • Heart attack Father    • Anemia Brother    • No Known Problems Son    • No Known Problems Son    • No Known Problems Son         Social History     Socioeconomic History   • Marital status: Single     Spouse name: Not on file   • Number of children: Not on file   • Years of education: Not on file   • Highest education level: Not on file   Occupational History   • Not on file   Tobacco Use   • Smoking status: Never   • Smokeless tobacco: Never   Vaping Use   • Vaping Use: Never used   Substance and Sexual Activity   • Alcohol use:  Yes     Alcohol/week: 1 0 standard drink     Types: 1 Cans of beer per week   • Drug use: No   • Sexual activity: Yes     Partners: Female     Birth control/protection: None   Other Topics Concern   • Not on file   Social History Narrative   • Not on file     Social Determinants of Health     Financial Resource Strain: Not on file   Food Insecurity: Not on file   Transportation Needs: Not on file   Physical Activity: Not on file   Stress: Not on file   Social Connections: Not on file   Intimate Partner Violence: Not on file   Housing Stability: Not on file      Physical Exam:     /78 (BP Location: Left arm, Patient Position: Sitting, Cuff Size: Standard)   Pulse 78   Temp 97 5 °F (36 4 °C) (Temporal)   Resp 17   SpO2 98%     Physical Exam  Vitals and nursing note reviewed  Constitutional:       Appearance: He is well-developed  HENT:      Head: Normocephalic and atraumatic  Cardiovascular:      Rate and Rhythm: Normal rate and regular rhythm  Pulmonary:      Effort: Pulmonary effort is normal       Breath sounds: Normal breath sounds  Musculoskeletal:      Comments: Left ankle boot   Neurological:      General: No focal deficit present  Mental Status: He is alert  Psychiatric:         Mood and Affect: Mood normal          Behavior: Behavior normal         Data:     Pre-operative work-up    Laboratory Results: I have personally reviewed the pertinent laboratory results/reports   Lab Results   Component Value Date    CREATININE 0 84 02/13/2023       EKG: not indicated         Assessment & Recommendations:     Pre-Op Evaluation Assessment  Cardiac Risk Estimation: per the Revised Cardiac Risk Index (Circ  100:1043, 1999), the patient's risk factors for cardiac complications include none, putting him in: RCI RISK CLASS I (0 risk factors, risk of major cardiac compl  appr  0 5%)  Jodee Santana is undergoing an elective Low Risk surgery  They are at [address] for major adverse cardiac event (MACE)  They may proceed with surgery as planned without further workup  Pre-Op Evaluation Plan  1  Further preoperative workup as follows:   - None; no further preoperative work-up is required    2  Medication Management/Recommendations:   - None, continue medication regimen including morning of surgery, with sip of water    3  Patient requires further consultation with: None    4  Assessment of Chronic Conditions:  Pre-op examination  From a cardiac standpoint patient may proceed with surgery  His hemoglobin A1c is currently 9 3 which places him at an elevated risk  Unable to tolerate metformin    Starting patient on Januvia and glipizide  We will also refer to an endocrinologist   Will defer to podiatrist in regards to proceeding with surgery at current A1c level  Type 2 diabetes mellitus without complication, without long-term current use of insulin (HCC)    Lab Results   Component Value Date    HGBA1C 9 4 (H) 02/13/2023   poorly controlled diabetic  Increasing oral medications  Counseled patient on appropriate diet and importance of strict adherence to medications       I have spent a total time of 40 minutes on 02/17/23 in caring for this patient including Prognosis, Instructions for management, Patient and family education, Importance of tx compliance, Risk factor reductions and Impressions  Pre-op form completed and faxed to referring physician noted above      Florentino Gottron, MD  [facility]  [address] [place] [id number]  Phone#  [phone]  Fax#  [phone]

## 2023-02-16 ENCOUNTER — TELEPHONE (OUTPATIENT)
Dept: ENDOCRINOLOGY | Facility: CLINIC | Age: 48
End: 2023-02-16

## 2023-02-16 NOTE — TELEPHONE ENCOUNTER
Received referral for nvite  Left voicemail for patient to contact office to schedule Consult/New Patient Appointment

## 2023-02-21 ENCOUNTER — OFFICE VISIT (OUTPATIENT)
Dept: FAMILY MEDICINE CLINIC | Facility: CLINIC | Age: 48
End: 2023-02-21

## 2023-02-21 VITALS
HEART RATE: 92 BPM | BODY MASS INDEX: 35.49 KG/M2 | OXYGEN SATURATION: 98 % | SYSTOLIC BLOOD PRESSURE: 122 MMHG | DIASTOLIC BLOOD PRESSURE: 80 MMHG | WEIGHT: 213 LBS | HEIGHT: 65 IN

## 2023-02-21 DIAGNOSIS — E11.9 TYPE 2 DIABETES MELLITUS WITHOUT COMPLICATION, WITHOUT LONG-TERM CURRENT USE OF INSULIN (HCC): ICD-10-CM

## 2023-02-21 DIAGNOSIS — K52.9 CHRONIC DIARRHEA: ICD-10-CM

## 2023-02-21 DIAGNOSIS — N48.29 FORESKIN INFLAMMATION: ICD-10-CM

## 2023-02-21 DIAGNOSIS — K43.9 ABDOMINAL WALL HERNIA: Primary | ICD-10-CM

## 2023-02-21 RX ORDER — CLOTRIMAZOLE 1 %
CREAM (GRAM) TOPICAL 2 TIMES DAILY
Qty: 30 G | Refills: 0 | Status: SHIPPED | OUTPATIENT
Start: 2023-02-21 | End: 2023-02-28

## 2023-02-21 RX ORDER — DIAPER,BRIEF,INFANT-TODD,DISP
EACH MISCELLANEOUS 2 TIMES DAILY
Qty: 30 G | Refills: 0 | Status: SHIPPED | OUTPATIENT
Start: 2023-02-21 | End: 2023-02-28

## 2023-02-21 NOTE — ASSESSMENT & PLAN NOTE
Lab Results   Component Value Date    HGBA1C 9 4 (H) 02/13/2023     Refer to endocrinology  Continue current medications  Discussed discontinuing metformin 500 XR due to ongoing diarrhea  Patient feels diarrhea is chronic and not attributed to the metformin at this time  Tolerating other medications well  Counseled on appropriate diet and exercise

## 2023-02-21 NOTE — ASSESSMENT & PLAN NOTE
Continue Questran  Patient will schedule appointment with gastroenterology for screening colonoscopy and also chronic diarrhea  We have decreased his metformin to 500 mg XR daily  Continues to have diarrhea  Discussed stopping medication altogether  Patient would like to continue it at this time  Could be clouding picture of chronic diarrhea

## 2023-02-21 NOTE — PROGRESS NOTES
Name: Nancy Noriega      : 1975      MRN: [id number]  Encounter Provider: Bushra Melendrez MD  Encounter Date: 2023   Encounter department: [address] [place] [address]  Abdominal wall hernia  Assessment & Plan:  Likely ventral hernia  US ordered  Orders:  -     US abdomen complete; Future; Expected date: 2023    2  Type 2 diabetes mellitus without complication, without long-term current use of insulin [facility])  Assessment & Plan:    Lab Results   Component Value Date    HGBA1C 9 4 (H) 2023     Refer to endocrinology  Continue current medications  Discussed discontinuing metformin 500 XR due to ongoing diarrhea  Patient feels diarrhea is chronic and not attributed to the metformin at this time  Tolerating other medications well  Counseled on appropriate diet and exercise  3  Chronic diarrhea  Assessment & Plan:  Continue Questran  Patient will schedule appointment with gastroenterology for screening colonoscopy and also chronic diarrhea  We have decreased his metformin to 500 mg XR daily  Continues to have diarrhea  Discussed stopping medication altogether  Patient would like to continue it at this time  Could be clouding picture of chronic diarrhea  4  Foreskin inflammation  -     clotrimazole (LOTRIMIN) 1 % cream; Apply topically 2 (two) times a day  -     hydrocortisone 1 % cream; Apply topically 2 (two) times a day for 7 days           Subjective      Patient presents with:  Abdominal Pain: When he coughs he gets a bulging lump on upper abdomen  Reports redness and irritation of his foreskin  Continues to have chronic diarrhea  Review of Systems   Constitutional: Negative for fatigue and fever  HENT: Negative for sore throat  Eyes: Negative for visual disturbance  Respiratory: Negative for cough, chest tightness and shortness of breath  Cardiovascular: Negative for chest pain, palpitations and leg swelling  Gastrointestinal: Positive for abdominal distention, abdominal pain and diarrhea  Negative for constipation and nausea  Endocrine: Negative for cold intolerance and heat intolerance  Genitourinary: Positive for penile pain  Negative for flank pain and penile discharge  Musculoskeletal: Negative for back pain and neck pain  Skin: Negative for rash  Neurological: Negative for headaches  Psychiatric/Behavioral: Negative for behavioral problems and confusion  Current Outpatient Medications on File Prior to Visit   Medication Sig   • atorvastatin (LIPITOR) 20 mg tablet Take 1 tablet (20 mg total) by mouth every evening   • cholestyramine sugar free (QUESTRAN LIGHT) 4 g packet Take 1 packet (4 g total) by mouth daily   • DULoxetine (CYMBALTA) 30 mg delayed release capsule Take 1 capsule (30 mg total) by mouth daily   • Empagliflozin (Jardiance) 25 MG TABS Take 1 tablet (25 mg total) by mouth daily   • glipiZIDE (GLUCOTROL XL) 2 5 mg 24 hr tablet Take 1 tablet (2 5 mg total) by mouth daily   • ibuprofen (MOTRIN) 200 mg tablet Take 600 mg by mouth every 6 (six) hours as needed for mild pain or moderate pain   • metFORMIN (GLUCOPHAGE-XR) 500 mg 24 hr tablet Take 1 tablet (500 mg total) by mouth daily with breakfast   • tadalafil (CIALIS) 10 MG tablet TAKE ONE TABLET BY MOUTH EVERY DAY AS NEEDED FOR ED       Objective     /80 (BP Location: Left arm, Patient Position: Sitting, Cuff Size: Large)   Pulse 92   Ht 5' 5" (1 651 m)   Wt 96 6 kg (213 lb)   SpO2 98%   BMI 35 45 kg/m²     Physical Exam  Vitals and nursing note reviewed  Constitutional:       Appearance: He is well-developed  Cardiovascular:      Pulses: no weak pulses  Abdominal:      General: Bowel sounds are normal       Tenderness: There is no abdominal tenderness  Hernia: A hernia is present  Hernia is present in the ventral area  Genitourinary:     Penis: Uncircumcised  Erythema and tenderness present          Feet: Right foot:      Skin integrity: No ulcer, skin breakdown, erythema, warmth, callus or dry skin  Left foot:      Skin integrity: No ulcer, skin breakdown, erythema, warmth, callus or dry skin  Skin:     General: Skin is warm and dry  Neurological:      General: No focal deficit present  Mental Status: He is alert  Patient's shoes and socks removed  Right Foot/Ankle   Right Foot Inspection  Skin Exam: skin normal and skin intact  No dry skin, no warmth, no callus, no erythema, no maceration, no abnormal color, no pre-ulcer, no ulcer and no callus  Left Foot/Ankle  Left Foot Inspection  Skin Exam: skin normal and skin intact  No dry skin, no warmth, no erythema, no maceration, normal color, no pre-ulcer, no ulcer and no callus       Assign Risk Category  No deformity present  No loss of protective sensation  No weak pulses  Risk: 0      Gabriel Dietrich MD

## 2023-02-21 NOTE — PATIENT INSTRUCTIONS
Type 2 Diabetes Management for Adults   AMBULATORY CARE:   Type 2 diabetes  is a disease that affects how your body uses glucose (sugar)  Either your body cannot make enough insulin, or it cannot use the insulin correctly  It is important to keep diabetes controlled to prevent damage to your heart, blood vessels, and other organs  Management will help you feel well and enjoy your daily activities  Your diabetes care team providers can help you make a plan to fit diabetes care into your schedule  Your plan can change over time to fit your needs and your family's needs  Have someone call your local emergency number (911 in the [address]) if:   • You cannot be woken  • You have signs of diabetic ketoacidosis:     ? confusion, fatigue    ? vomiting    ? rapid heartbeat    ? fruity smelling breath    ? extreme thirst    ? dry mouth and skin    • You have any of the following signs of a heart attack:      ? Squeezing, pressure, or pain in your chest    ? You may  also have any of the following:     - Discomfort or pain in your back, neck, jaw, stomach, or arm    - Shortness of breath    - Nausea or vomiting    - Lightheadedness or a sudden cold sweat    • You have any of the following signs of a stroke:      ? Numbness or drooping on one side of your face     ? Weakness in an arm or leg    ? Confusion or difficulty speaking    ? Dizziness, a severe headache, or vision loss    Call your doctor or diabetes care team provider if:   • You have a sore or wound that will not heal     • You have a change in the amount you urinate  • Your blood sugar levels are higher than your target goals  • You often have lower blood sugar levels than your target goals  • Your skin is red, dry, warm, or swollen  • You have trouble coping with diabetes, or you feel anxious or depressed  • You have questions or concerns about your condition or care      What you need to know about high blood sugar levels:  High blood sugar levels may not cause any symptoms  You may feel more thirsty or urinate more often than usual  Over time, high blood sugar levels can damage your nerves, blood vessels, tissues, and organs  The following can increase your blood sugar levels:  • Large meals or large amounts of carbohydrates at one time    • Less physical activity    • Stress    • Illness    • A lower dose of diabetes medicine or insulin, or a late dose    What you need to know about low blood sugar levels:  Symptoms include feeling shaky, dizzy, irritable, or confused  You can prevent symptoms by keeping your blood sugar levels from going too low  • Treat a low blood sugar level right away:      ? Drink 4 ounces of juice or have 1 tube of glucose gel  ? Check your blood sugar level again 10 to 15 minutes later  ? When the level goes back to normal, eat a meal or snack to prevent another decrease  • Keep glucose gel, raisins, or hard candy with you at all times to treat a low blood sugar level  • Your blood sugar level can get too low if you take diabetes medicine or insulin and do not eat enough food  • If you use insulin, check your blood sugar level before you exercise  ? If your blood sugar level is below 100 mg/dL, eat 4 crackers or 2 ounces of raisins, or drink 4 ounces of juice  ? Check your level every 30 minutes if you exercise longer than 1 hour  ? You may need a snack during or after exercise  What you can do to manage your blood sugar levels:   • Check your blood sugar levels as directed and as needed  Several items are available to use to check your levels  You may need to check by testing a drop of blood in a glucose monitor  You may instead be given a continuous glucose monitoring (CGM) device  The device is worn at all times  The CGM checks your blood sugar level every 5 minutes  It sends results to an electronic device such as a smart phone  A CGM can be used with or without an insulin pump   You and your diabetes care team providers will decide on the best method for you  The goal for blood sugar levels before meals  is between 80 and 130 mg/dL and 2 hours after eating  is lower than 180 mg/dL  • Make healthy food choices  Work with a dietitian to develop a meal plan that works for you and your schedule  A dietitian can help you learn how to eat the right amount of carbohydrates during your meals and snacks  Carbohydrates can raise your blood sugar level if you eat too many at one time  Examples of foods that contain carbohydrates are breads, cereals, rice, pasta, and sweets  • Eat high-fiber foods as directed  Fiber helps improve blood sugar levels  Fiber also lowers your risk for heart disease and other problems diabetes can cause  Examples of high-fiber foods include vegetables, whole-grain bread, and beans such as leal beans  Your dietitian can tell you how much fiber to have each day  • Get regular physical activity  Physical activity can help you get to your target blood sugar level goal and manage your weight  Get at least 150 minutes of moderate to vigorous aerobic physical activity each week  Do not miss more than 2 days in a row  Do not sit longer than 30 minutes at a time  Your healthcare provider can help you create an activity plan  The plan can include the best activities for you and can help you build your strength and endurance  • Maintain a healthy weight  Ask your team what a healthy weight is for you  A healthy weight can help you control diabetes and prevent heart disease  Ask your team to help you create a weight loss plan, if needed  Weight loss can help make a difference in managing diabetes  Your team will help you set a weight-loss goal, such as 10 to 15 pounds, or 5% of your extra weight  Together you and your team can set manageable weight loss goals  • Take your diabetes medicine or insulin as directed    You may need diabetes medicine, insulin, or both to help control your blood sugar levels  Your healthcare provider will teach you how and when to take your diabetes medicine or insulin  You will also be taught about side effects oral diabetes medicine can cause  Insulin may be injected or given through a pump or pen  You and your providers will decide on the best method for you:    ? An insulin pump  is an implanted device that gives your insulin 24 hours a day  An insulin pump prevents the need for multiple insulin injections in a day  ? An insulin pen  is a device prefilled with the right amount of insulin  ? You and your family members will be taught how to draw up and give insulin  if this is the best method for you  Your providers will also teach you how to dispose of needles and syringes  ? You will learn how much insulin you need  and when to give it  You will be taught when not to give insulin  You will also be taught what to do if your blood sugar level drops too low  This may happen if you take insulin and do not eat the right amount of carbohydrates  More ways to manage type 2 diabetes:   • Wear medical alert identification  Wear medical alert jewelry or carry a card that says you have diabetes  Ask your provider where to get these items  • Do not smoke  Nicotine and other chemicals in cigarettes and cigars can cause lung and blood vessel damage  It also makes it more difficult to manage your diabetes  Ask your provider for information if you currently smoke and need help to quit  Do not use e-cigarettes or smokeless tobacco in place of cigarettes or to help you quit  They still contain nicotine  • Check your feet each day for cuts, scratches, calluses, or other wounds  Look for redness and swelling, and feel for warmth  Wear shoes that fit well  Check your shoes for rocks or other objects that can hurt your feet  Do not walk barefoot or wear shoes without socks   Wear cotton socks to help keep your feet dry  • Ask about vaccines you may need  You have a higher risk for serious illness if you get the flu, pneumonia, COVID-19, or hepatitis  Ask your provider if you should get vaccines to prevent these or other diseases, and when to get the vaccines  • Talk to your provider if you become stressed about diabetes care  Sometimes being able to fit diabetes care into your life can cause increased stress  The stress can cause you not to take care of yourself properly  Your care team providers can help by offering tips about self-care  Your providers may suggest you talk to a mental health provider who can listen and offer help with self-care issues  • Have your A1c checked as directed  Your provider may check your A1c every 3 months, or 2 times each year if your diabetes is controlled  An A1c test shows the average amount of sugar in your blood over the past 2 to 3 months  Your provider will tell you what your A1c level should be  • Have screening tests as directed  Your provider may recommend screening for complications of diabetes and other conditions that may develop  Some screenings may begin right away and some may happen within the first 5 years of diagnosis:    ? Examples of diabetes complications  include kidney problems, high cholesterol, high blood pressure, blood vessel problems, eye problems, and sleep apnea  ? You may be screened for a low vitamin B level  if you take oral diabetes medicine for a long time  ? Women of childbearing years may be screened  for polycystic ovarian syndrome (PCOS)  Follow up with your doctor or diabetes care team providers as directed: You may need to have blood tests done before your follow-up visit  The test results will show if changes need to be made in your treatment or self-care  Talk to your provider if you cannot afford your medicine  Write down your questions so you remember to ask them during your visits    © Copyright [employer]ative 2022 Information is for End User's use only and may not be sold, redistributed or otherwise used for commercial purposes  The above information is an  only  It is not intended as medical advice for individual conditions or treatments  Talk to your doctor, nurse or pharmacist before following any medical regimen to see if it is safe and effective for you

## 2023-02-22 ENCOUNTER — OFFICE VISIT (OUTPATIENT)
Dept: GASTROENTEROLOGY | Facility: CLINIC | Age: 48
End: 2023-02-22

## 2023-02-22 ENCOUNTER — CONSULT (OUTPATIENT)
Dept: ENDOCRINOLOGY | Facility: CLINIC | Age: 48
End: 2023-02-22

## 2023-02-22 ENCOUNTER — TELEPHONE (OUTPATIENT)
Dept: GASTROENTEROLOGY | Facility: CLINIC | Age: 48
End: 2023-02-22

## 2023-02-22 VITALS
BODY MASS INDEX: 36.32 KG/M2 | DIASTOLIC BLOOD PRESSURE: 94 MMHG | WEIGHT: 218 LBS | SYSTOLIC BLOOD PRESSURE: 134 MMHG | HEART RATE: 79 BPM | HEIGHT: 65 IN

## 2023-02-22 VITALS
TEMPERATURE: 98.5 F | HEIGHT: 65 IN | WEIGHT: 212 LBS | DIASTOLIC BLOOD PRESSURE: 86 MMHG | HEART RATE: 78 BPM | SYSTOLIC BLOOD PRESSURE: 122 MMHG | OXYGEN SATURATION: 99 % | BODY MASS INDEX: 35.32 KG/M2

## 2023-02-22 DIAGNOSIS — K76.0 FATTY LIVER: ICD-10-CM

## 2023-02-22 DIAGNOSIS — G47.33 OSA (OBSTRUCTIVE SLEEP APNEA): ICD-10-CM

## 2023-02-22 DIAGNOSIS — K62.5 RECTAL BLEEDING: ICD-10-CM

## 2023-02-22 DIAGNOSIS — E78.1 HYPERTRIGLYCERIDEMIA: ICD-10-CM

## 2023-02-22 DIAGNOSIS — Z80.0 FAMILY HISTORY OF COLON CANCER IN MOTHER: ICD-10-CM

## 2023-02-22 DIAGNOSIS — E11.9 TYPE 2 DIABETES MELLITUS WITHOUT COMPLICATION, WITHOUT LONG-TERM CURRENT USE OF INSULIN (HCC): Primary | ICD-10-CM

## 2023-02-22 DIAGNOSIS — R74.01 ELEVATED ALT MEASUREMENT: ICD-10-CM

## 2023-02-22 DIAGNOSIS — E66.09 CLASS 1 OBESITY DUE TO EXCESS CALORIES WITH SERIOUS COMORBIDITY AND BODY MASS INDEX (BMI) OF 34.0 TO 34.9 IN ADULT: ICD-10-CM

## 2023-02-22 DIAGNOSIS — K21.9 GASTROESOPHAGEAL REFLUX DISEASE, UNSPECIFIED WHETHER ESOPHAGITIS PRESENT: ICD-10-CM

## 2023-02-22 DIAGNOSIS — Z85.038 HISTORY OF COLON CANCER: ICD-10-CM

## 2023-02-22 DIAGNOSIS — R10.13 EPIGASTRIC PAIN: ICD-10-CM

## 2023-02-22 DIAGNOSIS — K52.9 CHRONIC DIARRHEA: Primary | ICD-10-CM

## 2023-02-22 DIAGNOSIS — Z90.49 S/P COLON RESECTION: ICD-10-CM

## 2023-02-22 RX ORDER — PANTOPRAZOLE SODIUM 40 MG/1
40 TABLET, DELAYED RELEASE ORAL DAILY
Qty: 90 TABLET | Refills: 1 | Status: SHIPPED | OUTPATIENT
Start: 2023-02-22

## 2023-02-22 RX ORDER — INSULIN GLARGINE 100 [IU]/ML
20 INJECTION, SOLUTION SUBCUTANEOUS
Qty: 15 ML | Refills: 0 | Status: SHIPPED | OUTPATIENT
Start: 2023-02-22

## 2023-02-22 RX ORDER — FLASH GLUCOSE SCANNING READER
1 EACH MISCELLANEOUS CONTINUOUS
Qty: 1 EACH | Refills: 0 | Status: SHIPPED | OUTPATIENT
Start: 2023-02-22

## 2023-02-22 RX ORDER — METFORMIN HYDROCHLORIDE 500 MG/1
500 TABLET, EXTENDED RELEASE ORAL 2 TIMES DAILY WITH MEALS
Qty: 180 TABLET | Refills: 1 | Status: SHIPPED | OUTPATIENT
Start: 2023-02-22 | End: 2023-05-23

## 2023-02-22 RX ORDER — FLASH GLUCOSE SENSOR
1 KIT MISCELLANEOUS
Qty: 2 EACH | Refills: 5 | Status: SHIPPED | OUTPATIENT
Start: 2023-02-22

## 2023-02-22 RX ORDER — PEN NEEDLE, DIABETIC 32GX 5/32"
NEEDLE, DISPOSABLE MISCELLANEOUS
Qty: 100 EACH | Refills: 2 | Status: SHIPPED | OUTPATIENT
Start: 2023-02-22

## 2023-02-22 NOTE — PROGRESS NOTES
New Consult Note      CC: Diabetets    History of Present Illness:   47yr male with type 2 diabetes since 2018, obesity BMI 35, HTN, HLD, PETTY, left lumbar radiculopathy, ED, NAFLD, chronic diarrhea, Hx colon cancer age 27, chronic fatigue and vitamin D deficiency  He was originally diagnosed with prediabetes and then progressed to diabetes a few years ago  He was started on metformin but was not compliant initially  Recently he had a left ankle injury with tendon rupture and is waiting for surgery on 3/10/2023  Preop testing note A1c 9 3% and he was a started on medications 1 week ago  Ports symptoms of hyperglycemia including polyuria, polydipsia, blurred vision and fatigue      Home blood glucose monitoring: does not check    Current meds:  Glipizide 2 5mg daily  Jardiance 25mg qdaily  Metformin 500mg po daily    Opthamology: yes, last exam 2021  Podiatry: No  vaccination: Yes  Dental:  Pancreatitis: No    Ace/ARB: No  Statin: lipitor  Thyroid issues: NO    FH: Diabetes - mother and father; father - CAD    Patient Active Problem List   Diagnosis   • Chronic diarrhea   • De Quervain's tenosynovitis   • Fatty liver   • Hypertriglyceridemia   • Impingement syndrome of left shoulder   • Left lumbar radiculopathy   • Male erectile dysfunction   • Plantar warts   • Paresthesias   • Excessive daytime sleepiness   • PETTY (obstructive sleep apnea)   • Type 2 diabetes mellitus without complication, without long-term current use of insulin (HCC)   • Class 1 obesity due to excess calories with serious comorbidity and body mass index (BMI) of 34 0 to 34 9 in adult   • Arthritis of right knee   • Chronic pain of right knee   • Pre-op examination   • Abdominal wall hernia     Past Medical History:   Diagnosis Date   • Cancer ([address] )     colon   • Post concussion syndrome 5/9/2017   • Traumatic brain injury 2017    R/S 2017      Past Surgical History:   Procedure Laterality Date   • APPENDECTOMY     • COLON SURGERY Prescription of traMADol (ULTRAM) 50 MG was faxed to pharmacy.   Family History   Problem Relation Age of Onset   • Coronary artery disease Mother    • Diabetes Mother    • Hypertension Mother    • Hyperlipidemia Mother    • Coronary artery disease Father    • Diabetes Father    • Hypertension Father    • Hyperlipidemia Father    • Heart attack Father    • Anemia Brother    • No Known Problems Son    • No Known Problems Son    • No Known Problems Son      Social History     Tobacco Use   • Smoking status: Never   • Smokeless tobacco: Never   Substance Use Topics   • Alcohol use:  Yes     Alcohol/week: 1 0 standard drink     Types: 1 Cans of beer per week     Allergies   Allergen Reactions   • Asa [Aspirin] GI Intolerance   • Penicillin G    • Penicillins          Current Outpatient Medications:   •  atorvastatin (LIPITOR) 20 mg tablet, Take 1 tablet (20 mg total) by mouth every evening, Disp: 90 tablet, Rfl: 1  •  cholestyramine sugar free (QUESTRAN LIGHT) 4 g packet, Take 1 packet (4 g total) by mouth daily, Disp: 30 packet, Rfl: 3  •  clotrimazole (LOTRIMIN) 1 % cream, Apply topically 2 (two) times a day, Disp: 30 g, Rfl: 0  •  DULoxetine (CYMBALTA) 30 mg delayed release capsule, Take 1 capsule (30 mg total) by mouth daily, Disp: 30 capsule, Rfl: 2  •  Empagliflozin (Jardiance) 25 MG TABS, Take 1 tablet (25 mg total) by mouth daily, Disp: 90 tablet, Rfl: 1  •  glipiZIDE (GLUCOTROL XL) 2 5 mg 24 hr tablet, Take 1 tablet (2 5 mg total) by mouth daily, Disp: 90 tablet, Rfl: 1  •  hydrocortisone 1 % cream, Apply topically 2 (two) times a day for 7 days, Disp: 30 g, Rfl: 0  •  ibuprofen (MOTRIN) 200 mg tablet, Take 600 mg by mouth every 6 (six) hours as needed for mild pain or moderate pain, Disp: , Rfl:   •  metFORMIN (GLUCOPHAGE-XR) 500 mg 24 hr tablet, Take 1 tablet (500 mg total) by mouth daily with breakfast, Disp: 90 tablet, Rfl: 0  •  tadalafil (CIALIS) 10 MG tablet, TAKE ONE TABLET BY MOUTH EVERY DAY AS NEEDED FOR ED, Disp: 20 tablet, Rfl: 1    Review of Systems Constitutional: Positive for activity change and appetite change  HENT: Negative  Eyes: Negative  Respiratory: Negative  Cardiovascular: Negative for chest pain  Gastrointestinal: Negative  Endocrine: Negative  Genitourinary: Negative  Musculoskeletal: Negative  Skin: Negative  Allergic/Immunologic: Negative  Neurological: Negative  Hematological: Negative  Psychiatric/Behavioral: Negative  All other systems reviewed and are negative  Physical Exam:  Body mass index is 35 28 kg/m²  /86 (BP Location: Left arm, Patient Position: Sitting, Cuff Size: Large)   Pulse 78   Temp 98 5 °F (36 9 °C) (Tympanic)   Ht 5' 5" (1 651 m)   Wt 96 2 kg (212 lb) Comment: patient has boot on  SpO2 99%   BMI 35 28 kg/m²    Vitals:    02/22/23 0830   Weight: 96 2 kg (212 lb)        Physical Exam  Constitutional:       General: He is not in acute distress  Appearance: He is well-developed  He is not ill-appearing  HENT:      Head: Normocephalic and atraumatic  Nose: Nose normal       Mouth/Throat:      Pharynx: Oropharynx is clear  Eyes:      Extraocular Movements: Extraocular movements intact  Conjunctiva/sclera: Conjunctivae normal    Neck:      Thyroid: No thyromegaly  Cardiovascular:      Rate and Rhythm: Normal rate  Pulmonary:      Effort: Pulmonary effort is normal    Musculoskeletal:         General: No deformity  Cervical back: Normal range of motion  Skin:     Capillary Refill: Capillary refill takes less than 2 seconds  Coloration: Skin is not pale  Findings: No rash  Neurological:      Mental Status: He is alert and oriented to person, place, and time     Psychiatric:         Behavior: Behavior normal          Labs:   Lab Results   Component Value Date    HGBA1C 9 4 (H) 02/13/2023       Lab Results   Component Value Date    [id number] 1 470 02/13/2023       Lab Results   Component Value Date    CREATININE 0 84 02/13/2023 CREATININE 1 08 12/27/2021    CREATININE 0 79 09/07/2021    BUN 11 02/13/2023     12/13/2014    K 3 9 02/13/2023     02/13/2023    CO2 24 02/13/2023     eGFR   Date Value Ref Range Status   02/13/2023 104 ml/min/1 73sq m Final       Lab Results   Component Value Date     (H) 02/13/2023    AST 38 02/13/2023    ALKPHOS 63 02/13/2023       Lab Results   Component Value Date    CHOLESTEROL 202 (H) 02/13/2023    CHOLESTEROL 185 09/07/2021    CHOLESTEROL 182 07/17/2020     Lab Results   Component Value Date    HDL 35 (L) 02/13/2023    HDL 36 (L) 09/07/2021    HDL 37 (L) 07/17/2020     Lab Results   Component Value Date    TRIG 454 (H) 02/13/2023    TRIG 455 (H) 09/07/2021    TRIG 310 (H) 07/17/2020     Lab Results   Component Value Date    [place] 167 02/13/2023         Impression:  1  Fatty liver    2  Type 2 diabetes mellitus without complication, without long-term current use of insulin ([address] )    3  PETTY (obstructive sleep apnea)    4  Hypertriglyceridemia    5  Class 1 obesity due to excess calories with serious comorbidity and body mass index (BMI) of 34 0 to 34 9 in adult         Plan:    Diagnoses and all orders for this visit:    Type 2 diabetes mellitus without complication, without long-term current use of insulin ([address] )  He is uncontrolled with an A1c of 9 4%  Goal is less than 7%  Perioperative goal is less than 8%  Today we discussed all aspects of diabetes including pathophysiology, risk factors, complications, SAGM, CGM, diet, lifestyle modifications, medical fitness training, diabetes education, goals of therapy, follow up needs and medications including insulin, metformin, Jardiance and GLP1 agonists  Advised to maintain goal blood sugars 70-180mg/dL  Today we agreed to initiate basal insulin therapy to prepare for surgery  We will stop glipizide  Advised to continue metformin 500 mg twice daily and Jardiance 25 mg daily    Will initiate a professional sensor based on which further dose adjustments will be done  Also sent a prescription for a personal CGM for long-term diabetes management  Over next visits, will add GLP-1 agonist [like Ozempic 2 25 mg weekly] and wean off insulin therapy post surgery  Follow-up in 3 months with repeat labs  -     Ambulatory Referral to Endocrinology  -     Insulin Glargine Solostar (Lantus SoloStar) 100 UNIT/ML SOPN; Inject 0 2 mL (20 Units total) under the skin daily at bedtime  -     Insulin Pen Needle (BD Pen Needle Nancy U/F) 32G X 4 MM MISC; Use daily as needed with insulin pen  -     Hemoglobin A1C; Future  -     Microalbumin / creatinine urine ratio; Future  -     Vitamin D 25 hydroxy; Future  -     metFORMIN (GLUCOPHAGE-XR) 500 mg 24 hr tablet; Take 1 tablet (500 mg total) by mouth 2 (two) times a day with meals  -     Continuous Blood Gluc Sensor (FreeStyle Dell 2 Sensor) MISC; Use 1 Units every 14 (fourteen) days  -     Continuous Blood Gluc  (FreeStyle [place] 2 [place]) EV; Use 1 Units continuous  -     Ambulatory referral to Diabetic Education; Future  -     Continous glucose monitoring dell placement; Future  -     Continous glucose monitoring dell intrepretation; Future    Fatty liver  Would benefit from GLP-1 agonist and SGLT2 inhibitor  PETTY (obstructive sleep apnea)  CPAP    Hypertriglyceridemia  He has significant hypertriglyceridemia  He is already on statin  His ASCVD risk score is 6 8%  I expect this to improve with management of diabetes  Class 1 obesity due to excess calories with serious comorbidity and body mass index (BMI) of 34 0 to 34 9 in adult        I have spent 48 minutes with patient today in which greater than 50% of this time was spent in counseling/coordination of care  Discussed with the patient and all questioned fully answered  He will call me if any problems arise      Educated/ Counseled patient on diagnostic test results, prognosis, risk vs benefit of treatment options, importance of treatment compliance, healthy life and lifestyle choices        [address]

## 2023-02-22 NOTE — PROGRESS NOTES
Continous glucose monitoring [place]ow placement     Date/Time 2/22/2023 9:37 AM     Performed by  Gabby Stanford     Authorized by Leland Marroquin MD      Universal Protocol   Consent: Verbal consent obtained  Written consent obtained  Timeout called at: 2/22/2023 9:37 AM   Patient understanding: patient states understanding of the procedure being performed  Patient consent: the patient's understanding of the procedure matches consent given  Procedure consent: procedure consent matches procedure scheduled  Relevant documents: relevant documents present and verified  Test results: test results available and properly labeled  Site marked: the operative site was marked  Radiology Images displayed and confirmed  If images not available, report reviewed: imaging studies not available  Patient identity confirmed: verbally with patient        Local anesthesia used: no     Anesthesia   Local anesthesia used: no     Sedation   Patient sedated: no        Specimen: no    Culture: no   Procedure Details   Procedure Notes: TIFFANIE SANCHES ! [id number]  EXP 07 31 23

## 2023-02-22 NOTE — PATIENT INSTRUCTIONS
-Start Pantoprazole 40mg daily 1/2 hour before breakfast for heartburn  -Avoid NSAIDs  -Get stool studies done, recheck liver function panel  -Increase Questran to twice daily  Take with food and at least 2 hours apart from other medications  -EGD/Colonoscopy scheduled

## 2023-02-22 NOTE — PROGRESS NOTES
[facility] [address] Consultation  Ivory Chan 52 y o  male MRN: [id number]  Encounter: [id number]          ASSESSMENT AND PLAN:      1  Chronic diarrhea  Pt with history of lower abdominal cramping and diarrhea since a year prior to colonic resection for cancerous polyp 12 years ago which improved with the use of Questran  Recently was diagnosed with diabetes and started on Metformin with increased stool frequency  +Nocturnal symptoms  Differentials include IBS-D, related to colectomy, medication side effect, pancreatic insufficiency,  r/o IBD, microscopic colitis, infection      -obtain stool studies  -increase Questran to BID, discussed how to take this  -Colonoscopy scheduled   -     Stool Enteric Bacterial Panel by PCR; Future  -     Clostridium difficile toxin by PCR; Future  -     Pancreatic elastase, fecal; Future  -     Calprotectin,Fecal; Future  -     Ova and parasite examination; Future  -     White Blood Cells, Stool by Gram Stain; Future  -     Colonoscopy; Future; Expected date: 02/22/2023    2  History of colon cancer  3  S/P colon resection  4  Family history of colon cancer mother  Pt with history of cancerous polyp with sigmoid resection 12 years ago at [facility], he is overdue for surveillance reports last colonoscopy done approximately 6 years ago  -     Colonoscopy; Future; Expected date: 02/22/2023        5  Rectal bleeding  Reports intermittent rectal bleeding with bright red vs dark red blood for the last year, worse with increased diarrhea  Recent CBC stable on 2/16 with Hgb 16  -     Colonoscopy; Future; Expected date: 02/22/2023    6  Gastroesophageal reflux disease, unspecified whether esophagitis present  7  Epigastric pain  Reports daily heartburn for the last 5-6 months taking Tums regularly with improvement  He's been taking Motrin BID due to LLE torn ligaments and surgery is scheduled 3/10   Reports epigastric pain with palpation and coughing for the last several months, has US ordered for further evaluation of possible hernia   -Follow up abdominal US  -Start  PPI  -Avoid NSAIDs  -EGD scheduled  -     pantoprazole (PROTONIX) 40 mg tablet; Take 1 tablet (40 mg total) by mouth daily  -     EGD; Future; Expected date: 02/22/2023      7  Fatty liver  8  Elevated ALT measurement  Patient with history of fatty liver seen on prior CT scan in 2017  He has had intermittent elevation of ALT on labs since around that time and most recent hepatic function panel 2/13 shows   His platelet count has been normal   Hepatitis C screening was negative in 2021  He denies heavy alcohol use  Denies any drug use  He was recently diagnosed with diabetes with A1c of 9 4 and working with endocrinology to lower this prior to upcoming surgery  His cholesterol levels are also elevated with total cholesterol 202, triglycerides 454  He was started on Lipitor last Friday     -Follow-up abdominal ultrasound, recheck hepatic function panel  -Work on weight loss of 5-7% of total body weight  -Continue to work towards tight glycemic control with endocrinology and control of cholesterol with PCP  -Avoid excess alcohol use  -Discussed the importance of the above to prevent progression of fatty liver disease to fibrosis/cirrhosis   -If LFTs remain elevated consider further serologic work-up including TD, AMA, ASMA, iron panel to evaluate for any other underlying cause of liver disease   -     Hepatic function panel; Future        ______________________________________________________________________    HPI:  Christ Fontenot is a 52 y o  male with history of TBI, colon cancer s/p sigmoid resection, type 2 DM, fatty liver, appendectomy who presents to establish care for colon cancer screening/evaluation of chronic diarrhea       He has a history cancerous polyp resected 12 years ago at [facility] and has had approximately 4 colonoscopies done since that time by Dr Kate small surgeon at [facility] with last one about 6 years ago with pre cancerous polyps removed  He has had chronic diarrhea since a year before colon resection  Reports he normally moves his bowels 9-13 times a day, Edgemary Carrera has helped decrease to 5-7x daily  Usually takes this 1-2x daily and has been on it for a little over 2 years, prior to that was taking Imodium  Fatty/greasy meals increases the diarrhea  Weight fluctuates between 179-218  He was diagnosed with diabetes about 2 weeks ago and was started on Metformin which increased his bowel movements to 9-10x daily and is now following with endocrinology  He does have nocturnal symptoms and wakes up in the middle of the night for BMs  Occasionally has intermittent blood in his stool which varies from bright red to dark burgundy which increases with amount of diarrhea he's having and this has been ongoing for the past year  Recent CBC on 2/13 showed Hgb normal at 16 2  He's taking Ibuprofen twice a day for his LLE injury  He's having epigastric pain for months with coughing which he attributes to a hernia and cramping lower abdominal pain with diarrhea  Has heartburn daily for the last 5-6 months for which he's taking tums/milk  Denies any nausea/vomiting, dysphagia  New start of Lipitor Friday 2/17, ALT elevated prior to that  Mother has history of colon cancer just diagnosed and going for surgery  Denies any tobacco use or drug use  Drinks alcohol once a week  Works as a , currently out because of torn ligaments in LLE and is undergoing surgery March 10th  REVIEW OF SYSTEMS:    CONSTITUTIONAL: Denies any fever, chills, rigors, and weight loss  HEENT: No earache or tinnitus  CARDIOVASCULAR: No chest pain or palpitations  RESPIRATORY: Denies any cough, hemoptysis, shortness of breath or dyspnea on exertion  GASTROINTESTINAL: As noted in the History of Present Illness  GENITOURINARY: Denies any hematuria or dysuria    NEUROLOGIC: No dizziness or vertigo  MUSCULOSKELETAL: Denies any joint swellings  SKIN: Denies skin rashes or itching  ENDOCRINE: Denies excessive thirst  Denies intolerance to heat or cold  PSYCHOSOCIAL: Denies depression or anxiety  Denies any recent memory loss         Historical Information   Past Medical History:   Diagnosis Date   • Cancer ([facility] [address] )     colon   • Post concussion syndrome 5/9/2017   • Traumatic brain injury 2017    R/S 2017     Past Surgical History:   Procedure Laterality Date   • APPENDECTOMY     • COLON SURGERY       Social History   Social History     Substance and Sexual Activity   Alcohol Use Yes   • Alcohol/week: 1 0 standard drink   • Types: 1 Cans of beer per week     Social History     Substance and Sexual Activity   Drug Use No     Social History     Tobacco Use   Smoking Status Never   Smokeless Tobacco Never     Family History   Problem Relation Age of Onset   • Coronary artery disease Mother    • Diabetes Mother    • Hypertension Mother    • Hyperlipidemia Mother    • Coronary artery disease Father    • Diabetes Father    • Hypertension Father    • Hyperlipidemia Father    • Heart attack Father    • Anemia Brother    • No Known Problems Son    • No Known Problems Son    • No Known Problems Son        Meds/Allergies       Current Outpatient Medications:   •  atorvastatin (LIPITOR) 20 mg tablet  •  cholestyramine sugar free (QUESTRAN LIGHT) 4 g packet  •  clotrimazole (LOTRIMIN) 1 % cream  •  Continuous Blood Gluc  (FreeStyle Dell 2 [place]) EV  •  Continuous Blood Gluc Sensor (FreeStyle Dell 2 Sensor) MISC  •  DULoxetine (CYMBALTA) 30 mg delayed release capsule  •  Empagliflozin (Jardiance) 25 MG TABS  •  hydrocortisone 1 % cream  •  ibuprofen (MOTRIN) 200 mg tablet  •  Insulin Glargine Solostar (Lantus SoloStar) 100 UNIT/ML SOPN  •  Insulin Pen Needle (BD Pen Needle Nancy U/F) 32G X 4 MM MISC  •  metFORMIN (GLUCOPHAGE-XR) 500 mg 24 hr tablet  •  pantoprazole (PROTONIX) 40 mg tablet  • tadalafil (CIALIS) 10 MG tablet    Allergies   Allergen Reactions   • Asa [Aspirin] GI Intolerance   • Penicillin G    • Penicillins            Objective     Blood pressure 134/94, pulse 79, height 5' 5" (1 651 m), weight 98 9 kg (218 lb)  Body mass index is 36 28 kg/m²  PHYSICAL EXAM:      General Appearance:   Alert, cooperative, no distress   HEENT:   Normocephalic, atraumatic, anicteric  Neck:  Supple, symmetrical, trachea midline   Lungs:   Clear to auscultation bilaterally; no rales, rhonchi or wheezing; respirations unlabored    Heart[de-identified]   Regular rate and rhythm; no murmur  Abdomen:   Soft, non-tender, non-distended; normal bowel sounds; no masses, no organomegaly    Genitalia:   Deferred    Rectal:   Deferred    Extremities:  No cyanosis, clubbing or edema    Skin:  No jaundice, rashes, or lesions    Lymph nodes:  No palpable cervical lymphadenopathy        Lab Results:   No visits with results within 1 Day(s) from this visit     Latest known visit with results is:   Appointment on 02/13/2023   Component Date Value   • TSH 3RD GENERATON 02/13/2023 1 470    • WBC 02/13/2023 6 12    • RBC 02/13/2023 5 64 (H)    • Hemoglobin 02/13/2023 16 2    • Hematocrit 02/13/2023 48 4    • MCV 02/13/2023 86    • MCH 02/13/2023 28 7    • MCHC 02/13/2023 33 5    • RDW 02/13/2023 12 6    • MPV 02/13/2023 9 4    • Platelets 12/44/8089 243    • nRBC 02/13/2023 0    • Neutrophils Relative 02/13/2023 64    • Immat GRANS % 02/13/2023 0    • Lymphocytes Relative 02/13/2023 26    • Monocytes Relative 02/13/2023 7    • Eosinophils Relative 02/13/2023 2    • Basophils Relative 02/13/2023 1    • Neutrophils Absolute 02/13/2023 3 92    • Immature Grans Absolute 02/13/2023 0 02    • Lymphocytes Absolute 02/13/2023 1 59    • Monocytes Absolute 02/13/2023 0 44    • Eosinophils Absolute 02/13/2023 0 11    • Basophils Absolute 02/13/2023 0 04    • Sodium 02/13/2023 136    • Potassium 02/13/2023 3 9    • Chloride 02/13/2023 103 • CO2 02/13/2023 24    • ANION GAP 02/13/2023 9    • BUN 02/13/2023 11    • Creatinine 02/13/2023 0 84    • Glucose, Fasting 02/13/2023 216 (H)    • Calcium 02/13/2023 9 6    • AST 02/13/2023 38    • ALT 02/13/2023 101 (H)    • Alkaline Phosphatase 02/13/2023 63    • Total Protein 02/13/2023 7 9    • Albumin 02/13/2023 4 0    • Total Bilirubin 02/13/2023 0 53    • eGFR 02/13/2023 104    • Cholesterol 02/13/2023 202 (H)    • Triglycerides 02/13/2023 454 (H)    • HDL, Direct 02/13/2023 35 (L)    • LDL Calculated 02/13/2023     • Non-HDL-Chol (CHOL-HDL) 02/13/2023 167    • Creatinine, Ur 02/13/2023 195 0    • Microalbum  ,U,Random 02/13/2023 270 0 (H)    • Microalb Creat Ratio 02/13/2023 138 (H)    • Hemoglobin A1C 02/13/2023 9 4 (H)    • EAG 02/13/2023 223    • HIV-1 p24 Antigen 02/13/2023 Non-Reactive    • HIV-1 Antibody 02/13/2023 Non-Reactive    • HIV-2 Antibody 02/13/2023 Non-Reactive    • HIV Ag-Ab 5th Gen 02/13/2023 Non-Reactive          Radiology Results:   No results found

## 2023-02-22 NOTE — TELEPHONE ENCOUNTER
Scheduled date of EGD/colonoscopy (as of today): 3/1/23  Physician performing EGD/colonoscopy: Dr Steven Costa   Location of EGD/colonoscopy: [facility]  Desired bowel prep reviewed with patient: miralax w/ dul given at [facility]   Instructions reviewed with patient by: ls  Clearances:  N/a  Pt diabetic

## 2023-02-27 ENCOUNTER — APPOINTMENT (OUTPATIENT)
Dept: PREADMISSION TESTING | Facility: HOSPITAL | Age: 48
End: 2023-02-27

## 2023-02-28 ENCOUNTER — TELEPHONE (OUTPATIENT)
Dept: PAIN MEDICINE | Facility: CLINIC | Age: 48
End: 2023-02-28

## 2023-02-28 ENCOUNTER — ANESTHESIA EVENT (OUTPATIENT)
Dept: PERIOP | Facility: HOSPITAL | Age: 48
End: 2023-02-28

## 2023-02-28 ENCOUNTER — TELEPHONE (OUTPATIENT)
Dept: GASTROENTEROLOGY | Facility: CLINIC | Age: 48
End: 2023-02-28

## 2023-02-28 NOTE — PRE-PROCEDURE INSTRUCTIONS
Pre-Surgery Instructions:   Medication Instructions   • atorvastatin (LIPITOR) 20 mg tablet Continue to take as prescribed including DOS with a small sip of water, unless usually taken at night   • cholestyramine sugar free (QUESTRAN LIGHT) 4 g packet continue as prescribed excluding DOS   • Empagliflozin (Jardiance) 25 MG TABS continue as prescribed excluding DOS   • ibuprofen (MOTRIN) 200 mg tablet Hold for at least 3 days   • Insulin Glargine Solostar (Lantus SoloStar) 100 UNIT/ML SOPN Continue to take as prescribed including DOS, unless usually taken at night   • metFORMIN (GLUCOPHAGE-XR) 500 mg 24 hr tablet continue as prescribed excluding DOS   • pantoprazole (PROTONIX) 40 mg tablet continue as prescribed excluding DOS   • tadalafil (CIALIS) 10 MG tablet continue as prescribed excluding DOS    Avoid non-prescribed ASPIRIN, OTC vitamins and NSAIDS prior to surgery  Tylenol okay PRN  Continue scheduled medications excluding DOS  Avoid smoking prior to Surgery   Avoid alcohol, illicit drugs and marijuana for 24 hours prior to DOS  NPO instructions given: no food, water or anything else by mouth after midnight prior to surgery  Shower the night before and the morning of surgery using CHG wash or antibacterial soap if CHG is not indicated  Avoid shaving for 24 hours prior to DOS  Avoid using lotions, powders, oils, makeup, hair products, false eyelashes, etc  DOS  Patient given up to date visitor guidelines  A ride home after surgery is needed- failure to have a ride can result in cancellation  Remove jewelry and not to bring valuables DOS  Dentures and contact lenses will have to be removed for surgery  Patient understands he or she will receive a call the afternoon before surgery regarding an arrival time  If you have any changes in your health before DOS please call the surgeon's office  Patient verbalized understanding of all instructions

## 2023-02-28 NOTE — TELEPHONE ENCOUNTER
Caller: pt    Doctor: Q    Reason for call: procedure scheduled for 3/2/23  Pt needs to reschedule he is not feeling well       Call back#: [phone]

## 2023-03-01 ENCOUNTER — CLINICAL SUPPORT (OUTPATIENT)
Dept: ENDOCRINOLOGY | Facility: CLINIC | Age: 48
End: 2023-03-01

## 2023-03-01 ENCOUNTER — TREATMENT (OUTPATIENT)
Dept: OTHER | Facility: HOSPITAL | Age: 48
End: 2023-03-01

## 2023-03-01 DIAGNOSIS — Z79.4 TYPE 2 DIABETES MELLITUS WITH HYPERGLYCEMIA, WITH LONG-TERM CURRENT USE OF INSULIN (HCC): ICD-10-CM

## 2023-03-01 DIAGNOSIS — E11.9 TYPE 2 DIABETES MELLITUS WITHOUT COMPLICATION, WITHOUT LONG-TERM CURRENT USE OF INSULIN (HCC): ICD-10-CM

## 2023-03-01 DIAGNOSIS — E11.65 TYPE 2 DIABETES MELLITUS WITH HYPERGLYCEMIA, WITH LONG-TERM CURRENT USE OF INSULIN (HCC): ICD-10-CM

## 2023-03-01 NOTE — PROGRESS NOTES
Continous glucose monitoring willow intrepretation     Date/Time 3/1/2023 9:11 AM     Performed by  Paige Madrid     Authorized by Sylvia Vance MD      Universal Protocol   Consent: Verbal consent obtained  Written consent obtained  Consent given by: patient  Timeout called at: 3/1/2023 9:12 AM   Patient understanding: patient states understanding of the procedure being performed  Patient consent: the patient's understanding of the procedure matches consent given  Procedure consent: procedure consent matches procedure scheduled  Relevant documents: relevant documents present and verified  Test results: test results available and properly labeled  Site marked: the operative site was not marked  Radiology Images displayed and confirmed  If images not available, report reviewed: imaging studies not available  Patient identity confirmed: verbally with patient        Local anesthesia used: no     Anesthesia   Local anesthesia used: no     Sedation   Patient sedated: no        Specimen: no    Culture: no   Procedure Details   Procedure Notes: 5 days of date  Sensor fell off  Patient dropped off sensor

## 2023-03-01 NOTE — PROGRESS NOTES
CGM data review[de-identified]  Device: Dell pro dates: 2/22/2023 - 3/1/2023 usage: 99% Av glu: 181 mg/dL  SD:  mg/dL CV: 28%  GMI: 8 1%  TIR: 45%  TAR: 36% TBR: 19%    Glycemic patters: Some fasting but mostly postprandial significant hyperglycemia  Hypoglycemia: No    Recommendation:  Increase Lantus 25 units nightly  Increase metformin 1000 mg twice daily  Continue Jardiance 25 mg daily    Overall acceptable risk to proceed to surgery

## 2023-03-02 ENCOUNTER — TELEPHONE (OUTPATIENT)
Dept: PODIATRY | Facility: CLINIC | Age: 48
End: 2023-03-02

## 2023-03-02 ENCOUNTER — TELEPHONE (OUTPATIENT)
Dept: OBGYN CLINIC | Facility: CLINIC | Age: 48
End: 2023-03-02

## 2023-03-02 NOTE — TELEPHONE ENCOUNTER
Caller: Alejandra Rivas    Doctor/Office: Dr Makayla Reno    #: [phone]    Escalation: Surgery/has SX scheduled for 3/10/2023 and wants to make sure it is being filed under [facility] Comp/not his personal insurance  Please call back to let him know the details   Thanks

## 2023-03-02 NOTE — TELEPHONE ENCOUNTER
Returned pt's call per task  Explained that in order for his surgery to be billed under worker's comp, a claim number and insurance company name is needed  Pt states that his employer does not want to file the claim and wants it to go through his health insurance and then reimburse him out of pocket  He has no claim information as a claim has not yet been filed  He was told that without this information his surgery cannot be billed under WC  Pt will call back if he is able to file a claim and gets the required information

## 2023-03-06 DIAGNOSIS — E11.9 TYPE 2 DIABETES MELLITUS WITHOUT COMPLICATION, WITHOUT LONG-TERM CURRENT USE OF INSULIN (HCC): ICD-10-CM

## 2023-03-06 RX ORDER — PEN NEEDLE, DIABETIC 32GX 5/32"
NEEDLE, DISPOSABLE MISCELLANEOUS
Qty: 100 EACH | Refills: 0 | Status: SHIPPED | OUTPATIENT
Start: 2023-03-06

## 2023-03-07 ENCOUNTER — HOSPITAL ENCOUNTER (OUTPATIENT)
Dept: ULTRASOUND IMAGING | Facility: HOSPITAL | Age: 48
Discharge: HOME | End: 2023-03-07
Attending: FAMILY MEDICINE

## 2023-03-07 DIAGNOSIS — K43.9 ABDOMINAL WALL HERNIA: ICD-10-CM

## 2023-03-09 ENCOUNTER — TELEPHONE (OUTPATIENT)
Dept: UROLOGY | Facility: AMBULATORY SURGERY CENTER | Age: 48
End: 2023-03-09

## 2023-03-09 ENCOUNTER — HOSPITAL ENCOUNTER (EMERGENCY)
Facility: HOSPITAL | Age: 48
Discharge: HOME | End: 2023-03-09
Attending: EMERGENCY MEDICINE

## 2023-03-09 ENCOUNTER — APPOINTMENT (EMERGENCY)
Dept: CT IMAGING | Facility: HOSPITAL | Age: 48
End: 2023-03-09

## 2023-03-09 ENCOUNTER — APPOINTMENT (EMERGENCY)
Dept: RADIOLOGY | Facility: HOSPITAL | Age: 48
End: 2023-03-09

## 2023-03-09 ENCOUNTER — TELEPHONE (OUTPATIENT)
Dept: FAMILY MEDICINE CLINIC | Facility: CLINIC | Age: 48
End: 2023-03-09

## 2023-03-09 VITALS
OXYGEN SATURATION: 95 % | HEART RATE: 71 BPM | RESPIRATION RATE: 16 BRPM | DIASTOLIC BLOOD PRESSURE: 68 MMHG | TEMPERATURE: 97.6 F | SYSTOLIC BLOOD PRESSURE: 129 MMHG

## 2023-03-09 DIAGNOSIS — R19.7 DIARRHEA: ICD-10-CM

## 2023-03-09 DIAGNOSIS — N48.1 BALANITIS: Primary | ICD-10-CM

## 2023-03-09 DIAGNOSIS — R10.13 EPIGASTRIC PAIN: Primary | ICD-10-CM

## 2023-03-09 DIAGNOSIS — K80.20 CALCULUS OF GALLBLADDER WITHOUT CHOLECYSTITIS WITHOUT OBSTRUCTION: ICD-10-CM

## 2023-03-09 DIAGNOSIS — R11.0 NAUSEA: ICD-10-CM

## 2023-03-09 LAB
ALBUMIN SERPL BCP-MCNC: 4.6 G/DL (ref 3.5–5)
ALP SERPL-CCNC: 60 U/L (ref 34–104)
ALT SERPL W P-5'-P-CCNC: 65 U/L (ref 7–52)
ANION GAP SERPL CALCULATED.3IONS-SCNC: 10 MMOL/L (ref 4–13)
AST SERPL W P-5'-P-CCNC: 27 U/L (ref 13–39)
ATRIAL RATE: 70 BPM
BASOPHILS # BLD AUTO: 0.03 THOUSANDS/ÂΜL (ref 0–0.1)
BASOPHILS NFR BLD AUTO: 1 % (ref 0–1)
BILIRUB SERPL-MCNC: 0.53 MG/DL (ref 0.2–1)
BUN SERPL-MCNC: 16 MG/DL (ref 5–25)
CALCIUM SERPL-MCNC: 9.6 MG/DL (ref 8.4–10.2)
CARDIAC TROPONIN I PNL SERPL HS: 2 NG/L
CHLORIDE SERPL-SCNC: 102 MMOL/L (ref 96–108)
CO2 SERPL-SCNC: 27 MMOL/L (ref 21–32)
CREAT SERPL-MCNC: 0.97 MG/DL (ref 0.6–1.3)
EOSINOPHIL # BLD AUTO: 0.1 THOUSAND/ÂΜL (ref 0–0.61)
EOSINOPHIL NFR BLD AUTO: 2 % (ref 0–6)
ERYTHROCYTE [DISTWIDTH] IN BLOOD BY AUTOMATED COUNT: 13.1 % (ref 11.6–15.1)
GFR SERPL CREATININE-BSD FRML MDRD: 92 ML/MIN/1.73SQ M
GLUCOSE SERPL-MCNC: 165 MG/DL (ref 65–140)
HCT VFR BLD AUTO: 48 % (ref 36.5–49.3)
HGB BLD-MCNC: 16 G/DL (ref 12–17)
IMM GRANULOCYTES # BLD AUTO: 0.02 THOUSAND/UL (ref 0–0.2)
IMM GRANULOCYTES NFR BLD AUTO: 0 % (ref 0–2)
LACTATE SERPL-SCNC: 1 MMOL/L (ref 0.5–2)
LIPASE SERPL-CCNC: 50 U/L (ref 11–82)
LYMPHOCYTES # BLD AUTO: 1.31 THOUSANDS/ÂΜL (ref 0.6–4.47)
LYMPHOCYTES NFR BLD AUTO: 21 % (ref 14–44)
MAGNESIUM SERPL-MCNC: 2 MG/DL (ref 1.9–2.7)
MCH RBC QN AUTO: 29 PG (ref 26.8–34.3)
MCHC RBC AUTO-ENTMCNC: 33.3 G/DL (ref 31.4–37.4)
MCV RBC AUTO: 87 FL (ref 82–98)
MONOCYTES # BLD AUTO: 0.47 THOUSAND/ÂΜL (ref 0.17–1.22)
MONOCYTES NFR BLD AUTO: 8 % (ref 4–12)
NEUTROPHILS # BLD AUTO: 4.35 THOUSANDS/ÂΜL (ref 1.85–7.62)
NEUTS SEG NFR BLD AUTO: 68 % (ref 43–75)
NRBC BLD AUTO-RTO: 0 /100 WBCS
P AXIS: 50 DEGREES
PLATELET # BLD AUTO: 226 THOUSANDS/UL (ref 149–390)
PMV BLD AUTO: 9.4 FL (ref 8.9–12.7)
POTASSIUM SERPL-SCNC: 3.6 MMOL/L (ref 3.5–5.3)
PR INTERVAL: 180 MS
PROT SERPL-MCNC: 7.8 G/DL (ref 6.4–8.4)
QRS AXIS: 23 DEGREES
QRSD INTERVAL: 86 MS
QT INTERVAL: 390 MS
QTC INTERVAL: 421 MS
RBC # BLD AUTO: 5.51 MILLION/UL (ref 3.88–5.62)
SODIUM SERPL-SCNC: 139 MMOL/L (ref 135–147)
T WAVE AXIS: 50 DEGREES
VENTRICULAR RATE: 70 BPM
WBC # BLD AUTO: 6.28 THOUSAND/UL (ref 4.31–10.16)

## 2023-03-09 RX ORDER — ONDANSETRON 2 MG/ML
4 INJECTION INTRAMUSCULAR; INTRAVENOUS ONCE
Status: COMPLETED | OUTPATIENT
Start: 2023-03-09 | End: 2023-03-09

## 2023-03-09 RX ORDER — MORPHINE SULFATE 10 MG/ML
6 INJECTION, SOLUTION INTRAMUSCULAR; INTRAVENOUS ONCE
Status: COMPLETED | OUTPATIENT
Start: 2023-03-09 | End: 2023-03-09

## 2023-03-09 RX ORDER — ONDANSETRON 4 MG/1
4 TABLET, FILM COATED ORAL EVERY 6 HOURS
Qty: 12 TABLET | Refills: 0 | Status: SHIPPED | OUTPATIENT
Start: 2023-03-09

## 2023-03-09 RX ADMIN — SODIUM CHLORIDE 1000 ML: 0.9 INJECTION, SOLUTION INTRAVENOUS at 04:10

## 2023-03-09 RX ADMIN — IOHEXOL 100 ML: 350 INJECTION, SOLUTION INTRAVENOUS at 05:10

## 2023-03-09 RX ADMIN — MORPHINE SULFATE 6 MG: 10 INJECTION INTRAVENOUS at 04:09

## 2023-03-09 RX ADMIN — ONDANSETRON 4 MG: 2 INJECTION INTRAMUSCULAR; INTRAVENOUS at 04:09

## 2023-03-09 NOTE — ED PROVIDER NOTES
History  Chief Complaint   Patient presents with   • Abdominal Pain     Pt reports epigastric pain starting 9 hours ago, bloating, diarrhea  Denies CP, SOB, N/V     Patient is a 55-year-old male with PMH of HLD and DM and PSH of appendectomy and partial colectomy presenting for evaluation of epigastric pain, nausea, and diarrhea  Patient notes approximately 9 hours prior to arrival the development of epigastric abdominal pain that he describes as constant, pressure, aggravated with coughing, and relieved with burping  He notes that at 8p m  (6h PTA) the development of a cold clammy sweat and nausea  He reports going to the bathroom with diarrhea approximately 11-12 times and reports his stool is loose and orange in color (denies blood and black tarry appearance)  He denies headache, lightheadedness/dizziness, vision changes, nasal congestion, cough, sore throat, chest pain, shortness of breath, urinary complaints, skin changes  History provided by:  Patient   used: No        Prior to Admission Medications   Prescriptions Last Dose Informant Patient Reported? Taking?    Continuous Blood Gluc  (FreeStyle Benítez 2 [place]) EV   No No   Sig: Use 1 Units continuous   Continuous Blood Gluc Sensor (FreeStyle Dell 2 Sensor) MISC   No No   Sig: Use 1 Units every 14 (fourteen) days   Empagliflozin (Jardiance) 25 MG TABS   No No   Sig: Take 1 tablet (25 mg total) by mouth daily   Insulin Glargine Solostar (Lantus SoloStar) 100 UNIT/ML SOPN   No No   Sig: Inject 0 2 mL (20 Units total) under the skin daily at bedtime   Insulin Pen Needle (BD Pen Needle Nancy U/F) 32G X 4 MM MISC   No No   Sig: Use daily daily bedtime   atorvastatin (LIPITOR) 20 mg tablet   No No   Sig: Take 1 tablet (20 mg total) by mouth every evening   cholestyramine sugar free (QUESTRAN LIGHT) 4 g packet   No No   Sig: Take 1 packet (4 g total) by mouth daily   ibuprofen (MOTRIN) 200 mg tablet   Yes No   Sig: Take 600 mg by mouth every 6 (six) hours as needed for mild pain or moderate pain   metFORMIN (GLUCOPHAGE-XR) 500 mg 24 hr tablet   No No   Sig: Take 1 tablet (500 mg total) by mouth 2 (two) times a day with meals   Patient taking differently: Take 1,000 mg by mouth daily with breakfast   pantoprazole (PROTONIX) 40 mg tablet   No No   Sig: Take 1 tablet (40 mg total) by mouth daily   tadalafil (CIALIS) 10 MG tablet   No No   Sig: TAKE ONE TABLET BY MOUTH EVERY DAY AS NEEDED FOR ED      Facility-Administered Medications: None       Past Medical History:   Diagnosis Date   • Cancer ([facility] )     colon   • Diabetes mellitus ([facility] )    • GERD (gastroesophageal reflux disease)    • Hyperlipidemia    • Post concussion syndrome 05/09/2017   • Sleep apnea     "mild"   • Traumatic brain injury 2017    R/S 2017       Past Surgical History:   Procedure Laterality Date   • APPENDECTOMY     • COLON SURGERY         Family History   Problem Relation Age of Onset   • Coronary artery disease Mother    • Diabetes Mother    • Hypertension Mother    • Hyperlipidemia Mother    • Coronary artery disease Father    • Diabetes Father    • Hypertension Father    • Hyperlipidemia Father    • Heart attack Father    • Anemia Brother    • No Known Problems Son    • No Known Problems Son    • No Known Problems Son      I have reviewed and agree with the history as documented  E-Cigarette/Vaping   • E-Cigarette Use Never User      E-Cigarette/Vaping Substances   • Nicotine No    • THC No    • CBD No    • Flavoring No    • Other No    • Unknown No      Social History     Tobacco Use   • Smoking status: Never   • Smokeless tobacco: Never   Vaping Use   • Vaping Use: Never used   Substance Use Topics   • Alcohol use: Yes     Alcohol/week: 1 0 standard drink     Types: 1 Cans of beer per week   • Drug use: No       Review of Systems   Constitutional: Positive for appetite change (Decreased due to nausea), chills and diaphoresis  Negative for fever     HENT: Negative for congestion and sore throat  Eyes: Negative for visual disturbance  Respiratory: Negative for cough and shortness of breath  Cardiovascular: Negative for chest pain, palpitations and leg swelling  Gastrointestinal: Positive for abdominal distention, abdominal pain, diarrhea and nausea  Negative for blood in stool and vomiting  Genitourinary:        Reports a cut on the skin of the shaft of the penis that has been there for 2 weeks and that primary care is managing; denies erythema/warmth/swelling to that area   Musculoskeletal: Negative for back pain and gait problem  Skin: Negative for color change, rash and wound  Allergic/Immunologic: Negative for immunocompromised state  Neurological: Negative for dizziness, syncope, weakness, light-headedness, numbness and headaches  All other systems reviewed and are negative  Physical Exam  Physical Exam  Vitals and nursing note reviewed  Constitutional:       General: He is in acute distress (Evidencing moderate distress)  Appearance: Normal appearance  He is well-developed  He is obese  He is not ill-appearing, toxic-appearing or diaphoretic  HENT:      Head: Normocephalic and atraumatic  Jaw: There is normal jaw occlusion  Nose: Nose normal       Mouth/Throat:      Lips: Pink  Mouth: Mucous membranes are moist       Pharynx: Oropharynx is clear  Eyes:      General: Lids are normal  Vision grossly intact  Gaze aligned appropriately  No visual field deficit  Extraocular Movements: Extraocular movements intact  Right eye: Normal extraocular motion  Left eye: Normal extraocular motion  Conjunctiva/sclera: Conjunctivae normal       Pupils: Pupils are equal, round, and reactive to light  Neck:      Trachea: Phonation normal  No abnormal tracheal secretions  Cardiovascular:      Rate and Rhythm: Normal rate and regular rhythm  Pulses: Normal pulses             Radial pulses are 2+ on the right side and 2+ on the left side  Dorsalis pedis pulses are 2+ on the right side and 2+ on the left side  Heart sounds: Normal heart sounds, S1 normal and S2 normal  No murmur heard  Pulmonary:      Effort: Pulmonary effort is normal  No tachypnea or respiratory distress  Breath sounds: Normal breath sounds and air entry  No stridor, decreased air movement or transmitted upper airway sounds  No decreased breath sounds  Abdominal:      General: There is no abdominal bruit  Palpations: Abdomen is soft  There is no pulsatile mass  Tenderness: There is abdominal tenderness in the epigastric area and left upper quadrant  There is no right CVA tenderness, left CVA tenderness, guarding or rebound  Negative signs include Zaragoza's sign  Hernia: A hernia is present  Hernia is present in the ventral area  Genitourinary:     Penis: Uncircumcised  No erythema, discharge or swelling  Testes: Normal  Cremasteric reflex is present  Comments: Small abrasion to distal penile shaft when patient retracts foreskin; no evidence of infection  Musculoskeletal:         General: Normal range of motion  Cervical back: Full passive range of motion without pain, normal range of motion and neck supple  Right lower leg: No edema  Left lower leg: No edema  Skin:     General: Skin is warm and dry  Capillary Refill: Capillary refill takes less than 2 seconds  Findings: No rash  Neurological:      General: No focal deficit present  Mental Status: He is alert and oriented to person, place, and time  Mental status is at baseline  GCS: GCS eye subscore is 4  GCS verbal subscore is 5  GCS motor subscore is 6  Sensory: Sensation is intact  Motor: Motor function is intact  Gait: Gait is intact           Vital Signs  ED Triage Vitals   Temperature Pulse Respirations Blood Pressure SpO2   03/09/23 0312 03/09/23 0311 03/09/23 0311 03/09/23 0311 03/09/23 0311   97 6 °F (36 4 °C) 75 16 149/83 97 %      Temp Source Heart Rate Source Patient Position - Orthostatic VS BP Location FiO2 (%)   03/09/23 0312 03/09/23 0311 -- 03/09/23 0311 --   Oral Monitor  Right arm       Pain Score       03/09/23 0409       7           Vitals:    03/09/23 0311 03/09/23 0530 03/09/23 0600   BP: 149/83 135/71 129/68   Pulse: 75 72 71         Visual Acuity      ED Medications  Medications   sodium chloride 0 9 % bolus 1,000 mL (0 mL Intravenous Stopped 3/9/23 0636)   ondansetron (ZOFRAN) injection 4 mg (4 mg Intravenous Given 3/9/23 0409)   morphine injection 6 mg (6 mg Intravenous Given 3/9/23 0409)   iohexol (OMNIPAQUE) 350 MG/ML injection (SINGLE-DOSE) 100 mL (100 mL Intravenous Given 3/9/23 0510)       Diagnostic Studies  Results Reviewed     Procedure Component Value Units Date/Time    HS Troponin 0hr (reflex protocol) [[id number]]  (Normal) Collected: 03/09/23 0405    Lab Status: Final result Specimen: Blood from Arm, Left Updated: 03/09/23 0507     hs TnI 0hr 2 ng/L     Lactic acid [[id number]]  (Normal) Collected: 03/09/23 0405    Lab Status: Final result Specimen: Blood from Arm, Left Updated: 03/09/23 0451     LACTIC ACID 1 0 mmol/L     Narrative:      Result may be elevated if tourniquet was used during collection      Comprehensive metabolic panel [[id number]]  (Abnormal) Collected: 03/09/23 0405    Lab Status: Final result Specimen: Blood from Arm, Left Updated: 03/09/23 0439     Sodium 139 mmol/L      Potassium 3 6 mmol/L      Chloride 102 mmol/L      CO2 27 mmol/L      ANION GAP 10 mmol/L      BUN 16 mg/dL      Creatinine 0 97 mg/dL      Glucose 165 mg/dL      Calcium 9 6 mg/dL      AST 27 U/L      ALT 65 U/L      Alkaline Phosphatase 60 U/L      Total Protein 7 8 g/dL      Albumin 4 6 g/dL      Total Bilirubin 0 53 mg/dL      eGFR 92 ml/min/1 73sq m     Narrative:      [place] guidelines for Chronic Kidney Disease (CKD):   •  Stage 1 with normal or high GFR (GFR > 90 mL/min/1 73 square meters)  •  Stage 2 Mild CKD (GFR = 60-89 mL/min/1 73 square meters)  •  Stage 3A Moderate CKD (GFR = 45-59 mL/min/1 73 square meters)  •  Stage 3B Moderate CKD (GFR = 30-44 mL/min/1 73 square meters)  •  Stage 4 Severe CKD (GFR = 15-29 mL/min/1 73 square meters)  •  Stage 5 End Stage CKD (GFR <15 mL/min/1 73 square meters)  Note: GFR calculation is accurate only with a steady state creatinine    Magnesium [[id number]]  (Normal) Collected: 03/09/23 0405    Lab Status: Final result Specimen: Blood from Arm, Left Updated: 03/09/23 0439     Magnesium 2 0 mg/dL     Lipase [[id number]]  (Normal) Collected: 03/09/23 0405    Lab Status: Final result Specimen: Blood from Arm, Left Updated: 03/09/23 0439     Lipase 50 u/L     CBC and differential [[id number]] Collected: 03/09/23 0405    Lab Status: Final result Specimen: Blood from Arm, Left Updated: 03/09/23 0415     WBC 6 28 Thousand/uL      RBC 5 51 Million/uL      Hemoglobin 16 0 g/dL      Hematocrit 48 0 %      MCV 87 fL      MCH 29 0 pg      MCHC 33 3 g/dL      RDW 13 1 %      MPV 9 4 fL      Platelets 779 Thousands/uL      nRBC 0 /100 WBCs      Neutrophils Relative 68 %      Immat GRANS % 0 %      Lymphocytes Relative 21 %      Monocytes Relative 8 %      Eosinophils Relative 2 %      Basophils Relative 1 %      Neutrophils Absolute 4 35 Thousands/µL      Immature Grans Absolute 0 02 Thousand/uL      Lymphocytes Absolute 1 31 Thousands/µL      Monocytes Absolute 0 47 Thousand/µL      Eosinophils Absolute 0 10 Thousand/µL      Basophils Absolute 0 03 Thousands/µL                  CT abdomen pelvis with contrast   Final Result by Luis Mcmanus MD (03/09 1196)      No acute pathology  Cholelithiasis  Hepatomegaly with fatty infiltration  Workstation performed: [id number]         XR chest 1 view portable    (Results Pending)              Procedures  ECG 12 Lead Documentation Only    Date/Time: 3/9/2023 4:10 AM  Performed by:  Nancy Moran Ul  Saurava Mateo [address], [facility]  Authorized by: NICKIE Peterson     Indications / Diagnosis:  Epigastric abdominal pain  ECG reviewed by me, the ED Provider: yes    Patient location:  ED  Previous ECG:     Previous ECG:  Unavailable    Comparison to cardiac monitor: Yes    Interpretation:     Interpretation: normal    Rate:     ECG rate:  70    ECG rate assessment: normal    Rhythm:     Rhythm: sinus rhythm    Ectopy:     Ectopy: none    QRS:     QRS axis:  Normal    QRS intervals:  Normal  Conduction:     Conduction: abnormal      Abnormal conduction: non-specific intraventricular conduction delay    ST segments:     ST segments:  Normal  T waves:     T waves: non-specific    Comments:      Sinus rhythm with marked sinus arrhythmia, axis, normal intervals, no acute ischemic changes read by me             ED Course  ED Course as of 03/09/23 0641   Thu Mar 09, 2023   0321 Triage vital signs stable   0421 CBC and differential  No leukocytosis, no gross anemia   0514 Comprehensive metabolic panel(!)  No electrolyte abnormality, no PHUC, mildly elevated random glucose, mildly elevated ALT without transaminitis or elevated total bilirubin   0514 Lipase: 50  Within defined limits, acute pancreatitis less likely   0514 Magnesium: 2 0  WDL   0514 hs TnI 0hr: 2  Epigastric abdominal pain x9 hours, will discontinue repeat 2 and 4-hour troponins has no chest pain, shortness of breath, vision for ACS very low   0515 Patient notes pain is improved from 8 5/10 to 5/10  He defers further pain medications at this time  9676 CT abdomen pelvis with contrast  FINDINGS:     ABDOMEN     LOWER CHEST:  Bibasilar atelectasis or scarring  No effusions      LIVER/BILIARY TREE:  Hepatomegaly with severe fatty infiltration  No CT evidence of suspicious hepatic mass  Normal hepatic contours    No biliary dilatation      GALLBLADDER:  There are gallstone(s) within the gallbladder, without pericholecystic inflammatory changes      SPLEEN: Unremarkable      PANCREAS:  Unremarkable      ADRENAL GLANDS:  Unremarkable      KIDNEYS/URETERS:  Unremarkable  No hydronephrosis      STOMACH AND BOWEL:  No obstruction or acute inflammatory changes  Stable postoperative changes in the sigmoid colon      APPENDIX:  No findings to suggest appendicitis      ABDOMINOPELVIC CAVITY:  No ascites  No pneumoperitoneum  No lymphadenopathy      VESSELS:  Unremarkable for patient's age      PELVIS     REPRODUCTIVE ORGANS:  Unremarkable for patient's age      URINARY BLADDER:  Unremarkable      ABDOMINAL WALL/INGUINAL REGIONS:  Unremarkable      OSSEOUS STRUCTURES:  No acute fracture or destructive osseous lesion      IMPRESSION:     No acute pathology      Cholelithiasis      Hepatomegaly with fatty infiltration  [address] [facility] paperwork reviewed emphasis on follow-up with surgery, use of Tylenol and ibuprofen for pain control, and strict return precautions  Patient agreeable plan has no further questions at this time  HEART Risk Score    Flowsheet Row Most Recent Value   Heart Score Risk Calculator    History 0 Filed at: 03/09/2023 0641   ECG 0 Filed at: 03/09/2023 0641   Age 1 Filed at: 03/09/2023 0641   Risk Factors 1 Filed at: 03/09/2023 0641   Troponin 0 Filed at: 03/09/2023 0641   HEART Score 2 Filed at: 03/09/2023 0940                                      Medical Decision Making  DDx including but not limited to: gastritis, PUD, GERD, gastroparesis, hepatitis, pancreatitis, colitis, enteritis, diverticulitis, food poisoning, epiploic appendagitis, mesenteric adenitis, mesenteric panniculitis, mesenteric ischemia, IBD, IBS, ileus, bowel obstruction, volvulus, internal hernia, cholecystitis, biliary colic, choledocholithiasis, UTI; considered but doubt cardiac etiology or AAA    Plan made for CMP, CBC, lipase for screening of electrolyte abnormality, endorgan dysfunction, anemia, and infection    UA order placed to rule out UTI and screen for hematuria as possible urologic/renal pathology  Patient with epigastric pain, obtaining EKG and troponin to rule out acute ischemic changes  Troponin negative after 9 hours of discomfort and EKG without acute ischemic changes  Chest x-ray obtained and without focal consolidation or acute cardiopulmonary disease identified  CT imaging without finding of colitis, after neuritis, diverticulitis, mesenteric adenitis, mesenteric ischemia, bowel obstruction, volvulus, internal hernia, cholecystitis, or other acute surgical abdominal pathology  Suspicion high for biliary colic given we will do cholelithiasis  Patient also with ventral hernia on exam which patient notes is acutely worse with coughing and leaning forward  Given these findings, will refer patient to general surgery for further evaluation  Patient notes vast improvement in pain since IV morphine and defers further pain medication regimen  Patient offered limited amount of oxycodone for severe pain at home, however patient defers notes he will take Tylenol and ibuprofen  Patient prescribed Zofran for as needed nausea and vomiting  Regarding the patient's diarrhea, patient notes he has loose stools at baseline reporting anywhere from 3 to 5/day, patient with an acute increase in his stool for which he was instructed to call his primary care for reevaluation as well as talk to the GI specialist that will be doing his colonoscopy in 1 month  After work-up complete and without acute findings, patient is with vast clinical improvement, in no acute distress, nontoxic  His repeat exam is with very mild tenderness to the epigastrium at the ventral hernia without a positive Zaragoza's or right upper quadrant tenderness  No indication to get right upper quadrant ultrasound at this time given patient's current physical exam and blood work  DC paperwork reviewed with emphasis on follow-up with primary care provider, strict return precautions, and follow-up with surgery  Patient vital signs remained stable and he is ambulatory steady gait at time of discharge  Amount and/or Complexity of Data Reviewed  Labs: ordered  Decision-making details documented in ED Course  Radiology: ordered  Decision-making details documented in ED Course  Risk  Prescription drug management  Disposition  Final diagnoses:   Epigastric pain   Nausea   Diarrhea     Time reflects when diagnosis was documented in both MDM as applicable and the Disposition within this note     Time User Action Codes Description Comment    3/9/2023  6:24 AM Dali Slipper Add [R10 13] Epigastric pain     3/9/2023  6:24 AM Dorothy Vacas, Ektalisa Melecioraymond Add [R11 0] Nausea     3/9/2023  6:24 AM Dali Slipper Add [R19 7] Diarrhea       ED Disposition     ED Disposition   Discharge    Condition   Stable    Date/Time   Thu Mar 9, 2023  6:23 AM    Comment   19 Melissa Doshi discharge to home/self care                 Follow-up Information     Follow up With Specialties Details Why Contact Info Additional Information    Estephania Jimenez MD Family Medicine Schedule an appointment as soon as possible for a visit in 2 days  [address] [facility] [address]  [address] [facility] Surgery Call today  [address] [address] [facility] [address]  [facility], [address] [place] [address], [facility] [address], [place], [place], [address]    [address] Emergency Department Emergency Medicine Go to  If symptoms worsen [address] [facility] [address] [facility] Emergency Department, [address], [place], [place], [address]          Discharge Medication List as of 3/9/2023  6:27 AM      START taking these medications    Details   ondansetron (ZOFRAN) 4 mg tablet Take 1 tablet (4 mg total) by mouth every 6 (six) hours, Starting Thu 3/9/2023, Normal         CONTINUE these medications which have NOT CHANGED    Details   atorvastatin (LIPITOR) 20 mg tablet Take 1 tablet (20 mg total) by mouth every evening, Starting Wed 2/15/2023, Until Mon 8/14/2023, Normal      cholestyramine sugar free (QUESTRAN LIGHT) 4 g packet Take 1 packet (4 g total) by mouth daily, Starting Fri 10/7/2022, Normal      Continuous Blood Gluc  (FreeStyle Benítez 2 [place]) EV Use 1 Units continuous, Starting Wed 2/22/2023, Normal      Continuous Blood Gluc Sensor (FreeStyle Dell 2 Sensor) MISC Use 1 Units every 14 (fourteen) days, Starting Wed 2/22/2023, Normal      Empagliflozin (Jardiance) 25 MG TABS Take 1 tablet (25 mg total) by mouth daily, Starting Wed 2/15/2023, Until Mon 8/14/2023, Normal      ibuprofen (MOTRIN) 200 mg tablet Take 600 mg by mouth every 6 (six) hours as needed for mild pain or moderate pain, Historical Med      Insulin Glargine Solostar (Lantus SoloStar) 100 UNIT/ML SOPN Inject 0 2 mL (20 Units total) under the skin daily at bedtime, Starting Wed 2/22/2023, Normal      Insulin Pen Needle (BD Pen Needle Nancy U/F) 32G X 4 MM MISC Use daily daily bedtime, Normal      metFORMIN (GLUCOPHAGE-XR) 500 mg 24 hr tablet Take 1 tablet (500 mg total) by mouth 2 (two) times a day with meals, Starting Wed 2/22/2023, Until Tue 5/23/2023, Normal      pantoprazole (PROTONIX) 40 mg tablet Take 1 tablet (40 mg total) by mouth daily, Starting Wed 2/22/2023, Normal      tadalafil (CIALIS) 10 MG tablet TAKE ONE TABLET BY MOUTH EVERY DAY AS NEEDED FOR ED, Normal             No discharge procedures on file      PDMP Review       Value Time User    PDMP Reviewed  Yes 3/9/2023  3:15 AM Sri Sidhu MD          ED Provider  Electronically Signed by           NICKIE Paredes  03/09/23 4198 Barb Mitchell  03/09/23 5045

## 2023-03-09 NOTE — DISCHARGE INSTRUCTIONS
Call your primary care provider to notify him of your visit and to schedule appointment for reevaluation in the next 2 to 3 days  Use the prescribed Zofran as needed for nausea and vomiting  Use 650 mg of Tylenol and 600 mg of ibuprofen with food every 6 hours as needed for pain  Return if you develop fever, inability tolerate fluids, chest pain, difficulty breathing, new or worsening abdominal pain, blood in urine or vomit or stool, weakness, or lethargy

## 2023-03-09 NOTE — ANESTHESIA PREPROCEDURE EVALUATION
Procedure:  ANKLE LIGAMENT REPAIR OF SYNDESMOTIC LIGAMENT with steroid injection of posterior heel bursa (Left: Ankle)    Relevant Problems   CARDIO   (+) Hypertriglyceridemia      ENDO   (+) Type 2 diabetes mellitus with hyperglycemia, with long-term current use of insulin (HCC)      GI/HEPATIC   (+) Fatty liver      MUSCULOSKELETAL   (+) Arthritis of right knee   (+) Impingement syndrome of left shoulder      NEURO/PSYCH   (+) Paresthesias      PULMONARY   (+) PETTY (obstructive sleep apnea)      Recent ER visit for abdominal pain, nausea and diarrhea    Physical Exam    Airway    Mallampati score: II         Dental       Cardiovascular  Cardiovascular exam normal    Pulmonary  Pulmonary exam normal     Other Findings        Anesthesia Plan  ASA Score- 2     Anesthesia Type- general with ASA Monitors  Additional Monitors:   Airway Plan: LMA  Comment: + PNB  Possible ETT if nausea/abdominal pain DOS  Plan Factors-    Chart reviewed  EKG reviewed  Existing labs reviewed  Patient summary reviewed  Induction- intravenous  Postoperative Plan-     Informed Consent- Anesthetic plan and risks discussed with patient  I personally reviewed this patient with the CRNA  Discussed and agreed on the Anesthesia Plan with the CRNA  Damari Dunn

## 2023-03-09 NOTE — TELEPHONE ENCOUNTER
Patient called today    he was in the ER today for  Abdominal pain          They are referring him to General surgeon for his gall bladder and needs a order and he also is complaining that the last time he was seen he had cuts on his penis and was given a ointment but it is not helping and would like to go to a urologist and  Would like a order for that  too

## 2023-03-09 NOTE — TELEPHONE ENCOUNTER
New Patient    What is the reason for the patient’s appointment? Balanitis cut on penis for 3-4 weeks burning when foreskin has to be pulled back     What office location does the patient prefer? [facility]     Imaging/Lab Results:    Do we accept the patient's insurance or is the patient Self-Pay? Yes     Insurance Provider: Mila Waite   Plan Type/Number:  Member ID#: [id number]    Has the patient had any previous Urologist(s)? Have patient records been requested? If not are records showing in [address] [place] [address] Rd: records in [facility]     Has the patient had any outside testing done? No     Does the patient have a personal history of cancer?  Colon cancer 12 yrs ago

## 2023-03-10 ENCOUNTER — ANESTHESIA (OUTPATIENT)
Dept: PERIOP | Facility: HOSPITAL | Age: 48
End: 2023-03-10

## 2023-03-10 ENCOUNTER — APPOINTMENT (OUTPATIENT)
Dept: RADIOLOGY | Facility: HOSPITAL | Age: 48
End: 2023-03-10

## 2023-03-10 ENCOUNTER — HOSPITAL ENCOUNTER (OUTPATIENT)
Facility: HOSPITAL | Age: 48
Setting detail: OUTPATIENT SURGERY
Discharge: HOME | End: 2023-03-10
Attending: PODIATRIST | Admitting: PODIATRIST

## 2023-03-10 VITALS
HEIGHT: 65 IN | SYSTOLIC BLOOD PRESSURE: 133 MMHG | WEIGHT: 205 LBS | TEMPERATURE: 97.6 F | RESPIRATION RATE: 18 BRPM | OXYGEN SATURATION: 92 % | HEART RATE: 86 BPM | BODY MASS INDEX: 34.16 KG/M2 | DIASTOLIC BLOOD PRESSURE: 73 MMHG

## 2023-03-10 DIAGNOSIS — Z98.890 POST-OPERATIVE STATE: Primary | ICD-10-CM

## 2023-03-10 LAB
GLUCOSE SERPL-MCNC: 128 MG/DL (ref 65–140)
GLUCOSE SERPL-MCNC: 141 MG/DL (ref 65–140)
GLUCOSE SERPL-MCNC: 196 MG/DL (ref 65–140)

## 2023-03-10 DEVICE — PLATE LCK 1/3 TUBL AVULSION DELTOID 4HL: Type: IMPLANTABLE DEVICE | Site: ANKLE | Status: FUNCTIONAL

## 2023-03-10 DEVICE — K-LESS T-ROPE W/DRV, SYN REPR, SS
Type: IMPLANTABLE DEVICE | Site: ANKLE | Status: FUNCTIONAL
Brand: ARTHREX®

## 2023-03-10 DEVICE — IMPLANTABLE DEVICE: Type: IMPLANTABLE DEVICE | Site: ANKLE | Status: FUNCTIONAL

## 2023-03-10 RX ORDER — FENTANYL CITRATE/PF 50 MCG/ML
25 SYRINGE (ML) INJECTION
Status: DISCONTINUED | OUTPATIENT
Start: 2023-03-10 | End: 2023-03-10 | Stop reason: HOSPADM

## 2023-03-10 RX ORDER — BUPIVACAINE HYDROCHLORIDE 2.5 MG/ML
INJECTION, SOLUTION EPIDURAL; INFILTRATION; INTRACAUDAL AS NEEDED
Status: DISCONTINUED | OUTPATIENT
Start: 2023-03-10 | End: 2023-03-10 | Stop reason: HOSPADM

## 2023-03-10 RX ORDER — DEXAMETHASONE SODIUM PHOSPHATE 10 MG/ML
INJECTION, SOLUTION INTRAMUSCULAR; INTRAVENOUS AS NEEDED
Status: DISCONTINUED | OUTPATIENT
Start: 2023-03-10 | End: 2023-03-10

## 2023-03-10 RX ORDER — ONDANSETRON 4 MG/1
4 TABLET, ORALLY DISINTEGRATING ORAL ONCE AS NEEDED
Status: COMPLETED | OUTPATIENT
Start: 2023-03-10 | End: 2023-03-10

## 2023-03-10 RX ORDER — LIDOCAINE HYDROCHLORIDE 10 MG/ML
INJECTION, SOLUTION EPIDURAL; INFILTRATION; INTRACAUDAL; PERINEURAL AS NEEDED
Status: DISCONTINUED | OUTPATIENT
Start: 2023-03-10 | End: 2023-03-10

## 2023-03-10 RX ORDER — PROPOFOL 10 MG/ML
INJECTION, EMULSION INTRAVENOUS AS NEEDED
Status: DISCONTINUED | OUTPATIENT
Start: 2023-03-10 | End: 2023-03-10

## 2023-03-10 RX ORDER — FENTANYL CITRATE 50 UG/ML
INJECTION, SOLUTION INTRAMUSCULAR; INTRAVENOUS AS NEEDED
Status: DISCONTINUED | OUTPATIENT
Start: 2023-03-10 | End: 2023-03-10

## 2023-03-10 RX ORDER — MIDAZOLAM HYDROCHLORIDE 2 MG/2ML
INJECTION, SOLUTION INTRAMUSCULAR; INTRAVENOUS AS NEEDED
Status: DISCONTINUED | OUTPATIENT
Start: 2023-03-10 | End: 2023-03-10

## 2023-03-10 RX ORDER — OXYCODONE HYDROCHLORIDE AND ACETAMINOPHEN 5; 325 MG/1; MG/1
1 TABLET ORAL EVERY 4 HOURS PRN
Status: DISCONTINUED | OUTPATIENT
Start: 2023-03-10 | End: 2023-03-10 | Stop reason: HOSPADM

## 2023-03-10 RX ORDER — SODIUM CHLORIDE, SODIUM LACTATE, POTASSIUM CHLORIDE, CALCIUM CHLORIDE 600; 310; 30; 20 MG/100ML; MG/100ML; MG/100ML; MG/100ML
INJECTION, SOLUTION INTRAVENOUS CONTINUOUS PRN
Status: DISCONTINUED | OUTPATIENT
Start: 2023-03-10 | End: 2023-03-10

## 2023-03-10 RX ORDER — ASPIRIN 325 MG
325 TABLET ORAL DAILY
Qty: 30 TABLET | Refills: 0 | Status: SHIPPED | OUTPATIENT
Start: 2023-03-10

## 2023-03-10 RX ORDER — ROPIVACAINE HYDROCHLORIDE 2 MG/ML
INJECTION, SOLUTION EPIDURAL; INFILTRATION; PERINEURAL AS NEEDED
Status: DISCONTINUED | OUTPATIENT
Start: 2023-03-10 | End: 2023-03-10

## 2023-03-10 RX ORDER — EPHEDRINE SULFATE 50 MG/ML
INJECTION INTRAVENOUS AS NEEDED
Status: DISCONTINUED | OUTPATIENT
Start: 2023-03-10 | End: 2023-03-10

## 2023-03-10 RX ORDER — HYDROMORPHONE HCL/PF 1 MG/ML
SYRINGE (ML) INJECTION AS NEEDED
Status: DISCONTINUED | OUTPATIENT
Start: 2023-03-10 | End: 2023-03-10

## 2023-03-10 RX ORDER — OXYCODONE HYDROCHLORIDE AND ACETAMINOPHEN 5; 325 MG/1; MG/1
1 TABLET ORAL EVERY 6 HOURS PRN
Qty: 12 TABLET | Refills: 0 | Status: SHIPPED | OUTPATIENT
Start: 2023-03-10 | End: 2023-03-20

## 2023-03-10 RX ORDER — ONDANSETRON 2 MG/ML
INJECTION INTRAMUSCULAR; INTRAVENOUS AS NEEDED
Status: DISCONTINUED | OUTPATIENT
Start: 2023-03-10 | End: 2023-03-10

## 2023-03-10 RX ORDER — BUPIVACAINE HYDROCHLORIDE 5 MG/ML
INJECTION, SOLUTION EPIDURAL; INTRACAUDAL AS NEEDED
Status: DISCONTINUED | OUTPATIENT
Start: 2023-03-10 | End: 2023-03-10

## 2023-03-10 RX ORDER — CHLORHEXIDINE GLUCONATE 0.12 MG/ML
15 RINSE ORAL ONCE
Status: COMPLETED | OUTPATIENT
Start: 2023-03-10 | End: 2023-03-10

## 2023-03-10 RX ORDER — CLINDAMYCIN PHOSPHATE 900 MG/50ML
900 INJECTION INTRAVENOUS ONCE
Status: COMPLETED | OUTPATIENT
Start: 2023-03-10 | End: 2023-03-10

## 2023-03-10 RX ORDER — ONDANSETRON 2 MG/ML
4 INJECTION INTRAMUSCULAR; INTRAVENOUS ONCE AS NEEDED
Status: COMPLETED | OUTPATIENT
Start: 2023-03-10 | End: 2023-03-10

## 2023-03-10 RX ORDER — METHYLPREDNISOLONE ACETATE 40 MG/ML
INJECTION, SUSPENSION INTRA-ARTICULAR; INTRALESIONAL; INTRAMUSCULAR; SOFT TISSUE AS NEEDED
Status: DISCONTINUED | OUTPATIENT
Start: 2023-03-10 | End: 2023-03-10 | Stop reason: HOSPADM

## 2023-03-10 RX ADMIN — ONDANSETRON 4 MG: 4 TABLET, ORALLY DISINTEGRATING ORAL at 11:00

## 2023-03-10 RX ADMIN — FENTANYL CITRATE 25 MCG: 50 INJECTION, SOLUTION INTRAMUSCULAR; INTRAVENOUS at 08:03

## 2023-03-10 RX ADMIN — ONDANSETRON 4 MG: 2 INJECTION INTRAMUSCULAR; INTRAVENOUS at 09:34

## 2023-03-10 RX ADMIN — DEXAMETHASONE SODIUM PHOSPHATE 10 MG: 10 INJECTION, SOLUTION INTRAMUSCULAR; INTRAVENOUS at 07:40

## 2023-03-10 RX ADMIN — HYDROMORPHONE HYDROCHLORIDE 0.3 MG: 1 INJECTION, SOLUTION INTRAMUSCULAR; INTRAVENOUS; SUBCUTANEOUS at 07:54

## 2023-03-10 RX ADMIN — ROPIVACAINE HYDROCHLORIDE 20 MG: 2 INJECTION, SOLUTION EPIDURAL; INFILTRATION at 07:38

## 2023-03-10 RX ADMIN — CHLORHEXIDINE GLUCONATE 0.12% ORAL RINSE 15 ML: 1.2 LIQUID ORAL at 07:05

## 2023-03-10 RX ADMIN — BUPIVACAINE HYDROCHLORIDE 10 ML: 5 INJECTION, SOLUTION EPIDURAL; INTRACAUDAL; PERINEURAL at 07:35

## 2023-03-10 RX ADMIN — CLINDAMYCIN PHOSPHATE 900 MG: 900 INJECTION, SOLUTION INTRAVENOUS at 07:37

## 2023-03-10 RX ADMIN — ONDANSETRON 4 MG: 2 INJECTION INTRAMUSCULAR; INTRAVENOUS at 08:42

## 2023-03-10 RX ADMIN — BUPIVACAINE 20 ML: 13.3 INJECTION, SUSPENSION, LIPOSOMAL INFILTRATION at 07:35

## 2023-03-10 RX ADMIN — FENTANYL CITRATE 25 MCG: 50 INJECTION, SOLUTION INTRAMUSCULAR; INTRAVENOUS at 08:41

## 2023-03-10 RX ADMIN — SODIUM CHLORIDE, SODIUM LACTATE, POTASSIUM CHLORIDE, AND CALCIUM CHLORIDE: .6; .31; .03; .02 INJECTION, SOLUTION INTRAVENOUS at 07:35

## 2023-03-10 RX ADMIN — PROPOFOL 200 MG: 10 INJECTION, EMULSION INTRAVENOUS at 07:40

## 2023-03-10 RX ADMIN — EPHEDRINE SULFATE 10 MG: 50 INJECTION, SOLUTION INTRAVENOUS at 08:29

## 2023-03-10 RX ADMIN — HYDROMORPHONE HYDROCHLORIDE 0.5 MG: 1 INJECTION, SOLUTION INTRAMUSCULAR; INTRAVENOUS; SUBCUTANEOUS at 08:54

## 2023-03-10 RX ADMIN — LIDOCAINE HYDROCHLORIDE 50 MG: 10 INJECTION, SOLUTION EPIDURAL; INFILTRATION; INTRACAUDAL at 07:40

## 2023-03-10 RX ADMIN — MIDAZOLAM HYDROCHLORIDE 2 MG: 1 INJECTION, SOLUTION INTRAMUSCULAR; INTRAVENOUS at 07:28

## 2023-03-10 RX ADMIN — HYDROMORPHONE HYDROCHLORIDE 0.2 MG: 1 INJECTION, SOLUTION INTRAMUSCULAR; INTRAVENOUS; SUBCUTANEOUS at 08:42

## 2023-03-10 RX ADMIN — FENTANYL CITRATE 50 MCG: 50 INJECTION, SOLUTION INTRAMUSCULAR; INTRAVENOUS at 07:35

## 2023-03-10 NOTE — INTERVAL H&P NOTE
H&P reviewed  After examining the patient I find no changes in the patients condition since the H&P had been written      Vitals:    03/10/23 0654   BP: 131/85   Pulse: 73   Resp: 19   Temp: 97 6 °F (36 4 °C)   SpO2: 96%

## 2023-03-10 NOTE — ANESTHESIA PROCEDURE NOTES
Peripheral Block    Patient location during procedure: holding area  Start time: 3/10/2023 7:38 AM  Reason for block: at surgeon's request and post-op pain management  Staffing  Performed: Anesthesiologist   Anesthesiologist: Karla Lugo MD  Preanesthetic Checklist  Completed: patient identified, IV checked, site marked, risks and benefits discussed, surgical consent, monitors and equipment checked, pre-op evaluation and timeout performed  Peripheral Block  Patient position: supine  Prep: ChloraPrep  Patient monitoring: continuous pulse ox and frequent blood pressure checks  Block type: adductor canal block  Laterality: left  Injection technique: single-shot  Procedures: ultrasound guided, Ultrasound guidance required for the procedure to increase accuracy and safety of medication placement and decrease risk of complications    Needle  Needle localization: anatomical landmarks and ultrasound guidance  Test dose: negative  Assessment  Injection assessment: incremental injection, local visualized surrounding nerve on ultrasound, no paresthesia on injection and negative aspiration for heme  Paresthesia pain: none  Heart rate change: no  Slow fractionated injection: yes  Post-procedure:  site cleaned  patient tolerated the procedure well with no immediate complications

## 2023-03-10 NOTE — ANESTHESIA PROCEDURE NOTES
Peripheral Block    Patient location during procedure: holding area  Start time: 3/10/2023 7:35 AM  Reason for block: at surgeon's request and post-op pain management  Staffing  Performed: Anesthesiologist   Anesthesiologist: Elena Reed MD  Preanesthetic Checklist  Completed: patient identified, IV checked, site marked, risks and benefits discussed, surgical consent, monitors and equipment checked, pre-op evaluation and timeout performed  Peripheral Block  Patient position: supine  Prep: ChloraPrep  Patient monitoring: continuous pulse ox and frequent blood pressure checks  Block type: popliteal  Laterality: left  Injection technique: single-shot  Procedures: ultrasound guided, Ultrasound guidance required for the procedure to increase accuracy and safety of medication placement and decrease risk of complications    Needle  Needle localization: ultrasound guidance and anatomical landmarks  Test dose: negative  Assessment  Injection assessment: incremental injection, local visualized surrounding nerve on ultrasound, negative aspiration for heme and no paresthesia on injection  Paresthesia pain: none  Heart rate change: no  Slow fractionated injection: yes  Post-procedure:  site cleaned  patient tolerated the procedure well with no immediate complications

## 2023-03-10 NOTE — ANESTHESIA POSTPROCEDURE EVALUATION
Post-Op Assessment Note    CV Status:  Stable  Pain Score: 0    Pain management: adequate     Mental Status:  Alert and awake   Hydration Status:  Euvolemic   PONV Controlled:  Controlled   Airway Patency:  Patent      Post Op Vitals Reviewed: Yes      Staff: Anesthesiologist, CRNA         There were no known notable events for this encounter      BP   127/65   Temp   97 7   Pulse  75   Resp   17   SpO2   95

## 2023-03-10 NOTE — DISCHARGE INSTR - AVS FIRST PAGE
Dr Moraima Carr Instructions    Pain / Swelling  There is expected to be some discomfort, swelling and bruising of the foot  You might see some blood on the bandage  This is not a cause for alarm  However, if there is active or persistent bleeding (blood running out of the bandage while at rest) - call the office at once  Apply an ice bag to behind left knee for 30 minutes for each waking hour, for the first 72 hours  This should be discontinued when sleeping  This will also work through your cast if you have one  Ice must not leak and wet the dressings  Also, using the ice inappropriately can cause permanent nerve damage and frostbite  Your foot should be elevated as much as possible for the first 72 hours  The foot should be above heart level  If your foot is below heart level, throbbing and pain will increase  When sleeping, elevation can be accomplished by putting a small hard suitcase between the box spring and mattress at the foot of the bed  Walking and standing will increase pain, throbbing and bleeding  Persistent pain despite elevation and your pain meds can many times be relieved by removing the tight brown compression layer (called the ACE wrap) that is over the white gauze dressing  If you are elevating and taking your pain meds and pain is still severe, remove this brown stretchy layer but leave the gauze intact  Wait 30 minutes  If the pain subsides, reapply the ACE so it’s not so tight  If pain doesn’t get better, call your doctor  Dressings / Casts  Do not remove your surgical dressings - they will be changed at your doctor appointment  Do not allow surgical dressings to get wet  Sponge baths should be used until the sutures are removed  Do not try to keep the foot dry using a garbage bag and tape - this rarely works  If you get your dressings or cast wet - call your doctor immediately  If your cast or dressings feel tight - elevate your foot for 30 minutes   If this doesn’t help and you feel tingling or see toe discoloration - call your doctor  Do not put things in your cast such as powder, coat hangers to scratch, etc  This can cause skin damage and infections  Infection  If you have a fever at or above 100 degrees, chills, sweats, or notice red streaks rising above the dressing or smell odor / see pus (creamy white drainage), call your doctor immediately  Constipation  If you have severe constipation after surgery, this can be due to the pain medication  Notify your doctor and special medication will be prescribed to deal with this  Blood Clots  If you had surgery and are in a cast, have an external fixation device, or are non-weightbearing using crutches, a knee scooter, a wheelchair, a walker, or an iWalk device - you need to be on a blood thinner  Your doctor will prescribe one of the regimens below  If you run out of the blood thinner checked off below before you are walking normally on your foot and out of your cast - notify your doctor immediately so you can get a refill  Not doing so can lead to blood clots and serious complications including death  Aspirin 325mg daily  Numbness  It is normal for your foot to be numb until about dinner time  If you’ve had a popliteal block procedure, you might be numb until the following day  When you start to feel pins and needles in the foot - this means the block is wearing off  That is the appropriate time to take your pain medication  Pain Medication  Take your post-operative pain medication as directed by your surgeon  Do not supplement your pain medication with over the counter drugs, old leftover pain medications, or extra Tylenol  You must discuss any additional medications with your doctor prior to taking them for pain  Driving  No driving is allowed without discussion with the doctor  Ambulation  Nonweightbearing to surgical foot  If given a flat, stiff shoe / darco wedge shoe, Do not walk at all without it    Use a device (cane, walker, crutches) to take some weight off of the foot when walking  If instructed not to put weight on the surgical foot, use the following:  Crutches     Putting weight on the foot will lead to complications

## 2023-03-10 NOTE — OP NOTE
OPERATIVE REPORT - Podiatry  PATIENT NAME: Raya Martinez    :  1975  MRN: [id number]  Pt Location: [facility] OR ROOM 02    SURGERY DATE: 3/10/2023    Surgeon(s) and Role:     * Almita Simmons DPM - Primary     * Ruthy Yarbrough DPM - Assisting    Pre-op Diagnosis:  Syndesmotic disruption of left ankle, subsequent encounter [S93 432D]  Pain in left ankle and joints of left foot [M25 572]    Post-Op Diagnosis Codes: * Syndesmotic disruption of left ankle, subsequent encounter [S93 432D]     * Pain in left ankle and joints of left foot [M25 572]    Procedure(s) (LRB):  ANKLE LIGAMENT REPAIR OF SYNDESMOTIC LIGAMENT with steroid injection of posterior heel bursa (Left)    Specimen(s):  * No specimens in log *    Estimated Blood Loss:   Minimal    Drains:  * No LDAs found *    Anesthesia Type:   General w/ Popliteal Block   Hemostasis:  Pneumatic thigh tourniquet, anatomic dissection, electrocautery    Materials:  Implant Name Type Inv  Item Serial No   Lot No  LRB No  Used Action   KIT SYNDESMOSIS TIGHTROPE XP - [id number]  KIT SYNDESMOSIS TIGHTROPE XP  [employer] [id number] Left 1 Implanted   KIT SYNDESMOSIS TIGHTROPE XP - [id number]  KIT SYNDESMOSIS TIGHTROPE XP  [employer] [id number] Left 1 Implanted   Arthrex Locking Third Tubular Plate    [employer]  Left 1 Implanted   3 5MM Locking Low Profile Screw SS 12mm      Left 2 Implanted     Vicryl  Nylon    Operative Findings:  Consistent with diagnosis  Post-operative stress test negative  Complications:   None    Procedure and Technique:     Under mild sedation, the patient was brought into the operating room and placed on the operating room table in the supine position  IV sedation was achieved by anesthesia team and a universal timeout was performed where all parties are in agreement of correct patient, correct procedure and correct site  A pneumatic tourniquet was then placed over the patient's left lower extremity with ample padding   The foot was then prepped and draped in the usual aseptic manner  An esmarch bandage was used to exsangunate the foot and the pneumatic tourniquet was then inflated to 300mmHg  Attention was directed to the lateral lateral left ankle  Utilizing a 15 blade an incision was made through skin and subcutaneous tissue centrally along the distal fibula  Dissection was then carried down to the level of periosteum utilizing Metzenbaum scissors  The periosteum was then incised centrally and freed from the fibula utilizing a key elevator  At this time a 4-hole Arthrex straight plate was temporarily fixated with BB tacks to the fibula  Proper plate placement was confirmed on fluoroscopy in multiple planes  In the 2 central holes, Arthrex tight ropes (see implants) were then placed with proper technique and per manufacture guidance using fluoroscopic guidance  The most proximal and most distal hole were then drilled, measured and filled with locking screws (see implants) under fluoroscopic guidance  The left ankle was then stressed under fluoroscopy and noted to be stable and reduced  Proper hardware placement was confirmed on C arm as well  The surgical incision was irrigated with copious amounts of normal sterile saline  The periosteal and capsular structures were reapproximated using Vicryl  Subcutaneous closure was obtained utilizing Vicryl  Skin edges were reapproximated and closure was obtained utilizing Nylon  The foot was then cleansed and dried  An injection into the retrocalcaneal bursa consisting of 1 cc of Depo-Medrol and 2 cc of 0 5% bupivacaine plain was performed  The incision site was dressed with Xeroform and a dry sterile dressing with well padded posterior fiberglass splint  The tourniquet was deflated and normal hyperemic flush was noted to all digits  The patient tolerated the procedure and anesthesia well and was transported to the PACU with vital signs stable       Dr Lieutenant Pinedo was present during the entire procedure and participated in all key aspects  SIGNATURE: Ken Sanders DPM  DATE: March 10, 2023  TIME: 9:05 AM      Portions of the record may have been created with voice recognition software  Occasional wrong word or "sound a like" substitutions may have occurred due to the inherent limitations of voice recognition software  Read the chart carefully and recognize, using context, where substitutions have occurred

## 2023-03-13 ENCOUNTER — OFFICE VISIT (OUTPATIENT)
Dept: UROLOGY | Facility: AMBULATORY SURGERY CENTER | Age: 48
End: 2023-03-13

## 2023-03-13 VITALS — BODY MASS INDEX: 34.16 KG/M2 | WEIGHT: 205 LBS | HEIGHT: 65 IN

## 2023-03-13 DIAGNOSIS — N48.1 BALANITIS: Primary | ICD-10-CM

## 2023-03-13 LAB
POST-VOID RESIDUAL VOLUME, ML POC: 46 ML
SL AMB  POCT GLUCOSE, UA: + 2
SL AMB LEUKOCYTE ESTERASE,UA: ABNORMAL
SL AMB POCT BILIRUBIN,UA: ABNORMAL
SL AMB POCT BLOOD,UA: ABNORMAL
SL AMB POCT CLARITY,UA: CLEAR
SL AMB POCT COLOR,UA: YELLOW
SL AMB POCT KETONES,UA: ABNORMAL
SL AMB POCT NITRITE,UA: ABNORMAL
SL AMB POCT PH,UA: 5
SL AMB POCT SPECIFIC GRAVITY,UA: 1.01
SL AMB POCT URINE PROTEIN: ABNORMAL
SL AMB POCT UROBILINOGEN: 0.2

## 2023-03-13 RX ORDER — NYSTATIN AND TRIAMCINOLONE ACETONIDE 100000; 1 [USP'U]/G; MG/G
OINTMENT TOPICAL 2 TIMES DAILY
Qty: 30 G | Refills: 3 | Status: SHIPPED | OUTPATIENT
Start: 2023-03-13

## 2023-03-13 RX ORDER — NYSTATIN AND TRIAMCINOLONE ACETONIDE 100000; 1 [USP'U]/G; MG/G
OINTMENT TOPICAL 2 TIMES DAILY
Qty: 30 G | Refills: 0 | Status: SHIPPED | OUTPATIENT
Start: 2023-03-13 | End: 2023-03-13

## 2023-03-13 NOTE — PROGRESS NOTES
3/13/2023      No chief complaint on file  Assessment and Plan    52 y o  male managed by new patient  1   Balanitis  · Penile/scrotal evaluation reveals uncircumcised penis with significant redness, irritation noted  Mild phimosis noted  Tighter fitting foreskin around glans upon retraction  Lateral scrotum and testicles unremarkable  · Prescription for Mycolog II ointment provided  · We discussed if continued presence and difficulty with foreskin may require circumcision  · We discussed the need to gain better control of glucose levels  Most recent hemoglobin A1c resulted 9 4  · Follow-up in the office in 4 weeks        History of Present Illness  Lety Rodgers is a 52 y o  male here for follow up evaluation of burning and irritation to glans of penis  Patient reports onset of symptoms approximate 5 weeks ago  He reports difficulty/irritation with retraction of foreskin, redness, irritation to glans and foreskin  He denies fever and chills  He denies all lower urinary tract symptoms including urinary tract infection  He reports his PCP prescribing clotrimazole which caused additional irritation and tightness of his foreskin  Past medical history includes recently diagnosed diabetes with most recent hemoglobin A1c at 9 4  Review of Systems   Constitutional: Negative for chills and fever  Respiratory: Negative for cough and shortness of breath  Cardiovascular: Negative for chest pain  Gastrointestinal: Negative for abdominal distention, abdominal pain, blood in stool, nausea and vomiting  Genitourinary: Positive for penile pain  Negative for difficulty urinating, dysuria, enuresis, flank pain, frequency, hematuria and urgency  Skin: Negative for rash  Urinary Incontinence Screening    Flowsheet Row Most Recent Value   Urinary Incontinence    Urinary Incontinence? No   Incomplete emptying? No   Urinary frequency? Yes   Urinary urgency? Yes   Urinary hesitancy?  No Dysuria (painful difficult urination)? No   Nocturia (waking up to use the bathroom)? Yes  [4 times]   Straining (having to push to go)? No   Weak stream? No   Intermittent stream? Yes  [sometimes]   Post void dribbling? Yes                 Past Medical History  Past Medical History:   Diagnosis Date   • Cancer ([facility] )     colon   • Diabetes mellitus ([facility] )    • GERD (gastroesophageal reflux disease)    • Hyperlipidemia    • Post concussion syndrome 05/09/2017   • Sleep apnea     "mild"   • Traumatic brain injury 2017    R/S 2017       Past Social History  Past Surgical History:   Procedure Laterality Date   • APPENDECTOMY     • COLON SURGERY       Social History     Tobacco Use   Smoking Status Never   Smokeless Tobacco Never       Past Family History  Family History   Problem Relation Age of Onset   • Coronary artery disease Mother    • Diabetes Mother    • Hypertension Mother    • Hyperlipidemia Mother    • Coronary artery disease Father    • Diabetes Father    • Hypertension Father    • Hyperlipidemia Father    • Heart attack Father    • Anemia Brother    • No Known Problems Son    • No Known Problems Son    • No Known Problems Son        Past Social history  Social History     Socioeconomic History   • Marital status: Single     Spouse name: Not on file   • Number of children: Not on file   • Years of education: Not on file   • Highest education level: Not on file   Occupational History   • Not on file   Tobacco Use   • Smoking status: Never   • Smokeless tobacco: Never   Vaping Use   • Vaping Use: Never used   Substance and Sexual Activity   • Alcohol use:  Yes     Alcohol/week: 1 0 standard drink     Types: 1 Cans of beer per week     Comment: occac   • Drug use: No   • Sexual activity: Yes     Partners: Female     Birth control/protection: None   Other Topics Concern   • Not on file   Social History Narrative   • Not on file     Social Determinants of Health     Financial Resource Strain: Not on file   Food Insecurity: Not on file   Transportation Needs: Not on file   Physical Activity: Not on file   Stress: Not on file   Social Connections: Not on file   Intimate Partner Violence: Not on file   Housing Stability: Not on file       Current Medications  Current Outpatient Medications   Medication Sig Dispense Refill   • aspirin 325 mg tablet Take 1 tablet (325 mg total) by mouth daily 30 tablet 0   • atorvastatin (LIPITOR) 20 mg tablet Take 1 tablet (20 mg total) by mouth every evening 90 tablet 1   • Continuous Blood Gluc  (FreeStyle Dell 2 [place]) EV Use 1 Units continuous 1 each 0   • Continuous Blood Gluc Sensor (FreeStyle Dell 2 Sensor) MISC Use 1 Units every 14 (fourteen) days 2 each 5   • Empagliflozin (Jardiance) 25 MG TABS Take 1 tablet (25 mg total) by mouth daily 90 tablet 1   • ibuprofen (MOTRIN) 200 mg tablet Take 600 mg by mouth every 6 (six) hours as needed for mild pain or moderate pain     • Insulin Glargine Solostar (Lantus SoloStar) 100 UNIT/ML SOPN Inject 0 2 mL (20 Units total) under the skin daily at bedtime 15 mL 0   • Insulin Pen Needle (BD Pen Needle Nancy U/F) 32G X 4 MM MISC Use daily daily bedtime 100 each 0   • metFORMIN (GLUCOPHAGE-XR) 500 mg 24 hr tablet Take 1 tablet (500 mg total) by mouth 2 (two) times a day with meals (Patient taking differently: Take 1,000 mg by mouth daily with breakfast) 180 tablet 1   • nystatin-triamcinolone (MYCOLOG-II) ointment Apply topically 2 (two) times a day 30 g 3   • oxyCODONE-acetaminophen (Percocet) 5-325 mg per tablet Take 1 tablet by mouth every 6 (six) hours as needed for moderate pain for up to 10 days Max Daily Amount: 4 tablets 12 tablet 0   • pantoprazole (PROTONIX) 40 mg tablet Take 1 tablet (40 mg total) by mouth daily 90 tablet 1   • tadalafil (CIALIS) 10 MG tablet TAKE ONE TABLET BY MOUTH EVERY DAY AS NEEDED FOR ED 20 tablet 1   • cholestyramine sugar free (QUESTRAN LIGHT) 4 g packet Take 1 packet (4 g total) by mouth daily (Patient not taking: Reported on 3/13/2023) 30 packet 3   • ondansetron (ZOFRAN) 4 mg tablet Take 1 tablet (4 mg total) by mouth every 6 (six) hours (Patient not taking: Reported on 3/13/2023) 12 tablet 0     No current facility-administered medications for this visit  Allergies  Allergies   Allergen Reactions   • Asa [Aspirin] GI Intolerance   • Penicillin G Other (See Comments)     Per pt, "unsure; was a baby"   • Penicillins          The following portions of the patient's history were reviewed and updated as appropriate: allergies, current medications, past medical history, past social history, past surgical history and problem list       Vitals  Vitals:    03/13/23 1322   Weight: 93 kg (205 lb)   Height: 5' 5" (1 651 m)           Physical Exam  Physical Exam  Vitals reviewed  Constitutional:       General: He is not in acute distress  Appearance: Normal appearance  He is normal weight  HENT:      Head: Normocephalic  Pulmonary:      Effort: No respiratory distress  Breath sounds: Normal breath sounds  Abdominal:      Hernia: There is no hernia in the left inguinal area or right inguinal area  Genitourinary:     Penis: Uncircumcised  Phimosis, erythema and tenderness present  Testes: Normal          Right: Mass, tenderness or swelling not present  Left: Mass, tenderness or swelling not present  Epididymis:      Right: Normal       Left: Normal    Lymphadenopathy:      Lower Body: No right inguinal adenopathy  No left inguinal adenopathy  Skin:     General: Skin is warm and dry  Neurological:      General: No focal deficit present  Mental Status: He is alert and oriented to person, place, and time     Psychiatric:         Mood and Affect: Mood normal          Behavior: Behavior normal        Results  Recent Results (from the past 1 hour(s))   POCT urine dip    Collection Time: 03/13/23  1:28 PM   Result Value Ref Range    LEUKOCYTE ESTERASE,UA -     NITRITE,UA - SL AMB POCT UROBILINOGEN 0 2     POCT URINE PROTEIN -      PH,UA 5 0     BLOOD,UA -     SPECIFIC GRAVITY,UA 1 010     KETONES,UA small     BILIRUBIN,UA trace     GLUCOSE, UA + 2      COLOR,UA yellow     CLARITY,UA clear    POCT Measure PVR    Collection Time: 03/13/23  1:29 PM   Result Value Ref Range    POST-VOID RESIDUAL VOLUME, ML POC 46 mL   ]  No results found for: PSA  Lab Results   Component Value Date    GLUCOSE 126 12/13/2014    CALCIUM 9 6 03/09/2023     12/13/2014    K 3 6 03/09/2023    CO2 27 03/09/2023     03/09/2023    BUN 16 03/09/2023    CREATININE 0 97 03/09/2023     Lab Results   Component Value Date    WBC 6 28 03/09/2023    HGB 16 0 03/09/2023    HCT 48 0 03/09/2023    MCV 87 03/09/2023     03/09/2023     Orders  Orders Placed This Encounter   Procedures   • POCT urine dip   • POCT Measure PVR       NICKIE Cheek

## 2023-03-13 NOTE — PATIENT INSTRUCTIONS
Use Mycolog II ointment 2 times a day   Re-evaluate in the office in 3-4 weeks   Continue to control blood glucose levels

## 2023-03-14 ENCOUNTER — OFFICE VISIT (OUTPATIENT)
Dept: PODIATRY | Facility: CLINIC | Age: 48
End: 2023-03-14

## 2023-03-14 VITALS
HEART RATE: 87 BPM | BODY MASS INDEX: 34.11 KG/M2 | DIASTOLIC BLOOD PRESSURE: 93 MMHG | HEIGHT: 65 IN | SYSTOLIC BLOOD PRESSURE: 133 MMHG | OXYGEN SATURATION: 96 %

## 2023-03-14 DIAGNOSIS — S93.432D SYNDESMOTIC DISRUPTION OF ANKLE, LEFT, SUBSEQUENT ENCOUNTER: Primary | ICD-10-CM

## 2023-03-14 NOTE — PROGRESS NOTES
Assessment/Plan:     The patient's clinical examination today reveals a well coapted surgical incision over the lateral aspect of the patient's left ankle  The sutures are intact and without any signs of infection    A sterile redress was performed  The patient was also fitted and dispensed a cam boot for continued offloading of the postsurgical site  He will maintain nonweightbearing status for another 2 weeks, recommend follow-up in 1 week for dressing change  Diagnoses and all orders for this visit:    Syndesmotic disruption of ankle, left, subsequent encounter  -     Cam Boot          Subjective:     Patient ID: Niurka Mckay is a 52 y o  male  The patient presents today for follow-up status post repair of the left syndesmotic ligament of the left ankle  He has been doing fairly well from a pain standpoint  He does still note some mild to moderate pain today in the left ankle operative site  Review of Systems   Constitutional: Negative  HENT: Negative  Eyes: Negative  Respiratory: Negative  Cardiovascular: Negative  Endocrine: Negative  Musculoskeletal: Negative  Neurological: Negative  Hematological: Negative  Psychiatric/Behavioral: Negative  Objective:     Physical Exam  Vitals reviewed  Constitutional:       Appearance: Normal appearance  HENT:      Head: Normocephalic and atraumatic  Nose: Nose normal    Eyes:      Conjunctiva/sclera: Conjunctivae normal       Pupils: Pupils are equal, round, and reactive to light  Cardiovascular:      Pulses:           Dorsalis pedis pulses are 2+ on the left side  Posterior tibial pulses are 2+ on the left side  Pulmonary:      Effort: Pulmonary effort is normal    Feet:      Comments:  The incision line over the lateral left ankle is well coapted with sutures intact; there are no signs of infection noted; there is mild edema to the lateral ankle commensurate with the procedure performed there is no erythema nor ecchymosis nor calor  Skin:     General: Skin is warm  Capillary Refill: Capillary refill takes less than 2 seconds  Neurological:      General: No focal deficit present  Mental Status: He is alert and oriented to person, place, and time  Psychiatric:         Mood and Affect: Mood normal          Behavior: Behavior normal          Thought Content:  Thought content normal

## 2023-03-18 ENCOUNTER — NURSE TRIAGE (OUTPATIENT)
Dept: OTHER | Facility: OTHER | Age: 48
End: 2023-03-18

## 2023-03-18 NOTE — TELEPHONE ENCOUNTER
Regarding: post op pain  ----- Message from Yarelis Lozano sent at 3/18/2023  1:59 PM EDT -----  " I had surgery on 3/10/23   I am having severe pain and Ibuprofen is not helping "

## 2023-03-18 NOTE — TELEPHONE ENCOUNTER
Patient states he "over did it" on his ankle today and is asking for more narcotic pain medication  Advised patient to take OTC medications or seek care in an ED or Urgent Care Center for medications  Reason for Disposition  • Prescription refill request for a CONTROLLED substance (e g , narcotics, ADHD medicines)    Answer Assessment - Initial Assessment Questions  Can I have more prescription medications?     Protocols used: MEDICATION QUESTION CALL-ADULT-

## 2023-03-20 DIAGNOSIS — S93.432D ANKLE SYNDESMOSIS DISRUPTION, LEFT, SUBSEQUENT ENCOUNTER: Primary | ICD-10-CM

## 2023-03-20 DIAGNOSIS — M25.572 ACUTE LEFT ANKLE PAIN: ICD-10-CM

## 2023-03-20 DIAGNOSIS — G89.18 ACUTE POST-OPERATIVE PAIN: ICD-10-CM

## 2023-03-20 RX ORDER — TRAMADOL HYDROCHLORIDE 50 MG/1
50 TABLET ORAL EVERY 8 HOURS PRN
Qty: 9 TABLET | Refills: 0 | Status: SHIPPED | OUTPATIENT
Start: 2023-03-20

## 2023-03-20 NOTE — PROGRESS NOTES
Patient notified office regarding left ankle post-op pain and he is out of post-op pain medication  OTC NSAID therapy and Tylenol are not helping  [place] PDMP was reviewed and is consistent  Tramadol 50 mg #9 tabs, 1 tab PO q 8 hours PRN left ankle post-op pain was sent to his pharmacy

## 2023-03-21 ENCOUNTER — HOSPITAL ENCOUNTER (OUTPATIENT)
Dept: RADIOLOGY | Facility: HOSPITAL | Age: 48
Discharge: HOME | End: 2023-03-21
Attending: PODIATRIST

## 2023-03-21 ENCOUNTER — CONSULT (OUTPATIENT)
Dept: SURGERY | Facility: CLINIC | Age: 48
End: 2023-03-21

## 2023-03-21 ENCOUNTER — OFFICE VISIT (OUTPATIENT)
Dept: PODIATRY | Facility: CLINIC | Age: 48
End: 2023-03-21

## 2023-03-21 ENCOUNTER — PREP FOR PROCEDURE (OUTPATIENT)
Dept: SURGERY | Facility: CLINIC | Age: 48
End: 2023-03-21

## 2023-03-21 VITALS
SYSTOLIC BLOOD PRESSURE: 139 MMHG | BODY MASS INDEX: 34.11 KG/M2 | OXYGEN SATURATION: 97 % | HEART RATE: 98 BPM | HEIGHT: 65 IN | DIASTOLIC BLOOD PRESSURE: 90 MMHG

## 2023-03-21 VITALS
BODY MASS INDEX: 32.99 KG/M2 | SYSTOLIC BLOOD PRESSURE: 142 MMHG | HEIGHT: 65 IN | WEIGHT: 198 LBS | DIASTOLIC BLOOD PRESSURE: 83 MMHG | HEART RATE: 98 BPM

## 2023-03-21 DIAGNOSIS — S93.432D ANKLE SYNDESMOSIS DISRUPTION, LEFT, SUBSEQUENT ENCOUNTER: Primary | ICD-10-CM

## 2023-03-21 DIAGNOSIS — K80.20 CALCULUS OF GALLBLADDER WITHOUT CHOLECYSTITIS WITHOUT OBSTRUCTION: ICD-10-CM

## 2023-03-21 DIAGNOSIS — K80.10 CALCULOUS CHOLECYSTITIS: Primary | ICD-10-CM

## 2023-03-21 DIAGNOSIS — S93.432D ANKLE SYNDESMOSIS DISRUPTION, LEFT, SUBSEQUENT ENCOUNTER: ICD-10-CM

## 2023-03-21 DIAGNOSIS — R10.9 ABDOMINAL PAIN: Primary | ICD-10-CM

## 2023-03-21 DIAGNOSIS — N48.1 BALANITIS: ICD-10-CM

## 2023-03-21 NOTE — PROGRESS NOTES
Assessment/Plan: Patient presents with epigastric and upper abdominal pains  This has been present over the last 6 months  Ultrasound of the abdominal wall is unremarkable  CAT scan of the abdomen pelvis reveals cholelithiasis  He also complains of bloating, gas and belching, abdominal distention as well as chronic diarrhea  Is notable that he is an insulin-dependent diabetic  Peptic ulcer risk factors include stress, Advil and Motrin use secondary to left ankle fracture  He denies alcohol excess, smoking or aspirin use  Patient is due for upper and lower endoscopy in April 10  I asked him to resume Protonix daily  Initially I did not believe that his pain was related to gallstones as he has tenderness mostly in the epigastric region  However, he states that he has intermittent more severe episodes that are also associated in the right upper quadrant  In addition he is an insulin-dependent diabetic which increases his risk for biliary complications  We scheduled today for laparoscopic cholecystectomy after his upper endoscopy  If a significant ulcer is found, we could delay lap Damaris  If no significant ulcers noted  Then laparoscopic cholecystectomy would be indicated  All questions answered  Diabetes diet and exercise discussed  Diagnoses and all orders for this visit:    Balanitis  -     Ambulatory Referral to General Surgery    Calculus of gallbladder without cholecystitis without obstruction  -     Ambulatory Referral to General Surgery        Subjective:      Patient ID: Stephie Hinton is a 52 y o  male  Patient presents for gallbladder consult  States he has had episodes of RUQ pain for 6 to 7 months  CT abdomen pelvis 3/9/2023: GALLBLADDER:  There are gallstone(s) within the gallbladder, without pericholecystic inflammatory changes  Patient presents for possible ventral hernia consult    States he has had a bulge on his upper abdomen with coughing and lifting for over one year   Ultrasound abdominal wall 3/7/2023   TECHNIQUE:   Real-time ultrasound of the right upper anterior abdominal wall in the region of reported abnormality was performed with a curvilinear transducer with both volumetric sweeps and still imaging techniques  FINDINGS:  No abnormality identified throughout the imaged region of concern  IMPRESSION:  Normal     Abdominal Pain  The current episode started more than 1 month ago  The problem occurs intermittently  The problem has been unchanged  The pain is located in the RUQ  The quality of the pain is aching  The abdominal pain does not radiate  Associated symptoms include belching, diarrhea and flatus  Nothing aggravates the pain  The pain is relieved by nothing  He has tried antacids for the symptoms  The treatment provided no relief  Prior diagnostic workup includes CT scan  His past medical history is significant for gallstones  The following portions of the patient's history were reviewed and updated as appropriate:     He  has a past medical history of Cancer ([facility] [address] ), Diabetes mellitus ([facility] [address] ), GERD (gastroesophageal reflux disease), Hyperlipidemia, Post concussion syndrome (05/09/2017), Sleep apnea, and Traumatic brain injury (2017)  He  has a past surgical history that includes Appendectomy; Colon surgery; and pr rpr primary disrupted ligament ankle collateral (Left, 3/10/2023)  His family history includes Anemia in his brother; Coronary artery disease in his father and mother; Diabetes in his father and mother; Heart attack in his father; Hyperlipidemia in his father and mother; Hypertension in his father and mother; No Known Problems in his son, son, and son  He  reports that he has never smoked  He has never used smokeless tobacco  He reports current alcohol use of about 1 0 standard drink per week  He reports that he does not use drugs    Current Outpatient Medications   Medication Sig Dispense Refill   • aspirin 325 mg tablet Take 1 tablet (325 mg total) by mouth daily (Patient not taking: Reported on 3/21/2023) 30 tablet 0   • atorvastatin (LIPITOR) 20 mg tablet Take 1 tablet (20 mg total) by mouth every evening 90 tablet 1   • cholestyramine sugar free (QUESTRAN LIGHT) 4 g packet Take 1 packet (4 g total) by mouth daily (Patient not taking: Reported on 3/13/2023) 30 packet 3   • Continuous Blood Gluc  (FreeStyle Matthew Knowles 2 [place]) EV Use 1 Units continuous 1 each 0   • Continuous Blood Gluc Sensor (FreeStyle Dell 2 Sensor) MISC Use 1 Units every 14 (fourteen) days 2 each 5   • Empagliflozin (Jardiance) 25 MG TABS Take 1 tablet (25 mg total) by mouth daily 90 tablet 1   • ibuprofen (MOTRIN) 200 mg tablet Take 600 mg by mouth every 6 (six) hours as needed for mild pain or moderate pain     • Insulin Glargine Solostar (Lantus SoloStar) 100 UNIT/ML SOPN Inject 0 2 mL (20 Units total) under the skin daily at bedtime 15 mL 0   • Insulin Pen Needle (BD Pen Needle Nancy U/F) 32G X 4 MM MISC Use daily daily bedtime 100 each 0   • metFORMIN (GLUCOPHAGE-XR) 500 mg 24 hr tablet Take 1 tablet (500 mg total) by mouth 2 (two) times a day with meals (Patient taking differently: Take 1,000 mg by mouth daily with breakfast) 180 tablet 1   • nystatin-triamcinolone (MYCOLOG-II) ointment Apply topically 2 (two) times a day 30 g 3   • ondansetron (ZOFRAN) 4 mg tablet Take 1 tablet (4 mg total) by mouth every 6 (six) hours (Patient not taking: Reported on 3/13/2023) 12 tablet 0   • pantoprazole (PROTONIX) 40 mg tablet Take 1 tablet (40 mg total) by mouth daily (Patient not taking: Reported on 3/14/2023) 90 tablet 1   • tadalafil (CIALIS) 10 MG tablet TAKE ONE TABLET BY MOUTH EVERY DAY AS NEEDED FOR ED 20 tablet 1   • traMADol (Ultram) 50 mg tablet Take 1 tablet (50 mg total) by mouth every 8 (eight) hours as needed for moderate pain 9 tablet 0     No current facility-administered medications for this visit       He is allergic to asa [aspirin], penicillin g, and penicillins       Review of Systems   Constitutional: Negative  Negative for activity change  HENT: Negative  Eyes: Negative  Respiratory: Negative  Cardiovascular: Negative  Gastrointestinal: Positive for abdominal distention, abdominal pain, diarrhea and flatus  Endocrine: Negative  Genitourinary: Negative  Musculoskeletal: Negative  Skin: Negative  Allergic/Immunologic: Negative  Neurological: Negative  Psychiatric/Behavioral: Negative for agitation, behavioral problems and confusion  The patient is not nervous/anxious  Objective:      /83   Pulse 98   Ht 5' 5" (1 651 m)   Wt 89 8 kg (198 lb)   BMI 32 95 kg/m²          Physical Exam  Constitutional:       Appearance: He is well-developed  He is not diaphoretic  HENT:      Head: Normocephalic and atraumatic  Eyes:      General: No scleral icterus  Right eye: No discharge  Left eye: No discharge  Extraocular Movements: Extraocular movements intact  Conjunctiva/sclera: Conjunctivae normal    Neck:      Thyroid: No thyromegaly  Trachea: No tracheal deviation  Cardiovascular:      Rate and Rhythm: Normal rate and regular rhythm  Heart sounds: Normal heart sounds  No murmur heard  No friction rub  Pulmonary:      Effort: Pulmonary effort is normal  No respiratory distress  Breath sounds: Normal breath sounds  No stridor  No wheezing  Chest:      Chest wall: No tenderness  Abdominal:      General: There is no distension  Palpations: There is no mass  Tenderness: There is abdominal tenderness in the epigastric area  There is no guarding or rebound  Musculoskeletal:         General: No tenderness  Right lower leg: No edema  Left lower leg: No edema  Lymphadenopathy:      Cervical: No cervical adenopathy  Skin:     General: Skin is warm and dry  Findings: No erythema or rash  Comments: Left ankle walking cast boot  Neurological:      Mental Status: He is alert and oriented to person, place, and time  Cranial Nerves: No cranial nerve deficit        Coordination: Coordination normal    Psychiatric:         Behavior: Behavior normal          Judgment: Judgment normal

## 2023-03-21 NOTE — PROGRESS NOTES
Assessment/Plan:     Repeat x-rays of the patient's left ankle taken today were personally reviewed and interpreted and shows intact hardware and stable alignment of the left ankle syndesmosis  Continue nonweightbearing to the left lower extremity for an additional week  Anticipate suture removal next week  A sterile redress was performed to the patient's left ankle today  Follow-up in 1 week  Diagnoses and all orders for this visit:    Ankle syndesmosis disruption, left, subsequent encounter  -     X-ray ankle left 3+ views; Future          Subjective:     Patient ID: Stephie Hinton is a 52 y o  male  The patient presents today for follow-up status post repair of a syndesmotic rupture of his left ankle  The patient did note some significant discomfort over the weekend  He states he noticed a "pop "to his posterior left ankle/heel but after the pop he states that his pain felt much better  He did request a refill of some postop pain medications  A prescription for tramadol was sent to his pharmacy yesterday  Review of Systems   Constitutional: Negative  HENT: Negative  Eyes: Negative  Respiratory: Negative  Cardiovascular: Negative  Endocrine: Negative  Musculoskeletal: Negative  Neurological: Negative  Hematological: Negative  Psychiatric/Behavioral: Negative  Objective:     Physical Exam  Vitals reviewed  Constitutional:       Appearance: Normal appearance  HENT:      Head: Normocephalic and atraumatic  Nose: Nose normal    Eyes:      Conjunctiva/sclera: Conjunctivae normal       Pupils: Pupils are equal, round, and reactive to light  Cardiovascular:      Pulses:           Dorsalis pedis pulses are 2+ on the left side  Posterior tibial pulses are 2+ on the left side  Pulmonary:      Effort: Pulmonary effort is normal    Feet:      Comments:  There is marked reduction in postoperative edema and erythema to the lateral left ankle compared to his prior visit; there is mild tenderness with palpation along the surgical site; passive range of motion of the left ankle is within normal limits  Skin:     General: Skin is warm  Capillary Refill: Capillary refill takes less than 2 seconds  Neurological:      General: No focal deficit present  Mental Status: He is alert and oriented to person, place, and time  Psychiatric:         Mood and Affect: Mood normal          Behavior: Behavior normal          Thought Content:  Thought content normal

## 2023-03-23 ENCOUNTER — TELEPHONE (OUTPATIENT)
Dept: PODIATRY | Facility: CLINIC | Age: 48
End: 2023-03-23

## 2023-03-23 NOTE — TELEPHONE ENCOUNTER
Caller: Nadiya Bautista    Doctor/Office: Dr Hugh Mae    CB#: [phone]    Escalation: Medication/said the Tramadol for pain was never sent to his [facility] in [place]  Please have Dr send and call pt back to let him know he can send someone to pick it up   Thanks

## 2023-03-28 ENCOUNTER — OFFICE VISIT (OUTPATIENT)
Dept: PODIATRY | Facility: CLINIC | Age: 48
End: 2023-03-28

## 2023-03-28 ENCOUNTER — TELEPHONE (OUTPATIENT)
Dept: DIABETES SERVICES | Facility: CLINIC | Age: 48
End: 2023-03-28

## 2023-03-28 ENCOUNTER — OFFICE VISIT (OUTPATIENT)
Dept: DIABETES SERVICES | Facility: CLINIC | Age: 48
End: 2023-03-28

## 2023-03-28 VITALS
SYSTOLIC BLOOD PRESSURE: 135 MMHG | HEART RATE: 82 BPM | OXYGEN SATURATION: 97 % | HEIGHT: 65 IN | BODY MASS INDEX: 34.11 KG/M2 | DIASTOLIC BLOOD PRESSURE: 90 MMHG

## 2023-03-28 VITALS — BODY MASS INDEX: 34.11 KG/M2 | WEIGHT: 205 LBS

## 2023-03-28 DIAGNOSIS — E11.9 TYPE 2 DIABETES MELLITUS WITHOUT COMPLICATION, WITHOUT LONG-TERM CURRENT USE OF INSULIN (HCC): Primary | ICD-10-CM

## 2023-03-28 DIAGNOSIS — S93.432D ANKLE SYNDESMOSIS DISRUPTION, LEFT, SUBSEQUENT ENCOUNTER: Primary | ICD-10-CM

## 2023-03-28 NOTE — PROGRESS NOTES
Type 2 Diabetes Class Assessment    HPI: Met with Tristan Vo for DSME Initial visit  Az Iglesias has Type 2 Diabetes  Diabetes Assessment  Visit Type: Initial visit  Present at Session: patient   Medical Diagnosis/ICD 10: E11 9 (ICD-10-CM) - Type 2 diabetes mellitus without complication, without long-term current use of insulin ([facility] [address] )  Special Learning Needs: No  Barriers to Learning: no barriers    How do you learn best? video  What are you most interested in learning about regards to your diabetes? How lifestyle influences diabetes mgt  How do you feel about making lifestyle changes at this time? Feels its a must  How would you rate your current knowledge of diabetes? poor  How confident are you that will be able to take better control of your diabetes?: fair    How long have you had diabetes? 2 1/2 - 3 month  Have you had diabetes education in the past?: No  Do you have any family members with diabetes?: Yes - mother, father, sisters  Do you monitor your blood sugar? no  Type of blood sugar monitor: OneTouch Verio Flex and FreeStyle Mikaelakem Sesay  How old is your meter?: new  How often do you test your blood sugars? :starting today  Do you keep a written record of your blood sugars? No   Blood sugar log with patient today and reviewed by educator?: No   Any financial concerns pertaining to your diabetes supplies, medication or care?: Yes - for supplies because not working  Have you ever experienced hypoglycemia?:  No  Have you ever been hospitalized or gone to the ER due to your blood sugars?: No  How do you treat low blood sugars?: unknown; educated  How do you treat high blood sugars?  Unknown; educated  Do you wear a Diabetes I D ?: no  Where do you dispose of your sharps (needles,lancetes)?: unknown; educated    Lab Results   Component Value Date    HGBA1C 9 4 (H) 02/13/2023    HGBA1C 6 9 (A) 10/26/2022    HGBA1C 6 3 03/29/2022       No results found for: CHOL  Lab Results   Component Value Date    HDL 35 (L) "02/13/2023    HDL 36 (L) 09/07/2021    HDL 37 (L) 07/17/2020     Lab Results   Component Value Date    [facility]  02/13/2023      Comment:      Calculated LDL invalid, triglycerides >400 mg/dl  This screening LDL is a calculated result  It does not have the accuracy of the Direct Measured LDL in the monitoring of patients with hyperlipidemia and/or statin therapy  Direct Measure LDL (DQQ428) must be ordered separately in these patients  [facility]  09/07/2021      Comment:         LDL cholesterol not calculated  Triglyceride levels  greater than 400 mg/dL invalidate calculated LDL results  Reference range: <100     Desirable range <100 mg/dL for primary prevention;    <70 mg/dL for patients with CHD or diabetic patients   with > or = 2 CHD risk factors  LDL-C is now calculated using the Alex-Puente   calculation, which is a validated novel method providing   better accuracy than the Friedewald equation in the   estimation of LDL-C  Taylor Cooper  [facility]0;113(82): [id number]   (http://[employer]/faq/MKD081)      LDLCALC 104 (H) 07/17/2020     Lab Results   Component Value Date    TRIG 454 (H) 02/13/2023    TRIG 455 (H) 09/07/2021    TRIG 310 (H) 07/17/2020     No results found for: CHOLHDL  No results found for: Fatemeh Varner    Body mass index is 34 78 kg/m²      Ht Readings from Last 1 Encounters:   03/21/23 5' 5\" (1 651 m)     Wt Readings from Last 1 Encounters:   03/28/23 94 8 kg (209 lb)     Weight Change: Yes 6 lbs unintentional weight gain in 1 week    Diet Assessment    Do you follow any special diet presently?: No, but smaller portions  Who cooks at home?:  patient  Who does the grocery shopping?: sister   How frequently do you eat out?: 1-2x per week    Activity Assessment    Exercise: none    Lifestyle/Social Assessment    Racial/ethnic group:                                       Primary Language: English  Marital Status: Single  Education Level: " Associate's Degree   Work status: Unemployed  Type of job and hours: n/a  Who lives in your household?: child (son, 15 yo)  Who is you primary support person(s): sibling(s)   Describe your quality of life currently?: fair  Any concerns for your safety?: No  Any Adventism or cultural practices that may affect your diabetes care: No  Do you have a decrease or loss of hearing?: Yes  Do you have a decrease or loss of vision?: Yes  When was the last time you had an ophthalmology exam?: 2 yrs ago; April 2021  When was the last time you had dental exam?: pre-covid; unknown  Do you check your feet for cracks, sores, debris?: No  When was the last time you had podiatry or foot exam?: March 2023  Last flu shot?: 8 yrs ago  Pneumonia shot?: No    The patient's history was reviewed and updated as appropriate: allergies, current medications  Intervention    Diabetes Overview :   Marton Cockayne was instructed on basic concepts of diabetes, including identifying role of diabetes self management, basic pathophysiology and types of diabetes, A1c and blood sugar targets  Marton Cockayne has good understanding of material covered  Taking Medications: Instructed patient on action, side effects, efficacy, prescribed dosage and appropriate timing and frequency of administration of his diabetes medication  Marton Cockayne has good understanding of material covered  Monitoring Blood Sugars  Instructions for Meter Teaching- Patient instructed in the following:  Site selection and skin preparation, Loading strips and lancet device, meter activation, obtaining blood sample, test strip and lancet disposal and recording log book entries  Patient has good understanding of material covered and was able to test their own blood sugar in office today  Comments: Gave and instructed on OneTouch Verio Flex meter, Patient demonstrated use, blood sugar in office 114 mg/dl  at 9:57 am     Lot #: [id number]  Exp Date: 6/30/23    Testing frequency: Encouraged pair testing  Test sugars before a meal and 2hr after the same meal, rotating between breakfast, lunch, and dinner  Test sugars twice a day (3 days a week, 7 days a week)  Goal Blood Sugars:   Premeal , even better <110  2hr after a meal <180, even better <140  A1C <7%, even better <6 5%  Hypoglycemia: Instructed patient on definition/risk of hypoglycemia, treatment, causes/symptoms, when to notify provider of lows, prevention of hypoglycemia and exercise precautions  Comments: Nicola Nixon understanding of hypoglycemia concepts      Physical Activity: Discussed benefits of physical activity to optimize blood glucose control, encouraged activity at patient is physically able  Always consult a physician prior to starting an exercise program   Comments: Nicola Nixon understanding of hypoglycemia concepts        Diabetes Education Record  Verlyn Bosworth received the following handouts: class folder      Patient response to instruction    Comprehensiongood  Motivationgood  Expected Compliancegood  Response to Teachback: 100%, demonstrated understanding    Begin Time: 9:20 am  End Time: 10:41 am  Referring Provider: Allyson Mcnair MD    Thank you for referring your patient to [address] [place] [place] [address], it was a pleasure working with them today  Please feel free to call with any questions or concerns      Tash Montez, RD  [address] [place] [id number] [phone]

## 2023-03-28 NOTE — PROGRESS NOTES
Assessment/Plan:     The patient's postoperative visit today feels a well coapted incision line to the lateral left ankle  His sutures were removed today without complication  There are no signs of infection noted  Continue guarded weightbearing in a cam boot for another 2 weeks  At that point we will attempt to transition the patient to a lace up brace with a sneaker  Follow-up in 2 weeks  Diagnoses and all orders for this visit:    Ankle syndesmosis disruption, left, subsequent encounter          Subjective:     Patient ID: Catarino Kaur is a 52 y o  male  The patient presents today for follow-up status post syndesmotic repair of the left ankle  He does note some mild improvement in terms of postoperative pain  He states his pain scale fluctuates between a 6 and 8 out of 10 on the pain scale  He is currently ambulating with a cam boot and crutches  Review of Systems   Constitutional: Negative  HENT: Negative  Eyes: Negative  Respiratory: Negative  Cardiovascular: Negative  Endocrine: Negative  Musculoskeletal: Negative  Skin: Negative  Neurological: Negative  Hematological: Negative  Psychiatric/Behavioral: Negative  Objective:     Physical Exam  Vitals reviewed  Constitutional:       Appearance: Normal appearance  HENT:      Head: Normocephalic and atraumatic  Nose: Nose normal    Eyes:      Conjunctiva/sclera: Conjunctivae normal       Pupils: Pupils are equal, round, and reactive to light  Cardiovascular:      Pulses:           Dorsalis pedis pulses are 2+ on the left side  Posterior tibial pulses are 2+ on the left side  Pulmonary:      Effort: Pulmonary effort is normal    Feet:      Comments:  The incision line to the left ankle is well coapted with all sutures intact; there is minimal edema of the left ankle; there are no signs of infection; there is no erythema nor ecchymosis to the left ankle  Skin:     General: Skin is warm       Capillary Refill: Capillary refill takes less than 2 seconds  Neurological:      General: No focal deficit present  Mental Status: He is alert and oriented to person, place, and time  Psychiatric:         Mood and Affect: Mood normal          Behavior: Behavior normal          Thought Content:  Thought content normal

## 2023-03-29 ENCOUNTER — HOSPITAL ENCOUNTER (OUTPATIENT)
Dept: RADIOLOGY | Facility: CLINIC | Age: 48
Discharge: HOME | End: 2023-03-29

## 2023-03-29 ENCOUNTER — TELEPHONE (OUTPATIENT)
Dept: PODIATRY | Facility: CLINIC | Age: 48
End: 2023-03-29

## 2023-03-29 ENCOUNTER — TELEPHONE (OUTPATIENT)
Dept: ENDOCRINOLOGY | Facility: CLINIC | Age: 48
End: 2023-03-29

## 2023-03-29 VITALS
DIASTOLIC BLOOD PRESSURE: 77 MMHG | SYSTOLIC BLOOD PRESSURE: 121 MMHG | RESPIRATION RATE: 20 BRPM | OXYGEN SATURATION: 95 % | HEART RATE: 78 BPM | TEMPERATURE: 98.7 F

## 2023-03-29 DIAGNOSIS — M46.1 SACROILIITIS (HCC): ICD-10-CM

## 2023-03-29 RX ORDER — METHYLPREDNISOLONE ACETATE 80 MG/ML
80 INJECTION, SUSPENSION INTRA-ARTICULAR; INTRALESIONAL; INTRAMUSCULAR; PARENTERAL; SOFT TISSUE ONCE
Status: COMPLETED | OUTPATIENT
Start: 2023-03-29 | End: 2023-03-29

## 2023-03-29 RX ORDER — BUPIVACAINE HCL/PF 2.5 MG/ML
7 VIAL (ML) INJECTION ONCE
Status: COMPLETED | OUTPATIENT
Start: 2023-03-29 | End: 2023-03-29

## 2023-03-29 RX ORDER — 0.9 % SODIUM CHLORIDE 0.9 %
4 VIAL (ML) INJECTION ONCE
Status: COMPLETED | OUTPATIENT
Start: 2023-03-29 | End: 2023-03-29

## 2023-03-29 RX ADMIN — METHYLPREDNISOLONE ACETATE 80 MG: 80 INJECTION, SUSPENSION INTRA-ARTICULAR; INTRALESIONAL; INTRAMUSCULAR; PARENTERAL; SOFT TISSUE at 09:50

## 2023-03-29 RX ADMIN — Medication 4 ML: at 09:49

## 2023-03-29 RX ADMIN — BUPIVACAINE HYDROCHLORIDE 7 ML: 2.5 INJECTION, SOLUTION EPIDURAL; INFILTRATION; INTRACAUDAL at 09:50

## 2023-03-29 RX ADMIN — IOHEXOL 2 ML: 300 INJECTION, SOLUTION INTRAVENOUS at 09:50

## 2023-03-29 NOTE — TELEPHONE ENCOUNTER
Caller: Patient    Doctor: Phong Khan    Reason for call:  Insurance will not pay for his medication w/o an authorization from the      Call back#: [phone]

## 2023-03-29 NOTE — DISCHARGE INSTRUCTIONS
Steroid Joint Injection   WHAT YOU NEED TO KNOW:   A steroid joint injection is a procedure to inject steroid medicine into a joint  Steroid medicine decreases pain and inflammation  The injection may also contain an anesthetic (numbing medicine) to decrease pain  It may be done to treat conditions such as arthritis, gout, or carpal tunnel syndrome  The injections may be given in your knee, ankle, shoulder, elbow, wrist, ankle or sacroiliac joint  Do not apply heat to any area that is numb  If you have discomfort or soreness at the injection site, you may apply ice today, 20 minutes on and 20 minutes off  Tomorrow you may use ice or warm, moist heat  Do not apply ice or heat directly to the skin  You may have an increase or change in the discomfort for 36-48 hours after your treatment  Apply ice and continue with any pain medicine you have been prescribed  Do not do anything strenuous today  You may shower, but no tub baths or hot tubs today  You may resume your normal activities tomorrow, but do not “overdo it”  Resume normal activities slowly when you are feeling better  If you experience redness, drainage or swelling at the injection site, or if you develop a fever above 100 degrees, please call [facility] at [phone] or go to the Emergency Room  Continue to take all routine medicines prescribed by your primary care physician unless otherwise instructed by our staff  Most blood thinners should be started again according to your regularly scheduled dosing  If you have any questions, please give our office a call  As no general anesthesia was used in today's procedure, you should not experience any side effects related to anesthesia  If you are diabetic, the steroids used in today's injection may temporarily increase your blood sugar levels after the first few days after your injection   Please keep a close eye on your sugars and alert the doctor who manages your diabetes if your sugars are significantly high from your baseline or you are symptomatic  If you have a problem specifically related to your procedure, please call our office at [phone]  Problems not related to your procedure should be directed to your primary care physician

## 2023-03-29 NOTE — TELEPHONE ENCOUNTER
One Touch Verio Flex was given to patient at [facility] ED      Please submit order for Test Strips and Lancets

## 2023-03-29 NOTE — H&P
"History of Present Illness:  The patient is a 52 y o  male who presents with complaints of lower back pain and is here today for bilateral sacroiliac joint injections    Past Medical History:   Diagnosis Date   • Cancer ([facility] )     colon   • Diabetes mellitus ([facility] )    • GERD (gastroesophageal reflux disease)    • Hyperlipidemia    • Post concussion syndrome 05/09/2017   • Sleep apnea     \"mild\"   • Traumatic brain injury 2017    R/S 2017       Past Surgical History:   Procedure Laterality Date   • APPENDECTOMY     • COLON SURGERY     • MT RPR PRIMARY DISRUPTED LIGAMENT ANKLE COLLATERAL Left 3/10/2023    Procedure: ANKLE LIGAMENT REPAIR OF SYNDESMOTIC LIGAMENT with steroid injection of posterior heel bursa;  Surgeon: Jarvis Shore DPM;  Location:  MAIN OR;  Service: Podiatry         Current Outpatient Medications:   •  aspirin 325 mg tablet, Take 1 tablet (325 mg total) by mouth daily (Patient not taking: Reported on 3/21/2023), Disp: 30 tablet, Rfl: 0  •  atorvastatin (LIPITOR) 20 mg tablet, Take 1 tablet (20 mg total) by mouth every evening, Disp: 90 tablet, Rfl: 1  •  cholestyramine sugar free (QUESTRAN LIGHT) 4 g packet, Take 1 packet (4 g total) by mouth daily (Patient not taking: Reported on 3/13/2023), Disp: 30 packet, Rfl: 3  •  Continuous Blood Gluc  (FreeStyle Benítez 2 [place]) EV, Use 1 Units continuous (Patient not taking: Reported on 3/28/2023), Disp: 1 each, Rfl: 0  •  Continuous Blood Gluc Sensor (FreeStyle Dell 2 Sensor) MISC, Use 1 Units every 14 (fourteen) days (Patient not taking: Reported on 3/28/2023), Disp: 2 each, Rfl: 5  •  Empagliflozin (Jardiance) 25 MG TABS, Take 1 tablet (25 mg total) by mouth daily, Disp: 90 tablet, Rfl: 1  •  ibuprofen (MOTRIN) 200 mg tablet, Take 600 mg by mouth every 6 (six) hours as needed for mild pain or moderate pain, Disp: , Rfl:   •  Insulin Glargine Solostar (Lantus SoloStar) 100 UNIT/ML SOPN, Inject 0 2 mL (20 Units total) under the skin daily at " "bedtime (Patient taking differently: Inject 20 Units under the skin daily at bedtime Takes 25 units daily   Patient reports forgetting to take sometimes), Disp: 15 mL, Rfl: 0  •  Insulin Pen Needle (BD Pen Needle Nancy U/F) 32G X 4 MM MISC, Use daily daily bedtime, Disp: 100 each, Rfl: 0  •  metFORMIN (GLUCOPHAGE-XR) 500 mg 24 hr tablet, Take 1 tablet (500 mg total) by mouth 2 (two) times a day with meals (Patient taking differently: Take 1,000 mg by mouth daily with breakfast Takes 2 tablets 2x per day), Disp: 180 tablet, Rfl: 1  •  nystatin-triamcinolone (MYCOLOG-II) ointment, Apply topically 2 (two) times a day, Disp: 30 g, Rfl: 3  •  ondansetron (ZOFRAN) 4 mg tablet, Take 1 tablet (4 mg total) by mouth every 6 (six) hours (Patient not taking: Reported on 3/13/2023), Disp: 12 tablet, Rfl: 0  •  pantoprazole (PROTONIX) 40 mg tablet, Take 1 tablet (40 mg total) by mouth daily (Patient not taking: Reported on 3/14/2023), Disp: 90 tablet, Rfl: 1  •  tadalafil (CIALIS) 10 MG tablet, TAKE ONE TABLET BY MOUTH EVERY DAY AS NEEDED FOR ED, Disp: 20 tablet, Rfl: 1  •  traMADol (Ultram) 50 mg tablet, Take 1 tablet (50 mg total) by mouth every 8 (eight) hours as needed for moderate pain, Disp: 9 tablet, Rfl: 0    Current Facility-Administered Medications:   •  bupivacaine (PF) (MARCAINE) 0 25 % injection 7 mL, 7 mL, Intra-articular, Once, Wesley Che MD  •  iohexol (OMNIPAQUE) 300 mg/mL injection 2 mL, 2 mL, Intra-articular, Once, Wesley Che MD  •  lidocaine (PF) (XYLOCAINE-MPF) 2 % injection 4 mL, 4 mL, Infiltration, Once, Wesley Che MD  •  methylPREDNISolone acetate (DEPO-MEDROL) injection 80 mg, 80 mg, Intra-articular, Once, Wesley Che MD  •  sodium chloride (PF) 0 9 % injection 4 mL, 4 mL, Infiltration, Once, Wesley Che MD    Allergies   Allergen Reactions   • Asa [Aspirin] GI Intolerance   • Penicillin G Other (See Comments)     Per pt, \"unsure; was a baby\"   • Penicillins        Physical Exam: " Vitals:    03/29/23 0929   BP: 129/86   Pulse: 85   Resp: 18   Temp: 98 7 °F (37 1 °C)   SpO2: 95%     General: Awake, Alert, Oriented x 3, Mood and affect appropriate  Respiratory: Respirations even and unlabored  Cardiovascular: Peripheral pulses intact; no edema  Musculoskeletal Exam: lower back pain    ASA Score: 2    Patient/Chart Verification  Patient ID Verified: Verbal  ID Band Applied: No  Consents Confirmed: Procedural  H&P( within 30 days) Verified: To be obtained in the Pre-Procedure area  Interval H&P(within 24 hr) Complete (required for Outpatients and Surgery Admit only): To be obtained in the Pre-Procedure area  Allergies Reviewed: Yes  Anticoag/NSAID held?: NA  Currently on antibiotics?: No  Pre-op Lab/Test Results Available: N/A    Assessment:   1   Sacroiliitis ([facility] )        Plan: Bilateral SI joint injections

## 2023-03-31 DIAGNOSIS — E11.65 TYPE 2 DIABETES MELLITUS WITH HYPERGLYCEMIA, WITH LONG-TERM CURRENT USE OF INSULIN (HCC): Primary | ICD-10-CM

## 2023-03-31 DIAGNOSIS — Z79.4 TYPE 2 DIABETES MELLITUS WITH HYPERGLYCEMIA, WITH LONG-TERM CURRENT USE OF INSULIN (HCC): Primary | ICD-10-CM

## 2023-04-05 ENCOUNTER — TELEPHONE (OUTPATIENT)
Dept: RADIOLOGY | Facility: MEDICAL CENTER | Age: 48
End: 2023-04-05

## 2023-04-05 NOTE — TELEPHONE ENCOUNTER
Patient Reports     minimal    %     improvement post injection    Pain Level  6 5 but walking in a boot  /10

## 2023-04-06 ENCOUNTER — OFFICE VISIT (OUTPATIENT)
Dept: DIABETES SERVICES | Facility: HOSPITAL | Age: 48
End: 2023-04-06

## 2023-04-06 DIAGNOSIS — Z79.4 TYPE 2 DIABETES MELLITUS WITH HYPERGLYCEMIA, WITH LONG-TERM CURRENT USE OF INSULIN (HCC): Primary | ICD-10-CM

## 2023-04-06 DIAGNOSIS — E11.65 TYPE 2 DIABETES MELLITUS WITH HYPERGLYCEMIA, WITH LONG-TERM CURRENT USE OF INSULIN (HCC): Primary | ICD-10-CM

## 2023-04-06 NOTE — PROGRESS NOTES
Living Well with Diabetes Group Class #1    Sandor Cramer attended the Living Well with Diabetes Group Class #1  Topics Covered in class today include: What is diabetes; Types of Diabetes; How Diabetes is diagnosed; Management skills; the role of exercise in blood sugar managements, Home glucose monitoring, and target ranges  Marton Cockayne participated in group activities    The patient's history was reviewed and updated as appropriate: allergies, current medications  Lab Results   Component Value Date    HGBA1C 9 4 (H) 02/13/2023       Diabetes Education Record  Marton Cockayne was provided Living Well with Diabetes Class #1 book      Patient response to instruction    Comprehensiongood  Motivationgood  Expected Compliancegood    Begin Time: 9:30  End Time: 11:30  Referring Provider: Laurie metcalf for referring your patient to [facility], it was a pleasure working with them today  Please feel free to call with any questions or concerns      Karl Kurtz, [address] [place] [address]  [address] [place] [address] 20  29 [facility] [id number]

## 2023-04-11 PROBLEM — S93.432D ANKLE SYNDESMOSIS DISRUPTION, LEFT, SUBSEQUENT ENCOUNTER: Status: ACTIVE | Noted: 2023-04-11

## 2023-04-25 ENCOUNTER — OFFICE VISIT (OUTPATIENT)
Dept: DIABETES SERVICES | Facility: CLINIC | Age: 48
End: 2023-04-25

## 2023-04-25 VITALS — BODY MASS INDEX: 34.61 KG/M2 | WEIGHT: 208 LBS

## 2023-04-25 DIAGNOSIS — E11.65 TYPE 2 DIABETES MELLITUS WITH HYPERGLYCEMIA, WITH LONG-TERM CURRENT USE OF INSULIN (HCC): Primary | ICD-10-CM

## 2023-04-25 DIAGNOSIS — Z79.4 TYPE 2 DIABETES MELLITUS WITH HYPERGLYCEMIA, WITH LONG-TERM CURRENT USE OF INSULIN (HCC): Primary | ICD-10-CM

## 2023-04-25 NOTE — PROGRESS NOTES
Personal Dell Training        Met with Janet Xie today for a personal Benítez 2 training  Lisa Bhatt brought his own supplies to the visit which include a reader and sensors  Patient was sent the [address] invitation  Educator reviewed the following:    -- Skin Prep  -- How to place the Benítez  -- Importance of site rotation  -- Benítez 2 set alerts  -- How to scan for a reading  -- 60 minute warm-up  -- If reading doesn't match symptoms, do a finger stick  -- Return in 14 days for download and change sensor    Lab Results   Component Value Date    HGBA1C 9 4 (H) 02/13/2023         Patient response to instruction    Comprehension: good  Motivation: good  Expected Compliance: good  Response to Teachback: 100%, demonstrated understanding    Thank you for referring your patient to [address], it was a pleasure working with them today  Please feel free to call with any questions or concerns      Nolan Johnson, MS, RDN, LDN  [address] [facility], [place], operated [facility], [address], [address]

## 2023-04-27 ENCOUNTER — OFFICE VISIT (OUTPATIENT)
Dept: DIABETES SERVICES | Facility: HOSPITAL | Age: 48
End: 2023-04-27

## 2023-04-27 ENCOUNTER — OFFICE VISIT (OUTPATIENT)
Dept: PAIN MEDICINE | Facility: CLINIC | Age: 48
End: 2023-04-27

## 2023-04-27 VITALS
RESPIRATION RATE: 18 BRPM | DIASTOLIC BLOOD PRESSURE: 87 MMHG | SYSTOLIC BLOOD PRESSURE: 133 MMHG | HEART RATE: 73 BPM | HEIGHT: 65 IN | WEIGHT: 210 LBS | BODY MASS INDEX: 34.99 KG/M2

## 2023-04-27 DIAGNOSIS — G89.4 CHRONIC PAIN SYNDROME: Primary | ICD-10-CM

## 2023-04-27 DIAGNOSIS — E11.65 TYPE 2 DIABETES MELLITUS WITH HYPERGLYCEMIA, WITH LONG-TERM CURRENT USE OF INSULIN (HCC): Primary | ICD-10-CM

## 2023-04-27 DIAGNOSIS — E11.9 TYPE 2 DIABETES MELLITUS WITHOUT COMPLICATION, WITHOUT LONG-TERM CURRENT USE OF INSULIN (HCC): ICD-10-CM

## 2023-04-27 DIAGNOSIS — M79.18 MYOFASCIAL PAIN SYNDROME: ICD-10-CM

## 2023-04-27 DIAGNOSIS — M47.816 LUMBAR SPONDYLOSIS: ICD-10-CM

## 2023-04-27 DIAGNOSIS — Z79.4 TYPE 2 DIABETES MELLITUS WITH HYPERGLYCEMIA, WITH LONG-TERM CURRENT USE OF INSULIN (HCC): Primary | ICD-10-CM

## 2023-04-27 RX ORDER — TIZANIDINE 4 MG/1
4 TABLET ORAL 2 TIMES DAILY PRN
Qty: 60 TABLET | Refills: 2 | Status: SHIPPED | OUTPATIENT
Start: 2023-04-27

## 2023-04-27 RX ORDER — METFORMIN HYDROCHLORIDE 500 MG/1
500 TABLET, EXTENDED RELEASE ORAL 2 TIMES DAILY WITH MEALS
Qty: 180 TABLET | Refills: 1 | Status: CANCELLED | OUTPATIENT
Start: 2023-04-27 | End: 2023-07-26

## 2023-04-27 NOTE — PROGRESS NOTES
Living Well with Diabetes Group Class #4    Salo Omalley attended the Living Well with Diabetes Group Class #4  He arrived 30min late due to traffic  During class, Felicia Velez was instructed on the following topics: Types of cholesterol, dietary sources of cholesterol, types of fat, types of fiber, reading food labels- in depth, healthy choices when dining out  Felicia Velez participated in group activities of reading labels together and completed the dining out activity  Felicia Velez will follow up with class #5 or additional DSMT/MNT as desired  Will call with any questions or concerns prior to next session  The patient's history was reviewed and updated as appropriate: allergies, current medications  Lab Results   Component Value Date    HGBA1C 9 4 (H) 02/13/2023       Diabetes Education Record  Felicia Velez was provided Living Well with Diabetes Class #4 book      Patient response to instruction    Comprehensiongood  Motivationgood  Expected Compliancegood    Begin Time: 10am  End Time: 11:30am  Referring Provider: Yen Mcclain    Thank you for referring your patient to [address] [facility], it was a pleasure working with them today  Please feel free to call with any questions or concerns      Triston Santana, [address] [place]  [address] [facility] 20  27 [facility] [address]

## 2023-04-27 NOTE — TELEPHONE ENCOUNTER
Left message for patient to return call  This was filled by clive in 02/2023 with 90 day supply  He should contact their office for refills

## 2023-04-27 NOTE — PATIENT INSTRUCTIONS
Tizanidine (By mouth)   Tizanidine ([id number]-KHP-[id number]-dung)  Treats muscle spasticity  Brand Name(s): Zanaflex, Zanaflex Capsule   There may be other brand names for this medicine  When This Medicine Should Not Be Used: This medicine is not right for everyone  Do not use if you had an allergic reaction to tizanidine  How to Use This Medicine:   Capsule, Tablet  Take your medicine as directed  Your dose may need to be changed several times to find what works best for you  You may take this medicine with or without food, but always take it the same way every time  Tizanidine works differently depending on whether you take it on an empty stomach or a full stomach  Talk to your doctor if you have any questions about this  Missed dose: Take a dose as soon as you remember  If it is almost time for your next dose, wait until then and take a regular dose  Do not take extra medicine to make up for a missed dose  Store the medicine in a closed container at room temperature, away from heat, moisture, and direct light  Drugs and Foods to Avoid:   Ask your doctor or pharmacist before using any other medicine, including over-the-counter medicines, vitamins, and herbal products  Do not use this medicine together with ciprofloxacin or fluvoxamine  Some foods and medicines can affect how tizanidine works  Tell your doctor if you are using any of the following:  Acyclovir, baclofen, cimetidine, famotidine, ticlopidine, verapamil, zileuton  Birth control pills, blood pressure medicine, medicine for heart rhythm problems (such as amiodarone, mexiletine, propafenone), or medicine to treat an infection (such as levofloxacin, moxifloxacin)  Do not drink alcohol while you are using this medicine  Tell your doctor if you use anything else that makes you sleepy  Some examples are allergy medicine, narcotic pain medicine, and alcohol    Warnings While Using This Medicine:   Tell your doctor if you are pregnant or breastfeeding, or if you have kidney disease or liver disease  This medicine may cause the following problems:  Low blood pressure  Liver damage  This medicine may make you dizzy or drowsy  Do not drive or do anything else that could be dangerous until you know how this medicine affects you  Stand or sit up slowly if you are dizzy  Do not stop using this medicine suddenly  Your doctor will need to slowly decrease your dose before you stop it completely  Your doctor will do lab tests at regular visits to check on the effects of this medicine  Keep all appointments  Keep all medicine out of the reach of children  Never share your medicine with anyone  Possible Side Effects While Using This Medicine:   Call your doctor right away if you notice any of these side effects: Allergic reaction: Itching or hives, swelling in your face or hands, swelling or tingling in your mouth or throat, chest tightness, trouble breathing  Dark urine or pale stools, nausea, vomiting, loss of appetite, stomach pain, yellow skin or eyes  Lightheadedness, dizziness, or fainting  Seeing or hearing things that are not really there  Slow heartbeat  If you notice these less serious side effects, talk with your doctor:   Dry mouth  Drowsiness or sleepiness  Weakness  If you notice other side effects that you think are caused by this medicine, tell your doctor  Call your doctor for medical advice about side effects  You may report side effects to FDA at 8-308-FDA-4989  © Copyright Kalyn Genao 2022 Information is for End User's use only and may not be sold, redistributed or otherwise used for commercial purposes  The above information is an  only  It is not intended as medical advice for individual conditions or treatments  Talk to your doctor, nurse or pharmacist before following any medical regimen to see if it is safe and effective for you

## 2023-04-27 NOTE — PROGRESS NOTES
Assessment:  1  Chronic pain syndrome    2  Myofascial pain syndrome    3  Lumbar spondylosis        Plan:  The patient is a 77-year-old male with a history of chronic pain secondary to low back pain, lumbar intervertebral disorder with radiculopathy and lumbar spondylosis who presents to the office with ongoing bilateral low back pain that is unchanged since his last office visit and unrelieved after undergoing bilateral SI joint injections on 3/29/2023  On exam, the patient is tender with palpation over bilateral lumbar paraspinals  I advised patient his pain is likely myofascial in nature as result of a lumbar spondylosis  I will start him on tizanidine 4 mg as needed for muscle spasms  I advised patient of common side effects including drowsiness and instructed patient not to operate any heavy machinery while taking this medication  Patient will call with an update in 2 weeks with how the medication is working, if pain persists, can consider trigger point injections  My impressions and treatment recommendations were discussed in detail with the patient who verbalized understanding and had no further questions  Discharge instructions were provided  I personally saw and examined the patient and I agree with the above discussed plan of care  No orders of the defined types were placed in this encounter  New Medications Ordered This Visit   Medications   • tiZANidine (ZANAFLEX) 4 mg tablet     Sig: Take 1 tablet (4 mg total) by mouth 2 (two) times a day as needed for muscle spasms     Dispense:  60 tablet     Refill:  2       History of Present Illness:  Cecilia Patterson is a 52 y o  male with a history of chronic pain secondary to low back pain, lumbar intervertebral disorder with radiculopathy and lumbar spondylosis  He was last seen on 3/29/2023 where he underwent bilateral SI joint injections which provided him no relief of his pain    He presents to the office with ongoing bilateral low back pain     He states his pain is the same since the last office visit and constant but worse in the evening and at night  He rates the quality of his pain as throbbing, shooting, numbness and is currently rating his pain a 7/10 on a numeric scale  Current pain medications include diclofenac 75 mg twice a day, Cymbalta 30 mg daily and Tylenol as needed  He states his medication regimen is providing him mild relief of his pain  I have personally reviewed and/or updated the patient's past medical history, past surgical history, family history, social history, current medications, allergies, and vital signs today  Review of Systems   Respiratory: Negative for shortness of breath  Cardiovascular: Negative for chest pain  Gastrointestinal: Negative for constipation, diarrhea, nausea and vomiting  Musculoskeletal: Positive for back pain and gait problem  Negative for arthralgias, joint swelling and myalgias  Skin: Negative for rash  Neurological: Negative for dizziness, seizures and weakness  All other systems reviewed and are negative        Patient Active Problem List   Diagnosis   • Chronic diarrhea   • De Quervain's tenosynovitis   • Fatty liver   • Hypertriglyceridemia   • Impingement syndrome of left shoulder   • Left lumbar radiculopathy   • Male erectile dysfunction   • Plantar warts   • Paresthesias   • Excessive daytime sleepiness   • PETTY (obstructive sleep apnea)   • Type 2 diabetes mellitus with hyperglycemia, with long-term current use of insulin (HCC)   • Class 1 obesity due to excess calories with serious comorbidity and body mass index (BMI) of 34 0 to 34 9 in adult   • Arthritis of right knee   • Chronic pain of right knee   • Pre-op examination   • Abdominal wall hernia   • Syndesmotic disruption of ankle, left, subsequent encounter   • Calculus of gallbladder without cholecystitis without obstruction   • Abdominal pain   • Sacroiliitis (HCC)   • Ankle syndesmosis disruption, left, "subsequent encounter       Past Medical History:   Diagnosis Date   • Cancer ([facility] )     colon   • Diabetes mellitus ([facility] )    • GERD (gastroesophageal reflux disease)    • Hyperlipidemia    • Post concussion syndrome 05/09/2017   • Sleep apnea     \"mild\"   • Traumatic brain injury ([facility] ) 2017    R/S 2017       Past Surgical History:   Procedure Laterality Date   • APPENDECTOMY     • COLON SURGERY     • MA RPR PRIMARY DISRUPTED LIGAMENT ANKLE COLLATERAL Left 3/10/2023    Procedure: ANKLE LIGAMENT REPAIR OF SYNDESMOTIC LIGAMENT with steroid injection of posterior heel bursa;  Surgeon: Rik Atkinson DPM;  Location:  MAIN OR;  Service: Podiatry       Family History   Problem Relation Age of Onset   • Coronary artery disease Mother    • Diabetes Mother    • Hypertension Mother    • Hyperlipidemia Mother    • Coronary artery disease Father    • Diabetes Father    • Hypertension Father    • Hyperlipidemia Father    • Heart attack Father    • Anemia Brother    • No Known Problems Son    • No Known Problems Son    • No Known Problems Son        Social History     Occupational History   • Not on file   Tobacco Use   • Smoking status: Never   • Smokeless tobacco: Never   Vaping Use   • Vaping Use: Never used   Substance and Sexual Activity   • Alcohol use:  Yes     Alcohol/week: 1 0 standard drink     Types: 1 Cans of beer per week   • Drug use: Never   • Sexual activity: Yes     Partners: Female     Birth control/protection: None       Current Outpatient Medications on File Prior to Visit   Medication Sig   • Blood Glucose Monitoring Suppl (OneTouch Verio Flex System) w/Device KIT TEST BLOOD SUGAR AS DIRECTED   • cholestyramine sugar free (QUESTRAN LIGHT) 4 g packet Take 1 packet (4 g total) by mouth daily   • Continuous Blood Gluc  (FreeStyle Benítez 2 [place]) EV Use 1 Units continuous   • Continuous Blood Gluc Sensor (FreeStyle Dell 2 Sensor) MISC Use 1 Units every 14 (fourteen) days   • Insulin Glargine Solostar " "(Lantus SoloStar) 100 UNIT/ML SOPN Inject 0 2 mL (20 Units total) under the skin daily at bedtime (Patient taking differently: Inject 20 Units under the skin daily at bedtime Patient reports taking 25 units nightly)   • Insulin Pen Needle (BD Pen Needle Nancy U/F) 32G X 4 MM MISC Use daily daily bedtime   • Lancets (OneTouch Delica Plus [id number]) MISC daily   • metFORMIN (GLUCOPHAGE-XR) 500 mg 24 hr tablet Take 1 tablet (500 mg total) by mouth 2 (two) times a day with meals (Patient taking differently: Take 1,000 mg by mouth daily with breakfast Takes 2 tablets 2x per day)   • ondansetron (ZOFRAN) 4 mg tablet Take 1 tablet (4 mg total) by mouth every 6 (six) hours (Patient taking differently: Take 4 mg by mouth every 8 (eight) hours as needed)   • OneTouch Verio test strip daily   • pantoprazole (PROTONIX) 40 mg tablet Take 1 tablet (40 mg total) by mouth daily   • tadalafil (CIALIS) 10 MG tablet TAKE ONE TABLET BY MOUTH EVERY DAY AS NEEDED FOR ED     No current facility-administered medications on file prior to visit  Allergies   Allergen Reactions   • Asa [Aspirin] GI Intolerance   • Penicillin G Other (See Comments)     Ancef ok     \"unsure; was a baby\"       Physical Exam:    /87   Pulse 73   Resp 18   Ht 5' 5\" (1 651 m)   Wt 95 3 kg (210 lb)   BMI 34 95 kg/m²     Constitutional:normal, well developed, well nourished, alert, in no distress and non-toxic and no overt pain behavior    Eyes:anicteric  HEENT:grossly intact  Neck:supple, symmetric, trachea midline and no masses   Pulmonary:even and unlabored  Cardiovascular:No edema or pitting edema present  Skin:Normal without rashes or lesions and well hydrated  Psychiatric:Mood and affect appropriate  Neurologic:Cranial Nerves II-XII grossly intact  Musculoskeletal:normal     Lumbar Spine Exam    Appearance:  Normal lordosis  Palpation/Tenderness:  left lumbar paraspinal tenderness  right lumbar paraspinal tenderness  Sensory:  no sensory deficits " noted  Range of Motion:  Flexion:  Minimally limited  with pain  Extension:  Minimally limited  with pain  Motor Strength:  Left hip flexion:  5/5  Right hip flexion:  5/5  Left knee flexion:  5/5  Left knee extension:  5/5  Right knee flexion:  5/5  Right knee extension:  5/5  Left foot dorsiflexion:  5/5  Left foot plantar flexion:  5/5  Right foot dorsiflexion:  5/5  Right foot plantar flexion:  5/5    Imaging

## 2023-05-01 ENCOUNTER — TELEPHONE (OUTPATIENT)
Dept: ENDOCRINOLOGY | Facility: CLINIC | Age: 48
End: 2023-05-01

## 2023-05-01 DIAGNOSIS — E11.9 TYPE 2 DIABETES MELLITUS WITHOUT COMPLICATION, WITHOUT LONG-TERM CURRENT USE OF INSULIN (HCC): ICD-10-CM

## 2023-05-01 NOTE — TELEPHONE ENCOUNTER
Pt called requesting refill of Metformin and Lantus sent to the [facility] on file  Stated he left a v/m last week requesting

## 2023-05-02 ENCOUNTER — OFFICE VISIT (OUTPATIENT)
Dept: DIABETES SERVICES | Facility: CLINIC | Age: 48
End: 2023-05-02

## 2023-05-02 ENCOUNTER — OFFICE VISIT (OUTPATIENT)
Dept: PODIATRY | Facility: CLINIC | Age: 48
End: 2023-05-02

## 2023-05-02 VITALS
OXYGEN SATURATION: 99 % | WEIGHT: 210 LBS | SYSTOLIC BLOOD PRESSURE: 133 MMHG | HEART RATE: 75 BPM | BODY MASS INDEX: 34.99 KG/M2 | HEIGHT: 65 IN | DIASTOLIC BLOOD PRESSURE: 87 MMHG

## 2023-05-02 VITALS — BODY MASS INDEX: 34.85 KG/M2 | WEIGHT: 209.4 LBS

## 2023-05-02 DIAGNOSIS — Z79.4 TYPE 2 DIABETES MELLITUS WITHOUT COMPLICATION, WITH LONG-TERM CURRENT USE OF INSULIN (HCC): Primary | ICD-10-CM

## 2023-05-02 DIAGNOSIS — R26.89 FUNCTIONAL GAIT ABNORMALITY: ICD-10-CM

## 2023-05-02 DIAGNOSIS — S93.432D ANKLE SYNDESMOSIS DISRUPTION, LEFT, SUBSEQUENT ENCOUNTER: Primary | ICD-10-CM

## 2023-05-02 DIAGNOSIS — E11.9 TYPE 2 DIABETES MELLITUS WITHOUT COMPLICATION, WITH LONG-TERM CURRENT USE OF INSULIN (HCC): Primary | ICD-10-CM

## 2023-05-02 DIAGNOSIS — R53.81 DEBILITY: ICD-10-CM

## 2023-05-02 RX ORDER — METFORMIN HYDROCHLORIDE 500 MG/1
500 TABLET, EXTENDED RELEASE ORAL 2 TIMES DAILY WITH MEALS
Qty: 180 TABLET | Refills: 1 | Status: SHIPPED | OUTPATIENT
Start: 2023-05-02 | End: 2023-07-31

## 2023-05-02 RX ORDER — INSULIN GLARGINE 100 [IU]/ML
20 INJECTION, SOLUTION SUBCUTANEOUS
Qty: 15 ML | Refills: 1 | Status: SHIPPED | OUTPATIENT
Start: 2023-05-02

## 2023-05-02 RX ORDER — PEN NEEDLE, DIABETIC 32GX 5/32"
NEEDLE, DISPOSABLE MISCELLANEOUS
Qty: 100 EACH | Refills: 1 | Status: SHIPPED | OUTPATIENT
Start: 2023-05-02

## 2023-05-02 NOTE — PROGRESS NOTES
"Medical Nutrition Therapy        Assessment    Visit Type: Initial visit  Chief complaint/Medical Diagnosis/reason for visit E11 65, Z79 4 (ICD-10-CM) - Type 2 diabetes mellitus with hyperglycemia, with long-term current use of insulin ([facility] 75 )    TIM Roman was seen in person for the initial MNT appointment  Patient checks BG levels up to 20x per day  Denies any low BG levels  Patient reported no current exercise regimen and is recently cleared by his doctor to start exercise  Explained how exercise lowers BG levels by creating more demand for glucose in the muscle cells  Problems identified in food recall include meal skipping, inconsistent carbohydrate intake, high-fat and high-sodium convenience foods, limited non-starchy vegetables and whole grains, sugary beverages  Consumption of carbohydrates ranges from 0 to 95 grams per meal  Explained basic pathophysiology of diabetes and impact of diet on blood glucose levels and disease complications  Explained how sodium influences volume retention, blood pressure, and complications for heart disease, stroke, and CKD  Provided patient with a 1573 calorie meal plan to assist with consistency, balance and portion control  Encouraged the consumption of regular meals at regular times  Advised patient to keep carbohydrate intake to 30-45 grams per meal and 15 per snack to assist with glycemic control  Suggested keeping protein intake to 6 ounces a day and fat to 4 servings daily to assist with lipid management and calorie control  Portion booklet and food labels were used to teach basic carbohydrate counting  Patient agreed to keep daily food logs and return them in 2 months for assessment  RD will remain available for further dietary questions/concerns       Ht Readings from Last 1 Encounters:   05/02/23 5' 5\" (1 651 m)     Wt Readings from Last 3 Encounters:   05/02/23 95 3 kg (210 lb)   04/27/23 95 3 kg (210 lb)   04/25/23 94 3 kg (208 lb)     Weight Change: Yes 5 lbs " increase in about 3 weeks    Barriers to Learning: no barriers    Do you follow any special diet presently?: No  Who shops: patient and spouse  Who cooks: patient and spouse    Food Log: Completed via the method of food recall    Wakes up 7 am    Breakfast:9:30-10 am; skips; 2 c Honey Bunches of Oats or Honey Nut Cheerios w/ 1 1/2 c milk (1%) OR pack of 6 peanut butter crackers (80% of the time has this if unable to have cereal)  Morning Snack:none  Lunch:1-2 pm; ham & cheese (white or wheat) sandwich w/ 1 oz chips (Sun chips; 19 g carb) OR 1-2 slices pizza with water  Afternoon Snack: 3:30-4 pm; pack of 6 peanut butter crackers with water  Dinner:8 pm; 2 c baked ziti (used to be 4 c) with 12 oz arnold freeman light tea/lemondade (20 g) and sometimes a slice of bread w/ buttery spread  Evening Snack: 2 am; leftovers- 1 meatball w/ 1 1/2 c pasta with 1 c marinara (w/ sugar)  Beverages: water and arnold's freeman  Eating out/Take out: 3-6H per week; 2 slices pizza w/ pepperoni with salad (caesar, no croutons) OR 8 oz burger (80/20) on brioch roll with thompson, onion straws, bbq sauce, & simpson and with 5 oz fries (56 g)  Exercise none; just cleared to start exercise; will mt bike and play soccer    Calorie needs 1573 kcals/day Carbs: 30-45 g/meal, 15 g/snack     Fat: 4 servings/day    Protein:6 ounces/day    Nutrition Diagnosis:  Food and nutrition related knowledge deficit  related to Lack of prior exposure to accurate nutrition related information as evidenced by Verbalizes inaccurate or incomplete information    Intervention: plate method, increased fiber intake, label reading, carbohydrate counting, increased plant based foods, meal timing, meal planning, monitoring portion control, exercise guidelines and food diary     Treatment Goals: Patient understands education and recommendations, Patient will monitor food intake daily with tracker, Patient will consume 3 meals a day, Patient will increase their intake of plant based foods, Patient will count carbohydrates, Patient will exercise and Patient will monitor blood glucose    Monitoring and evaluation:    Term code indicator  FH 1 3 2 Food Intake Criteria: Eat 3 meals per day, 4-5 hours apart  No meal skipping  Eat your snack 2-3 hours away from your meals  Term code indicator  FH 1 6 3 Carbohydrate Intake Criteria: Eat 30-45 g carb/meal and 15 g/ snack  Term code indicator  CH 2 2 Treatments/Therapy/Alternative Medicine Criteria: Initiate regular exercise to build to at least 30 minutes per day or 150 minutes of moderate exercise per week, and 2 days per week of resistance/strength training, pending physician approval     Materials Provided: Portion book, 3-day food log    Patients Response to Instruction:  Comprehensiongood  Motivationgood  Expected Compliancegood    Begin Time: 10:42 am  End Time: 12:21 pm  Referring Provider: Mark Reed MD    Thank you for coming to the [facility] for education today  Please feel free to call with any questions or concerns      Jefry Mccurdy, DALE  [address] [place] [id number] [phone]

## 2023-05-02 NOTE — PROGRESS NOTES
Assessment/Plan:     The patient's postoperative examination today is significant for some mild residual edema of the left ankle both medially and laterally  Mild to moderate tenderness palpation still noted to the area of the syndesmotic ligament of the lateral left ankle  Tenderness to palpation is also noted along the posterior tibial tendon as it courses around the retromalleolar groove of the medial malleolus  Muscle power is 4 out of 5 with both dorsiflexion and plantarflexion and inversion and eversion of the ankle and hindfoot  The patient will continue use of the ankle brace as needed  Recommendations are made for some quality OTC arch supports to use in his sneakers when he is out of the brace  I will also start him with physical therapy for his left ankle  Recommend follow-up in 3 weeks  Diagnoses and all orders for this visit:    Ankle syndesmosis disruption, left, subsequent encounter  -     Ambulatory referral to Physical Therapy; Future    Debility  -     Ambulatory referral to Physical Therapy; Future    Functional gait abnormality  -     Ambulatory referral to Physical Therapy; Future          Subjective:     Patient ID: Sandy Espinoza is a 52 y o  male  The patient presents today for follow-up status post repair of a syndesmotic rupture of the lateral left ankle  The patient notes that he has been having some good and bad days  He has tried on occasion to ambulate without the brace and notes significant discomfort when doing so  His main complaint today is tenderness along the inside of his medial left ankle  Review of Systems   Constitutional: Negative  HENT: Negative  Eyes: Negative  Respiratory: Negative  Cardiovascular: Negative  Endocrine: Negative  Musculoskeletal: Negative  Skin: Negative  Neurological: Negative  Hematological: Negative  Psychiatric/Behavioral: Negative  Objective:     Physical Exam  Vitals reviewed  Constitutional:       Appearance: Normal appearance  HENT:      Head: Normocephalic and atraumatic  Nose: Nose normal    Eyes:      Conjunctiva/sclera: Conjunctivae normal       Pupils: Pupils are equal, round, and reactive to light  Cardiovascular:      Pulses:           Dorsalis pedis pulses are 2+ on the left side  Posterior tibial pulses are 2+ on the left side  Pulmonary:      Effort: Pulmonary effort is normal    Feet:      Left foot:      Skin integrity: Skin integrity normal       Comments: The patient's postoperative examination today is significant for some mild residual edema of the left ankle both medially and laterally  Mild to moderate tenderness palpation still noted to the area of the syndesmotic ligament of the lateral left ankle  Tenderness to palpation is also noted along the posterior tibial tendon as it courses around the retromalleolar groove of the medial malleolus  Muscle power is 4 out of 5 with both dorsiflexion and plantarflexion and inversion and eversion of the ankle and hindfoot  Skin:     General: Skin is warm  Capillary Refill: Capillary refill takes less than 2 seconds  Neurological:      General: No focal deficit present  Mental Status: He is alert and oriented to person, place, and time  Psychiatric:         Mood and Affect: Mood normal          Behavior: Behavior normal          Thought Content:  Thought content normal

## 2023-05-02 NOTE — PATIENT INSTRUCTIONS
Recommend quality over the counter arch supports:    - Powerstep [place] series insoles  - Spenco Orthotic Arch insoles  - Superfeet insoles

## 2023-05-03 ENCOUNTER — TELEPHONE (OUTPATIENT)
Dept: FAMILY MEDICINE CLINIC | Facility: CLINIC | Age: 48
End: 2023-05-03

## 2023-05-23 ENCOUNTER — TELEPHONE (OUTPATIENT)
Dept: PODIATRY | Facility: CLINIC | Age: 48
End: 2023-05-23

## 2023-05-23 NOTE — TELEPHONE ENCOUNTER
Caller: Patient    Doctor:Eusebio    Reason for call:Pt needs a letter stating that he has been out of work since the day of the injury 12/16/22  Needs letter to reinstate his state insurance  He will  letter from office   Pt re-injured his ankle  But he cannot see doctor w/o insurance      Call back#: [phone]

## 2023-05-29 ENCOUNTER — HOSPITAL ENCOUNTER (EMERGENCY)
Facility: HOSPITAL | Age: 48
Discharge: HOME | End: 2023-05-29
Attending: EMERGENCY MEDICINE

## 2023-05-29 ENCOUNTER — APPOINTMENT (EMERGENCY)
Dept: RADIOLOGY | Facility: HOSPITAL | Age: 48
End: 2023-05-29

## 2023-05-29 ENCOUNTER — APPOINTMENT (EMERGENCY)
Dept: ULTRASOUND IMAGING | Facility: HOSPITAL | Age: 48
End: 2023-05-29

## 2023-05-29 VITALS
SYSTOLIC BLOOD PRESSURE: 186 MMHG | OXYGEN SATURATION: 99 % | DIASTOLIC BLOOD PRESSURE: 90 MMHG | RESPIRATION RATE: 18 BRPM | HEART RATE: 67 BPM | TEMPERATURE: 97.7 F

## 2023-05-29 DIAGNOSIS — K80.50 BILIARY COLIC: Primary | ICD-10-CM

## 2023-05-29 DIAGNOSIS — R10.9 ABDOMINAL PAIN: ICD-10-CM

## 2023-05-29 DIAGNOSIS — K80.20 GALLSTONE: ICD-10-CM

## 2023-05-29 LAB
ALBUMIN SERPL BCP-MCNC: 4.7 G/DL (ref 3.5–5)
ALP SERPL-CCNC: 58 U/L (ref 34–104)
ALT SERPL W P-5'-P-CCNC: 45 U/L (ref 7–52)
ANION GAP SERPL CALCULATED.3IONS-SCNC: 9 MMOL/L (ref 4–13)
APTT PPP: 27 SECONDS (ref 23–37)
AST SERPL W P-5'-P-CCNC: 21 U/L (ref 13–39)
ATRIAL RATE: 68 BPM
BASOPHILS # BLD AUTO: 0.03 THOUSANDS/ÂΜL (ref 0–0.1)
BASOPHILS NFR BLD AUTO: 1 % (ref 0–1)
BILIRUB SERPL-MCNC: 0.61 MG/DL (ref 0.2–1)
BUN SERPL-MCNC: 13 MG/DL (ref 5–25)
CALCIUM SERPL-MCNC: 9.5 MG/DL (ref 8.4–10.2)
CHLORIDE SERPL-SCNC: 101 MMOL/L (ref 96–108)
CO2 SERPL-SCNC: 28 MMOL/L (ref 21–32)
CREAT SERPL-MCNC: 0.89 MG/DL (ref 0.6–1.3)
EOSINOPHIL # BLD AUTO: 0.08 THOUSAND/ÂΜL (ref 0–0.61)
EOSINOPHIL NFR BLD AUTO: 1 % (ref 0–6)
ERYTHROCYTE [DISTWIDTH] IN BLOOD BY AUTOMATED COUNT: 13.1 % (ref 11.6–15.1)
GFR SERPL CREATININE-BSD FRML MDRD: 101 ML/MIN/1.73SQ M
GLUCOSE SERPL-MCNC: 182 MG/DL (ref 65–140)
HCT VFR BLD AUTO: 46.1 % (ref 36.5–49.3)
HGB BLD-MCNC: 15.3 G/DL (ref 12–17)
IMM GRANULOCYTES # BLD AUTO: 0.01 THOUSAND/UL (ref 0–0.2)
IMM GRANULOCYTES NFR BLD AUTO: 0 % (ref 0–2)
INR PPP: 0.91 (ref 0.84–1.19)
LACTATE SERPL-SCNC: 1.4 MMOL/L (ref 0.5–2)
LIPASE SERPL-CCNC: 35 U/L (ref 11–82)
LYMPHOCYTES # BLD AUTO: 1.22 THOUSANDS/ÂΜL (ref 0.6–4.47)
LYMPHOCYTES NFR BLD AUTO: 18 % (ref 14–44)
MCH RBC QN AUTO: 29.1 PG (ref 26.8–34.3)
MCHC RBC AUTO-ENTMCNC: 33.2 G/DL (ref 31.4–37.4)
MCV RBC AUTO: 88 FL (ref 82–98)
MONOCYTES # BLD AUTO: 0.48 THOUSAND/ÂΜL (ref 0.17–1.22)
MONOCYTES NFR BLD AUTO: 7 % (ref 4–12)
NEUTROPHILS # BLD AUTO: 4.8 THOUSANDS/ÂΜL (ref 1.85–7.62)
NEUTS SEG NFR BLD AUTO: 73 % (ref 43–75)
NRBC BLD AUTO-RTO: 0 /100 WBCS
P AXIS: 50 DEGREES
PLATELET # BLD AUTO: 219 THOUSANDS/UL (ref 149–390)
PMV BLD AUTO: 9 FL (ref 8.9–12.7)
POTASSIUM SERPL-SCNC: 3.5 MMOL/L (ref 3.5–5.3)
PR INTERVAL: 186 MS
PROT SERPL-MCNC: 7.7 G/DL (ref 6.4–8.4)
PROTHROMBIN TIME: 12.5 SECONDS (ref 11.6–14.5)
QRS AXIS: 17 DEGREES
QRSD INTERVAL: 86 MS
QT INTERVAL: 396 MS
QTC INTERVAL: 421 MS
RBC # BLD AUTO: 5.25 MILLION/UL (ref 3.88–5.62)
SODIUM SERPL-SCNC: 138 MMOL/L (ref 135–147)
T WAVE AXIS: 50 DEGREES
VENTRICULAR RATE: 68 BPM
WBC # BLD AUTO: 6.62 THOUSAND/UL (ref 4.31–10.16)

## 2023-05-29 RX ORDER — OXYCODONE HYDROCHLORIDE AND ACETAMINOPHEN 5; 325 MG/1; MG/1
1 TABLET ORAL EVERY 4 HOURS PRN
Qty: 20 TABLET | Refills: 0 | Status: SHIPPED | OUTPATIENT
Start: 2023-05-29 | End: 2023-06-02

## 2023-05-29 RX ORDER — HYDROMORPHONE HCL/PF 1 MG/ML
0.5 SYRINGE (ML) INJECTION ONCE
Status: COMPLETED | OUTPATIENT
Start: 2023-05-29 | End: 2023-05-29

## 2023-05-29 RX ORDER — SODIUM CHLORIDE 9 MG/ML
150 INJECTION, SOLUTION INTRAVENOUS CONTINUOUS
Status: DISCONTINUED | OUTPATIENT
Start: 2023-05-29 | End: 2023-05-29 | Stop reason: HOSPADM

## 2023-05-29 RX ORDER — ONDANSETRON 2 MG/ML
4 INJECTION INTRAMUSCULAR; INTRAVENOUS ONCE
Status: COMPLETED | OUTPATIENT
Start: 2023-05-29 | End: 2023-05-29

## 2023-05-29 RX ADMIN — ONDANSETRON 4 MG: 2 INJECTION INTRAMUSCULAR; INTRAVENOUS at 06:57

## 2023-05-29 RX ADMIN — HYDROMORPHONE HYDROCHLORIDE 0.5 MG: 1 INJECTION, SOLUTION INTRAMUSCULAR; INTRAVENOUS; SUBCUTANEOUS at 06:57

## 2023-05-29 RX ADMIN — SODIUM CHLORIDE 1000 ML: 0.9 INJECTION, SOLUTION INTRAVENOUS at 06:53

## 2023-05-29 NOTE — ED PROVIDER NOTES
History  Chief Complaint   Patient presents with   • Abdominal Pain     Patient presents for upper abdominal pain that he states feels very similar to previous gallbladder flare-up's  Patient has history of gallstones but couldn't have them removed due to his insurance  Believes this is a result  Patient is a 52year old male with constant worsening RUQ pain since last night with nausea  No vomiting  Has chronic diarrhea  No urinary sx  No GI bleeding  Has had partial colectomy for colon cancer  Has had prior appy  Has known gallstones  No fever  Was last seen at  [facility] in [place] on 5/2/23 for type 2 DM  [facility] website checked on this patient and last Rx filled was on 3/10/23 for percocet for 3 day supply  Tried NSAID without relief  History provided by:  Patient   used: No    Abdominal Pain  Associated symptoms: diarrhea (chronic)    Associated symptoms: no fever, no nausea and no vomiting        Prior to Admission Medications   Prescriptions Last Dose Informant Patient Reported? Taking?    Blood Glucose Monitoring Suppl (OneTouch Verio Flex System) w/Device KIT  Self Yes No   Sig: TEST BLOOD SUGAR AS DIRECTED   Continuous Blood Gluc  (FreeStyle Dell 2 [place]) EV  Self No No   Sig: Use 1 Units continuous   Continuous Blood Gluc Sensor (FreeStyle Dell 2 Sensor) MISC  Self No No   Sig: Use 1 Units every 14 (fourteen) days   Insulin Glargine Solostar (Lantus SoloStar) 100 UNIT/ML SOPN   No No   Sig: Inject 0 2 mL (20 Units total) under the skin daily at bedtime   Patient taking differently: Inject 20 Units under the skin daily at bedtime Patient takes 25 units at bedtime   Insulin Pen Needle (BD Pen Needle Nancy U/F) 32G X 4 MM MISC   No No   Sig: Use daily daily bedtime   Lancets (OneTouch Delica Plus [id number]) MISC  Self Yes No   Sig: daily   OneTouch Verio test strip  Self Yes No   Sig: daily   cholestyramine sugar free (QUESTRAN LIGHT) 4 g packet  Self No No "  Sig: Take 1 packet (4 g total) by mouth daily   metFORMIN (GLUCOPHAGE-XR) 500 mg 24 hr tablet   No No   Sig: Take 1 tablet (500 mg total) by mouth 2 (two) times a day with meals   ondansetron (ZOFRAN) 4 mg tablet  Self No No   Sig: Take 1 tablet (4 mg total) by mouth every 6 (six) hours   Patient taking differently: Take 4 mg by mouth every 8 (eight) hours as needed   pantoprazole (PROTONIX) 40 mg tablet  Self No No   Sig: Take 1 tablet (40 mg total) by mouth daily   tadalafil (CIALIS) 10 MG tablet  Self No No   Sig: TAKE ONE TABLET BY MOUTH EVERY DAY AS NEEDED FOR ED   tiZANidine (ZANAFLEX) 4 mg tablet  Self No No   Sig: Take 1 tablet (4 mg total) by mouth 2 (two) times a day as needed for muscle spasms      Facility-Administered Medications: None       Past Medical History:   Diagnosis Date   • Cancer ([place] [address] )     colon   • Diabetes mellitus ([place] [address] )    • GERD (gastroesophageal reflux disease)    • Hyperlipidemia    • Post concussion syndrome 05/09/2017   • Sleep apnea     \"mild\"   • Traumatic brain injury ([facility] ) 2017    R/S 2017       Past Surgical History:   Procedure Laterality Date   • APPENDECTOMY     • COLON SURGERY     • NM RPR PRIMARY DISRUPTED LIGAMENT ANKLE COLLATERAL Left 3/10/2023    Procedure: ANKLE LIGAMENT REPAIR OF SYNDESMOTIC LIGAMENT with steroid injection of posterior heel bursa;  Surgeon: Joanie Montgomery DPM;  Location: [facility];  Service: Podiatry       Family History   Problem Relation Age of Onset   • Coronary artery disease Mother    • Diabetes Mother    • Hypertension Mother    • Hyperlipidemia Mother    • Coronary artery disease Father    • Diabetes Father    • Hypertension Father    • Hyperlipidemia Father    • Heart attack Father    • Anemia Brother    • No Known Problems Son    • No Known Problems Son    • No Known Problems Son      I have reviewed and agree with the history as documented      E-Cigarette/Vaping   • E-Cigarette Use Never User      E-Cigarette/Vaping Substances   • " Nicotine No    • THC No    • CBD No    • Flavoring No    • Other No    • Unknown No      Social History     Tobacco Use   • Smoking status: Never   • Smokeless tobacco: Never   Vaping Use   • Vaping Use: Never used   Substance Use Topics   • Alcohol use: Yes     Alcohol/week: 1 0 standard drink of alcohol     Types: 1 Cans of beer per week   • Drug use: Never       Review of Systems   Constitutional: Negative for fever  Gastrointestinal: Positive for abdominal pain and diarrhea (chronic)  Negative for blood in stool, nausea and vomiting  Genitourinary: Negative for difficulty urinating  All other systems reviewed and are negative  Physical Exam  Physical Exam  Vitals and nursing note reviewed  Constitutional:       General: He is in acute distress (moderate)  HENT:      Head: Normocephalic and atraumatic  Mouth/Throat:      Mouth: Mucous membranes are moist    Eyes:      General: No scleral icterus  Cardiovascular:      Rate and Rhythm: Normal rate and regular rhythm  Heart sounds: Normal heart sounds  No murmur heard  Pulmonary:      Effort: Pulmonary effort is normal  No respiratory distress  Breath sounds: Normal breath sounds  Abdominal:      General: Bowel sounds are normal  There is no distension  Palpations: Abdomen is soft  Tenderness: There is abdominal tenderness (RUQ)  There is guarding (voluntary)  There is no rebound  Musculoskeletal:         General: No deformity  Cervical back: Normal range of motion and neck supple  Right lower leg: No edema  Left lower leg: No edema  Skin:     General: Skin is warm and dry  Coloration: Skin is not jaundiced  Findings: No erythema or rash  Neurological:      General: No focal deficit present  Mental Status: He is alert and oriented to person, place, and time     Psychiatric:         Mood and Affect: Mood normal          Vital Signs  ED Triage Vitals [05/29/23 0611]   Temperature Pulse Respirations Blood Pressure SpO2   97 7 °F (36 5 °C) 67 18 (!) 186/90 99 %      Temp Source Heart Rate Source Patient Position - Orthostatic VS BP Location FiO2 (%)   Oral Monitor Sitting Left arm --      Pain Score       --           Vitals:    05/29/23 0611   BP: (!) 186/90   Pulse: 67   Patient Position - Orthostatic VS: Sitting         Visual Acuity      ED Medications  Medications   sodium chloride 0 9 % infusion (has no administration in time range)   sodium chloride 0 9 % bolus 1,000 mL (1,000 mL Intravenous New Bag 5/29/23 0653)   HYDROmorphone (DILAUDID) injection 0 5 mg (0 5 mg Intravenous Given 5/29/23 0657)   ondansetron (ZOFRAN) injection 4 mg (4 mg Intravenous Given 5/29/23 0657)       Diagnostic Studies  Results Reviewed     Procedure Component Value Units Date/Time    Comprehensive metabolic panel [[id number]]  (Abnormal) Collected: 05/29/23 0650    Lab Status: Final result Specimen: Blood from Arm, Right Updated: 05/29/23 0726     Sodium 138 mmol/L      Potassium 3 5 mmol/L      Chloride 101 mmol/L      CO2 28 mmol/L      ANION GAP 9 mmol/L      BUN 13 mg/dL      Creatinine 0 89 mg/dL      Glucose 182 mg/dL      Calcium 9 5 mg/dL      AST 21 U/L      ALT 45 U/L      Alkaline Phosphatase 58 U/L      Total Protein 7 7 g/dL      Albumin 4 7 g/dL      Total Bilirubin 0 61 mg/dL      eGFR 101 ml/min/1 73sq m     Narrative:      [place] guidelines for Chronic Kidney Disease (CKD):   •  Stage 1 with normal or high GFR (GFR > 90 mL/min/1 73 square meters)  •  Stage 2 Mild CKD (GFR = 60-89 mL/min/1 73 square meters)  •  Stage 3A Moderate CKD (GFR = 45-59 mL/min/1 73 square meters)  •  Stage 3B Moderate CKD (GFR = 30-44 mL/min/1 73 square meters)  •  Stage 4 Severe CKD (GFR = 15-29 mL/min/1 73 square meters)  •  Stage 5 End Stage CKD (GFR <15 mL/min/1 73 square meters)  Note: GFR calculation is accurate only with a steady state creatinine    Lipase [[id number]]  (Normal) Collected: 05/29/23 0650    Lab Status: Final result Specimen: Blood from Arm, Right Updated: 05/29/23 0726     Lipase 35 u/L     Lactic acid, plasma (w/reflex if result > 2 0) [[id number]]  (Normal) Collected: 05/29/23 0650    Lab Status: Final result Specimen: Blood from Arm, Right Updated: 05/29/23 0725     LACTIC ACID 1 4 mmol/L     Narrative:      Result may be elevated if tourniquet was used during collection  Protime-INR [[id number]]  (Normal) Collected: 05/29/23 0650    Lab Status: Final result Specimen: Blood from Arm, Right Updated: 05/29/23 0718     Protime 12 5 seconds      INR 0 91    APTT [[id number]]  (Normal) Collected: 05/29/23 0650    Lab Status: Final result Specimen: Blood from Arm, Right Updated: 05/29/23 0718     PTT 27 seconds     CBC and differential [[id number]] Collected: 05/29/23 0650    Lab Status: Final result Specimen: Blood from Arm, Right Updated: 05/29/23 0703     WBC 6 62 Thousand/uL      RBC 5 25 Million/uL      Hemoglobin 15 3 g/dL      Hematocrit 46 1 %      MCV 88 fL      MCH 29 1 pg      MCHC 33 2 g/dL      RDW 13 1 %      MPV 9 0 fL      Platelets 250 Thousands/uL      nRBC 0 /100 WBCs      Neutrophils Relative 73 %      Immat GRANS % 0 %      Lymphocytes Relative 18 %      Monocytes Relative 7 %      Eosinophils Relative 1 %      Basophils Relative 1 %      Neutrophils Absolute 4 80 Thousands/µL      Immature Grans Absolute 0 01 Thousand/uL      Lymphocytes Absolute 1 22 Thousands/µL      Monocytes Absolute 0 48 Thousand/µL      Eosinophils Absolute 0 08 Thousand/µL      Basophils Absolute 0 03 Thousands/µL     Blood culture #2 [[id number]] Collected: 05/29/23 0650    Lab Status: In process Specimen: Blood from Arm, Right Updated: 05/29/23 0657    Blood culture #1 [[id number]] Collected: 05/29/23 0650    Lab Status:  In process Specimen: Blood from Arm, Left Updated: 05/29/23 0657                  right upper quadrant   ED Interpretation by Caitlin Pretty MD (05/29 4502) FINDINGS:     PANCREAS:  Visualized portions of the pancreas are within normal limits      AORTA AND IVC:  Visualized portions are normal for patient age      LIVER:  Size: Mildly enlarged  The liver measures 18 5 cm in the midclavicular line  Contour:  Surface contour is smooth  Parenchyma: There is marked diffuse increased echogenicity with smooth echotexture and significant beam attenuation with loss of periportal echogenicity  Most consistent with severe hepatic steatosis  Minimal fat sparing adjacent to the gallbladder   fossa  No liver mass identified  Limited imaging of the main portal vein shows it to be patent and hepatopetal      BILIARY:  The gallbladder is mildly distended  No wall thickening or pericholecystic fluid  Gallstone in the gallbladder neck  No sonographic Zaragoza sign  No intrahepatic biliary dilatation  CBD measures 3 0 mm  No choledocholithiasis      KIDNEY:  Right kidney measures 11 8 x 6 0 x 6 1 cm  Volume 229 5 mL  Kidney within normal limits         ASCITES:   None      IMPRESSION:     Cholelithiasis in a mildly distended gallbladder  No sonographic evidence of acute cholecystitis  Consider follow-up with a HIDA scan if it would alter patient management      Hepatic steatosis and mild hepatomegaly            Workstation performed: [id number]      Final Result by Adelina Garrett MD (05/29 5724)      Cholelithiasis in a mildly distended gallbladder  No sonographic evidence of acute cholecystitis  Consider follow-up with a HIDA scan if it would alter patient management  Hepatic steatosis and mild hepatomegaly  Workstation performed: [id number]         XR chest 1 view portable   ED Interpretation by Caitlin Pretty MD (05/29 3351)   No acute disease read by me                    Procedures  ECG 12 Lead Documentation Only    Date/Time: 5/29/2023 7:03 AM    Performed by: Caitlin Pretty MD  Authorized by: Caitlin Pretty MD    Indications / Diagnosis:  Abdominal pain  ECG reviewed by me, the ED Provider: yes    Patient location:  ED  Previous ECG:     Previous ECG:  Compared to current    Comparison ECG info:  3/9/23    Similarity:  Changes noted (no s  arrhythmia now)  Interpretation:     Interpretation: normal    Rate:     ECG rate:  68    ECG rate assessment: normal    Rhythm:     Rhythm: sinus rhythm    Ectopy:     Ectopy: none    QRS:     QRS axis:  Normal    QRS intervals:  Normal  Conduction:     Conduction: normal    ST segments:     ST segments:  Normal  T waves:     T waves: normal               ED Course  ED Course as of 05/29/23 0805   Mon May 29, 2023   0800 Labs, EKG, CXR and US d/w patient  Pain improved  Nontender abdomen prior to discharge  Will cancel UA since no urinary sx  SBIRT 20yo+    Flowsheet Row Most Recent Value   Initial Alcohol Screen: US AUDIT-C     1  How often do you have a drink containing alcohol? 1 Filed at: 05/29/2023 0643   2  How many drinks containing alcohol do you have on a typical day you are drinking? 0 Filed at: 05/29/2023 0643   3a  Male UNDER 65: How often do you have five or more drinks on one occasion? 0 Filed at: 05/29/2023 0643   3b  FEMALE Any Age, or MALE 65+: How often do you have 4 or more drinks on one occassion? 0 Filed at: 05/29/2023 7175   Audit-C Score 1 Filed at: 05/29/2023 7340   LEIDY: How many times in the past year have you    Used an illegal drug or used a prescription medication for non-medical reasons?  Never Filed at: 05/29/2023 1617                    Medical Decision Making  DDx including but not limited to:  gastroenteritis, gastritis, PUD, GERD, gastroparesis, hepatitis, pancreatitis, colitis, enteritis, diverticulitis, food poisoning, epiploic appendagitis, mesenteric adenitis, mesenteric panniculitis, mesenteric ischemia, IBD, IBS, ileus, bowel obstruction, volvulus, internal hernia, cholecystitis, biliary colic, choledocholithiasis, perforated viscus, tumor, splenic etiology, constipation, AAA; doubt testicular torsion; renal colic, pyelonephritis, UTI; doubt cardiac etiology  Amount and/or Complexity of Data Reviewed  Labs: ordered  Decision-making details documented in ED Course  Radiology: ordered and independent interpretation performed  Decision-making details documented in ED Course  ECG/medicine tests: ordered and independent interpretation performed  Decision-making details documented in ED Course  Risk  Prescription drug management  Parenteral controlled substances  Decision regarding hospitalization  Disposition  Final diagnoses:   Abdominal pain   Gallstone   Biliary colic     Time reflects when diagnosis was documented in both MDM as applicable and the Disposition within this note     Time User Action Codes Description Comment    5/29/2023  8:01 AM Gloriajean Sieving Add [R10 9] Abdominal pain     5/29/2023  8:01 AM Gloriajean Sieving Add [K80 20] Gallstone     5/29/2023  8:02 AM Gloriajean Sieving Add [Y43 90] Biliary colic     8/36/9308  0:32 AM Gloriajean Sieving Modify [R10 9] Abdominal pain     5/29/2023  8:02 AM Gloriajean Sieving Modify [B43 12] Biliary colic       ED Disposition     ED Disposition   Discharge    Condition   Stable    Date/Time   Mon May 29, 2023  8:01 AM    Comment   19 Melissa Doshi discharge to home/self care  Follow-up Information     Follow up With Specialties Details Why Marilee Jefferson MD General Surgery Call in 1 day Clear fluids for 24 hours and then advance diet as tolerated  No greasy, spicy or fatty foods  No driving with percocet  Do not use acetaminophen with percocet  return sooner if increased pain, fever, vomiting, difficulty urinating or breathing, rash   [facility]            Patient's Medications   Discharge Prescriptions    OXYCODONE-ACETAMINOPHEN (PERCOCET) 5-325 MG PER TABLET Take 1 tablet by mouth every 4 (four) hours as needed for moderate pain for up to 4 days Max Daily Amount: 6 tablets       Start Date: 5/29/2023 End Date: 6/2/2023       Order Dose: 1 tablet       Quantity: 20 tablet    Refills: 0       No discharge procedures on file      PDMP Review       Value Time User    PDMP Reviewed  Yes 5/29/2023  6:30 AM Shan Fabry, MD          ED Provider  Electronically Signed by           Shan Fabry, MD  05/29/23 5034

## 2023-06-03 LAB
BACTERIA BLD CULT: NORMAL
BACTERIA BLD CULT: NORMAL

## 2023-06-07 ENCOUNTER — VBI (OUTPATIENT)
Dept: ADMINISTRATIVE | Facility: OTHER | Age: 48
End: 2023-06-07

## 2023-06-14 ENCOUNTER — TRANSCRIBE ORDERS (OUTPATIENT)
Dept: OBGYN CLINIC | Facility: CLINIC | Age: 48
End: 2023-06-14

## 2023-07-31 NOTE — ASSESSMENT & PLAN NOTE
-Continue outpatient follow up with PCP/Heme   Lab Results   Component Value Date    HGBA1C 9 4 (H) 02/13/2023   poorly controlled diabetic  Increasing oral medications    Counseled patient on appropriate diet and importance of strict adherence to medications

## 2023-08-04 DIAGNOSIS — N52.9 ERECTILE DYSFUNCTION, UNSPECIFIED ERECTILE DYSFUNCTION TYPE: ICD-10-CM

## 2023-08-04 RX ORDER — TADALAFIL 10 MG/1
10 TABLET ORAL DAILY PRN
Qty: 20 TABLET | Refills: 1 | Status: SHIPPED | OUTPATIENT
Start: 2023-08-04

## 2023-08-23 ENCOUNTER — OFFICE VISIT (OUTPATIENT)
Dept: PODIATRY | Facility: CLINIC | Age: 48
End: 2023-08-23

## 2023-08-23 ENCOUNTER — HOSPITAL ENCOUNTER (OUTPATIENT)
Dept: RADIOLOGY | Facility: HOSPITAL | Age: 48
Discharge: HOME | End: 2023-08-23
Attending: PODIATRIST

## 2023-08-23 ENCOUNTER — TELEPHONE (OUTPATIENT)
Dept: PODIATRY | Facility: CLINIC | Age: 48
End: 2023-08-23

## 2023-08-23 VITALS
BODY MASS INDEX: 34.49 KG/M2 | SYSTOLIC BLOOD PRESSURE: 142 MMHG | WEIGHT: 207 LBS | HEIGHT: 65 IN | DIASTOLIC BLOOD PRESSURE: 92 MMHG | HEART RATE: 90 BPM | OXYGEN SATURATION: 96 %

## 2023-08-23 DIAGNOSIS — S93.492A SPRAIN OF ANTERIOR TALOFIBULAR LIGAMENT OF LEFT ANKLE, INITIAL ENCOUNTER: ICD-10-CM

## 2023-08-23 DIAGNOSIS — S93.432D ANKLE SYNDESMOSIS DISRUPTION, LEFT, SUBSEQUENT ENCOUNTER: ICD-10-CM

## 2023-08-23 DIAGNOSIS — S93.432D ANKLE SYNDESMOSIS DISRUPTION, LEFT, SUBSEQUENT ENCOUNTER: Primary | ICD-10-CM

## 2023-08-23 DIAGNOSIS — R53.81 DEBILITY: ICD-10-CM

## 2023-08-23 DIAGNOSIS — S93.432D SYNDESMOTIC DISRUPTION OF ANKLE, LEFT, SUBSEQUENT ENCOUNTER: ICD-10-CM

## 2023-08-23 PROCEDURE — 73610 X-RAY EXAM OF ANKLE: CPT

## 2023-08-23 PROCEDURE — 99213 OFFICE O/P EST LOW 20 MIN: CPT | Performed by: PODIATRIST

## 2023-08-23 NOTE — PROGRESS NOTES
Assessment/Plan:     There was no fracture or dislocation noted. Hardware is intact to the left ankle. 2 tight ropes across the ankle joint are also intact. Alignment is satisfactory. On clinical examination, there is moderate to severe tenderness palpation to the anterolateral aspect of the patient's lateral left ankle gutter as well as the area of the subtalar joint and ATFL. Mild tenderness is also noted along the dorsal lateral aspect of the left midfoot. Unfortunately, the patient recently lost his insurance and was unable to go through a formal course of physical therapy. He still does not have his insurance. I would recommend an MRI of the left ankle at this point in time to assess healing of the ATFL and the syndesmotic ligament. We may need to consider hardware removal if the ligaments have healed and appear to be intact. Due to the severity of his pain, he is unable to bear weight for any prolonged periods of time. The patient notes tenderness in his lateral ankle after about 30 minutes of being on it. I will keep him out of work until we can review his MRI studies and assess for possible repeat left ankle surgery. Recommend follow-up in 2 to 3 weeks to review MRI study. Diagnoses and all orders for this visit:    Ankle syndesmosis disruption, left, subsequent encounter  -     X-ray ankle left 3+ views; Future  -     MRI ankle/heel left wo contrast; Future    Debility    Syndesmotic disruption of ankle, left, subsequent encounter  -     MRI ankle/heel left wo contrast; Future    Sprain of anterior talofibular ligament of left ankle, initial encounter  -     MRI ankle/heel left wo contrast; Future          Subjective:     Patient ID: Sadi Lepe is a 50 y.o. male. The patient presents today for follow-up status post repair of a disrupted left ankle syndesmosis in March 2023. The patient has been lost to follow-up as he unfortunately lost his insurance.   He still no significant tenderness to his lateral left ankle especially when being on it for prolonged periods of time. He is unable to run or do any strenuous activities.         PAST MEDICAL HISTORY:  Past Medical History:   Diagnosis Date   • Cancer ([address])     colon   • Diabetes mellitus ([address])    • GERD (gastroesophageal reflux disease)    • Hyperlipidemia    • Post concussion syndrome 05/09/2017   • Sleep apnea     "mild"   • Traumatic brain injury ([address]) 2017    R/S 2017       PAST SURGICAL HISTORY:  Past Surgical History:   Procedure Laterality Date   • APPENDECTOMY     • COLON SURGERY     • AK RPR PRIMARY DISRUPTED LIGAMENT ANKLE COLLATERAL Left 3/10/2023    Procedure: ANKLE LIGAMENT REPAIR OF SYNDESMOTIC LIGAMENT with steroid injection of posterior heel bursa;  Surgeon: Carla Ramirez DPM;  Location:  MAIN OR;  Service: Podiatry        ALLERGIES:  Asa [aspirin] and Penicillin g    MEDICATIONS:  Current Outpatient Medications   Medication Sig Dispense Refill   • Blood Glucose Monitoring Suppl (OneTouch Verio Flex System) w/Device KIT TEST BLOOD SUGAR AS DIRECTED     • cholestyramine sugar free (QUESTRAN LIGHT) 4 g packet Take 1 packet (4 g total) by mouth daily 30 packet 3   • Continuous Blood Gluc  (FreeStyle Dell 2 [place]) EV Use 1 Units continuous 1 each 0   • Continuous Blood Gluc Sensor (FreeStyle Dell 2 Sensor) MISC Use 1 Units every 14 (fourteen) days 2 each 5   • Insulin Glargine Solostar (Lantus SoloStar) 100 UNIT/ML SOPN Inject 0.2 mL (20 Units total) under the skin daily at bedtime (Patient taking differently: Inject 20 Units under the skin daily at bedtime Patient takes 25 units at bedtime) 15 mL 1   • Insulin Pen Needle (BD Pen Needle Nancy U/F) 32G X 4 MM MISC Use daily daily bedtime 100 each 1   • Lancets (OneTouch Delica Plus [id number]) MISC daily     • ondansetron (ZOFRAN) 4 mg tablet Take 1 tablet (4 mg total) by mouth every 6 (six) hours (Patient taking differently: Take 4 mg by mouth every 8 (eight) hours as needed) 12 tablet 0   • OneTouch Verio test strip daily     • pantoprazole (PROTONIX) 40 mg tablet Take 1 tablet (40 mg total) by mouth daily 90 tablet 1   • tadalafil (CIALIS) 10 MG tablet Take 1 tablet (10 mg total) by mouth daily as needed for erectile dysfunction 20 tablet 1   • tiZANidine (ZANAFLEX) 4 mg tablet Take 1 tablet (4 mg total) by mouth 2 (two) times a day as needed for muscle spasms 60 tablet 2   • metFORMIN (GLUCOPHAGE-XR) 500 mg 24 hr tablet Take 1 tablet (500 mg total) by mouth 2 (two) times a day with meals 180 tablet 1     No current facility-administered medications for this visit. SOCIAL HISTORY:  Social History     Socioeconomic History   • Marital status: Single     Spouse name: None   • Number of children: None   • Years of education: None   • Highest education level: None   Occupational History   • None   Tobacco Use   • Smoking status: Never   • Smokeless tobacco: Never   Vaping Use   • Vaping Use: Never used   Substance and Sexual Activity   • Alcohol use: Yes     Alcohol/week: 1.0 standard drink of alcohol     Types: 1 Cans of beer per week   • Drug use: Never   • Sexual activity: Yes     Partners: Female     Birth control/protection: None   Other Topics Concern   • None   Social History Narrative   • None     Social Determinants of Health     Financial Resource Strain: Not on file   Food Insecurity: Not on file   Transportation Needs: Not on file   Physical Activity: Not on file   Stress: Not on file   Social Connections: Not on file   Intimate Partner Violence: Not on file   Housing Stability: Not on file        Review of Systems   Constitutional: Negative. HENT: Negative. Eyes: Negative. Respiratory: Negative. Cardiovascular: Negative. Endocrine: Negative. Musculoskeletal: Negative. Neurological: Negative. Hematological: Negative. Psychiatric/Behavioral: Negative. Objective:     Physical Exam  Vitals reviewed. Constitutional:       Appearance: Normal appearance. HENT:      Head: Normocephalic and atraumatic. Nose: Nose normal.   Eyes:      Conjunctiva/sclera: Conjunctivae normal.      Pupils: Pupils are equal, round, and reactive to light. Cardiovascular:      Pulses:           Dorsalis pedis pulses are 2+ on the left side. Posterior tibial pulses are 2+ on the left side. Pulmonary:      Effort: Pulmonary effort is normal.   Musculoskeletal:        Feet:    Feet:      Left foot:      Skin integrity: Skin integrity normal.      Comments: On clinical examination, there is moderate to severe tenderness palpation to the anterolateral aspect of the patient's lateral left ankle gutter as well as the area of the subtalar joint and ATFL. Mild tenderness is also noted along the dorsal lateral aspect of the left midfoot. Skin:     General: Skin is warm. Capillary Refill: Capillary refill takes less than 2 seconds. Neurological:      General: No focal deficit present. Mental Status: He is alert and oriented to person, place, and time. Psychiatric:         Mood and Affect: Mood normal.         Behavior: Behavior normal.         Thought Content:  Thought content normal.

## 2023-10-06 DIAGNOSIS — E11.9 TYPE 2 DIABETES MELLITUS WITHOUT COMPLICATION, WITHOUT LONG-TERM CURRENT USE OF INSULIN (HCC): ICD-10-CM

## 2023-10-06 RX ORDER — METFORMIN HYDROCHLORIDE 500 MG/1
500 TABLET, EXTENDED RELEASE ORAL 2 TIMES DAILY WITH MEALS
Qty: 180 TABLET | Refills: 1 | Status: SHIPPED | OUTPATIENT
Start: 2023-10-06

## 2023-10-10 ENCOUNTER — HOSPITAL ENCOUNTER (OUTPATIENT)
Dept: MRI IMAGING | Facility: HOSPITAL | Age: 48
Discharge: HOME | End: 2023-10-10
Attending: PODIATRIST
Payer: COMMERCIAL

## 2023-10-10 DIAGNOSIS — S93.492A SPRAIN OF ANTERIOR TALOFIBULAR LIGAMENT OF LEFT ANKLE, INITIAL ENCOUNTER: ICD-10-CM

## 2023-10-10 DIAGNOSIS — S93.432D ANKLE SYNDESMOSIS DISRUPTION, LEFT, SUBSEQUENT ENCOUNTER: ICD-10-CM

## 2023-10-10 DIAGNOSIS — S93.432D SYNDESMOTIC DISRUPTION OF ANKLE, LEFT, SUBSEQUENT ENCOUNTER: ICD-10-CM

## 2023-10-10 PROCEDURE — G1004 CDSM NDSC: HCPCS

## 2023-10-10 PROCEDURE — 73721 MRI JNT OF LWR EXTRE W/O DYE: CPT

## 2023-10-17 ENCOUNTER — OFFICE VISIT (OUTPATIENT)
Dept: PODIATRY | Facility: CLINIC | Age: 48
End: 2023-10-17
Payer: COMMERCIAL

## 2023-10-17 VITALS
SYSTOLIC BLOOD PRESSURE: 138 MMHG | HEART RATE: 101 BPM | WEIGHT: 210 LBS | HEIGHT: 65 IN | DIASTOLIC BLOOD PRESSURE: 97 MMHG | OXYGEN SATURATION: 96 % | BODY MASS INDEX: 34.99 KG/M2

## 2023-10-17 DIAGNOSIS — R53.81 DEBILITY: ICD-10-CM

## 2023-10-17 DIAGNOSIS — S93.492A SPRAIN OF ANTERIOR TALOFIBULAR LIGAMENT OF LEFT ANKLE, INITIAL ENCOUNTER: ICD-10-CM

## 2023-10-17 DIAGNOSIS — R26.89 FUNCTIONAL GAIT ABNORMALITY: ICD-10-CM

## 2023-10-17 DIAGNOSIS — S93.432D ANKLE SYNDESMOSIS DISRUPTION, LEFT, SUBSEQUENT ENCOUNTER: Primary | ICD-10-CM

## 2023-10-17 PROCEDURE — 99213 OFFICE O/P EST LOW 20 MIN: CPT | Performed by: PODIATRIST

## 2023-10-17 RX ORDER — CELECOXIB 100 MG/1
200 CAPSULE ORAL DAILY
Qty: 28 CAPSULE | Refills: 0 | Status: SHIPPED | OUTPATIENT
Start: 2023-10-17

## 2023-10-17 NOTE — PROGRESS NOTES
Assessment/Plan:     The patient's clinical examination today significant for moderate tenderness with palpation along the peroneal tendons starting in the area of the posterior fibula and radiating proximally. Moderate tenderness to palpation is also noted with palpation of the ATFL and CFL as well as the PT FL. Passive range of motion of the ankle and hindfoot joints also produces pain. Muscle power is 4 out of 5 in all directions. Mild to moderate tenderness is also noted in all directions. There is minimal edema noted today. There is no erythema nor ecchymosis nor calor. MRI report of the patient's left foot was reviewed and the results discussed with the patient. There is thickening of the anterior inferior tibiofibular ligament likely stemming from scar tissue formation from his known history of trauma to the ligament; there is a small osteochondral lesion to the lateral talar dome that is stable compared to his prior examination. Clinically, do not feel that this OCD is causing his clinical symptoms. The patient has not been able to have a formal course of physical therapy due to his loss of insurance. I feel that this is most likely the cause of his persistent debility and tenderness to the left lower extremity. He states that he now has his insurance back. I will refer him back to physical therapy for strengthening and range of motion of the left ankle. He still does not feel he is ready to return to his work. Ultimately, if he fails to improve with physical therapy, we may need to consider hardware removal.    Recommend follow-up in 4 to 6 weeks. I would like to reassess the patient after at least 4 weeks of therapy. Diagnoses and all orders for this visit:    Ankle syndesmosis disruption, left, subsequent encounter  -     celecoxib (CeleBREX) 100 mg capsule;  Take 2 capsules (200 mg total) by mouth daily    Debility    Sprain of anterior talofibular ligament of left ankle, initial encounter  -     celecoxib (CeleBREX) 100 mg capsule; Take 2 capsules (200 mg total) by mouth daily    Functional gait abnormality          Subjective:     Patient ID: Emmy Limon is a 50 y.o. male. The patient presents today for follow-up of persistent left foot and ankle pain status post syndesmotic repair. He still notes difficulty bearing weight on the left lower extremity for prolonged periods, even with the ankle brace on. He recently had his MRI done and presents for its review.       PAST MEDICAL HISTORY:  Past Medical History:   Diagnosis Date    Cancer ([address])     colon    Diabetes mellitus ([address])     GERD (gastroesophageal reflux disease)     Hyperlipidemia     Post concussion syndrome 05/09/2017    Sleep apnea     "mild"    Traumatic brain injury ([address]) 2017    R/S 2017       PAST SURGICAL HISTORY:  Past Surgical History:   Procedure Laterality Date    APPENDECTOMY      COLON SURGERY      [place] RPR PRIMARY DISRUPTED LIGAMENT ANKLE COLLATERAL Left 3/10/2023    Procedure: ANKLE LIGAMENT REPAIR OF SYNDESMOTIC LIGAMENT with steroid injection of posterior heel bursa;  Surgeon: Tiffany Acosta DPM;  Location:  MAIN OR;  Service: Podiatry        ALLERGIES:  Asa [aspirin] and Penicillin g    MEDICATIONS:  Current Outpatient Medications   Medication Sig Dispense Refill    Blood Glucose Monitoring Suppl (OneTouch Verio Flex System) w/Device KIT TEST BLOOD SUGAR AS DIRECTED      celecoxib (CeleBREX) 100 mg capsule Take 2 capsules (200 mg total) by mouth daily 28 capsule 0    cholestyramine sugar free (QUESTRAN LIGHT) 4 g packet Take 1 packet (4 g total) by mouth daily 30 packet 3    Continuous Blood Gluc  (FreeStyle Dell 2 [place]) EV Use 1 Units continuous 1 each 0    Continuous Blood Gluc Sensor (FreeStyle Dell 2 Sensor) MISC Use 1 Units every 14 (fourteen) days 2 each 5    Insulin Glargine Solostar (Lantus SoloStar) 100 UNIT/ML SOPN Inject 0.2 mL (20 Units total) under the skin daily at bedtime (Patient taking differently: Inject 20 Units under the skin daily at bedtime Patient takes 25 units at bedtime) 15 mL 1    Insulin Pen Needle (BD Pen Needle Nancy U/F) 32G X 4 MM MISC Use daily daily bedtime 100 each 1    Lancets (OneTouch Delica Plus [id number]) MISC daily      metFORMIN (GLUCOPHAGE-XR) 500 mg 24 hr tablet TAKE ONE TABLET BY MOUTH TWICE A DAY WITH MEALS 180 tablet 1    ondansetron (ZOFRAN) 4 mg tablet Take 1 tablet (4 mg total) by mouth every 6 (six) hours (Patient taking differently: Take 4 mg by mouth every 8 (eight) hours as needed) 12 tablet 0    OneTouch Verio test strip daily      pantoprazole (PROTONIX) 40 mg tablet Take 1 tablet (40 mg total) by mouth daily 90 tablet 1    tadalafil (CIALIS) 10 MG tablet Take 1 tablet (10 mg total) by mouth daily as needed for erectile dysfunction 20 tablet 1    tiZANidine (ZANAFLEX) 4 mg tablet Take 1 tablet (4 mg total) by mouth 2 (two) times a day as needed for muscle spasms 60 tablet 2     No current facility-administered medications for this visit. SOCIAL HISTORY:  Social History     Socioeconomic History    Marital status: Single     Spouse name: None    Number of children: None    Years of education: None    Highest education level: None   Occupational History    None   Tobacco Use    Smoking status: Never    Smokeless tobacco: Never   Vaping Use    Vaping Use: Never used   Substance and Sexual Activity    Alcohol use:  Yes     Alcohol/week: 1.0 standard drink of alcohol     Types: 1 Cans of beer per week    Drug use: Never    Sexual activity: Yes     Partners: Female     Birth control/protection: None   Other Topics Concern    None   Social History Narrative    None     Social Determinants of Health     Financial Resource Strain: Not on file   Food Insecurity: Not on file   Transportation Needs: Not on file   Physical Activity: Not on file   Stress: Not on file   Social Connections: Not on file   Intimate Partner Violence: Not on file Housing Stability: Not on file        Review of Systems   Constitutional: Negative. HENT: Negative. Eyes: Negative. Respiratory: Negative. Cardiovascular: Negative. Endocrine: Negative. Musculoskeletal: Negative. Neurological: Negative. Hematological: Negative. Psychiatric/Behavioral: Negative. Objective:     Physical Exam  Vitals reviewed. Constitutional:       Appearance: Normal appearance. HENT:      Head: Normocephalic and atraumatic. Nose: Nose normal.   Eyes:      Conjunctiva/sclera: Conjunctivae normal.      Pupils: Pupils are equal, round, and reactive to light. Cardiovascular:      Pulses:           Dorsalis pedis pulses are 2+ on the left side. Posterior tibial pulses are 2+ on the left side. Pulmonary:      Effort: Pulmonary effort is normal.   Musculoskeletal:        Feet:    Feet:      Left foot:      Skin integrity: Skin integrity normal.      Comments: The patient's clinical examination today significant for moderate tenderness with palpation along the peroneal tendons starting in the area of the posterior fibula and radiating proximally. Moderate tenderness to palpation is also noted with palpation of the ATFL and CFL as well as the PT FL. Passive range of motion of the ankle and hindfoot joints also produces pain. Muscle power is 4 out of 5 in all directions. Mild to moderate tenderness is also noted in all directions. There is minimal edema noted today. There is no erythema nor ecchymosis nor calor. Skin:     General: Skin is warm. Capillary Refill: Capillary refill takes less than 2 seconds. Neurological:      General: No focal deficit present. Mental Status: He is alert and oriented to person, place, and time. Psychiatric:         Mood and Affect: Mood normal.         Behavior: Behavior normal.         Thought Content:  Thought content normal. this season

## 2023-10-23 ENCOUNTER — APPOINTMENT (OUTPATIENT)
Dept: LAB | Facility: CLINIC | Age: 48
End: 2023-10-23
Payer: COMMERCIAL

## 2023-10-23 DIAGNOSIS — R74.01 ELEVATED ALT MEASUREMENT: ICD-10-CM

## 2023-10-23 DIAGNOSIS — E11.9 TYPE 2 DIABETES MELLITUS WITHOUT COMPLICATION, WITHOUT LONG-TERM CURRENT USE OF INSULIN (HCC): ICD-10-CM

## 2023-10-23 DIAGNOSIS — K52.9 CHRONIC DIARRHEA: ICD-10-CM

## 2023-10-23 LAB
25(OH)D3 SERPL-MCNC: 17.5 NG/ML (ref 30–100)
ALBUMIN SERPL BCP-MCNC: 4.3 G/DL (ref 3.5–5)
ALP SERPL-CCNC: 64 U/L (ref 34–104)
ALT SERPL W P-5'-P-CCNC: 56 U/L (ref 7–52)
AST SERPL W P-5'-P-CCNC: 25 U/L (ref 13–39)
BILIRUB DIRECT SERPL-MCNC: 0.06 MG/DL (ref 0–0.2)
BILIRUB SERPL-MCNC: 0.61 MG/DL (ref 0.2–1)
CREAT UR-MCNC: 165.3 MG/DL
EST. AVERAGE GLUCOSE BLD GHB EST-MCNC: 220 MG/DL
HBA1C MFR BLD: 9.3 %
MICROALBUMIN UR-MCNC: 93.3 MG/L
MICROALBUMIN/CREAT 24H UR: 56 MG/G CREATININE (ref 0–30)
PROT SERPL-MCNC: 7.6 G/DL (ref 6.4–8.4)

## 2023-10-23 PROCEDURE — 36415 COLL VENOUS BLD VENIPUNCTURE: CPT

## 2023-10-23 PROCEDURE — 82306 VITAMIN D 25 HYDROXY: CPT

## 2023-10-23 PROCEDURE — 83036 HEMOGLOBIN GLYCOSYLATED A1C: CPT

## 2023-10-23 PROCEDURE — 82570 ASSAY OF URINE CREATININE: CPT

## 2023-10-23 PROCEDURE — 82043 UR ALBUMIN QUANTITATIVE: CPT

## 2023-10-23 PROCEDURE — 80076 HEPATIC FUNCTION PANEL: CPT

## 2023-10-25 ENCOUNTER — OFFICE VISIT (OUTPATIENT)
Dept: ENDOCRINOLOGY | Facility: CLINIC | Age: 48
End: 2023-10-25
Payer: COMMERCIAL

## 2023-10-25 VITALS
BODY MASS INDEX: 35.09 KG/M2 | OXYGEN SATURATION: 96 % | DIASTOLIC BLOOD PRESSURE: 76 MMHG | WEIGHT: 210.6 LBS | HEART RATE: 87 BPM | SYSTOLIC BLOOD PRESSURE: 138 MMHG | TEMPERATURE: 98.2 F | HEIGHT: 65 IN

## 2023-10-25 DIAGNOSIS — E55.9 VITAMIN D DEFICIENCY: ICD-10-CM

## 2023-10-25 DIAGNOSIS — E66.09 CLASS 1 OBESITY DUE TO EXCESS CALORIES WITH SERIOUS COMORBIDITY AND BODY MASS INDEX (BMI) OF 34.0 TO 34.9 IN ADULT: ICD-10-CM

## 2023-10-25 DIAGNOSIS — Z79.4 TYPE 2 DIABETES MELLITUS WITH HYPERGLYCEMIA, WITH LONG-TERM CURRENT USE OF INSULIN (HCC): Primary | ICD-10-CM

## 2023-10-25 DIAGNOSIS — N48.1 BALANITIS: ICD-10-CM

## 2023-10-25 DIAGNOSIS — E11.9 TYPE 2 DIABETES MELLITUS WITHOUT COMPLICATION, WITHOUT LONG-TERM CURRENT USE OF INSULIN (HCC): ICD-10-CM

## 2023-10-25 DIAGNOSIS — E78.1 HYPERTRIGLYCERIDEMIA: ICD-10-CM

## 2023-10-25 DIAGNOSIS — E11.65 TYPE 2 DIABETES MELLITUS WITH HYPERGLYCEMIA, WITH LONG-TERM CURRENT USE OF INSULIN (HCC): Primary | ICD-10-CM

## 2023-10-25 PROCEDURE — 99214 OFFICE O/P EST MOD 30 MIN: CPT | Performed by: INTERNAL MEDICINE

## 2023-10-25 RX ORDER — INSULIN GLARGINE 100 [IU]/ML
25 INJECTION, SOLUTION SUBCUTANEOUS
Qty: 24 ML | Refills: 1 | Status: SHIPPED | OUTPATIENT
Start: 2023-10-25

## 2023-10-25 RX ORDER — METFORMIN HYDROCHLORIDE 500 MG/1
500 TABLET, EXTENDED RELEASE ORAL 2 TIMES DAILY WITH MEALS
Qty: 180 TABLET | Refills: 1 | Status: SHIPPED | OUTPATIENT
Start: 2023-10-25

## 2023-10-25 RX ORDER — ERGOCALCIFEROL 1.25 MG/1
50000 CAPSULE ORAL WEEKLY
Qty: 12 CAPSULE | Refills: 0 | Status: SHIPPED | OUTPATIENT
Start: 2023-10-25 | End: 2024-01-11

## 2023-10-25 RX ORDER — BLOOD-GLUCOSE SENSOR
1 EACH MISCELLANEOUS
Qty: 2 EACH | Refills: 5 | Status: SHIPPED | OUTPATIENT
Start: 2023-10-25

## 2023-10-25 RX ORDER — PEN NEEDLE, DIABETIC 32GX 5/32"
NEEDLE, DISPOSABLE MISCELLANEOUS
Qty: 100 EACH | Refills: 1 | Status: SHIPPED | OUTPATIENT
Start: 2023-10-25

## 2023-10-25 NOTE — PROGRESS NOTES
Follow-up Patient Progress Note      CC: Diabetets     History of Present Illness:   47yr male with type 2 diabetes since 2018, obesity BMI 35, HTN, HLD, PETTY, left lumbar radiculopathy, ED, NAFLD, chronic diarrhea, Hx colon cancer age 27, chronic fatigue and vitamin D deficiency. Last visit was 2/22/23. Since last visit, weight is same. He ran out of insulin 7/23. Has  polyuria, polydipsia, blurred vision and fatigue.      Home blood glucose monitoring: does not check     Current meds:  Metformin 1000mg po  BID     Opthamology: yes, last exam 2021  Podiatry: No  vaccination: Yes  Dental:  Pancreatitis: No     Ace/ARB: No  Statin: no  Thyroid issues: NO     FH: Diabetes - mother and father; father - CAD    Patient Active Problem List   Diagnosis    Chronic diarrhea    De Quervain's tenosynovitis    Fatty liver    Hypertriglyceridemia    Impingement syndrome of left shoulder    Left lumbar radiculopathy    Male erectile dysfunction    Plantar warts    Paresthesias    Excessive daytime sleepiness    PETTY (obstructive sleep apnea)    Type 2 diabetes mellitus with hyperglycemia, with long-term current use of insulin (HCC)    Class 1 obesity due to excess calories with serious comorbidity and body mass index (BMI) of 34.0 to 34.9 in adult    Arthritis of right knee    Chronic pain of right knee    Pre-op examination    Abdominal wall hernia    Syndesmotic disruption of ankle, left, subsequent encounter    Calculus of gallbladder without cholecystitis without obstruction    Abdominal pain    Sacroiliitis (HCC)    Ankle syndesmosis disruption, left, subsequent encounter     Past Medical History:   Diagnosis Date    Cancer ([address])     colon    Diabetes mellitus ([address])     GERD (gastroesophageal reflux disease)     Hyperlipidemia     Post concussion syndrome 05/09/2017    Sleep apnea     "mild"    Traumatic brain injury ([address]) 2017    R/S 2017      Past Surgical History:   Procedure Laterality Date    APPENDECTOMY COLON SURGERY      [place] RPR PRIMARY DISRUPTED LIGAMENT ANKLE COLLATERAL Left 3/10/2023    Procedure: ANKLE LIGAMENT REPAIR OF SYNDESMOTIC LIGAMENT with steroid injection of posterior heel bursa;  Surgeon: Melody Greenberg DPM;  Location: [facility];  Service: Podiatry      Family History   Problem Relation Age of Onset    Coronary artery disease Mother     Diabetes Mother     Hypertension Mother     Hyperlipidemia Mother     Coronary artery disease Father     Diabetes Father     Hypertension Father     Hyperlipidemia Father     Heart attack Father     Anemia Brother     No Known Problems Son     No Known Problems Son     No Known Problems Son      Social History     Tobacco Use    Smoking status: Never    Smokeless tobacco: Never   Substance Use Topics    Alcohol use:  Yes     Alcohol/week: 1.0 standard drink of alcohol     Types: 1 Cans of beer per week     Allergies   Allergen Reactions    Asa [Aspirin] GI Intolerance    Penicillin G Other (See Comments)     Ancef ok     "unsure; was a baby"         Current Outpatient Medications:     Blood Glucose Monitoring Suppl (OneTouch Verio Flex System) w/Device KIT, TEST BLOOD SUGAR AS DIRECTED, Disp: , Rfl:     celecoxib (CeleBREX) 100 mg capsule, Take 2 capsules (200 mg total) by mouth daily, Disp: 28 capsule, Rfl: 0    cholestyramine sugar free (QUESTRAN LIGHT) 4 g packet, Take 1 packet (4 g total) by mouth daily, Disp: 30 packet, Rfl: 3    Insulin Pen Needle (BD Pen Needle Nancy U/F) 32G X 4 MM MISC, Use daily daily bedtime, Disp: 100 each, Rfl: 1    Lancets (OneTouch Delica Plus [id number]) MISC, daily, Disp: , Rfl:     metFORMIN (GLUCOPHAGE-XR) 500 mg 24 hr tablet, TAKE ONE TABLET BY MOUTH TWICE A DAY WITH MEALS, Disp: 180 tablet, Rfl: 1    OneTouch Verio test strip, daily, Disp: , Rfl:     tadalafil (CIALIS) 10 MG tablet, Take 1 tablet (10 mg total) by mouth daily as needed for erectile dysfunction, Disp: 20 tablet, Rfl: 1    Continuous Blood Gluc  (FreeStyle Dell 2 [place]) Marielena Lara, Use 1 Units continuous (Patient not taking: Reported on 10/25/2023), Disp: 1 each, Rfl: 0    Continuous Blood Gluc Sensor (FreeStyle Dell 2 Sensor) MISC, Use 1 Units every 14 (fourteen) days (Patient not taking: Reported on 10/25/2023), Disp: 2 each, Rfl: 5    Insulin Glargine Solostar (Lantus SoloStar) 100 UNIT/ML SOPN, Inject 0.2 mL (20 Units total) under the skin daily at bedtime (Patient not taking: Reported on 10/25/2023), Disp: 15 mL, Rfl: 1    ondansetron (ZOFRAN) 4 mg tablet, Take 1 tablet (4 mg total) by mouth every 6 (six) hours (Patient not taking: Reported on 10/25/2023), Disp: 12 tablet, Rfl: 0    pantoprazole (PROTONIX) 40 mg tablet, Take 1 tablet (40 mg total) by mouth daily (Patient not taking: Reported on 10/25/2023), Disp: 90 tablet, Rfl: 1    tiZANidine (ZANAFLEX) 4 mg tablet, Take 1 tablet (4 mg total) by mouth 2 (two) times a day as needed for muscle spasms (Patient not taking: Reported on 10/25/2023), Disp: 60 tablet, Rfl: 2    Review of Systems   Constitutional:  Positive for activity change and appetite change. HENT: Negative. Eyes: Negative. Respiratory: Negative. Cardiovascular:  Negative for chest pain. Gastrointestinal: Negative. Endocrine: Negative. Genitourinary: Negative. Musculoskeletal: Negative. Skin: Negative. Allergic/Immunologic: Negative. Neurological: Negative. Hematological: Negative. Psychiatric/Behavioral: Negative. All other systems reviewed and are negative. Physical Exam:  Body mass index is 35.05 kg/m². /76 (BP Location: Left arm, Patient Position: Sitting, Cuff Size: Standard)   Pulse 87   Temp 98.2 °F (36.8 °C) (Tympanic)   Ht 5' 5" (1.651 m)   Wt 95.5 kg (210 lb 9.6 oz)   SpO2 96%   BMI 35.05 kg/m²    Vitals:    10/25/23 1102   Weight: 95.5 kg (210 lb 9.6 oz)        Physical Exam  Constitutional:       General: He is not in acute distress. Appearance: He is well-developed.  He is not ill-appearing. HENT:      Head: Normocephalic and atraumatic. Nose: Nose normal.      Mouth/Throat:      Pharynx: Oropharynx is clear. Eyes:      Extraocular Movements: Extraocular movements intact. Conjunctiva/sclera: Conjunctivae normal.   Neck:      Thyroid: No thyromegaly. Cardiovascular:      Rate and Rhythm: Normal rate. Pulmonary:      Effort: Pulmonary effort is normal.   Musculoskeletal:         General: No deformity. Cervical back: Normal range of motion. Skin:     Capillary Refill: Capillary refill takes less than 2 seconds. Coloration: Skin is not pale. Findings: No rash. Neurological:      Mental Status: He is alert and oriented to person, place, and time. Psychiatric:         Behavior: Behavior normal.         Labs:   Lab Results   Component Value Date    HGBA1C 9.3 (H) 10/23/2023       Lab Results   Component Value Date    ENO3NNVYHBPV 1.470 02/13/2023       Lab Results   Component Value Date    CREATININE 0.89 05/29/2023    CREATININE 0.97 03/09/2023    CREATININE 0.84 02/13/2023    BUN 13 05/29/2023     12/13/2014    K 3.5 05/29/2023     05/29/2023    CO2 28 05/29/2023     eGFR   Date Value Ref Range Status   05/29/2023 101 ml/min/1.73sq m Final       Lab Results   Component Value Date    ALT 56 (H) 10/23/2023    AST 25 10/23/2023    ALKPHOS 64 10/23/2023       Lab Results   Component Value Date    CHOLESTEROL 202 (H) 02/13/2023    CHOLESTEROL 185 09/07/2021    CHOLESTEROL 182 07/17/2020     Lab Results   Component Value Date    HDL 35 (L) 02/13/2023    HDL 36 (L) 09/07/2021    HDL 37 (L) 07/17/2020     Lab Results   Component Value Date    TRIG 454 (H) 02/13/2023    TRIG 455 (H) 09/07/2021    TRIG 310 (H) 07/17/2020     Lab Results   Component Value Date    [address] 02/13/2023         Impression:  1.  Type 2 diabetes mellitus with hyperglycemia, with long-term current use of insulin (HCC)    2. Class 1 obesity due to excess calories with serious comorbidity and body mass index (BMI) of 34.0 to 34.9 in adult    3. Hypertriglyceridemia    4. Type 2 diabetes mellitus without complication, without long-term current use of insulin ([address])    5. Vitamin D deficiency    6. Balanitis         Plan:    Sharmin Foy was seen today for follow-up and diabetes type 2. Diagnoses and all orders for this visit:    Type 2 diabetes mellitus with hyperglycemia, with long-term current use of insulin ([address]). He is uncontrolled with A1c 9.3%. Goal is 6.5%. Today we agreed to reinitiate medications as listed below. Monitor capillary glucose or consider a personal cgm/ SAGM. Will consider a sample sensor next visit. Follow up in 6 weeks. -     Insulin Glargine Solostar (Lantus SoloStar) 100 UNIT/ML SOPN; Inject 0.25 mL (25 Units total) under the skin daily at bedtime  -     metFORMIN (GLUCOPHAGE-XR) 500 mg 24 hr tablet; Take 1 tablet (500 mg total) by mouth 2 (two) times a day with meals  -     Continuous Blood Gluc Sensor (FreeStyle Dell 3 Sensor) MISC; Use 1 Units every 14 (fourteen) days  -     dulaglutide (Trulicity) 8.40 NV/4.2HY injection; Inject 0.5 mL (0.75 mg total) under the skin once a week  -     Insulin Pen Needle (BD Pen Needle Nancy U/F) 32G X 4 MM MISC; Use twice daily  -     Hemoglobin A1C; Future  -     Albumin / creatinine urine ratio; Future  -     Ambulatory referral to Diabetic Education; Future    Class 1 obesity due to excess calories with serious comorbidity and body mass index (BMI) of 34.0 to 34.9 in adult. Today we discussed all aspects of obesity and metabolism including pathophysiology, risk factors, complications, goal of sustaining weight loss long term, usual propensity to regain weight with short term approaches, follow up needs and medications - efficacy and limitations. Discussed role of endocrinopathies, inflammatory conditions, sleep disorders, mental health disorders, lifestyle issues and polypharmacy and weight gain.   Briefly discussed bariatric surgery. Diet: carb controlled diet <1500cal/day/ VLCD, 64oz fluids/day   lifestyle modifications: intermittent fasting and 10,000 steps/day  medical fitness training: muscle building  education: nutrition input    Hypertriglyceridemia. May need to reinitiate lipitor. Will wait lipid panel.  -     Lipid panel; Future    Balanitis. 2" severe hyperglycemia. Will avoid SGLT2i. Vitamin D deficiency  -     ergocalciferol (VITAMIN D2) 50,000 units; Take 1 capsule (50,000 Units total) by mouth once a week for 12 doses        I have spent 32 minutes with patient today in which greater than 50% of this time was spent in counseling/coordination of care. Discussed with the patient and all questioned fully answered. He will call me if any problems arise. Educated/ Counseled patient on diagnostic test results, prognosis, risk vs benefit of treatment options, importance of treatment compliance, healthy life and lifestyle choices.       Jessenia

## 2023-10-26 ENCOUNTER — OFFICE VISIT (OUTPATIENT)
Dept: PHYSICAL THERAPY | Facility: CLINIC | Age: 48
End: 2023-10-26
Payer: COMMERCIAL

## 2023-10-26 DIAGNOSIS — G89.29 CHRONIC PAIN OF LEFT ANKLE: Primary | ICD-10-CM

## 2023-10-26 DIAGNOSIS — M25.572 CHRONIC PAIN OF LEFT ANKLE: Primary | ICD-10-CM

## 2023-10-26 PROCEDURE — 97110 THERAPEUTIC EXERCISES: CPT | Performed by: PHYSICAL THERAPIST

## 2023-10-26 PROCEDURE — 97161 PT EVAL LOW COMPLEX 20 MIN: CPT | Performed by: PHYSICAL THERAPIST

## 2023-10-26 PROCEDURE — 97140 MANUAL THERAPY 1/> REGIONS: CPT | Performed by: PHYSICAL THERAPIST

## 2023-10-26 NOTE — PROGRESS NOTES
PT EVALUATION    Today's date: 10/26/23  Patient name: Marlin Benítez  : 1975  MRN: [id number]  Referring provider: Leyla Monroe DPM  Dx:   1. Chronic pain of left ankle        Marlin Benítez is a 50 y.o. male who presents with prior syndesmotic disruption and distal fibular ORIF and syndesmotic fixation. There is decreased tolerance to ankle movement, decreased strength and pain with all weight bearing activities. This patient would benefit from skilled physical therapy to address their listed impairments and functional limitations to maximize functional outcome. Impairments:    restricted ROM    decreased strength   pain with function   weight bearing intolerance   abnormal gait     Prognosis:  Good  Positive and negative prognostic indicator(s):  prior success with conservative care and pain >3 months    Goals:    STG Patient is independent with HEP   STG Range of motion is improved by 25% in 2 weeks  LTG Range of motion is improved by 50% in 4 weeks  LTG Weight bearing tolerance improved 50% in 4 weeks    Planned interventions:  home exercise program, patient education, manual therapy, graded activity, flexibility, functional range of motion exercises, strengthening, balance and weight bearing training, and gait training    Duration in visits:  8  Frequency: 2 visits per week  Duration in weeks:  4    History of Current Injury: 2022, he slipped and fell and had torn ligaments in the ankle. S/p distal fibular ORIF and syndesmotic fixation. He was in a boot for a while and has been using a brace since May/[place]. Mild to moderate pain with weight bearing that gradually increases throughout the day until it is severe and he has to get off it. He likes to play soccer and cycle but he has been unable to do either. He can cycle on level surfaces but a lot of pain with hills.      Pain location: lateral ankle  Pain descriptors:  aching  Pain intensity:  11/10 at the end of the day,  4/10    Aggravating factors: towards the middle and end of the day  Easing factors: elevating/and non weight bearing    Imaging: IMPRESSION:     1. Status post distal fibular ORIF and syndesmotic fixation. 2.Thickening of anterior inferior tibiofibular ligament, in keeping with chronic sprain. 3. Tiny osteochondral lesion of the lateral talar dome, similar to prior exam. No evidence of instability.       Patient goals:  decreased pain and independence with ADLs    Objective     Active Range of Motion   Left Ankle/Foot   Dorsiflexion (ke): -10 degrees   Dorsiflexion (kf): 30 degrees   Inversion: 20 degrees   Eversion: 11 degrees           Precautions: DM      Manuals 10/26            Manual left ankle stretching SY            MFR calf and soleus with soleus stretch SY                                      Neuro Re-Ed                                                                                                        Ther Ex             Gastroc towel stretch :10x4            Soleus towel stretch :10x4            TB PF             TB eversion             Inversion ball squeeze iso             SLS             Kneeling DF stretch Gentle :02x15            Heel raises             Seated heel raises :10x4            Rocker board             Wobble board                                                                 Ther Activity                                       Gait Training                                       Modalities

## 2023-10-31 ENCOUNTER — OFFICE VISIT (OUTPATIENT)
Dept: PHYSICAL THERAPY | Facility: CLINIC | Age: 48
End: 2023-10-31
Payer: COMMERCIAL

## 2023-10-31 ENCOUNTER — TELEPHONE (OUTPATIENT)
Dept: GASTROENTEROLOGY | Facility: CLINIC | Age: 48
End: 2023-10-31

## 2023-10-31 DIAGNOSIS — G89.29 CHRONIC PAIN OF LEFT ANKLE: Primary | ICD-10-CM

## 2023-10-31 DIAGNOSIS — M25.572 CHRONIC PAIN OF LEFT ANKLE: Primary | ICD-10-CM

## 2023-10-31 PROCEDURE — 97110 THERAPEUTIC EXERCISES: CPT | Performed by: PHYSICAL THERAPIST

## 2023-10-31 PROCEDURE — 97112 NEUROMUSCULAR REEDUCATION: CPT | Performed by: PHYSICAL THERAPIST

## 2023-10-31 PROCEDURE — 97140 MANUAL THERAPY 1/> REGIONS: CPT | Performed by: PHYSICAL THERAPIST

## 2023-10-31 NOTE — PROGRESS NOTES
Daily Note     Today's date: 10/31/2023  Patient name: Jimenez Cleary  : 1975  MRN: [id number]  Referring provider: Sumanth Black DPM  Dx:   Encounter Diagnosis     ICD-10-CM    1. Chronic pain of left ankle  M25.572     G89.29                      Subjective: notes pain when using a later recently and he was only able to walk 10 minutes at [address] [place] before having to stop      Objective: See treatment diary below      Assessment: Tolerated treatment well. Patient exhibited good technique with therapeutic exercises and would benefit from continued PT. PROM DF to -1 (from -10), pain with PROM in all directions, less with eversion. Discussed with patient that the ankle will be sore with initial stretching. Plan: Progress treatment as tolerated.        Precautions: DM      Manuals 10/26 10/31           Manual left ankle stretching SY SY           MFR calf and soleus with soleus stretch SY SY                        Bike   5' lvl 1           Neuro Re-Ed                                                                                                        Ther Ex             Gastroc towel stretch :10x4 :10x4           Soleus towel stretch :10x4 :10x4           TB PF             TB eversion  Red :05x20           Inversion ball squeeze iso  :10x10           SLS             Kneeling DF stretch Gentle :02x15            Heel raises             Seated heel raises :10x4            Rocker board             Wobble board  4-way 1'ea           prostretch                                                    Ther Activity                                       Gait Training                                       Modalities

## 2023-10-31 NOTE — TELEPHONE ENCOUNTER
Spoke with patient and he will give us a call back to schedule EGD/colon. Will wait for patient to call back to schedule.

## 2023-11-02 ENCOUNTER — OFFICE VISIT (OUTPATIENT)
Dept: PHYSICAL THERAPY | Facility: CLINIC | Age: 48
End: 2023-11-02
Payer: COMMERCIAL

## 2023-11-02 DIAGNOSIS — M25.572 CHRONIC PAIN OF LEFT ANKLE: Primary | ICD-10-CM

## 2023-11-02 DIAGNOSIS — G89.29 CHRONIC PAIN OF LEFT ANKLE: Primary | ICD-10-CM

## 2023-11-02 PROCEDURE — 97140 MANUAL THERAPY 1/> REGIONS: CPT | Performed by: PHYSICAL THERAPIST

## 2023-11-02 PROCEDURE — 97112 NEUROMUSCULAR REEDUCATION: CPT | Performed by: PHYSICAL THERAPIST

## 2023-11-02 PROCEDURE — 97110 THERAPEUTIC EXERCISES: CPT | Performed by: PHYSICAL THERAPIST

## 2023-11-02 NOTE — PROGRESS NOTES
Daily Note     Today's date: 2023  Patient name: Clinton Krabbe  : 1975  MRN: [id number]  Referring provider: Lilli Alamo DPM  Dx:   Encounter Diagnosis     ICD-10-CM    1. Chronic pain of left ankle  M25.572     G89.29                      Subjective: notes continues soreness along the lateral ankle; he skipped walking with a group during Halloween due to pain      Objective: See treatment diary below      Assessment: Tolerated treatment well. Patient exhibited good technique with therapeutic exercises and would benefit from continued PT. Light ankle stretching and minimal weight bearing activities until tolerance improves. PROM DF to 2      Plan: Progress treatment as tolerated.        Precautions: DM      Manuals 10/26 10/31 11/2          Manual left ankle stretching SY SY SY          MFR calf and soleus with soleus stretch SY SY                        Bike   5' lvl 1 5' lvl 3          Neuro Re-Ed                                                                                                        Ther Ex             Gastroc towel stretch :10x4 :10x4           Soleus towel stretch :10x4 :10x4           TB PF             TB eversion  Red :05x20 Red :05x20          Inversion ball squeeze iso  :10x10 :10x10          SLS             Kneeling DF stretch Gentle :02x15            Heel raises             Seated heel raises :10x4            Rocker board             Wobble board  4-way 1'ea 4-way 1'ea          prostretch   Seated :10x 2min                                                 Ther Activity                                       Gait Training                                       Modalities

## 2023-11-07 ENCOUNTER — OFFICE VISIT (OUTPATIENT)
Dept: PHYSICAL THERAPY | Facility: CLINIC | Age: 48
End: 2023-11-07
Payer: COMMERCIAL

## 2023-11-07 DIAGNOSIS — G89.29 CHRONIC PAIN OF LEFT ANKLE: Primary | ICD-10-CM

## 2023-11-07 DIAGNOSIS — M25.572 CHRONIC PAIN OF LEFT ANKLE: Primary | ICD-10-CM

## 2023-11-07 PROCEDURE — 97112 NEUROMUSCULAR REEDUCATION: CPT | Performed by: PHYSICAL THERAPIST

## 2023-11-07 PROCEDURE — 97140 MANUAL THERAPY 1/> REGIONS: CPT | Performed by: PHYSICAL THERAPIST

## 2023-11-07 PROCEDURE — 97110 THERAPEUTIC EXERCISES: CPT | Performed by: PHYSICAL THERAPIST

## 2023-11-07 NOTE — PROGRESS NOTES
Daily Note     Today's date: 2023  Patient name: Jimenez Cleary  : 1975  MRN: [id number]  Referring provider: Sumanth Black DPM  Dx:   Encounter Diagnosis     ICD-10-CM    1. Chronic pain of left ankle  M25.572     G89.29                    Subjective: notes he had to modify his gear shifting on a motorcycle (using his heel more) due to ankle soreness, also used an aircast      Objective: See treatment diary below      Assessment: Tolerated treatment well. Patient exhibited good technique with therapeutic exercises and would benefit from continued PT. Pain with all PROM, DF improving with 7 degrees passively at end of stretching. Still limiting weight bearing with exercises until tolerance to current routine improves      Plan: Progress treatment as tolerated.        Precautions: DM      Manuals 10/26 10/31 11/2 11/7         Manual left ankle stretching SY SY SY SY         MFR calf and soleus with soleus stretch SY SY  SY                      Bike   5' lvl 1 5' lvl 3 5' lvl 3         Neuro Re-Ed                                                                                                        Ther Ex             Gastroc towel stretch :10x4 :10x4           Soleus towel stretch :10x4 :10x4           TB PF             TB eversion  Red :05x20 Red :05x20          Inversion ball squeeze iso  :10x10 :10x10 :10x90s         SLS             Kneeling DF stretch Gentle :02x15            Heel raises             Seated heel raises :10x4            Rocker board             Wobble board  4-way 1'ea 4-way 1'ea 4-way 1'ea         prostretch   Seated :10x 2min Seated :10x ea 2min                                                Ther Activity                                       Gait Training                                       Modalities

## 2023-11-09 ENCOUNTER — OFFICE VISIT (OUTPATIENT)
Dept: PHYSICAL THERAPY | Facility: CLINIC | Age: 48
End: 2023-11-09
Payer: COMMERCIAL

## 2023-11-09 DIAGNOSIS — M25.572 CHRONIC PAIN OF LEFT ANKLE: Primary | ICD-10-CM

## 2023-11-09 DIAGNOSIS — G89.29 CHRONIC PAIN OF LEFT ANKLE: Primary | ICD-10-CM

## 2023-11-09 PROCEDURE — 97110 THERAPEUTIC EXERCISES: CPT | Performed by: PHYSICAL THERAPIST

## 2023-11-09 PROCEDURE — 97140 MANUAL THERAPY 1/> REGIONS: CPT | Performed by: PHYSICAL THERAPIST

## 2023-11-09 PROCEDURE — 97112 NEUROMUSCULAR REEDUCATION: CPT | Performed by: PHYSICAL THERAPIST

## 2023-11-09 NOTE — PROGRESS NOTES
Daily Note     Today's date: 2023  Patient name: Clinton Krabbe  : 1975  MRN: [id number]  Referring provider: Lilli Alamo DPM  Dx:   Encounter Diagnosis     ICD-10-CM    1. Chronic pain of left ankle  M25.572     G89.29                  Subjective:   patient rolled his ankle and fell yesterday when walking down hill on a slightly uneven surface; he had increased pain and swelling which continues today      Objective: See treatment diary below      Assessment: Tolerated treatment well. Patient exhibited good technique with therapeutic exercises and would benefit from continued PT. PROM DF to -2, gentle stretching only today; limited exercises due to recent exacerbation. Color/temp wnl, tender to ATFL, mild swelling      Plan: Progress treatment as tolerated.        Precautions: DM      Manuals 10/26 10/31 11/2 11/7 11/9        Visit 1/10 2/10 3/10 4/10 5/10        Manual left ankle stretching SY SY SY SY SY        MFR calf and soleus with soleus stretch SY SY  SY SY                     Bike   5' lvl 1 5' lvl 3 5' lvl 3 5' lvl 3        Neuro Re-Ed                                                                                                        Ther Ex             Gastroc towel stretch :10x4 :10x4           Soleus towel stretch :10x4 :10x4           TB PF             TB eversion  Red :05x20 Red :05x20  Yellow :10x13'loop        Inversion ball squeeze iso  :10x10 :10x10 :10x90s :10x90s        SLS             Kneeling DF stretch Gentle :02x15            Heel raises             Seated heel raises :10x4            Rocker board             Wobble board  4-way 1'ea 4-way 1'ea 4-way 1'ea         prostretch   Seated :10x 2min Seated :10x ea 2min Seated :10x ea 2min                                                 Ther Activity                                       Gait Training                                       Modalities

## 2023-11-14 ENCOUNTER — APPOINTMENT (OUTPATIENT)
Dept: PHYSICAL THERAPY | Facility: CLINIC | Age: 48
End: 2023-11-14
Payer: COMMERCIAL

## 2023-11-16 ENCOUNTER — APPOINTMENT (OUTPATIENT)
Dept: PHYSICAL THERAPY | Facility: CLINIC | Age: 48
End: 2023-11-16
Payer: COMMERCIAL

## 2023-11-16 ENCOUNTER — TELEPHONE (OUTPATIENT)
Age: 48
End: 2023-11-16

## 2023-11-20 ENCOUNTER — OFFICE VISIT (OUTPATIENT)
Dept: PHYSICAL THERAPY | Facility: CLINIC | Age: 48
End: 2023-11-20
Payer: COMMERCIAL

## 2023-11-20 DIAGNOSIS — G89.29 CHRONIC PAIN OF LEFT ANKLE: Primary | ICD-10-CM

## 2023-11-20 DIAGNOSIS — M25.572 CHRONIC PAIN OF LEFT ANKLE: Primary | ICD-10-CM

## 2023-11-20 PROCEDURE — 97140 MANUAL THERAPY 1/> REGIONS: CPT | Performed by: PHYSICAL THERAPIST

## 2023-11-20 PROCEDURE — 97110 THERAPEUTIC EXERCISES: CPT | Performed by: PHYSICAL THERAPIST

## 2023-11-20 PROCEDURE — 97112 NEUROMUSCULAR REEDUCATION: CPT | Performed by: PHYSICAL THERAPIST

## 2023-11-20 NOTE — PROGRESS NOTES
Daily Note     Today's date: 2023  Patient name: Dinorah Oneal  : 1975  MRN: [id number]  Referring provider: Jericho Mazariegos DPM  Dx:   Encounter Diagnosis     ICD-10-CM    1. Chronic pain of left ankle  M25.572     G89.29                      Subjective: had stomach illness most of last week and was unable to attend appointments;  ankle feels stiff following the bed rest        Objective: See treatment diary below      Assessment: Tolerated treatment well. Patient exhibited good technique with therapeutic exercises and would benefit from continued PT. PROM DF to 5 after stretching; initiated sidestepping which he noted felt weird and uncomfortable      Plan: Progress treatment as tolerated.        Precautions: DM      Manuals 10/26 10/31 11/2 11/7 11/9 11/20       Visit 1/10 2/10 3/10 4/10 5/10 6/10       Manual left ankle stretching SY SY SY SY SY SY       MFR calf and soleus with soleus stretch SY SY  SY SY SY                    Bike   5' lvl 1 5' lvl 3 5' lvl 3 5' lvl 3 5' lvl 3       Neuro Re-Ed                                                                                                        Ther Ex             Gastroc towel stretch :10x4 :10x4           Soleus towel stretch :10x4 :10x4           TB PF             TB eversion  Red :05x20 Red :05x20  Yellow :10x13'loop        Inversion ball squeeze iso  :10x10 :10x10 :10x90s :10x90s        SLS             Kneeling DF stretch Gentle :02x15            Heel raises             Seated heel raises :10x4            Rocker board      AP 2'       Wobble board  4-way 1'ea 4-way 1'ea 4-way 1'ea  CW 2'       prostretch   Seated :10x 2min Seated :10x ea 2min Seated :10x ea 2min   Seated :10x ea 2min       Standing bilat heel raises with UE      :10x2'       Side stepping      2'                    Ther Activity                                       Gait Training                                       Modalities

## 2023-11-21 ENCOUNTER — OFFICE VISIT (OUTPATIENT)
Dept: DIABETES SERVICES | Facility: CLINIC | Age: 48
End: 2023-11-21
Payer: COMMERCIAL

## 2023-11-21 VITALS — BODY MASS INDEX: 34.88 KG/M2 | WEIGHT: 209.6 LBS

## 2023-11-21 DIAGNOSIS — Z79.4 TYPE 2 DIABETES MELLITUS WITH HYPERGLYCEMIA, WITH LONG-TERM CURRENT USE OF INSULIN (HCC): Primary | ICD-10-CM

## 2023-11-21 DIAGNOSIS — E11.65 TYPE 2 DIABETES MELLITUS WITH HYPERGLYCEMIA, WITH LONG-TERM CURRENT USE OF INSULIN (HCC): Primary | ICD-10-CM

## 2023-11-21 PROCEDURE — 95249 CONT GLUC MNTR PT PROV EQP: CPT

## 2023-11-21 NOTE — PROGRESS NOTES
Personal [employer]        Met with Magnus Miller today for a personal [address] 3 training. Danial Shepard brought his own supplies to the visit which include a reader and sensors. Educator reviewed the following:    -- Skin Prep  -- How to place the [address]  -- Importance of site rotation  -- [employer] set alerts  -- How to scan for a reading  -- 60 minute warm-up  -- If reading doesn't match symptoms, do a finger stick  -- Return in 14 days for download and change sensor    Lab Results   Component Value Date    HGBA1C 9.3 (H) 10/23/2023         Patient response to instruction    Comprehension: good  Motivation: good  Expected Compliance: good  Response to Teachback: 100%, demonstrated understanding    Thank you for referring your patient to [facility] [address], it was a pleasure working with them today. Please feel free to call with any questions or concerns.     Maxine Vaneags, MS, RDN, LDN  [address],  [place], [place]

## 2023-11-22 ENCOUNTER — OFFICE VISIT (OUTPATIENT)
Dept: PHYSICAL THERAPY | Facility: CLINIC | Age: 48
End: 2023-11-22
Payer: COMMERCIAL

## 2023-11-22 DIAGNOSIS — M25.572 CHRONIC PAIN OF LEFT ANKLE: Primary | ICD-10-CM

## 2023-11-22 DIAGNOSIS — G89.29 CHRONIC PAIN OF LEFT ANKLE: Primary | ICD-10-CM

## 2023-11-22 PROCEDURE — 97140 MANUAL THERAPY 1/> REGIONS: CPT | Performed by: PHYSICAL THERAPIST

## 2023-11-22 PROCEDURE — 97110 THERAPEUTIC EXERCISES: CPT | Performed by: PHYSICAL THERAPIST

## 2023-11-22 PROCEDURE — 97112 NEUROMUSCULAR REEDUCATION: CPT | Performed by: PHYSICAL THERAPIST

## 2023-11-22 NOTE — PROGRESS NOTES
Daily Note     Today's date: 2023  Patient name: Roby Davis  : 1975  MRN: [id number]  Referring provider: Tori Farr DPM  Dx:   Encounter Diagnosis     ICD-10-CM    1. Chronic pain of left ankle  M25.572     G89.29                    Subjective: states he was on his feet all day yesterday and the ankle is sore/stiff this morning      Objective: See treatment diary below      Assessment: Tolerated treatment well. Patient exhibited good technique with therapeutic exercises and would benefit from continued PT. PROM DF to 4 after stretching; +TTP left fibularis with very low tolerance to STM      Plan: Progress treatment as tolerated.        Precautions: DM      Manuals 10/26 10/31 11/2 11/7 11/9 11/20 11/22      Visit 1/10 2/10 3/10 4/10 5/10 6/10 7/10      Manual left ankle stretching SY SY SY SY SY SY SY      MFR calf and soleus with soleus stretch SY SY  SY SY SY SY                   Bike , recumbent   5' lvl 1 5' lvl 3 5' lvl 3 5' lvl 3 5' lvl 3 5' lvl 3      Neuro Re-Ed                                                                                                        Ther Ex             Gastroc towel stretch :10x4 :10x4           Soleus towel stretch :10x4 :10x4           TB PF             TB eversion  Red :05x20 Red :05x20  Yellow :10x13'loop        Inversion ball squeeze iso  :10x10 :10x10 :10x90s :10x90s        SLS             Kneeling DF stretch Gentle :02x15            Heel raises             Seated heel raises :10x4            Rocker board      AP 2' AP 2' seated      Wobble board  4-way 1'ea 4-way 1'ea 4-way 1'ea  CW 2' CW 2' seated      prostretch   Seated :10x 2min Seated :10x ea 2min Seated :10x ea 2min   Seated :10x ea 2min Standing PWB :10x ea 2min      Standing bilat heel raises with UE      :10x2' :10x10      Side stepping      2' 2'                   Ther Activity                                       Gait Training                                       Modalities

## 2023-11-28 ENCOUNTER — OFFICE VISIT (OUTPATIENT)
Dept: PODIATRY | Facility: CLINIC | Age: 48
End: 2023-11-28
Payer: COMMERCIAL

## 2023-11-28 ENCOUNTER — OFFICE VISIT (OUTPATIENT)
Dept: PHYSICAL THERAPY | Facility: CLINIC | Age: 48
End: 2023-11-28
Payer: COMMERCIAL

## 2023-11-28 VITALS
OXYGEN SATURATION: 98 % | WEIGHT: 209 LBS | SYSTOLIC BLOOD PRESSURE: 132 MMHG | HEIGHT: 65 IN | DIASTOLIC BLOOD PRESSURE: 80 MMHG | BODY MASS INDEX: 34.82 KG/M2 | HEART RATE: 77 BPM

## 2023-11-28 DIAGNOSIS — R26.89 FUNCTIONAL GAIT ABNORMALITY: ICD-10-CM

## 2023-11-28 DIAGNOSIS — G89.29 CHRONIC PAIN OF LEFT ANKLE: Primary | ICD-10-CM

## 2023-11-28 DIAGNOSIS — R53.81 DEBILITY: ICD-10-CM

## 2023-11-28 DIAGNOSIS — S93.492A SPRAIN OF ANTERIOR TALOFIBULAR LIGAMENT OF LEFT ANKLE, INITIAL ENCOUNTER: ICD-10-CM

## 2023-11-28 DIAGNOSIS — S93.432D ANKLE SYNDESMOSIS DISRUPTION, LEFT, SUBSEQUENT ENCOUNTER: Primary | ICD-10-CM

## 2023-11-28 DIAGNOSIS — M25.572 CHRONIC PAIN OF LEFT ANKLE: Primary | ICD-10-CM

## 2023-11-28 PROCEDURE — 97140 MANUAL THERAPY 1/> REGIONS: CPT

## 2023-11-28 PROCEDURE — 97110 THERAPEUTIC EXERCISES: CPT

## 2023-11-28 PROCEDURE — 97112 NEUROMUSCULAR REEDUCATION: CPT

## 2023-11-28 PROCEDURE — 99213 OFFICE O/P EST LOW 20 MIN: CPT | Performed by: PODIATRIST

## 2023-11-28 NOTE — PROGRESS NOTES
Daily Note     Today's date: 2023  Patient name: Carmen Meléndez  : 1975  MRN: [id number]  Referring provider: Carla Ramirez DPM  Dx:   Encounter Diagnosis     ICD-10-CM    1. Chronic pain of left ankle  M25.572     G89.29                      Subjective:  Patients states ankle is hurting today. Objective: See treatment diary below      Assessment: Tolerated treatment well. Patient presents with limtations into all planes of motion, especially DF. Continue to progress as able. Patient demonstrated fatigue post treatment, exhibited good technique with therapeutic exercises, and would benefit from continued PT      Plan: Continue per plan of care.       Precautions: DM      Manuals 10/26 10/31 11/2 11/7 11/9 11/20 11/22 11/28     Visit 1/10 2/10 3/10 4/10 5/10 6/10 7/10 8/10     Manual left ankle stretching SY SY SY SY SY SY SY RA     MFR calf and soleus with soleus stretch SY SY  SY SY SY SY RA                  Bike , recumbent   5' lvl 1 5' lvl 3 5' lvl 3 5' lvl 3 5' lvl 3 5' lvl 3 5' lvl 3      Neuro Re-Ed                                                                                                        Ther Ex             Gastroc towel stretch :10x4 :10x4           Soleus towel stretch :10x4 :10x4           TB PF             TB eversion  Red :05x20 Red :05x20  Yellow :10x13'loop        Inversion ball squeeze iso  :10x10 :10x10 :10x90s :10x90s        SLS             Kneeling DF stretch Gentle :02x15            Heel raises             Seated heel raises :10x4            Rocker board      AP 2' AP 2' seated AP 2' seated      Wobble board  4-way 1'ea 4-way 1'ea 4-way 1'ea  CW 2' CW 2' seated CW 2' seated     prostretch   Seated :10x 2min Seated :10x ea 2min Seated :10x ea 2min   Seated :10x ea 2min Standing PWB :10x ea 2min Standing PWB :10x ea 2min     Standing bilat heel raises with UE      :10x2' :10x10 :10x10      Side stepping      2' 2' 2'                   Ther Activity Gait Training                                       Modalities

## 2023-11-28 NOTE — PROGRESS NOTES
Assessment/Plan:     The patient's clinical examination today is significant for persistent tenderness palpation along the peroneal tendons. The most intense area of pain is along the peroneals as they course around the fibular malleolus. There is still persistent tenderness with palpation to the area of the ATFL and anterior tibial fibular ligament. There is still reduction with passive range of motion specifically with dorsiflexion. Plantarflexion and inversion of the left ankle hindfoot also produces mild to moderate tenderness. There is no significant edema noted today. There is no calor nor ecchymosis. His most recent MRI images were reviewed once again. I saw no pathology involving the peroneal tendons. There was then noted by the radiologist on his official read either. At this point in time due to the patient's persistent tenderness, we may need to consider removal of the internal hardware. From a structural standpoint, the syndesmosis appears intact and the anterior and posterior tibiofibular ligaments are also intact. These options were discussed with the patient. He would like to continue with physical therapy for now as he is scheduled with PT until early January. Will reevaluate the patient at that point in time. If he does not make any significant progress, again we may need to consider removal of hardware. Due to his pain and inability to ambulate for any prolonged periods greater than 15 to 20 minutes, it was recommended that he is continue to remain out of work until his next follow-up when we can reassess. Diagnoses and all orders for this visit:    Ankle syndesmosis disruption, left, subsequent encounter    Debility    Sprain of anterior talofibular ligament of left ankle, initial encounter    Functional gait abnormality          Subjective:     Patient ID: Prachi Bernal is a 50 y.o. male.     The patient presents today for follow-up of chronic lateral left ankle pain.  He still notes persistent tenderness to his left ankle and the inability to stand or ambulate for any significant amount of time that is greater than 15 to 20 minutes. He notes no significant improvement with physical therapy thus far but does note that it does tend to exacerbate his lateral ankle pain after his PT sessions.       PAST MEDICAL HISTORY:  Past Medical History:   Diagnosis Date    Cancer ([address])     colon    Diabetes mellitus ([address])     GERD (gastroesophageal reflux disease)     Hyperlipidemia     Post concussion syndrome 05/09/2017    Sleep apnea     "mild"    Traumatic brain injury ([address]) 2017    R/S 2017       PAST SURGICAL HISTORY:  Past Surgical History:   Procedure Laterality Date    APPENDECTOMY      COLON SURGERY      [place] RPR PRIMARY DISRUPTED LIGAMENT ANKLE COLLATERAL Left 3/10/2023    Procedure: ANKLE LIGAMENT REPAIR OF SYNDESMOTIC LIGAMENT with steroid injection of posterior heel bursa;  Surgeon: Tesha Chahal DPM;  Location:  MAIN OR;  Service: Podiatry        ALLERGIES:  Asa [aspirin] and Penicillin g    MEDICATIONS:  Current Outpatient Medications   Medication Sig Dispense Refill    celecoxib (CeleBREX) 100 mg capsule Take 2 capsules (200 mg total) by mouth daily 28 capsule 0    cholestyramine sugar free (QUESTRAN LIGHT) 4 g packet Take 1 packet (4 g total) by mouth daily 30 packet 3    dulaglutide (Trulicity) 1.66 LE/3.7IM injection Inject 0.5 mL (0.75 mg total) under the skin once a week 2 mL 0    ergocalciferol (VITAMIN D2) 50,000 units Take 1 capsule (50,000 Units total) by mouth once a week for 12 doses 12 capsule 0    Insulin Glargine Solostar (Lantus SoloStar) 100 UNIT/ML SOPN Inject 0.25 mL (25 Units total) under the skin daily at bedtime 24 mL 1    Insulin Pen Needle (BD Pen Needle Nancy U/F) 32G X 4 MM MISC Use twice daily 100 each 1    metFORMIN (GLUCOPHAGE-XR) 500 mg 24 hr tablet Take 1 tablet (500 mg total) by mouth 2 (two) times a day with meals 180 tablet 1 tadalafil (CIALIS) 10 MG tablet Take 1 tablet (10 mg total) by mouth daily as needed for erectile dysfunction 20 tablet 1    Blood Glucose Monitoring Suppl (OneTouch Verio Flex System) w/Device KIT TEST BLOOD SUGAR AS DIRECTED (Patient not taking: Reported on 11/21/2023)      Continuous Blood Gluc Sensor (FreeStyle [employer] 3 Sensor) MISC Use 1 Units every 14 (fourteen) days (Patient not taking: Reported on 11/21/2023) 2 each 5    Lancets (OneTouch Delica Plus [id number]) MISC daily (Patient not taking: Reported on 11/21/2023)      OneTouch Verio test strip daily (Patient not taking: Reported on 11/21/2023)       No current facility-administered medications for this visit. SOCIAL HISTORY:  Social History     Socioeconomic History    Marital status: Single     Spouse name: None    Number of children: None    Years of education: None    Highest education level: None   Occupational History    None   Tobacco Use    Smoking status: Never    Smokeless tobacco: Never   Vaping Use    Vaping Use: Never used   Substance and Sexual Activity    Alcohol use: Yes     Alcohol/week: 1.0 standard drink of alcohol     Types: 1 Cans of beer per week    Drug use: Never    Sexual activity: Yes     Partners: Female     Birth control/protection: None   Other Topics Concern    None   Social History Narrative    None     Social Determinants of Health     Financial Resource Strain: Not on file   Food Insecurity: Not on file   Transportation Needs: Not on file   Physical Activity: Not on file   Stress: Not on file   Social Connections: Not on file   Intimate Partner Violence: Not on file   Housing Stability: Not on file        Review of Systems   Constitutional: Negative. HENT: Negative. Eyes: Negative. Respiratory: Negative. Cardiovascular: Negative. Endocrine: Negative. Musculoskeletal:  Positive for arthralgias. Neurological: Negative. Hematological: Negative. Psychiatric/Behavioral: Negative. Objective:     Physical Exam  Vitals reviewed. Constitutional:       Appearance: Normal appearance. HENT:      Head: Normocephalic and atraumatic. Nose: Nose normal.   Eyes:      Conjunctiva/sclera: Conjunctivae normal.      Pupils: Pupils are equal, round, and reactive to light. Cardiovascular:      Pulses:           Dorsalis pedis pulses are 2+ on the left side. Posterior tibial pulses are 2+ on the left side. Pulmonary:      Effort: Pulmonary effort is normal.   Musculoskeletal:        Feet:    Feet:      Left foot:      Skin integrity: Skin integrity normal.      Comments: The patient's clinical examination today is significant for persistent tenderness palpation along the peroneal tendons. The most intense area of pain is along the peroneals as they course around the fibular malleolus. There is still persistent tenderness with palpation to the area of the ATFL and anterior tibial fibular ligament. There is still reduction with passive range of motion specifically with dorsiflexion. Plantarflexion and inversion of the left ankle hindfoot also produces mild to moderate tenderness. There is no significant edema noted today. There is no calor nor ecchymosis. Skin:     General: Skin is warm. Capillary Refill: Capillary refill takes less than 2 seconds. Neurological:      General: No focal deficit present. Mental Status: He is alert and oriented to person, place, and time. Psychiatric:         Mood and Affect: Mood normal.         Behavior: Behavior normal.         Thought Content:  Thought content normal.

## 2023-11-30 ENCOUNTER — OFFICE VISIT (OUTPATIENT)
Dept: PHYSICAL THERAPY | Facility: CLINIC | Age: 48
End: 2023-11-30
Payer: COMMERCIAL

## 2023-11-30 DIAGNOSIS — G89.29 CHRONIC PAIN OF LEFT ANKLE: Primary | ICD-10-CM

## 2023-11-30 DIAGNOSIS — M25.572 CHRONIC PAIN OF LEFT ANKLE: Primary | ICD-10-CM

## 2023-11-30 PROCEDURE — 97140 MANUAL THERAPY 1/> REGIONS: CPT | Performed by: PHYSICAL THERAPIST

## 2023-11-30 PROCEDURE — 97110 THERAPEUTIC EXERCISES: CPT | Performed by: PHYSICAL THERAPIST

## 2023-11-30 PROCEDURE — 97112 NEUROMUSCULAR REEDUCATION: CPT | Performed by: PHYSICAL THERAPIST

## 2023-11-30 NOTE — PROGRESS NOTES
Daily Note     Today's date: 2023  Patient name: Ember Marmolejo  : 1975  MRN: [id number]  Referring provider: Raheem Baer DPM  Dx:   Encounter Diagnosis     ICD-10-CM    1. Chronic pain of left ankle  M25.572     G89.29                Subjective: notes continued pain in the ankle that hinders activity by mid day. He will continue with PT until January and return to surgeon to discuss options if stiffness/pain not improving      Objective: See treatment diary below      Assessment: Tolerated treatment well. Patient exhibited good technique with therapeutic exercises and would benefit from continued PT. Continued pain with PROM in all directions, over pressure minimal      Plan: Progress treatment as tolerated.        Precautions: DM      Manuals 10/26 10/31 11/2 11/7 11/9 11/20 11/22 11/28 11/30    Visit 1/10 2/10 3/10 4/10 5/10 6/10 7/10 8/10 9/10    Manual left ankle stretching SY SY SY SY SY SY SY RA SY    MFR calf and soleus with soleus stretch SY SY  SY SY SY SY RA SY                 Bike , recumbent   5' lvl 1 5' lvl 3 5' lvl 3 5' lvl 3 5' lvl 3 5' lvl 3 5' lvl 3  5' lvl 3     Neuro Re-Ed                                                                                                        Ther Ex             Gastroc towel stretch :10x4 :10x4           Soleus towel stretch :10x4 :10x4           TB PF             TB eversion  Red :05x20 Red :05x20  Yellow :10x13'loop        Inversion ball squeeze iso  :10x10 :10x10 :10x90s :10x90s        SLS             Kneeling DF stretch Gentle :02x15            Heel raises             Seated heel raises :10x4            Rocker board      AP 2' AP 2' seated AP 2' seated  AP 2' seated     Wobble board  4-way 1'ea 4-way 1'ea 4-way 1'ea  CW 2' CW 2' seated CW 2' seated CW 2' seated    prostretch   Seated :10x 2min Seated :10x ea 2min Seated :10x ea 2min   Seated :10x ea 2min Standing PWB :10x ea 2min Standing PWB :10x ea 2min Standing PWB :10x ea 2min Standing bilat heel raises with UE      :10x2' :10x10 :10x10  :10x10    Side stepping      2' 2' 2'  2'                 Ther Activity                                       Gait Training                                       Modalities

## 2023-12-04 DIAGNOSIS — N52.9 ERECTILE DYSFUNCTION, UNSPECIFIED ERECTILE DYSFUNCTION TYPE: ICD-10-CM

## 2023-12-04 RX ORDER — TADALAFIL 10 MG/1
TABLET ORAL
Qty: 20 TABLET | Refills: 1 | Status: SHIPPED | OUTPATIENT
Start: 2023-12-04

## 2023-12-04 NOTE — TELEPHONE ENCOUNTER
Reason for call:   [x] Refill   [] Prior Auth  [] Other:     Office:   [x] PCP/Provider - Tristan Comment  [] Specialty/Provider -     Medication: tadalafil (CIALIS) 10 MG tablet     Dose/Frequency: Take 1 tablet (10 mg total) by mouth daily as needed for erectile dysfunction     Quantity: 20    Pharmacy: [facility]    Does the patient have enough for 3 days?    [] Yes   [x] No - Send as HP to POD

## 2023-12-05 ENCOUNTER — APPOINTMENT (OUTPATIENT)
Dept: PHYSICAL THERAPY | Facility: CLINIC | Age: 48
End: 2023-12-05
Payer: COMMERCIAL

## 2023-12-06 ENCOUNTER — TELEPHONE (OUTPATIENT)
Age: 48
End: 2023-12-06

## 2023-12-06 NOTE — TELEPHONE ENCOUNTER
Caller: Fernando hinton     Doctor: Clifford Ashley     Reason for call: Needs to set up deposition for his workman's  comp    Call back#: [phone]

## 2023-12-07 ENCOUNTER — OFFICE VISIT (OUTPATIENT)
Dept: PHYSICAL THERAPY | Facility: CLINIC | Age: 48
End: 2023-12-07
Payer: COMMERCIAL

## 2023-12-07 DIAGNOSIS — M25.572 CHRONIC PAIN OF LEFT ANKLE: Primary | ICD-10-CM

## 2023-12-07 DIAGNOSIS — G89.29 CHRONIC PAIN OF LEFT ANKLE: Primary | ICD-10-CM

## 2023-12-07 PROCEDURE — 97110 THERAPEUTIC EXERCISES: CPT | Performed by: PHYSICAL THERAPIST

## 2023-12-07 PROCEDURE — 97140 MANUAL THERAPY 1/> REGIONS: CPT | Performed by: PHYSICAL THERAPIST

## 2023-12-07 PROCEDURE — 97112 NEUROMUSCULAR REEDUCATION: CPT | Performed by: PHYSICAL THERAPIST

## 2023-12-07 NOTE — PROGRESS NOTES
Daily Note     Today's date: 2023  Patient name: Reed Leary  : 1975  MRN: [id number]  Referring provider: Sahara Rodrigues DPM  Dx:   Encounter Diagnosis     ICD-10-CM    1. Chronic pain of left ankle  M25.572     G89.29                      Subjective: notes continued soreness with all weight bearing activities; stretching into dorsiflexion painful along the lateral malleolus      Objective: See treatment diary below      Assessment: Tolerated treatment well. Patient exhibited good technique with therapeutic exercises and would benefit from continued PT. Pain noted with PROM to all directions; having difficulty with progression of exercises due soreness      Plan: Progress treatment as tolerated.        Precautions: DM      Manuals 10/26 10/31 11/2 11/7 11/9 11/20 11/22 11/28 11/30 12/7   Visit 1/10 2/10 3/10 4/10 5/10 6/10 7/10 8/10 9/10 10/10   Manual left ankle stretching SY SY SY SY SY SY SY RA SY SY   MFR calf and soleus with soleus stretch SY SY  SY SY SY SY RA SY SY                Bike , recumbent   5' lvl 1 5' lvl 3 5' lvl 3 5' lvl 3 5' lvl 3 5' lvl 3 5' lvl 3  5' lvl 3  5' lvl 3    Neuro Re-Ed                                                                                                        Ther Ex             Gastroc towel stretch :10x4 :10x4           Soleus towel stretch :10x4 :10x4           TB PF             TB eversion  Red :05x20 Red :05x20  Yellow :10x13'loop        Inversion ball squeeze iso  :10x10 :10x10 :10x90s :10x90s        SLS             Kneeling DF stretch Gentle :02x15            Heel raises             Seated heel raises :10x4            Rocker board      AP 2' AP 2' seated AP 2' seated  AP 2' seated  AP 2' seated    Wobble board  4-way 1'ea 4-way 1'ea 4-way 1'ea  CW 2' CW 2' seated CW 2' seated CW 2' seated CW 2' seated   prostretch   Seated :10x 2min Seated :10x ea 2min Seated :10x ea 2min   Seated :10x ea 2min Standing PWB :10x ea 2min Standing PWB :10x ea 2min Standing PWB :10x ea 2min sittingPWB :10x ea 2min   Standing bilat heel raises with UE      :10x2' :10x10 :10x10  :10x10 :10x10   Side stepping      2' 2' 2'  2' 2'                Ther Activity                                       Gait Training                                       Modalities

## 2023-12-08 ENCOUNTER — OFFICE VISIT (OUTPATIENT)
Dept: FAMILY MEDICINE CLINIC | Facility: CLINIC | Age: 48
End: 2023-12-08
Payer: COMMERCIAL

## 2023-12-08 VITALS
SYSTOLIC BLOOD PRESSURE: 126 MMHG | TEMPERATURE: 98 F | WEIGHT: 211 LBS | OXYGEN SATURATION: 96 % | BODY MASS INDEX: 35.16 KG/M2 | HEART RATE: 88 BPM | RESPIRATION RATE: 16 BRPM | HEIGHT: 65 IN | DIASTOLIC BLOOD PRESSURE: 80 MMHG

## 2023-12-08 DIAGNOSIS — L03.032 CELLULITIS OF GREAT TOE OF LEFT FOOT: Primary | ICD-10-CM

## 2023-12-08 PROCEDURE — 99213 OFFICE O/P EST LOW 20 MIN: CPT | Performed by: FAMILY MEDICINE

## 2023-12-08 RX ORDER — CEPHALEXIN 500 MG/1
500 CAPSULE ORAL EVERY 6 HOURS SCHEDULED
Qty: 28 CAPSULE | Refills: 0 | Status: SHIPPED | OUTPATIENT
Start: 2023-12-08 | End: 2023-12-15

## 2023-12-08 NOTE — PROGRESS NOTES
Name: Liliana Ac      : 1975      MRN: [id number]  Encounter Provider: Genesis Barlow MD  Encounter Date: 2023   Encounter department: [address] [place] [address]. Cellulitis of great toe of left foot  -     cephalexin (KEFLEX) 500 mg capsule; Take 1 capsule (500 mg total) by mouth every 6 (six) hours for 7 days     Start Keflex for cellulitis. Follow-up with podiatry. Continue soaks    Subjective          Patient presents today with left great toe pain for the past 4 weeks. He had an ingrown toenail that he removed on his own. He saw podiatrist who removed additional portion of nail. Was told to continue with Neosporin and soaks. He is been doing that for the past 4 weeks and continues to have pain and swelling in this toe. Was told to follow-up with his family doctor. Denies any discharge from the toe.       Review of Systems   Musculoskeletal:         Pain and swelling left big toe       Current Outpatient Medications on File Prior to Visit   Medication Sig   • dulaglutide (Trulicity) 6.07 UO/8.7CW injection Inject 0.5 mL (0.75 mg total) under the skin once a week   • ergocalciferol (VITAMIN D2) 50,000 units Take 1 capsule (50,000 Units total) by mouth once a week for 12 doses   • Insulin Glargine Solostar (Lantus SoloStar) 100 UNIT/ML SOPN Inject 0.25 mL (25 Units total) under the skin daily at bedtime   • Insulin Pen Needle (BD Pen Needle Nancy U/F) 32G X 4 MM MISC Use twice daily   • metFORMIN (GLUCOPHAGE-XR) 500 mg 24 hr tablet Take 1 tablet (500 mg total) by mouth 2 (two) times a day with meals   • tadalafil (CIALIS) 10 MG tablet TAKE ONE TABLET BY MOUTH DAILY AS NEEDED FOR ERECTILE DYSFUNCTION   • Blood Glucose Monitoring Suppl (OneTouch Verio Flex System) w/Device KIT TEST BLOOD SUGAR AS DIRECTED (Patient not taking: Reported on 2023)   • celecoxib (CeleBREX) 100 mg capsule Take 2 capsules (200 mg total) by mouth daily (Patient not taking: Reported on 12/8/2023)   • cholestyramine sugar free (QUESTRAN LIGHT) 4 g packet Take 1 packet (4 g total) by mouth daily (Patient not taking: Reported on 12/8/2023)   • Continuous Blood Gluc Sensor (FreeStyle Dell 3 Sensor) MISC Use 1 Units every 14 (fourteen) days (Patient not taking: Reported on 11/21/2023)   • Lancets (OneTouch Delica Plus [id number]) MISC daily (Patient not taking: Reported on 11/21/2023)   • OneTouch Verio test strip daily (Patient not taking: Reported on 11/21/2023)       Objective     /80 (BP Location: Left arm, Patient Position: Sitting, Cuff Size: Large)   Pulse 88   Temp 98 °F (36.7 °C)   Resp 16   Ht 5' 5" (1.651 m)   Wt 95.7 kg (211 lb)   SpO2 96%   BMI 35.11 kg/m²     Physical Exam  Musculoskeletal:      Comments: Left great toe tender to palpation. Erythema and swelling. No discharge   Skin:     General: Skin is warm. Findings: Erythema present.        Noah Flores MD

## 2023-12-12 ENCOUNTER — TELEPHONE (OUTPATIENT)
Dept: FAMILY MEDICINE CLINIC | Facility: CLINIC | Age: 48
End: 2023-12-12

## 2023-12-12 ENCOUNTER — OFFICE VISIT (OUTPATIENT)
Dept: PHYSICAL THERAPY | Facility: CLINIC | Age: 48
End: 2023-12-12
Payer: COMMERCIAL

## 2023-12-12 ENCOUNTER — TELEPHONE (OUTPATIENT)
Age: 48
End: 2023-12-12

## 2023-12-12 DIAGNOSIS — M25.572 CHRONIC PAIN OF LEFT ANKLE: Primary | ICD-10-CM

## 2023-12-12 DIAGNOSIS — G89.29 CHRONIC PAIN OF LEFT ANKLE: Primary | ICD-10-CM

## 2023-12-12 PROCEDURE — 97140 MANUAL THERAPY 1/> REGIONS: CPT

## 2023-12-12 PROCEDURE — 97110 THERAPEUTIC EXERCISES: CPT

## 2023-12-12 NOTE — TELEPHONE ENCOUNTER
Caller: Vandana Vitale    Doctor/Office: Dr. Claudeen Jain    #: [phone]    Escalation: Care Patient was seen at pcp with an infected ingrown nail He is on antibiotics and wanted to make Dr. Anuja Pugh aware. He would like a return call to discuss.  Thank you

## 2023-12-12 NOTE — PROGRESS NOTES
Daily Note     Today's date: 2023  Patient name: Kyle Serrano  : 1975  MRN: [id number]  Referring provider: Tesha Chahal DPM  Dx:   Encounter Diagnosis     ICD-10-CM    1. Chronic pain of left ankle  M25.572     G89.29           Start Time: 0815  Stop Time: 9731  Total time in clinic (min): 38 minutes    Subjective: The patient presents today with a report of pain at a 7/10. The patient reports some change in symptoms since previous session. Noted pain deep in the ankle region. Objective: See treatment diary below      Assessment: The PT continued manual therapy and therapeutic exercise during today's session. Held ankle circles at pt's request due to pain level. Palpable trigger points greater in the lateral calf when compared to the medial calf head. Cuing used with exercise in order to improve holding duration. The patient tolerated manual and active treatment well today. The patient would benefit from further skilled PT services. Plan: Continue per plan of care.       Precautions: DM      Manuals    Visit      6/10 7/10 8/10 9/10 10/10   Manual left ankle stretching AML     SY SY RA SY SY   MFR calf and soleus with soleus stretch AML     SY SY RA SY SY                Bike , recumbent  5' lvl 3     5' lvl 3 5' lvl 3 5' lvl 3  5' lvl 3  5' lvl 3    Neuro Re-Ed                                                                                                        Ther Ex             Gastroc towel stretch             Soleus towel stretch             TB PF             TB eversion             Inversion ball squeeze iso             SLS             Kneeling DF stretch             Heel raises             Seated heel raises             Rocker board AP 2' seated      AP 2' AP 2' seated AP 2' seated  AP 2' seated  AP 2' seated    Wobble board Held- pt refused due to pain     CW 2' CW 2' seated CW 2' seated CW 2' seated CW 2' seated   prostretch sittingPWB :10x ea 2min     Seated :10x ea 2min Standing PWB :10x ea 2min Standing PWB :10x ea 2min Standing PWB :10x ea 2min sittingPWB :10x ea 2min   Standing bilat heel raises with UE :10x10     :10x2' :10x10 :10x10  :10x10 :10x10   Side stepping 2'     2' 2' 2'  2' 2'                Ther Activity                                       Gait Training                                       Modalities

## 2023-12-12 NOTE — TELEPHONE ENCOUNTER
----- Message from Jong Kelly MD sent at 12/12/2023 12:29 PM EST -----  Patient is due for annual physical.  Patient is also due for an eye exam.  Have patient come in for a nurse visit eye exam of unable to come in by the end of the year for physical

## 2023-12-13 ENCOUNTER — TELEPHONE (OUTPATIENT)
Dept: ENDOCRINOLOGY | Facility: CLINIC | Age: 48
End: 2023-12-13

## 2023-12-13 ENCOUNTER — TELEPHONE (OUTPATIENT)
Dept: FAMILY MEDICINE CLINIC | Facility: CLINIC | Age: 48
End: 2023-12-13

## 2023-12-13 NOTE — TELEPHONE ENCOUNTER
----- Message from Lashay Cortes MD sent at 12/12/2023 12:29 PM EST -----  Patient is due for annual physical.  Patient is also due for an eye exam.  Have patient come in for a nurse visit eye exam of unable to come in by the end of the year for physical

## 2023-12-13 NOTE — TELEPHONE ENCOUNTER
Patient states that metformin 500 mg is not helpful and sugar levels are spiking. States that he is currently taking 2000 mg a day and it has been helpful, however he is now out of medication. Patient requesting refill with updated dose.

## 2023-12-14 ENCOUNTER — APPOINTMENT (OUTPATIENT)
Dept: LAB | Facility: CLINIC | Age: 48
End: 2023-12-14
Payer: COMMERCIAL

## 2023-12-14 DIAGNOSIS — E11.65 TYPE 2 DIABETES MELLITUS WITH HYPERGLYCEMIA, WITH LONG-TERM CURRENT USE OF INSULIN (HCC): ICD-10-CM

## 2023-12-14 DIAGNOSIS — Z79.4 TYPE 2 DIABETES MELLITUS WITH HYPERGLYCEMIA, WITH LONG-TERM CURRENT USE OF INSULIN (HCC): ICD-10-CM

## 2023-12-14 DIAGNOSIS — E78.1 HYPERTRIGLYCERIDEMIA: ICD-10-CM

## 2023-12-14 LAB
CHOLEST SERPL-MCNC: 165 MG/DL
EST. AVERAGE GLUCOSE BLD GHB EST-MCNC: 203 MG/DL
HBA1C MFR BLD: 8.7 %
HDLC SERPL-MCNC: 33 MG/DL
NONHDLC SERPL-MCNC: 132 MG/DL
TRIGL SERPL-MCNC: 422 MG/DL

## 2023-12-14 PROCEDURE — 83036 HEMOGLOBIN GLYCOSYLATED A1C: CPT

## 2023-12-14 PROCEDURE — 36415 COLL VENOUS BLD VENIPUNCTURE: CPT

## 2023-12-14 PROCEDURE — 80061 LIPID PANEL: CPT

## 2023-12-14 NOTE — TELEPHONE ENCOUNTER
Called patient to schedule him next week per Dr. Frida King, patient is now scheduled next Wednesday.

## 2023-12-15 ENCOUNTER — OFFICE VISIT (OUTPATIENT)
Dept: PHYSICAL THERAPY | Facility: CLINIC | Age: 48
End: 2023-12-15
Payer: COMMERCIAL

## 2023-12-15 ENCOUNTER — DOCUMENTATION (OUTPATIENT)
Dept: OTHER | Facility: HOSPITAL | Age: 48
End: 2023-12-15

## 2023-12-15 DIAGNOSIS — G89.29 CHRONIC PAIN OF LEFT ANKLE: Primary | ICD-10-CM

## 2023-12-15 DIAGNOSIS — E11.65 TYPE 2 DIABETES MELLITUS WITH HYPERGLYCEMIA, WITH LONG-TERM CURRENT USE OF INSULIN (HCC): Primary | ICD-10-CM

## 2023-12-15 DIAGNOSIS — M25.572 CHRONIC PAIN OF LEFT ANKLE: Primary | ICD-10-CM

## 2023-12-15 DIAGNOSIS — Z79.4 TYPE 2 DIABETES MELLITUS WITH HYPERGLYCEMIA, WITH LONG-TERM CURRENT USE OF INSULIN (HCC): Primary | ICD-10-CM

## 2023-12-15 PROCEDURE — 97140 MANUAL THERAPY 1/> REGIONS: CPT | Performed by: PHYSICAL THERAPIST

## 2023-12-15 PROCEDURE — 97112 NEUROMUSCULAR REEDUCATION: CPT | Performed by: PHYSICAL THERAPIST

## 2023-12-15 PROCEDURE — 97110 THERAPEUTIC EXERCISES: CPT | Performed by: PHYSICAL THERAPIST

## 2023-12-15 RX ORDER — INSULIN GLARGINE 100 [IU]/ML
30 INJECTION, SOLUTION SUBCUTANEOUS
Qty: 30 ML | Refills: 1 | Status: SHIPPED | OUTPATIENT
Start: 2023-12-15

## 2023-12-15 RX ORDER — PEN NEEDLE, DIABETIC 32GX 5/32"
NEEDLE, DISPOSABLE MISCELLANEOUS
Qty: 100 EACH | Refills: 2 | Status: SHIPPED | OUTPATIENT
Start: 2023-12-15

## 2023-12-15 RX ORDER — DULAGLUTIDE 1.5 MG/.5ML
1.5 INJECTION, SOLUTION SUBCUTANEOUS WEEKLY
Qty: 6 ML | Refills: 0 | Status: SHIPPED | OUTPATIENT
Start: 2023-12-15

## 2023-12-15 NOTE — PROGRESS NOTES
Daily Note     Today's date: 12/15/2023  Patient name: Cedric Mcdonough  : 1975  MRN: [id number]  Referring provider: Leonard Lozano DPM  Dx:   Encounter Diagnosis     ICD-10-CM    1. Chronic pain of left ankle  M25.572     G89.29                      Subjective: he had an evaluation by a [facility] physician yesterday; continues to have pain with ROM in all directions      Objective: See treatment diary below      Assessment: Tolerated treatment well. Patient exhibited good technique with therapeutic exercises and would benefit from continued PT. Able to perform standing heel raises though still painful laterally; trial leg press heel raises to control resistance next visit      Plan: Progress treatment as tolerated.        Precautions: DM      Manuals 12/12 12/15    11/20 11/22 11/28 11/30 12/7   Visit      6/10 7/10 8/10 9/10 10/10   Manual left ankle stretching AML SY    SY SY RA SY SY   MFR calf and soleus with soleus stretch AML SY    SY SY RA SY SY                Bike , recumbent  5' lvl 3 Upright 5' lvl 5    5' lvl 3 5' lvl 3 5' lvl 3  5' lvl 3  5' lvl 3    Neuro Re-Ed                                                                                                        Ther Ex             Gastroc towel stretch             Soleus towel stretch             TB PF             TB eversion             Inversion ball squeeze iso             SLS             Kneeling DF stretch             Heel raises             Seated heel raises             Rocker board AP 2' seated  AP 2' seated     AP 2' AP 2' seated AP 2' seated  AP 2' seated  AP 2' seated    Wobble board Held- pt refused due to pain 90s CW    CW 2' CW 2' seated CW 2' seated CW 2' seated CW 2' seated   prostretch sittingPWB :10x ea 2min sittingPWB :10x ea 2min    Seated :10x ea 2min Standing PWB :10x ea 2min Standing PWB :10x ea 2min Standing PWB :10x ea 2min sittingPWB :10x ea 2min   Standing bilat heel raises with UE :10x10 :10x10    :10x2' :10x10 :10x10  :10x10 :10x10   Leg press heel raises  nv                                                  Side stepping 2' 2'    2' 2' 2'  2' 2'                Ther Activity                                       Gait Training                                       Modalities

## 2023-12-19 ENCOUNTER — APPOINTMENT (OUTPATIENT)
Dept: LAB | Facility: CLINIC | Age: 48
End: 2023-12-19
Payer: COMMERCIAL

## 2023-12-19 ENCOUNTER — OFFICE VISIT (OUTPATIENT)
Dept: PHYSICAL THERAPY | Facility: CLINIC | Age: 48
End: 2023-12-19
Payer: COMMERCIAL

## 2023-12-19 DIAGNOSIS — G89.29 CHRONIC PAIN OF LEFT ANKLE: Primary | ICD-10-CM

## 2023-12-19 DIAGNOSIS — M25.572 CHRONIC PAIN OF LEFT ANKLE: Primary | ICD-10-CM

## 2023-12-19 LAB
CREAT UR-MCNC: 147.6 MG/DL
MICROALBUMIN UR-MCNC: 25.7 MG/L
MICROALBUMIN/CREAT 24H UR: 17 MG/G CREATININE (ref 0–30)

## 2023-12-19 PROCEDURE — 97112 NEUROMUSCULAR REEDUCATION: CPT | Performed by: PHYSICAL THERAPIST

## 2023-12-19 PROCEDURE — 97140 MANUAL THERAPY 1/> REGIONS: CPT | Performed by: PHYSICAL THERAPIST

## 2023-12-19 PROCEDURE — 82043 UR ALBUMIN QUANTITATIVE: CPT

## 2023-12-19 PROCEDURE — 82570 ASSAY OF URINE CREATININE: CPT

## 2023-12-19 PROCEDURE — 97110 THERAPEUTIC EXERCISES: CPT | Performed by: PHYSICAL THERAPIST

## 2023-12-19 NOTE — PROGRESS NOTES
Daily Note     Today's date: 2023  Patient name: Jc Carmona  : 1975  MRN: [id number]  Referring provider: Steve Kaplan DPM  Dx:   Encounter Diagnosis     ICD-10-CM    1. Chronic pain of left ankle  M25.572     G89.29                      Subjective: notes continued pain in the ankle; especially after  a lot of walking      Objective: See treatment diary below      Assessment: Tolerated treatment well. Patient exhibited good technique with therapeutic exercises and would benefit from continued PT.  PROM dorsiflexion continues to be restricted, 5 degrees today.  Limited mobility in inversion and plantarflexion as well.   Less pain with eversion      Plan: Progress treatment as tolerated.       Precautions: DM      Manuals 12/12 12/15 12/19   11/20 11/22 11/28 11/30 12/7   Visit      6/10 7/10 8/10 9/10 10/10   Manual left ankle stretching AML SY SY   SY SY RA SY SY   MFR calf and soleus with soleus stretch AML SY SY   SY SY RA SY SY                Bike , recumbent  5' lvl 3 Upright 5' lvl 5 5' lvl 4   5' lvl 3 5' lvl 3 5' lvl 3  5' lvl 3  5' lvl 3    Neuro Re-Ed                                                                                                        Ther Ex             Gastroc towel stretch             Soleus towel stretch             TB PF             TB eversion             Inversion ball squeeze iso             SLS             Kneeling DF stretch             Heel raises             Seated heel raises             Rocker board AP 2' seated  AP 2' seated     AP 2' AP 2' seated AP 2' seated  AP 2' seated  AP 2' seated    Wobble board Held- pt refused due to pain 90s CW 2' AP, 2' ML, 2' CW, 2' CCW   CW 2' CW 2' seated CW 2' seated CW 2' seated CW 2' seated   prostretch sittingPWB :10x ea 2min sittingPWB :10x ea 2min Standing :10x2'   Seated :10x ea 2min Standing PWB :10x ea 2min Standing PWB :10x ea 2min Standing PWB :10x ea 2min sittingPWB :10x ea 2min   Standing bilat heel  raises with UE :10x10 :10x10 Held, toe infection   :10x2' :10x10 :10x10  :10x10 :10x10   Leg press heel raises  nv                                                  Side stepping 2' 2' 2'   2' 2' 2'  2' 2'                Ther Activity                                       Gait Training                                       Modalities

## 2023-12-20 ENCOUNTER — OFFICE VISIT (OUTPATIENT)
Dept: PODIATRY | Facility: CLINIC | Age: 48
End: 2023-12-20
Payer: COMMERCIAL

## 2023-12-20 VITALS
SYSTOLIC BLOOD PRESSURE: 137 MMHG | BODY MASS INDEX: 34.99 KG/M2 | WEIGHT: 210 LBS | HEART RATE: 83 BPM | OXYGEN SATURATION: 98 % | HEIGHT: 65 IN | DIASTOLIC BLOOD PRESSURE: 87 MMHG

## 2023-12-20 DIAGNOSIS — L60.0 INGROWN LEFT GREATER TOENAIL: Primary | ICD-10-CM

## 2023-12-20 PROCEDURE — 99212 OFFICE O/P EST SF 10 MIN: CPT | Performed by: PODIATRIST

## 2023-12-20 PROCEDURE — 11719 TRIM NAIL(S) ANY NUMBER: CPT | Performed by: PODIATRIST

## 2023-12-20 PROCEDURE — 11730 AVULSION NAIL PLATE SIMPLE 1: CPT | Performed by: PODIATRIST

## 2023-12-20 NOTE — PROGRESS NOTES
Assessment/Plan:     The patient's clinical examination today significant for a tender, incurvated medial nail border of the left hallucal nail plate.  There is localized edema and mild erythema along the medial nail fold.  There is no active drainage or purulence.    After left hallux block with 3 mL of 0.25% Vivacaine plain, a partial nail avulsion was performed to the medial nail border without complication.  The procedure was well-tolerated.  An antimicrobial compressive dressing was applied and is being obtained for the remainder of the day.  Wound care instructions were discussed with patient.  He will complete his course of antibiotic therapy as prescribed by his PCP.    Recommend follow-up as scheduled for his chronic right ankle pain.       Diagnoses and all orders for this visit:    Ingrown left greater toenail    Other orders  -     Nail removal          Subjective:     Patient ID: Jc Carmona is a 48 y.o. male.    The patient presents today with a chief complaint of a painful left ingrown toenail.  He is currently on antibiotics for this as prescribed by his primary care physician.  He still notes persistent tenderness to the great toe whenever any pressure is put up on it.        Review of Systems   Constitutional: Negative.    HENT: Negative.     Eyes: Negative.    Respiratory: Negative.     Cardiovascular: Negative.    Endocrine: Negative.    Musculoskeletal: Negative.    Neurological: Negative.    Hematological: Negative.    Psychiatric/Behavioral: Negative.           Objective:     Physical Exam  Vitals reviewed.   Constitutional:       Appearance: Normal appearance.   HENT:      Head: Normocephalic and atraumatic.      Nose: Nose normal.   Eyes:      Conjunctiva/sclera: Conjunctivae normal.      Pupils: Pupils are equal, round, and reactive to light.   Cardiovascular:      Pulses:           Dorsalis pedis pulses are 2+ on the left side.   Pulmonary:      Effort: Pulmonary effort is normal.  "  Musculoskeletal:        Feet:    Feet:      Left foot:      Toenail Condition: Left toenails are ingrown.      Comments: The patient's clinical examination today significant for a tender, incurvated medial nail border of the left hallucal nail plate.  There is localized edema and mild erythema along the medial nail fold.  There is no active drainage or purulence.    Skin:     General: Skin is warm.      Capillary Refill: Capillary refill takes less than 2 seconds.   Neurological:      General: No focal deficit present.      Mental Status: He is alert and oriented to person, place, and time.   Psychiatric:         Mood and Affect: Mood normal.         Behavior: Behavior normal.         Thought Content: Thought content normal.         Nail removal    Date/Time: 12/20/2023 1:15 PM    Performed by: Steve Kaplan DPM  Authorized by: Steve aKplan DPM    Patient location:  Clinic  Indications / Diagnosis:  Left ingrown nail  Universal Protocol:  Consent: Verbal consent obtained.  Risks and benefits: risks, benefits and alternatives were discussed  Consent given by: patient  Time out: Immediately prior to procedure a \"time out\" was called to verify the correct patient, procedure, equipment, support staff and site/side marked as required.  Timeout called at: 12/20/2023 1:15 PM.  Patient understanding: patient states understanding of the procedure being performed  Patient consent: the patient's understanding of the procedure matches consent given  Patient identity confirmed: verbally with patient and provided demographic data    Location:     Foot:  L big toe  Pre-procedure details:     Skin preparation:  Alcohol    Preparation: Patient was prepped and draped in the usual sterile fashion    Anesthesia (see MAR for exact dosages):     Anesthesia method:  Nerve block    Block location:  Left hallux    Block needle gauge:  27 G    Block anesthetic:  Bupivacaine 0.25% w/o epi    Block technique:  Left hallux block    " Block injection procedure:  Anatomic landmarks identified and introduced needle    Block outcome:  Anesthesia achieved  Nail Removal:     Nail removed:  Partial    Nail side:  Medial    Nail bed sutured: no    Ingrown nail:     Wedge excision of skin: no      Nail matrix removed or ablated:  None  Nails trimmed:     Number of nails trimmed:  1  Post-procedure details:     Dressing:  4x4 sterile gauze, antibiotic ointment and gauze roll    Patient tolerance of procedure:  Tolerated well, no immediate complications

## 2023-12-21 ENCOUNTER — APPOINTMENT (OUTPATIENT)
Dept: PHYSICAL THERAPY | Facility: CLINIC | Age: 48
End: 2023-12-21
Payer: COMMERCIAL

## 2023-12-21 ENCOUNTER — OFFICE VISIT (OUTPATIENT)
Dept: FAMILY MEDICINE CLINIC | Facility: CLINIC | Age: 48
End: 2023-12-21
Payer: COMMERCIAL

## 2023-12-21 VITALS
TEMPERATURE: 97 F | OXYGEN SATURATION: 97 % | HEART RATE: 88 BPM | HEIGHT: 65 IN | BODY MASS INDEX: 35.32 KG/M2 | SYSTOLIC BLOOD PRESSURE: 128 MMHG | DIASTOLIC BLOOD PRESSURE: 92 MMHG | WEIGHT: 212 LBS | RESPIRATION RATE: 16 BRPM

## 2023-12-21 DIAGNOSIS — E78.1 HYPERTRIGLYCERIDEMIA: ICD-10-CM

## 2023-12-21 DIAGNOSIS — E11.65 TYPE 2 DIABETES MELLITUS WITH HYPERGLYCEMIA, WITH LONG-TERM CURRENT USE OF INSULIN (HCC): ICD-10-CM

## 2023-12-21 DIAGNOSIS — Z79.4 TYPE 2 DIABETES MELLITUS WITH HYPERGLYCEMIA, WITH LONG-TERM CURRENT USE OF INSULIN (HCC): ICD-10-CM

## 2023-12-21 DIAGNOSIS — Z00.00 ANNUAL PHYSICAL EXAM: Primary | ICD-10-CM

## 2023-12-21 PROBLEM — Z01.818 PRE-OP EXAMINATION: Status: RESOLVED | Noted: 2023-02-15 | Resolved: 2023-12-21

## 2023-12-21 LAB
LEFT EYE DIABETIC RETINOPATHY: NORMAL
LEFT EYE IMAGE QUALITY: NORMAL
LEFT EYE MACULAR EDEMA: NORMAL
LEFT EYE OTHER RETINOPATHY: NORMAL
RIGHT EYE DIABETIC RETINOPATHY: NORMAL
RIGHT EYE IMAGE QUALITY: NORMAL
RIGHT EYE MACULAR EDEMA: NORMAL
RIGHT EYE OTHER RETINOPATHY: NORMAL
SEVERITY (EYE EXAM): NORMAL

## 2023-12-21 PROCEDURE — 99396 PREV VISIT EST AGE 40-64: CPT | Performed by: FAMILY MEDICINE

## 2023-12-21 RX ORDER — ATORVASTATIN CALCIUM 10 MG/1
10 TABLET, FILM COATED ORAL EVERY EVENING
Qty: 90 TABLET | Refills: 3 | Status: SHIPPED | OUTPATIENT
Start: 2023-12-21 | End: 2024-12-15

## 2023-12-21 NOTE — PATIENT INSTRUCTIONS

## 2023-12-21 NOTE — PROGRESS NOTES
ADULT ANNUAL PHYSICAL  [facility] PRACTICE    NAME: Jc Carmona  AGE: 48 y.o. SEX: male  : 1975     DATE: 2023     Assessment and Plan:     Problem List Items Addressed This Visit        Endocrine    Type 2 diabetes mellitus with hyperglycemia, with long-term current use of insulin (HCC)    Relevant Medications    atorvastatin (LIPITOR) 10 mg tablet    Other Relevant Orders    IRIS Diabetic eye exam (Completed)    Lipid Panel with Direct LDL reflex    TSH, 3rd generation with Free T4 reflex    Albumin / creatinine urine ratio    Comprehensive metabolic panel    Hemoglobin A1C       Other    Hypertriglyceridemia    Relevant Medications    atorvastatin (LIPITOR) 10 mg tablet   Other Visit Diagnoses     Annual physical exam    -  Primary      Patient follows with endocrinology.  Blood sugar still poorly controlled.  Diabetic eye exam today  Due for colonoscopy    Immunizations and preventive care screenings were discussed with patient today. Appropriate education was printed on patient's after visit summary.    Discussed risks and benefits of prostate cancer screening. We discussed the controversial history of PSA screening for prostate cancer in the [place] as well as the risk of over detection and over treatment of prostate cancer by way of PSA screening.  The patient understands that PSA blood testing is an imperfect way to screen for prostate cancer and that elevated PSA levels in the blood may also be caused by infection, inflammation, prostatic trauma or manipulation, urological procedures, or by benign prostatic enlargement.    The role of the digital rectal examination in prostate cancer screening was also discussed and I discussed with him that there is large interobserver variability in the findings of digital rectal examination.    Counseling:  Alcohol/drug use: discussed moderation in alcohol intake, the recommendations for healthy  alcohol use, and avoidance of illicit drug use.  Dental Health: discussed importance of regular tooth brushing, flossing, and dental visits.  Injury prevention: discussed safety/seat belts, safety helmets, smoke detectors, carbon dioxide detectors, and smoking near bedding or upholstery.  Sexual health: discussed sexually transmitted diseases, partner selection, use of condoms, avoidance of unintended pregnancy, and contraceptive alternatives.  Exercise: the importance of regular exercise/physical activity was discussed. Recommend exercise 3-5 times per week for at least 30 minutes.       Depression Screening and Follow-up Plan: Patient was screened for depression during today's encounter. They screened negative with a PHQ-2 score of 0.        Return in 6 months (on 6/21/2024) for Diabetes follow-up.     Chief Complaint:     Chief Complaint   Patient presents with   • Physical Exam      History of Present Illness:     Adult Annual Physical   Patient here for a comprehensive physical exam. The patient reports problems - diabetes. .    Diet and Physical Activity  Diet/Nutrition: poor diet and does not follow diabetic diet .   Exercise: walking.      Depression Screening  PHQ-2/9 Depression Screening    Little interest or pleasure in doing things: 0 - not at all  Feeling down, depressed, or hopeless: 0 - not at all  PHQ-2 Score: 0  PHQ-2 Interpretation: Negative depression screen       General Health  Sleep: sleeps poorly and gets 4-6 hours of sleep on average.   Hearing: normal - bilateral.  Vision: vision problems: Blurry vision. Wears glasses .   Dental: no dental visits for >1 year.        Health  Symptoms include: none         Review of Systems:     Review of Systems   Constitutional:  Negative for activity change, fatigue and fever.   Eyes:  Negative for visual disturbance.   Respiratory:  Negative for shortness of breath.    Cardiovascular:  Negative for chest pain.   Gastrointestinal:  Negative for abdominal  "pain, constipation, diarrhea and nausea.   Endocrine: Negative for cold intolerance and heat intolerance.   Musculoskeletal:  Negative for back pain.   Skin:  Negative for rash.   Neurological:  Negative for headaches.   Psychiatric/Behavioral:  Negative for confusion.       Past Medical History:     Past Medical History:   Diagnosis Date   • Cancer (HCC)     colon   • Diabetes mellitus (HCC)    • GERD (gastroesophageal reflux disease)    • Hyperlipidemia    • Post concussion syndrome 05/09/2017   • Pre-op examination    • Sleep apnea     \"mild\"   • Traumatic brain injury (HCC) 2017    R/S 2017      Past Surgical History:     Past Surgical History:   Procedure Laterality Date   • APPENDECTOMY     • COLON SURGERY     • MO RPR PRIMARY DISRUPTED LIGAMENT ANKLE COLLATERAL Left 3/10/2023    Procedure: ANKLE LIGAMENT REPAIR OF SYNDESMOTIC LIGAMENT with steroid injection of posterior heel bursa;  Surgeon: Steve Kaplan DPM;  Location: [facility] OR;  Service: Podiatry      Family History:     Family History   Problem Relation Age of Onset   • Coronary artery disease Mother    • Diabetes Mother    • Hypertension Mother    • Hyperlipidemia Mother    • Coronary artery disease Father    • Diabetes Father    • Hypertension Father    • Hyperlipidemia Father    • Heart attack Father    • Anemia Brother    • No Known Problems Son    • No Known Problems Son    • No Known Problems Son       Social History:     Social History     Socioeconomic History   • Marital status: Single     Spouse name: None   • Number of children: None   • Years of education: None   • Highest education level: None   Occupational History   • None   Tobacco Use   • Smoking status: Never   • Smokeless tobacco: Never   Vaping Use   • Vaping status: Never Used   Substance and Sexual Activity   • Alcohol use: Yes     Alcohol/week: 1.0 standard drink of alcohol     Types: 1 Cans of beer per week   • Drug use: Never   • Sexual activity: Yes     Partners: Female     " Birth control/protection: None   Other Topics Concern   • None   Social History Narrative   • None     Social Determinants of Health     Financial Resource Strain: Not on file   Food Insecurity: Not on file   Transportation Needs: Not on file   Physical Activity: Not on file   Stress: Not on file   Social Connections: Not on file   Intimate Partner Violence: Not on file   Housing Stability: Not on file      Current Medications:     Current Outpatient Medications   Medication Sig Dispense Refill   • atorvastatin (LIPITOR) 10 mg tablet Take 1 tablet (10 mg total) by mouth every evening 90 tablet 3   • Blood Glucose Monitoring Suppl (OneTouch Verio Flex System) w/Device KIT      • celecoxib (CeleBREX) 100 mg capsule Take 2 capsules (200 mg total) by mouth daily 28 capsule 0   • Continuous Blood Gluc Sensor (FreeStyle Dell 3 Sensor) MISC Use 1 Units every 14 (fourteen) days 2 each 5   • dulaglutide (Trulicity) 1.5 MG/0.5ML injection Inject 0.5 mL (1.5 mg total) under the skin once a week 6 mL 0   • ergocalciferol (VITAMIN D2) 50,000 units Take 1 capsule (50,000 Units total) by mouth once a week for 12 doses 12 capsule 0   • Insulin Glargine Solostar (Lantus SoloStar) 100 UNIT/ML SOPN Inject 0.3 mL (30 Units total) under the skin daily at bedtime 30 mL 1   • Insulin Pen Needle (BD Pen Needle Nancy U/F) 32G X 4 MM MISC Use twice daily 100 each 2   • Lancets (OneTouch Delica Plus [id number]) MISC daily     • metFORMIN (GLUCOPHAGE) 1000 MG tablet Take 1 tablet (1,000 mg total) by mouth 2 (two) times a day with meals 180 tablet 1   • OneTouch Verio test strip daily     • tadalafil (CIALIS) 10 MG tablet TAKE ONE TABLET BY MOUTH DAILY AS NEEDED FOR ERECTILE DYSFUNCTION 20 tablet 1   • cholestyramine sugar free (QUESTRAN LIGHT) 4 g packet Take 1 packet (4 g total) by mouth daily (Patient not taking: Reported on 12/8/2023) 30 packet 3     No current facility-administered medications for this visit.      Allergies:     Allergies  "  Allergen Reactions   • Asa [Aspirin] GI Intolerance   • Penicillin G Other (See Comments)     Ancef ok     \"unsure; was a baby\"      Physical Exam:     /92 (BP Location: Left arm, Patient Position: Sitting, Cuff Size: Large)   Pulse 88   Temp (!) 97 °F (36.1 °C)   Resp 16   Ht 5' 5\" (1.651 m)   Wt 96.2 kg (212 lb)   SpO2 97%   BMI 35.28 kg/m²     Physical Exam  Vitals and nursing note reviewed.   Constitutional:       Appearance: Normal appearance. He is well-developed.   HENT:      Head: Normocephalic and atraumatic.   Eyes:      Extraocular Movements: Extraocular movements intact.      Pupils: Pupils are equal, round, and reactive to light.   Cardiovascular:      Rate and Rhythm: Normal rate and regular rhythm.   Pulmonary:      Effort: Pulmonary effort is normal.      Breath sounds: Normal breath sounds.   Abdominal:      General: Bowel sounds are normal.      Palpations: Abdomen is soft.   Musculoskeletal:         General: Tenderness present.      Cervical back: Normal range of motion.   Skin:     General: Skin is warm and dry.   Neurological:      General: No focal deficit present.      Mental Status: He is alert and oriented to person, place, and time.   Psychiatric:         Mood and Affect: Mood normal.         Speech: Speech normal.         Behavior: Behavior normal.          Lv Torrez MD  [facility]    "

## 2023-12-26 ENCOUNTER — OFFICE VISIT (OUTPATIENT)
Dept: PHYSICAL THERAPY | Facility: CLINIC | Age: 48
End: 2023-12-26
Payer: COMMERCIAL

## 2023-12-26 DIAGNOSIS — G89.29 CHRONIC PAIN OF LEFT ANKLE: Primary | ICD-10-CM

## 2023-12-26 DIAGNOSIS — M25.572 CHRONIC PAIN OF LEFT ANKLE: Primary | ICD-10-CM

## 2023-12-26 PROCEDURE — 97110 THERAPEUTIC EXERCISES: CPT | Performed by: PHYSICAL THERAPIST

## 2023-12-26 PROCEDURE — 97112 NEUROMUSCULAR REEDUCATION: CPT | Performed by: PHYSICAL THERAPIST

## 2023-12-26 PROCEDURE — 97140 MANUAL THERAPY 1/> REGIONS: CPT | Performed by: PHYSICAL THERAPIST

## 2023-12-26 NOTE — PROGRESS NOTES
PT EVALUATION     Today's date: 23  Patient name: Jc Carmona  : 1975  MRN: [id number]  Referring provider: Steve Kaplan DPM  Dx:   1. Chronic pain of left ankle          Jc Carmona is a 48 y.o. male who presents with syndesmotic disruption and distal fibular ORIF and syndesmotic fixation.  He continues to have pain with weight bearing activities.  Active range of motion dorsiflexion, plantar flexion and inversion ROM increased but he continues to have pain with even gentle stretching in all directions.  Gait is normal.  This patient would benefit from skilled physical therapy to address their listed impairments and functional limitations to maximize functional outcome.     Impairments:    restricted ROM    decreased strength   pain with function   weight bearing intolerance   abnormal gait      Prognosis:  Good  Positive and negative prognostic indicator(s):  prior success with conservative care and pain >3 months     Goals:     STG Patient is independent with HEP (met)  STG Range of motion is improved by 25% in 2 weeks (partially met)  LTG Range of motion is improved by 50% in 4 weeks  LTG Weight bearing tolerance improved 50% in 4 weeks     Planned interventions:  home exercise program, patient education, manual therapy, graded activity, flexibility, functional range of motion exercises, strengthening, balance and weight bearing training, and gait training     Duration in visits:  6  Frequency: 2 visits per week  Duration in weeks:  3     History of Current Injury: 2022, he slipped and fell and had torn ligaments in the ankle. S/p distal fibular ORIF and syndesmotic fixation.  He was in a boot for a while and has been using a brace since May/[place].  Mild to moderate pain with weight bearing that gradually increases throughout the day until it is severe and he has to get off it.  He likes to play soccer and cycle but he has been unable to do either.  He can cycle  "on level surfaces but a lot of pain with hills.     Update 12/26/23:  He continues to have pain with weight bearing activities.  He did a lot of walking 2 days ago and had up to 8/10 pain.  Yesterday he was relatively inactive and pain decreased to 5/10.      Pain location: lateral ankle  Pain descriptors:  aching  Pain intensity:  8/10 at the end of the day (form \"11\"/10),  5/10     Aggravating factors: towards the middle and end of the day  Easing factors: elevating/and non weight bearing     Imaging: IMPRESSION:     1. Status post distal fibular ORIF and syndesmotic fixation.     2.Thickening of anterior inferior tibiofibular ligament, in keeping with chronic sprain.     3. Tiny osteochondral lesion of the lateral talar dome, similar to prior exam. No evidence of instability.        Patient goals:  decreased pain and independence with ADLs     Objective      Active Range of Motion   Left Ankle/Foot   Dorsiflexion (ke): -3 (from  -10 degrees)   Plantar flexion 40 (from 30 degrees)   Inversion: 30 (from 20 degrees)  Eversion: 11 degrees  (unchanged)    Strength  Left Ankle/Foot   Dorsiflexion (ke): 3+  Plantar flexion:  4  Inversion: 3+  Eversion: 3+      "

## 2023-12-28 ENCOUNTER — OFFICE VISIT (OUTPATIENT)
Dept: PHYSICAL THERAPY | Facility: CLINIC | Age: 48
End: 2023-12-28
Payer: COMMERCIAL

## 2023-12-28 DIAGNOSIS — M25.572 CHRONIC PAIN OF LEFT ANKLE: Primary | ICD-10-CM

## 2023-12-28 DIAGNOSIS — G89.29 CHRONIC PAIN OF LEFT ANKLE: Primary | ICD-10-CM

## 2023-12-28 PROCEDURE — 97112 NEUROMUSCULAR REEDUCATION: CPT | Performed by: PHYSICAL MEDICINE & REHABILITATION

## 2023-12-28 PROCEDURE — 97140 MANUAL THERAPY 1/> REGIONS: CPT | Performed by: PHYSICAL MEDICINE & REHABILITATION

## 2023-12-28 PROCEDURE — 97110 THERAPEUTIC EXERCISES: CPT | Performed by: PHYSICAL MEDICINE & REHABILITATION

## 2023-12-28 NOTE — PROGRESS NOTES
Daily Note     Today's date: 2023  Patient name: Jc Carmona  : 1975  MRN: [id number]  Referring provider: Steve Kaplan DPM  Dx:   Encounter Diagnosis     ICD-10-CM    1. Chronic pain of left ankle  M25.572     G89.29                    Subjective: Increased ankle pain yesterday and today.     Objective: See treatment diary below    Assessment: Tolerated treatment well overall. Patient demonstrated fatigue post treatment, exhibited good technique with therapeutic exercises, and would benefit from continued PT to address remaining deficits. Continue as able nv per patient tolerance.     Plan: Continue per plan of care.      Precautions: DM    Manuals 12/12 12/15 12/19 12/28  11/20 11/22 11/28 11/30 12/7   Visit      6/10 7/10 8/10 9/10 10/10   Manual left ankle stretching AML SY SY LH  SY SY RA SY SY   MFR calf and soleus with soleus stretch AML SY SY LH  SY SY RA SY SY                Bike , recumbent  5' lvl 3 Upright 5' lvl 5 5' lvl 4 5' L3  5' lvl 3 5' lvl 3 5' lvl 3  5' lvl 3  5' lvl 3    Neuro Re-Ed                                                                                                        Ther Ex             Gastroc towel stretch             Soleus towel stretch             TB PF             TB eversion             Inversion ball squeeze iso             SLS             Kneeling DF stretch             Heel raises             Seated heel raises             Rocker board AP 2' seated  AP 2' seated     AP 2' AP 2' seated AP 2' seated  AP 2' seated  AP 2' seated    Wobble board Held- pt refused due to pain 90s CW 2' AP, 2' ML, 2' CW, 2' CCW 2' ea  CW 2' CW 2' seated CW 2' seated CW 2' seated CW 2' seated   prostretch sittingPWB :10x ea 2min sittingPWB :10x ea 2min Standing :10x2' Standing :10x2'  Seated :10x ea 2min Standing PWB :10x ea 2min Standing PWB :10x ea 2min Standing PWB :10x ea 2min sittingPWB :10x ea 2min   Standing bilat heel raises with UE :10x10 :10x10  Held, toe infection   :10x2' :10x10 :10x10  :10x10 :10x10   Leg press heel raises  nv                                                  Side stepping 2' 2' 2'   2' 2' 2'  2' 2'                Ther Activity                                       Gait Training                                       Modalities

## 2024-01-02 ENCOUNTER — OFFICE VISIT (OUTPATIENT)
Dept: PHYSICAL THERAPY | Facility: CLINIC | Age: 49
End: 2024-01-02
Payer: COMMERCIAL

## 2024-01-02 DIAGNOSIS — M25.572 CHRONIC PAIN OF LEFT ANKLE: Primary | ICD-10-CM

## 2024-01-02 DIAGNOSIS — G89.29 CHRONIC PAIN OF LEFT ANKLE: Primary | ICD-10-CM

## 2024-01-02 PROCEDURE — 97110 THERAPEUTIC EXERCISES: CPT | Performed by: PHYSICAL THERAPIST

## 2024-01-02 PROCEDURE — 97112 NEUROMUSCULAR REEDUCATION: CPT | Performed by: PHYSICAL THERAPIST

## 2024-01-02 PROCEDURE — 97140 MANUAL THERAPY 1/> REGIONS: CPT | Performed by: PHYSICAL THERAPIST

## 2024-01-02 NOTE — PROGRESS NOTES
Daily Note     Today's date: 2024  Patient name: Jc Carmona  : 1975  MRN: [id number]  Referring provider: Steve Kaplan DPM  Dx:   Encounter Diagnosis     ICD-10-CM    1. Chronic pain of left ankle  M25.572     G89.29                      Subjective: notes he was biking outdoors and when on level surfaces felt fine but had pain when trying to go up hills; after attempting hills he had to get off the bike and walk it up due to pain      Objective: See treatment diary below      Assessment: Tolerated treatment well. Patient exhibited good technique with therapeutic exercises and would benefit from continued PT.  Tender with sub talar mobilizations, lighter heel raises on the leg press was tolerated better than last attempt at 50 lbs      Plan: Progress treatment as tolerated.       Precautions: DM    Manuals 12/12 12/15 12/19 12/28 1/2 11/20 11/22 11/28 11/30 12/7   Visit      6/10 7/10 8/10 9/10 10/10   Manual left ankle stretching AML SY SY LH SY SY SY RA SY SY   MFR calf and soleus with soleus stretch AML SY SY LH SY SY SY RA SY SY                Bike , recumbent  5' lvl 3 Upright 5' lvl 5 5' lvl 4 5' L3 5' L4 5' lvl 3 5' lvl 3 5' lvl 3  5' lvl 3  5' lvl 3    Neuro Re-Ed                                                                                                        Ther Ex             Gastroc towel stretch             Soleus towel stretch             TB PF             TB eversion             Inversion ball squeeze iso             SLS             Kneeling DF stretch             Heel raises             Seated heel raises             Rocker board AP 2' seated  AP 2' seated     AP 2' AP 2' seated AP 2' seated  AP 2' seated  AP 2' seated    Wobble board Held- pt refused due to pain 90s CW 2' AP, 2' ML, 2' CW, 2' CCW 2' ea (not CW) 2' ea (not CW) CW 2' CW 2' seated CW 2' seated CW 2' seated CW 2' seated   prostretch sittingPWB :10x ea 2min sittingPWB :10x ea 2min Standing :10x2'  "Standing :10x2' Standing :10x2' Seated :10x ea 2min Standing PWB :10x ea 2min Standing PWB :10x ea 2min Standing PWB :10x ea 2min sittingPWB :10x ea 2min   Standing bilat heel raises with UE :10x10 :10x10 Held, toe infection   :10x2' :10x10 :10x10  :10x10 :10x10   Leg press heel raises  nv  #40  30 #30  30        SB stretch :10x2' :10x2'  :10x2' :10x2'                                  Side stepping 2' 2' 2' 2'  2' over 6\" plyobox 2' 2' 2'  2' 2'                Ther Activity                                       Gait Training                                       Modalities                                                                      "

## 2024-01-04 ENCOUNTER — APPOINTMENT (OUTPATIENT)
Dept: PHYSICAL THERAPY | Facility: CLINIC | Age: 49
End: 2024-01-04
Payer: COMMERCIAL

## 2024-01-08 ENCOUNTER — OFFICE VISIT (OUTPATIENT)
Dept: PODIATRY | Facility: CLINIC | Age: 49
End: 2024-01-08
Payer: COMMERCIAL

## 2024-01-08 VITALS
BODY MASS INDEX: 34.82 KG/M2 | HEIGHT: 65 IN | HEART RATE: 83 BPM | OXYGEN SATURATION: 98 % | SYSTOLIC BLOOD PRESSURE: 135 MMHG | DIASTOLIC BLOOD PRESSURE: 83 MMHG | WEIGHT: 209 LBS

## 2024-01-08 DIAGNOSIS — S93.432D ANKLE SYNDESMOSIS DISRUPTION, LEFT, SUBSEQUENT ENCOUNTER: Primary | ICD-10-CM

## 2024-01-08 DIAGNOSIS — S93.492A SPRAIN OF ANTERIOR TALOFIBULAR LIGAMENT OF LEFT ANKLE, INITIAL ENCOUNTER: ICD-10-CM

## 2024-01-08 DIAGNOSIS — R53.81 DEBILITY: ICD-10-CM

## 2024-01-08 DIAGNOSIS — R26.89 FUNCTIONAL GAIT ABNORMALITY: ICD-10-CM

## 2024-01-08 PROCEDURE — 99213 OFFICE O/P EST LOW 20 MIN: CPT | Performed by: PODIATRIST

## 2024-01-08 NOTE — PROGRESS NOTES
Assessment/Plan:     The patient's clinical examination today is essentially unchanged from his prior examination of the left ankle, and is significant for persistent tenderness palpation along the peroneal tendons. The most intense area of pain is along the peroneals as they course around the fibular malleolus.  There is still persistent tenderness with palpation to the area of the ATFL.  There is still reduction with passive range of motion specifically with dorsiflexion.  Plantarflexion and inversion of the left ankle hindfoot also produces mild to moderate tenderness.  There is no significant edema noted today.  There is no calor nor ecchymosis.  The ingrown left hallux nail has resolved.     Physical therapy notes were reviewed and appreciated.  The patient does seem to making some improvements in regards to PT, however he does states he does not feel any better.    The patient will continue physical therapy.  He still finds it difficult to ambulate for periods greater than 1 hour at a time.  We did discuss the potential for hardware removal once again.  Recommend follow-up in 4 weeks to reassess his progress with physical therapy.       Diagnoses and all orders for this visit:    Ankle syndesmosis disruption, left, subsequent encounter  -     Ambulatory referral to Physical Therapy; Future    Debility  -     Ambulatory referral to Physical Therapy; Future    Sprain of anterior talofibular ligament of left ankle, initial encounter  -     Ambulatory referral to Physical Therapy; Future    Functional gait abnormality  -     Ambulatory referral to Physical Therapy; Future          Subjective:     Patient ID: Jc Carmona is a 48 y.o. male.    The patient presents today for follow-up of chronic lateral left ankle pain.  The patient states he has completed his initial course of therapy and has noted no significant improvement.  He still finds it difficult to ambulate for more than 1 hour at a time.  He states  that he recently went biking and had difficulty going up an incline and had to walk the rest of the way.  Tenderness is still noted to the lateral aspect of his left ankle.        Review of Systems   Constitutional: Negative.    HENT: Negative.     Eyes: Negative.    Respiratory: Negative.     Cardiovascular: Negative.    Endocrine: Negative.    Musculoskeletal: Negative.    Neurological: Negative.    Hematological: Negative.    Psychiatric/Behavioral: Negative.           Objective:     Physical Exam  Vitals reviewed.   Constitutional:       Appearance: Normal appearance.   HENT:      Head: Normocephalic and atraumatic.      Nose: Nose normal.   Eyes:      Conjunctiva/sclera: Conjunctivae normal.      Pupils: Pupils are equal, round, and reactive to light.   Cardiovascular:      Pulses:           Dorsalis pedis pulses are 2+ on the left side.        Posterior tibial pulses are 2+ on the left side.   Pulmonary:      Effort: Pulmonary effort is normal.   Feet:      Comments: The patient's clinical examination today is essentially unchanged from his prior examination of the left ankle, and is significant for persistent tenderness palpation along the peroneal tendons. The most intense area of pain is along the peroneals as they course around the fibular malleolus.  There is still persistent tenderness with palpation to the area of the ATFL.  There is still reduction with passive range of motion specifically with dorsiflexion.  Plantarflexion and inversion of the left ankle hindfoot also produces mild to moderate tenderness.  There is no significant edema noted today.  There is no calor nor ecchymosis.  The ingrown left hallux nail has resolved.  Skin:     General: Skin is warm.      Capillary Refill: Capillary refill takes less than 2 seconds.   Neurological:      General: No focal deficit present.      Mental Status: He is alert and oriented to person, place, and time.   Psychiatric:         Mood and Affect: Mood  normal.         Behavior: Behavior normal.         Thought Content: Thought content normal.

## 2024-01-15 ENCOUNTER — OFFICE VISIT (OUTPATIENT)
Dept: PHYSICAL THERAPY | Facility: CLINIC | Age: 49
End: 2024-01-15
Payer: COMMERCIAL

## 2024-01-15 DIAGNOSIS — G89.29 CHRONIC PAIN OF LEFT ANKLE: Primary | ICD-10-CM

## 2024-01-15 DIAGNOSIS — M25.572 CHRONIC PAIN OF LEFT ANKLE: Primary | ICD-10-CM

## 2024-01-15 PROCEDURE — 97140 MANUAL THERAPY 1/> REGIONS: CPT | Performed by: PHYSICAL THERAPIST

## 2024-01-15 PROCEDURE — 97110 THERAPEUTIC EXERCISES: CPT | Performed by: PHYSICAL THERAPIST

## 2024-01-15 NOTE — PROGRESS NOTES
Daily Note     Today's date: 1/15/2024  Patient name: Jc Carmona  : 1975  MRN: [id number]  Referring provider: Steve Kaplan DPM  Dx:   Encounter Diagnosis     ICD-10-CM    1. Chronic pain of left ankle  M25.572     G89.29           Start Time: 0850  Stop Time: 0928  Total time in clinic (min): 38 minutes    Subjective: notes he rolled his ankle last week which caused a lot of pain and increased swelling.  Pain continues today and he has low tolerance to weight bearing      Objective: See treatment diary below      Assessment: Tolerated treatment well. Patient exhibited good technique with therapeutic exercises and would benefit from continued PT.  Reduced exercise intensity due to recent increase in symptoms.      Plan: Progress treatment as tolerated.       Precautions: DM    Manuals 12/12 12/15 12/19 12/28 1/2 1/15 11/22 11/28 11/30 12/7   Visit       7/10 8/10 9/10 10/10   Manual left ankle stretching AML SY SY LH SY SY, more STM SY RA SY SY   MFR calf and soleus with soleus stretch AML SY SY LH SY SY SY RA SY SY                Bike , recumbent  5' lvl 3 Upright 5' lvl 5 5' lvl 4 5' L3 5' L4 5' lvl 3 5' lvl 3 5' lvl 3  5' lvl 3  5' lvl 3    Neuro Re-Ed                                                                                                        Ther Ex             Gastroc towel stretch             Soleus towel stretch             TB PF             TB eversion             Inversion ball squeeze iso             SLS             Kneeling DF stretch             Heel raises             Seated heel raises             Rocker board AP 2' seated  AP 2' seated     AP 2' AP 2' seated AP 2' seated  AP 2' seated  AP 2' seated    Wobble board Held- pt refused due to pain 90s CW 2' AP, 2' ML, 2' CW, 2' CCW 2' ea (not CW) 2' ea (not CW) CW 2' CW 2' seated CW 2' seated CW 2' seated CW 2' seated   prostretch sittingPWB :10x ea 2min sittingPWB :10x ea 2min Standing :10x2' Standing :10x2'  "Standing :10x2' P! Standing PWB :10x ea 2min Standing PWB :10x ea 2min Standing PWB :10x ea 2min sittingPWB :10x ea 2min   Standing bilat heel raises with UE :10x10 :10x10 Held, toe infection    :10x10 :10x10  :10x10 :10x10   Leg press heel raises  nv  #40  30 #30  30        SB stretch :10x2' :10x2'  :10x2' :10x2'                                  Side stepping 2' 2' 2' 2'  2' over 6\" plyobox  2' 2'  2' 2'                Ther Activity                                       Gait Training                                       Modalities                                                                        "

## 2024-01-16 ENCOUNTER — APPOINTMENT (OUTPATIENT)
Dept: PHYSICAL THERAPY | Facility: CLINIC | Age: 49
End: 2024-01-16
Payer: COMMERCIAL

## 2024-01-16 ENCOUNTER — OFFICE VISIT (OUTPATIENT)
Dept: ENDOCRINOLOGY | Facility: CLINIC | Age: 49
End: 2024-01-16
Payer: COMMERCIAL

## 2024-01-16 VITALS
OXYGEN SATURATION: 96 % | RESPIRATION RATE: 14 BRPM | HEART RATE: 76 BPM | HEIGHT: 65 IN | BODY MASS INDEX: 35.16 KG/M2 | TEMPERATURE: 98.7 F | DIASTOLIC BLOOD PRESSURE: 86 MMHG | SYSTOLIC BLOOD PRESSURE: 114 MMHG | WEIGHT: 211 LBS

## 2024-01-16 DIAGNOSIS — K76.0 FATTY LIVER: ICD-10-CM

## 2024-01-16 DIAGNOSIS — E11.65 TYPE 2 DIABETES MELLITUS WITH HYPERGLYCEMIA, WITH LONG-TERM CURRENT USE OF INSULIN (HCC): Primary | ICD-10-CM

## 2024-01-16 DIAGNOSIS — E66.09 CLASS 1 OBESITY DUE TO EXCESS CALORIES WITH SERIOUS COMORBIDITY AND BODY MASS INDEX (BMI) OF 34.0 TO 34.9 IN ADULT: ICD-10-CM

## 2024-01-16 DIAGNOSIS — Z79.4 TYPE 2 DIABETES MELLITUS WITH HYPERGLYCEMIA, WITH LONG-TERM CURRENT USE OF INSULIN (HCC): Primary | ICD-10-CM

## 2024-01-16 DIAGNOSIS — E78.1 HYPERTRIGLYCERIDEMIA: ICD-10-CM

## 2024-01-16 PROCEDURE — 95251 CONT GLUC MNTR ANALYSIS I&R: CPT | Performed by: INTERNAL MEDICINE

## 2024-01-16 PROCEDURE — 99214 OFFICE O/P EST MOD 30 MIN: CPT | Performed by: INTERNAL MEDICINE

## 2024-01-16 RX ORDER — DULAGLUTIDE 3 MG/.5ML
3 INJECTION, SOLUTION SUBCUTANEOUS
Qty: 6 ML | Refills: 0 | Status: SHIPPED | OUTPATIENT
Start: 2024-01-16

## 2024-01-16 RX ORDER — INSULIN GLARGINE 100 [IU]/ML
25 INJECTION, SOLUTION SUBCUTANEOUS
Qty: 30 ML | Refills: 0 | Status: SHIPPED | OUTPATIENT
Start: 2024-01-16

## 2024-01-16 NOTE — PROGRESS NOTES
Follow-up Patient Progress Note      CC: Diabetets     History of Present Illness:   47yr male with type 2 diabetes since 2018, obesity BMI 35, HTN, HLD, PETTY, left lumbar radiculopathy, ED, NAFLD, chronic diarrhea, Hx colon cancer age 30, chronic fatigue and vitamin D deficiency. Last visit was 2/22/23.     Since last visit, weight is same. He is using all meds. Waiting for lt ankle procedure to remove hardware.     No symptoms. Some dietary indiscretion during holiday.     CGM data review::  Device: willow Dates: 12/28/23-1/10/24 Usage: 96 % Av glu: 170 mg/dL  SD:  mg/dL CV:   % GMI:7.4  %  TIR: 62 %  TAR: 35+3 %  TBR: 0 %    Glycemic patters:  significant post prandial hyperglycemia.  Hypoglycemia: No     Current meds:  Metformin 1000mg po  BID  Lantus 25u QHS  Trulicity 1.5mg weekly     Opthamology: yes, last exam 2021  Podiatry: No  vaccination: Yes  Dental:  Pancreatitis: No     Ace/ARB: No  Statin: no  Thyroid issues: NO     FH: Diabetes - mother and father; father - CAD    Patient Active Problem List   Diagnosis    Chronic diarrhea    De Quervain's tenosynovitis    Fatty liver    Hypertriglyceridemia    Impingement syndrome of left shoulder    Left lumbar radiculopathy    Male erectile dysfunction    Plantar warts    Paresthesias    Excessive daytime sleepiness    PETTY (obstructive sleep apnea)    Type 2 diabetes mellitus with hyperglycemia, with long-term current use of insulin (HCC)    Class 1 obesity due to excess calories with serious comorbidity and body mass index (BMI) of 34.0 to 34.9 in adult    Arthritis of right knee    Chronic pain of right knee    Abdominal wall hernia    Syndesmotic disruption of ankle, left, subsequent encounter    Calculus of gallbladder without cholecystitis without obstruction    Abdominal pain    Sacroiliitis (HCC)    Ankle syndesmosis disruption, left, subsequent encounter     Past Medical History:   Diagnosis Date    Cancer (HCC)     colon    Diabetes mellitus (HCC)      "GERD (gastroesophageal reflux disease)     Hyperlipidemia     Post concussion syndrome 05/09/2017    Pre-op examination     Sleep apnea     \"mild\"    Traumatic brain injury (HCC) 2017    R/S 2017      Past Surgical History:   Procedure Laterality Date    APPENDECTOMY      COLON SURGERY      WA RPR PRIMARY DISRUPTED LIGAMENT ANKLE COLLATERAL Left 3/10/2023    Procedure: ANKLE LIGAMENT REPAIR OF SYNDESMOTIC LIGAMENT with steroid injection of posterior heel bursa;  Surgeon: Steve Kaplan DPM;  Location:  MAIN OR;  Service: Podiatry      Family History   Problem Relation Age of Onset    Coronary artery disease Mother     Diabetes Mother     Hypertension Mother     Hyperlipidemia Mother     Coronary artery disease Father     Diabetes Father     Hypertension Father     Hyperlipidemia Father     Heart attack Father     Anemia Brother     No Known Problems Son     No Known Problems Son     No Known Problems Son      Social History     Tobacco Use    Smoking status: Never    Smokeless tobacco: Never   Substance Use Topics    Alcohol use: Yes     Alcohol/week: 1.0 standard drink of alcohol     Types: 1 Cans of beer per week     Allergies   Allergen Reactions    Asa [Aspirin] GI Intolerance    Penicillin G Other (See Comments)     Ancef ok     \"unsure; was a baby\"         Current Outpatient Medications:     atorvastatin (LIPITOR) 10 mg tablet, Take 1 tablet (10 mg total) by mouth every evening, Disp: 90 tablet, Rfl: 3    Blood Glucose Monitoring Suppl (OneTouch Verio Flex System) w/Device KIT, , Disp: , Rfl:     celecoxib (CeleBREX) 100 mg capsule, Take 2 capsules (200 mg total) by mouth daily, Disp: 28 capsule, Rfl: 0    Insulin Glargine Solostar (Lantus SoloStar) 100 UNIT/ML SOPN, Inject 0.3 mL (30 Units total) under the skin daily at bedtime, Disp: 30 mL, Rfl: 1    Insulin Pen Needle (BD Pen Needle Nancy U/F) 32G X 4 MM MISC, Use twice daily, Disp: 100 each, Rfl: 2    Lancets (OneTouch Delica Plus Avappw90S) MISC, " "daily, Disp: , Rfl:     metFORMIN (GLUCOPHAGE) 1000 MG tablet, Take 1 tablet (1,000 mg total) by mouth 2 (two) times a day with meals, Disp: 180 tablet, Rfl: 1    OneTouch Verio test strip, daily, Disp: , Rfl:     tadalafil (CIALIS) 10 MG tablet, TAKE ONE TABLET BY MOUTH DAILY AS NEEDED FOR ERECTILE DYSFUNCTION, Disp: 20 tablet, Rfl: 1    cholestyramine sugar free (QUESTRAN LIGHT) 4 g packet, Take 1 packet (4 g total) by mouth daily (Patient not taking: Reported on 12/8/2023), Disp: 30 packet, Rfl: 3    Continuous Blood Gluc Sensor (FreeStyle Dell 3 Sensor) MISC, Use 1 Units every 14 (fourteen) days (Patient not taking: Reported on 1/16/2024), Disp: 2 each, Rfl: 5    dulaglutide (Trulicity) 1.5 MG/0.5ML injection, Inject 0.5 mL (1.5 mg total) under the skin once a week (Patient not taking: Reported on 1/16/2024), Disp: 6 mL, Rfl: 0    ergocalciferol (VITAMIN D2) 50,000 units, Take 1 capsule (50,000 Units total) by mouth once a week for 12 doses, Disp: 12 capsule, Rfl: 0    Review of Systems   Constitutional:  Positive for activity change and appetite change.   HENT: Negative.     Eyes: Negative.    Respiratory: Negative.     Cardiovascular:  Negative for chest pain.   Gastrointestinal: Negative.    Endocrine: Negative.    Genitourinary: Negative.    Musculoskeletal: Negative.    Skin: Negative.    Allergic/Immunologic: Negative.    Neurological: Negative.    Hematological: Negative.    Psychiatric/Behavioral: Negative.     All other systems reviewed and are negative.      Physical Exam:  Body mass index is 35.11 kg/m².  /86 (BP Location: Left arm, Patient Position: Sitting)   Pulse 76   Temp 98.7 °F (37.1 °C) (Tympanic)   Resp 14   Ht 5' 5\" (1.651 m)   Wt 95.7 kg (211 lb)   SpO2 96%   BMI 35.11 kg/m²    Vitals:    01/16/24 1055   Weight: 95.7 kg (211 lb)        Physical Exam  Constitutional:       General: He is not in acute distress.     Appearance: He is well-developed. He is not ill-appearing. "   HENT:      Head: Normocephalic and atraumatic.      Nose: Nose normal.      Mouth/Throat:      Pharynx: Oropharynx is clear.   Eyes:      Extraocular Movements: Extraocular movements intact.      Conjunctiva/sclera: Conjunctivae normal.   Neck:      Thyroid: No thyromegaly.   Cardiovascular:      Rate and Rhythm: Normal rate.   Pulmonary:      Effort: Pulmonary effort is normal.   Musculoskeletal:         General: No deformity.      Cervical back: Normal range of motion.   Skin:     Capillary Refill: Capillary refill takes less than 2 seconds.      Coloration: Skin is not pale.      Findings: No rash.   Neurological:      Mental Status: He is alert and oriented to person, place, and time.   Psychiatric:         Behavior: Behavior normal.         Labs:   Lab Results   Component Value Date    HGBA1C 8.7 (H) 12/14/2023       Lab Results   Component Value Date    EIG3UWHWHRMV 1.470 02/13/2023       Lab Results   Component Value Date    CREATININE 0.89 05/29/2023    CREATININE 0.97 03/09/2023    CREATININE 0.84 02/13/2023    BUN 13 05/29/2023     12/13/2014    K 3.5 05/29/2023     05/29/2023    CO2 28 05/29/2023     eGFR   Date Value Ref Range Status   05/29/2023 101 ml/min/1.73sq m Final       Lab Results   Component Value Date    ALT 56 (H) 10/23/2023    AST 25 10/23/2023    ALKPHOS 64 10/23/2023       Lab Results   Component Value Date    CHOLESTEROL 165 12/14/2023    CHOLESTEROL 202 (H) 02/13/2023    CHOLESTEROL 185 09/07/2021     Lab Results   Component Value Date    HDL 33 (L) 12/14/2023    HDL 35 (L) 02/13/2023    HDL 36 (L) 09/07/2021     Lab Results   Component Value Date    TRIG 422 (H) 12/14/2023    TRIG 454 (H) 02/13/2023    TRIG 455 (H) 09/07/2021     Lab Results   Component Value Date    NONHDLC 132 12/14/2023    NONHDLC 167 02/13/2023         Impression:  1. Type 2 diabetes mellitus with hyperglycemia, with long-term current use of insulin (HCC)    2. Fatty liver    3. Class 1 obesity due to  excess calories with serious comorbidity and body mass index (BMI) of 34.0 to 34.9 in adult    4. Hypertriglyceridemia         Plan:    Jc was seen today for follow-up.    Diagnoses and all orders for this visit:    Type 2 diabetes mellitus with hyperglycemia, with long-term current use of insulin (HCC). He is uncontrolled with A1c 8.7%. AGP however shows a GMI 7.4% for last 2 weeks. Admits dietary indiscretions during holidays.    Today we agreed to continue lantus 25u qhs and metformin 1000mg po bid. Increase trulicity and improve diet and liifestyle. Advised daily weight based muscle strengthening exercise.  We agreed to defer referral to medical fitness training till after ankle procedure.    Send willow data in 6 weeks.  Follow up in 3 months.    -     dulaglutide (Trulicity) 3 MG/0.5ML injection; Inject 0.5 mL (3 mg total) under the skin every 7 days  -     Insulin Glargine Solostar (Lantus SoloStar) 100 UNIT/ML SOPN; Inject 0.25 mL (25 Units total) under the skin daily at bedtime  -     metFORMIN (GLUCOPHAGE) 1000 MG tablet; Take 1 tablet (1,000 mg total) by mouth 2 (two) times a day with meals    Fatty liver. Diet and lifestyle changes.    Class 1 obesity due to excess calories with serious comorbidity and body mass index (BMI) of 34.0 to 34.9 in adult    Hypertriglyceridemia. On lipitor. Recent non HDL was 132mg/dL.        I have spent 35 minutes with patient today in which greater than 50% of this time was spent in counseling/coordination of care.      Discussed with the patient and all questioned fully answered. He will call me if any problems arise.    Educated/ Counseled patient on diagnostic test results, prognosis, risk vs benefit of treatment options, importance of treatment compliance, healthy life and lifestyle choices.      Marisol Nicholas

## 2024-01-18 ENCOUNTER — OFFICE VISIT (OUTPATIENT)
Dept: PHYSICAL THERAPY | Facility: CLINIC | Age: 49
End: 2024-01-18
Payer: COMMERCIAL

## 2024-01-18 DIAGNOSIS — M25.572 CHRONIC PAIN OF LEFT ANKLE: Primary | ICD-10-CM

## 2024-01-18 DIAGNOSIS — G89.29 CHRONIC PAIN OF LEFT ANKLE: Primary | ICD-10-CM

## 2024-01-18 PROCEDURE — 97110 THERAPEUTIC EXERCISES: CPT | Performed by: PHYSICAL THERAPIST

## 2024-01-18 PROCEDURE — 97140 MANUAL THERAPY 1/> REGIONS: CPT | Performed by: PHYSICAL THERAPIST

## 2024-01-18 NOTE — PROGRESS NOTES
"Daily Note     Today's date: 2024  Patient name: Jc Carmona  : 1975  MRN: [id number]  Referring provider: Steve Kaplan DPM  Dx:   Encounter Diagnosis     ICD-10-CM    1. Chronic pain of left ankle  M25.572     G89.29                      Subjective: notes pain a little better compared to last visit; still recovering from ankle sprain      Objective: See treatment diary below      Assessment: Tolerated treatment well. Patient exhibited good technique with therapeutic exercises and would benefit from continued PT>  Initiated mobilization with movement to improve ankle dorsiflexion in weight bearing.    Plan: Progress treatment as tolerated.       Precautions: DM    Manuals 12/12 12/15 12/19 12/28 1/2 1/15 1/18 11/28 11/30 12/7   Visit        8/10 9/10 10/10   Manual left ankle stretching AML SY SY LH SY SY, more STM SY RA SY SY   MFR calf and soleus with soleus stretch AML SY SY LH SY SY SY RA SY SY   Ankle  MWM kneeling on 18\" plyobox with UEsupports       SY 15x      Nu step       6 min lvl 4 no UE      Bike , recumbent  5' lvl 3 Upright 5' lvl 5 5' lvl 4 5' L3 5' L4 5' lvl 3  5' lvl 3  5' lvl 3  5' lvl 3    Neuro Re-Ed                                                                                                        Ther Ex             Gastroc towel stretch       :15x5      Soleus towel stretch       :15x5      TB PF             TB eversion             Inversion ball squeeze iso       :10x10      SLS             Kneeling DF stretch             Heel raises             Seated heel raises             Rocker board AP 2' seated  AP 2' seated     AP 2'  AP 2' seated  AP 2' seated  AP 2' seated    Wobble board Held- pt refused due to pain 90s CW 2' AP, 2' ML, 2' CW, 2' CCW 2' ea (not CW) 2' ea (not CW) CW 2'  CW 2' seated CW 2' seated CW 2' seated   prostretch sittingPWB :10x ea 2min sittingPWB :10x ea 2min Standing :10x2' Standing :10x2' Standing :10x2' P!  Standing PWB :10x ea " "2min Standing PWB :10x ea 2min sittingPWB :10x ea 2min   Standing bilat heel raises with UE :10x10 :10x10 Held, toe infection     :10x10  :10x10 :10x10   Leg press heel raises  nv  #40  30 #30  30        SB stretch :10x2' :10x2'  :10x2' :10x2'        Seated ankle eversion with looped band       :10x10                   Side stepping 2' 2' 2' 2'  2' over 6\" plyobox  2' 2'  2' 2'                Ther Activity                                       Gait Training                                       Modalities                                                                          "

## 2024-01-19 ENCOUNTER — APPOINTMENT (OUTPATIENT)
Dept: PHYSICAL THERAPY | Facility: CLINIC | Age: 49
End: 2024-01-19
Payer: COMMERCIAL

## 2024-01-23 ENCOUNTER — OFFICE VISIT (OUTPATIENT)
Dept: PHYSICAL THERAPY | Facility: CLINIC | Age: 49
End: 2024-01-23
Payer: COMMERCIAL

## 2024-01-23 DIAGNOSIS — M25.572 CHRONIC PAIN OF LEFT ANKLE: Primary | ICD-10-CM

## 2024-01-23 DIAGNOSIS — G89.29 CHRONIC PAIN OF LEFT ANKLE: Primary | ICD-10-CM

## 2024-01-23 PROCEDURE — 97140 MANUAL THERAPY 1/> REGIONS: CPT | Performed by: PHYSICAL THERAPIST

## 2024-01-23 PROCEDURE — 97112 NEUROMUSCULAR REEDUCATION: CPT | Performed by: PHYSICAL THERAPIST

## 2024-01-23 PROCEDURE — 97110 THERAPEUTIC EXERCISES: CPT | Performed by: PHYSICAL THERAPIST

## 2024-01-23 NOTE — PROGRESS NOTES
"Daily Note     Today's date: 2024  Patient name: Jc Carmona  : 1975  MRN: [id number]  Referring provider: Steve Kaplan DPM  Dx:   Encounter Diagnosis     ICD-10-CM    1. Chronic pain of left ankle  M25.572     G89.29                  Subjective: notes pain is not as severe as last week but he was inactive over the weekend      Objective: See treatment diary below      Assessment: Tolerated treatment well. Patient exhibited good technique with therapeutic exercises and would benefit from continued PT.  Improved tolerance to DF stretching but inv/ever still painful      Plan: Progress treatment as tolerated.       Precautions: DM    Manuals 12/12 12/15 12/19 12/28 1/2 1/15 1/18 1/23 11/30 12/7   Visit         9/10 10/10   Manual left ankle stretching AML SY SY LH SY SY, more STM SY SY SY SY   MFR calf and soleus with soleus stretch AML SY SY LH SY SY SY SY SY SY   Ankle  MWM kneeling on 18\" plyobox with UEsupports       SY 15x      Nu step       6 min lvl 4 no UE      Bike , recumbent  5' lvl 3 Upright 5' lvl 5 5' lvl 4 5' L3 5' L4 5' lvl 3  5' lvl 3 5' lvl 3  5' lvl 3    Neuro Re-Ed                                                                                                        Ther Ex             Gastroc towel stretch       :15x5      Soleus towel stretch       :15x5      TB PF             TB eversion             Inversion ball squeeze iso       :10x10      SLS             Kneeling DF stretch             Heel raises             Seated heel raises             Rocker board AP 2' seated  AP 2' seated     AP 2'  AP 2' seated  AP 2' seated  AP 2' seated    Wobble board Held- pt refused due to pain 90s CW 2' AP, 2' ML, 2' CW, 2' CCW 2' ea (not CW) 2' ea (not CW) CW 2'  CW 2' seated CW 2' seated CW 2' seated   prostretch sittingPWB :10x ea 2min sittingPWB :10x ea 2min Standing :10x2' Standing :10x2' Standing :10x2' P!  Seated PWB :10x ea 2min Standing PWB :10x ea 2min sittingPWB " ":10x ea 2min   Standing bilat heel raises with UE :10x10 :10x10 Held, toe infection      :10x10 :10x10   Leg press heel raises  nv  #40  30 #30  30        SB stretch :10x2' :10x2'  :10x2' :10x2'        Seated ankle eversion with looped band       :10x10 :10x10                  Side stepping 2' 2' 2' 2'  2' over 6\" plyobox  2'  2' 2'                Ther Activity                                       Gait Training                                       Modalities                                                                            "

## 2024-01-26 ENCOUNTER — APPOINTMENT (OUTPATIENT)
Dept: PHYSICAL THERAPY | Facility: CLINIC | Age: 49
End: 2024-01-26
Payer: COMMERCIAL

## 2024-01-29 ENCOUNTER — OFFICE VISIT (OUTPATIENT)
Dept: PHYSICAL THERAPY | Facility: CLINIC | Age: 49
End: 2024-01-29
Payer: COMMERCIAL

## 2024-01-29 DIAGNOSIS — M25.572 CHRONIC PAIN OF LEFT ANKLE: Primary | ICD-10-CM

## 2024-01-29 DIAGNOSIS — G89.29 CHRONIC PAIN OF LEFT ANKLE: Primary | ICD-10-CM

## 2024-01-29 PROCEDURE — 97140 MANUAL THERAPY 1/> REGIONS: CPT | Performed by: PHYSICAL THERAPIST

## 2024-01-29 PROCEDURE — 97112 NEUROMUSCULAR REEDUCATION: CPT | Performed by: PHYSICAL THERAPIST

## 2024-01-29 PROCEDURE — 97110 THERAPEUTIC EXERCISES: CPT | Performed by: PHYSICAL THERAPIST

## 2024-01-29 NOTE — PROGRESS NOTES
"Daily Note     Today's date: 2024  Patient name: Jc Carmona  : 1975  MRN: [id number]  Referring provider: Steve Kaplan DPM  Dx:   Encounter Diagnosis     ICD-10-CM    1. Chronic pain of left ankle  M25.572     G89.29                  Subjective:  was inactive for part of last week due to 3 days of stomach issues; ankle better overall when less active      Objective: See treatment diary below      Assessment: Tolerated treatment well. Patient exhibited good technique with therapeutic exercises and would benefit from continued PT.  progressed prone ankle DF stretching with knee bent; continues to have pain with DF eversion stretching.       Plan: Progress treatment as tolerated.       Precautions: DM    Manuals 12/12 12/15 12/19 12/28 1/2 1/15 1/18 1/23 1/29 12/7   Visit         9/10 10/10   Manual left ankle stretching AML SY SY LH SY SY, more STM SY SY SY SY   MFR calf and soleus with soleus stretch AML SY SY LH SY SY SY SY SY SY   Ankle  MWM kneeling on 18\" plyobox with UEsupports       SY 15x      Nu step       6 min lvl 4 no UE      Bike , recumbent  5' lvl 3 Upright 5' lvl 5 5' lvl 4 5' L3 5' L4 5' lvl 3  5' lvl 3 5' lvl 3  5' lvl 3    Neuro Re-Ed                                                                                                        Ther Ex             Gastroc towel stretch       :15x5      Soleus towel stretch       :15x5      TB PF             TB eversion             Inversion ball squeeze iso       :10x10      SLS             Kneeling DF stretch             Heel raises             Seated heel raises             Rocker board AP 2' seated  AP 2' seated     AP 2'  AP 2' seated   AP 2' seated    Wobble board Held- pt refused due to pain 90s CW 2' AP, 2' ML, 2' CW, 2' CCW 2' ea (not CW) 2' ea (not CW) CW 2'  CW 2' seated CW 2' standing 2' CW 2' seated   prostretch sittingPWB :10x ea 2min sittingPWB :10x ea 2min Standing :10x2' Standing :10x2' Standing :10x2' P! " " Seated PWB :10x ea 2min Standing PWB :10x ea 2min sittingPWB :10x ea 2min   Standing bilat heel raises with UE :10x10 :10x10 Held, toe infection      :10x10 :10x10   Leg press heel raises  nv  #40  30 #30  30    #25 20x, #35 2x15    SB stretch :10x2' :10x2'  :10x2' :10x2'        Seated ankle eversion with looped band       :10x10 :10x10                  Side stepping 2' 2' 2' 2'  2' over 6\" plyobox  2'  2' 2'                Ther Activity                                       Gait Training                                       Modalities                                                                              "

## 2024-01-30 ENCOUNTER — TELEPHONE (OUTPATIENT)
Dept: OBGYN CLINIC | Facility: CLINIC | Age: 49
End: 2024-01-30

## 2024-02-06 ENCOUNTER — OFFICE VISIT (OUTPATIENT)
Dept: PHYSICAL THERAPY | Facility: CLINIC | Age: 49
End: 2024-02-06
Payer: COMMERCIAL

## 2024-02-06 DIAGNOSIS — G89.29 CHRONIC PAIN OF LEFT ANKLE: Primary | ICD-10-CM

## 2024-02-06 DIAGNOSIS — M25.572 CHRONIC PAIN OF LEFT ANKLE: Primary | ICD-10-CM

## 2024-02-06 PROCEDURE — 97110 THERAPEUTIC EXERCISES: CPT | Performed by: PHYSICAL THERAPIST

## 2024-02-06 PROCEDURE — 97140 MANUAL THERAPY 1/> REGIONS: CPT | Performed by: PHYSICAL THERAPIST

## 2024-02-06 PROCEDURE — 97112 NEUROMUSCULAR REEDUCATION: CPT | Performed by: PHYSICAL THERAPIST

## 2024-02-06 NOTE — PROGRESS NOTES
"Daily Note     Today's date: 2024  Patient name: Jc Carmona  : 1975  MRN: [id number]  Referring provider: Steve Kaplan DPM  Dx:   Encounter Diagnosis     ICD-10-CM    1. Chronic pain of left ankle  M25.572     G89.29                    Subjective: notes he did a lot of walking and lifting while changing  working to change homes over the weekend      Objective: See treatment diary below      Assessment: Tolerated treatment fair. Patient exhibited good technique with therapeutic exercises and would benefit from continued PT.  Ankle soreness noted by end of treatment; SLS on yellow foam ~10 seconds.  Pain with passive horizontal abd/add      Plan: Progress treatment as tolerated.       Precautions: DM    Manuals 12/12 12/15 12/19 12/28 1/2 1/15 1/18 1/23 1/29 2/6   Visit             Manual left ankle stretching AML SY SY LH SY SY, more STM SY SY SY SY   MFR calf and soleus with soleus stretch AML SY SY LH SY SY SY SY SY SY   Ankle  MWM kneeling on 18\" plyobox with UEsupports       SY 15x      Nu step       6 min lvl 4 no UE      Bike , recumbent  5' lvl 3 Upright 5' lvl 5 5' lvl 4 5' L3 5' L4 5' lvl 3  5' lvl 3 5' lvl 3  6' lvl 4   Neuro Re-Ed                                                                                                        Ther Ex             Gastroc towel stretch       :15x5      Soleus towel stretch       :15x5      TB PF             TB eversion             Inversion ball squeeze iso       :10x10      SLS          Yellow, 2'   Kneeling DF stretch             Heel raises             Seated heel raises             Rocker board AP 2' seated  AP 2' seated     AP 2'  AP 2' seated   AP 2' standing   Wobble board Held- pt refused due to pain 90s CW 2' AP, 2' ML, 2' CW, 2' CCW 2' ea (not CW) 2' ea (not CW) CW 2'  CW 2' seated CW 2' standing 2' CW 2' standing   prostretch sittingPWB :10x ea 2min sittingPWB :10x ea 2min Standing :10x2' Standing :10x2' Standing :10x2' " "P!  Seated PWB :10x ea 2min Standing PWB :10x ea 2min    Standing bilat heel raises with UE :10x10 :10x10 Held, toe infection      :10x10 :10x10   Leg press heel raises  nv  #40  30 #30  30    #25 20x, #35 2x15    SB stretch :10x2' :10x2'  :10x2' :10x2'     :10x2'   Seated ankle eversion with looped band       :10x10 :10x10                  Side stepping 2' 2' 2' 2'  2' over 6\" plyobox  2'  2' 2'                Ther Activity                                       Gait Training                                       Modalities                                                                                "

## 2024-02-09 ENCOUNTER — OFFICE VISIT (OUTPATIENT)
Dept: PHYSICAL THERAPY | Facility: CLINIC | Age: 49
End: 2024-02-09
Payer: COMMERCIAL

## 2024-02-09 DIAGNOSIS — M25.572 CHRONIC PAIN OF LEFT ANKLE: Primary | ICD-10-CM

## 2024-02-09 DIAGNOSIS — G89.29 CHRONIC PAIN OF LEFT ANKLE: Primary | ICD-10-CM

## 2024-02-09 PROCEDURE — 97140 MANUAL THERAPY 1/> REGIONS: CPT | Performed by: PHYSICAL THERAPIST

## 2024-02-09 PROCEDURE — 97110 THERAPEUTIC EXERCISES: CPT | Performed by: PHYSICAL THERAPIST

## 2024-02-09 PROCEDURE — 97112 NEUROMUSCULAR REEDUCATION: CPT | Performed by: PHYSICAL THERAPIST

## 2024-02-09 NOTE — PROGRESS NOTES
"Daily Note     Today's date: 2024  Patient name: Jc Carmona  : 1975  MRN: [id number]  Referring provider: Steve Kaplan DPM  Dx:   Encounter Diagnosis     ICD-10-CM    1. Chronic pain of left ankle  M25.572     G89.29                      Subjective: notes the leg press heel raises tend to make it sore; he has a lot of weight bearing activities in plan for this weekend      Objective: See treatment diary below      Assessment: Tolerated treatment well. Patient exhibited good technique with therapeutic exercises and would benefit from continued PT.  Held on leg press heel raises due to a lot of planned activity this weekend.  Continued tightness with dorsiflexion.       Plan: Progress treatment as tolerated.       Precautions: DM    Manuals 2/9 12/15 12/19 12/28 1/2 1/15 1/18 1/23 1/29 2/6   Visit             Manual left ankle stretching SY SY SY LH SY SY, more STM SY SY SY SY   MFR calf and soleus with soleus stretch SY SY SY LH SY SY SY SY SY SY   Ankle  MWM kneeling on 18\" plyobox with UEsupports       SY 15x      Nu step       6 min lvl 4 no UE      Bike , recumbent  6' lvl 4   Upright 5' lvl 5 5' lvl 4 5' L3 5' L4 5' lvl 3  5' lvl 3 5' lvl 3  6' lvl 4   Neuro Re-Ed                                                                                                        Ther Ex             Gastroc towel stretch       :15x5      Soleus towel stretch       :15x5      TB PF             TB eversion             Inversion ball squeeze iso       :10x10      SLS          Yellow, 2'   Kneeling DF stretch             Heel raises             Seated heel raises             Rocker board AP 2' standiong AP 2' seated     AP 2'  AP 2' seated   AP 2' standing   Wobble board  90s CW 2' AP, 2' ML, 2' CW, 2' CCW 2' ea (not CW) 2' ea (not CW) CW 2'  CW 2' seated CW 2' standing 2' CW 2' standing   prostretch standingPWB :10x ea 2min sittingPWB :10x ea 2min Standing :10x2' Standing :10x2' Standing " ":10x2' P!  Seated PWB :10x ea 2min Standing PWB :10x ea 2min    Standing bilat heel raises with UE :10x10 :10x10 Held, toe infection      :10x10 :10x10   Leg press heel raises  nv  #40  30 #30  30    #25 20x, #35 2x15    SB stretch :10x2' :10x2'  :10x2' :10x2'     :10x2'   Seated ankle eversion with looped band       :10x10 :10x10                  Side stepping 2' 2' 2' 2'  2' over 6\" plyobox  2'  2' 2'                Ther Activity                                       Gait Training                                       Modalities                                                                                  "

## 2024-03-06 ENCOUNTER — TELEPHONE (OUTPATIENT)
Dept: PODIATRY | Facility: CLINIC | Age: 49
End: 2024-03-06

## 2024-03-06 ENCOUNTER — OFFICE VISIT (OUTPATIENT)
Dept: PODIATRY | Facility: CLINIC | Age: 49
End: 2024-03-06
Payer: COMMERCIAL

## 2024-03-06 VITALS
HEIGHT: 65 IN | DIASTOLIC BLOOD PRESSURE: 85 MMHG | HEART RATE: 85 BPM | SYSTOLIC BLOOD PRESSURE: 130 MMHG | WEIGHT: 206 LBS | BODY MASS INDEX: 34.32 KG/M2 | OXYGEN SATURATION: 98 %

## 2024-03-06 DIAGNOSIS — L60.0 INGROWN RIGHT GREATER TOENAIL: ICD-10-CM

## 2024-03-06 DIAGNOSIS — T84.84XA PAINFUL ORTHOPAEDIC HARDWARE (HCC): Primary | ICD-10-CM

## 2024-03-06 DIAGNOSIS — R53.81 DEBILITY: ICD-10-CM

## 2024-03-06 DIAGNOSIS — S93.432D ANKLE SYNDESMOSIS DISRUPTION, LEFT, SUBSEQUENT ENCOUNTER: ICD-10-CM

## 2024-03-06 DIAGNOSIS — R26.89 FUNCTIONAL GAIT ABNORMALITY: ICD-10-CM

## 2024-03-06 PROCEDURE — 99213 OFFICE O/P EST LOW 20 MIN: CPT | Performed by: PODIATRIST

## 2024-03-06 NOTE — TELEPHONE ENCOUNTER
Hi all,    The original paperwork is coming via [employer] mail to [facility] to your attention . This has been uploaded via the [employer] process.  I also entered a Telephone encounter.     Patient needs to originals as it is from [facility] which requires originals. Once received & completed, send back to [facility] and patient will  originals

## 2024-03-06 NOTE — PROGRESS NOTES
Assessment/Plan:     The patient's clinical examination today significant for persistent tenderness with palpation of the lateral left ankle.  Tenderness is also noted with end range of motion with dorsiflexion and eversion of the left ankle.  There is no erythema nor edema no calor or ecchymosis noted.  Mild tenderness is still noted along the peroneal tendons behind the distal aspect of the fibula.  He also notes tenderness along the medial aspect of the right great toe nail plate.  There are no signs of infection noted.    The patient notes minimal improvement despite a prolonged course of physical therapy.  He feels that he is perhaps only 10% better even with physical therapy.  He still cannot weight-bear or ambulate for prolonged periods without significant pain.  We had previously discussed removal of hardware for potential relief of his chronic left ankle pain.  I feel at this point in time that we need to proceed as therapy has not helped.  Informed consent was obtained today for removal of hardware from the left ankle.  The perioperative course was also discussed with the patient.  Anticipate healing time to be 4 to 6 weeks.  At which time hopefully we can get him back to work.    The offending right hallux nail was resected with a slant back procedure utilizing ethyl chloride for topical anesthesia.  An antimicrobial dressing was applied postprocedure.    We will initiate preoperative testing and scheduling.     Diagnoses and all orders for this visit:    Painful orthopaedic hardware (HCC)    Ankle syndesmosis disruption, left, subsequent encounter    Debility    Functional gait abnormality    Ingrown right greater toenail          Subjective:     Patient ID: Jc Carmona is a 48 y.o. male.    The patient presents today for follow-up of persistent lateral left ankle pain.  He notes no significant improvement since his last visit.  He has completed his course of physical therapy about 3 weeks ago  "and is continuing with his home exercises.  He notes approximately 10% improvement.  He still cannot stand or ambulate without significant pain after 30 to 60 minutes.      PAST MEDICAL HISTORY:  Past Medical History:   Diagnosis Date    Cancer (HCC)     colon    Diabetes mellitus (HCC)     GERD (gastroesophageal reflux disease)     Hyperlipidemia     Post concussion syndrome 05/09/2017    Pre-op examination     Sleep apnea     \"mild\"    Traumatic brain injury (HCC) 2017    R/S 2017       PAST SURGICAL HISTORY:  Past Surgical History:   Procedure Laterality Date    APPENDECTOMY      COLON SURGERY      OR RPR PRIMARY DISRUPTED LIGAMENT ANKLE COLLATERAL Left 3/10/2023    Procedure: ANKLE LIGAMENT REPAIR OF SYNDESMOTIC LIGAMENT with steroid injection of posterior heel bursa;  Surgeon: Steve Kaplan DPM;  Location:  MAIN OR;  Service: Podiatry        ALLERGIES:  Asa [aspirin] and Penicillin g    MEDICATIONS:  Current Outpatient Medications   Medication Sig Dispense Refill    atorvastatin (LIPITOR) 10 mg tablet Take 1 tablet (10 mg total) by mouth every evening 90 tablet 3    Blood Glucose Monitoring Suppl (OneTouch Verio Flex System) w/Device KIT       celecoxib (CeleBREX) 100 mg capsule Take 2 capsules (200 mg total) by mouth daily 28 capsule 0    dulaglutide (Trulicity) 3 MG/0.5ML injection Inject 0.5 mL (3 mg total) under the skin every 7 days 6 mL 0    Insulin Glargine Solostar (Lantus SoloStar) 100 UNIT/ML SOPN Inject 0.25 mL (25 Units total) under the skin daily at bedtime 30 mL 0    Insulin Pen Needle (BD Pen Needle Nancy U/F) 32G X 4 MM MISC Use twice daily 100 each 2    Lancets (OneTouch Delica Plus [id number]) MISC daily      metFORMIN (GLUCOPHAGE) 1000 MG tablet Take 1 tablet (1,000 mg total) by mouth 2 (two) times a day with meals 180 tablet 1    OneTouch Verio test strip daily      tadalafil (CIALIS) 10 MG tablet TAKE ONE TABLET BY MOUTH DAILY AS NEEDED FOR ERECTILE DYSFUNCTION 20 tablet 1    " cholestyramine sugar free (QUESTRAN LIGHT) 4 g packet Take 1 packet (4 g total) by mouth daily (Patient not taking: Reported on 12/8/2023) 30 packet 3    Continuous Blood Gluc Sensor (FreeStyle Dell 3 Sensor) MISC Use 1 Units every 14 (fourteen) days (Patient not taking: Reported on 1/16/2024) 2 each 5    ergocalciferol (VITAMIN D2) 50,000 units Take 1 capsule (50,000 Units total) by mouth once a week for 12 doses 12 capsule 0     No current facility-administered medications for this visit.       SOCIAL HISTORY:  Social History     Socioeconomic History    Marital status: Single     Spouse name: None    Number of children: None    Years of education: None    Highest education level: None   Occupational History    None   Tobacco Use    Smoking status: Never    Smokeless tobacco: Never   Vaping Use    Vaping status: Never Used   Substance and Sexual Activity    Alcohol use: Yes     Alcohol/week: 1.0 standard drink of alcohol     Types: 1 Cans of beer per week    Drug use: Never    Sexual activity: Yes     Partners: Female     Birth control/protection: None   Other Topics Concern    None   Social History Narrative    None     Social Determinants of Health     Financial Resource Strain: Not on file   Food Insecurity: Not on file   Transportation Needs: Not on file   Physical Activity: Not on file   Stress: Not on file   Social Connections: Not on file   Intimate Partner Violence: Not on file   Housing Stability: Not on file        Review of Systems   Constitutional: Negative.    HENT: Negative.     Eyes: Negative.    Respiratory: Negative.     Cardiovascular: Negative.    Endocrine: Negative.    Musculoskeletal: Negative.    Neurological: Negative.    Hematological: Negative.    Psychiatric/Behavioral: Negative.           Objective:     Physical Exam  Vitals reviewed.   Constitutional:       Appearance: Normal appearance.   HENT:      Head: Normocephalic and atraumatic.      Nose: Nose normal.   Eyes:       Conjunctiva/sclera: Conjunctivae normal.      Pupils: Pupils are equal, round, and reactive to light.   Cardiovascular:      Pulses:           Dorsalis pedis pulses are 2+ on the left side.        Posterior tibial pulses are 2+ on the left side.   Pulmonary:      Effort: Pulmonary effort is normal.   Feet:      Left foot:      Skin integrity: Skin integrity normal.      Comments: The patient's clinical examination today significant for persistent tenderness with palpation of the lateral left ankle.  Tenderness is also noted with end range of motion with dorsiflexion and eversion of the left ankle.  There is no erythema nor edema no calor or ecchymosis noted.  Mild tenderness is still noted along the peroneal tendons behind the distal aspect of the fibula.  He also notes tenderness along the medial aspect of the right great toe nail plate.  There are no signs of infection noted.  Skin:     General: Skin is warm.      Capillary Refill: Capillary refill takes less than 2 seconds.   Neurological:      General: No focal deficit present.      Mental Status: He is alert and oriented to person, place, and time.   Psychiatric:         Mood and Affect: Mood normal.         Behavior: Behavior normal.         Thought Content: Thought content normal.

## 2024-03-13 ENCOUNTER — APPOINTMENT (OUTPATIENT)
Dept: LAB | Facility: CLINIC | Age: 49
End: 2024-03-13
Payer: COMMERCIAL

## 2024-03-13 ENCOUNTER — OFFICE VISIT (OUTPATIENT)
Dept: ENDOCRINOLOGY | Facility: CLINIC | Age: 49
End: 2024-03-13
Payer: COMMERCIAL

## 2024-03-13 VITALS
WEIGHT: 206 LBS | BODY MASS INDEX: 34.32 KG/M2 | RESPIRATION RATE: 16 BRPM | DIASTOLIC BLOOD PRESSURE: 78 MMHG | TEMPERATURE: 97.8 F | OXYGEN SATURATION: 97 % | HEART RATE: 81 BPM | HEIGHT: 65 IN | SYSTOLIC BLOOD PRESSURE: 126 MMHG

## 2024-03-13 DIAGNOSIS — K76.0 FATTY LIVER: ICD-10-CM

## 2024-03-13 DIAGNOSIS — Z79.4 TYPE 2 DIABETES MELLITUS WITH HYPERGLYCEMIA, WITH LONG-TERM CURRENT USE OF INSULIN (HCC): ICD-10-CM

## 2024-03-13 DIAGNOSIS — E78.1 HYPERTRIGLYCERIDEMIA: ICD-10-CM

## 2024-03-13 DIAGNOSIS — E11.65 TYPE 2 DIABETES MELLITUS WITH HYPERGLYCEMIA, WITH LONG-TERM CURRENT USE OF INSULIN (HCC): Primary | ICD-10-CM

## 2024-03-13 DIAGNOSIS — Z79.4 TYPE 2 DIABETES MELLITUS WITH HYPERGLYCEMIA, WITH LONG-TERM CURRENT USE OF INSULIN (HCC): Primary | ICD-10-CM

## 2024-03-13 DIAGNOSIS — E66.09 CLASS 1 OBESITY DUE TO EXCESS CALORIES WITH SERIOUS COMORBIDITY AND BODY MASS INDEX (BMI) OF 34.0 TO 34.9 IN ADULT: ICD-10-CM

## 2024-03-13 DIAGNOSIS — E11.65 TYPE 2 DIABETES MELLITUS WITH HYPERGLYCEMIA, WITH LONG-TERM CURRENT USE OF INSULIN (HCC): ICD-10-CM

## 2024-03-13 LAB
EST. AVERAGE GLUCOSE BLD GHB EST-MCNC: 160 MG/DL
HBA1C MFR BLD: 7.2 %

## 2024-03-13 PROCEDURE — 99214 OFFICE O/P EST MOD 30 MIN: CPT | Performed by: INTERNAL MEDICINE

## 2024-03-13 PROCEDURE — 95251 CONT GLUC MNTR ANALYSIS I&R: CPT | Performed by: INTERNAL MEDICINE

## 2024-03-13 PROCEDURE — 83036 HEMOGLOBIN GLYCOSYLATED A1C: CPT

## 2024-03-13 PROCEDURE — 36415 COLL VENOUS BLD VENIPUNCTURE: CPT

## 2024-03-13 RX ORDER — INSULIN GLARGINE 100 [IU]/ML
15 INJECTION, SOLUTION SUBCUTANEOUS
Qty: 24 ML | Refills: 0 | Status: SHIPPED | OUTPATIENT
Start: 2024-03-13

## 2024-03-13 RX ORDER — DULAGLUTIDE 4.5 MG/.5ML
4.5 INJECTION, SOLUTION SUBCUTANEOUS
Qty: 6 ML | Refills: 1 | Status: SHIPPED | OUTPATIENT
Start: 2024-03-13

## 2024-03-13 NOTE — PROGRESS NOTES
"  Follow-up Patient Progress Note    CC: Diabetets     History of Present Illness:   47yr male with type 2 diabetes since 2018, obesity BMI 35, HTN, HLD, PETTY, left lumbar radiculopathy, ED, NAFLD, chronic diarrhea, Hx colon cancer age 30, chronic fatigue and vitamin D deficiency. Last visit was 2/22/23.     Since last visit, weight is down 5 lbs. He is using all meds. Waiting for lt ankle procedure to remove hardware.     No symptoms. Some dietary indiscretion during holiday.     CGM data review::  Device: willow    Dates:  2/29/24-3/13/24         Usage: 96 %    Av glu: 160 mg/dL                   SD:  mg/dL            CV:   %            GMI:7.1  %  TIR: 74 %                    TAR: 24+2 %               TBR: 0 %     Glycemic patters:  significant post prandial hyperglycemia.  Hypoglycemia: No     Current meds:  Metformin 1000mg PO  BID  Lantus 25u QHS  Trulicity 3mg weekly     Opthamology: yes, last exam 2021  Podiatry: No  vaccination: Yes  Dental:  Pancreatitis: No     Ace/ARB: No  Statin: lipitor 10mg  Thyroid issues: No     FH: Diabetes - mother and father; father - CAD    Physical Exam:  Body mass index is 34.28 kg/m².  /78 (BP Location: Left arm, Patient Position: Sitting)   Pulse 81   Temp 97.8 °F (36.6 °C) (Tympanic)   Resp 16   Ht 5' 5\" (1.651 m)   Wt 93.4 kg (206 lb)   SpO2 97%   BMI 34.28 kg/m²    Vitals:    03/13/24 1307   Weight: 93.4 kg (206 lb)        Physical Exam  Constitutional:       General: He is not in acute distress.     Appearance: He is well-developed.   HENT:      Head: Normocephalic and atraumatic.      Nose: Nose normal.   Eyes:      Conjunctiva/sclera: Conjunctivae normal.   Pulmonary:      Effort: Pulmonary effort is normal.   Abdominal:      General: There is no distension.   Musculoskeletal:      Cervical back: Normal range of motion and neck supple.   Skin:     Findings: No rash.      Comments: No icterus   Neurological:      Mental Status: He is alert and oriented to " person, place, and time.         Labs:   Lab Results   Component Value Date    HGBA1C 8.7 (H) 12/14/2023       Lab Results   Component Value Date    CKS5PWPTGHAE 1.470 02/13/2023       Lab Results   Component Value Date    CREATININE 0.89 05/29/2023    CREATININE 0.97 03/09/2023    CREATININE 0.84 02/13/2023    BUN 13 05/29/2023     12/13/2014    K 3.5 05/29/2023     05/29/2023    CO2 28 05/29/2023     eGFR   Date Value Ref Range Status   05/29/2023 101 ml/min/1.73sq m Final       Lab Results   Component Value Date    ALT 56 (H) 10/23/2023    AST 25 10/23/2023    ALKPHOS 64 10/23/2023       Lab Results   Component Value Date    CHOLESTEROL 165 12/14/2023    CHOLESTEROL 202 (H) 02/13/2023    CHOLESTEROL 185 09/07/2021     Lab Results   Component Value Date    HDL 33 (L) 12/14/2023    HDL 35 (L) 02/13/2023    HDL 36 (L) 09/07/2021     Lab Results   Component Value Date    TRIG 422 (H) 12/14/2023    TRIG 454 (H) 02/13/2023    TRIG 455 (H) 09/07/2021     Lab Results   Component Value Date    NONHDLC 132 12/14/2023    NONHDLC 167 02/13/2023         Assessment/Plan:    Problem List Items Addressed This Visit          Digestive    Fatty liver       Endocrine    Type 2 diabetes mellitus with hyperglycemia, with long-term current use of insulin (HCC) - Primary     He is improved with GMI 7.1% based on last 2 weeks AGP.    We agreed to increase Trulicity 4.5mg weekly, lantus 15u qhs and metformin 1000mg po BID.    He is medially acceptable risk to proceed to OR for removal of lt ankle hardware based on cgm data. A1c is pending but <8% should be OK.    Follow up in 6 months.      Lab Results   Component Value Date    HGBA1C 8.7 (H) 12/14/2023            Relevant Medications    dulaglutide (Trulicity) 4.5 MG/0.5ML injection    Insulin Glargine Solostar (Lantus SoloStar) 100 UNIT/ML SOPN    metFORMIN (GLUCOPHAGE) 1000 MG tablet       Other    Hypertriglyceridemia     ASCVD risk is 6%. Continue Lipitor 10mg qdaily.          Class 1 obesity due to excess calories with serious comorbidity and body mass index (BMI) of 34.0 to 34.9 in adult     Discussed weight loss options.  Goal weight is 180 lbs at 6 months.              I have spent a total time of 32 minutes on 03/13/24 in caring for this patient including greater than 50% of this time was spent in counseling/coordination of care as listed above.       Discussed with the patient and all questioned fully answered. He will contact me with concerns.    Marisol Nicholas

## 2024-03-13 NOTE — ASSESSMENT & PLAN NOTE
He is improved with GMI 7.1% based on last 2 weeks AGP.    We agreed to increase Trulicity 4.5mg weekly, lantus 15u qhs and metformin 1000mg po BID.    He is medially acceptable risk to proceed to OR for removal of lt ankle hardware based on cgm data. A1c is pending but <8% should be OK.    Follow up in 6 months.      Lab Results   Component Value Date    HGBA1C 8.7 (H) 12/14/2023

## 2024-03-14 ENCOUNTER — TELEPHONE (OUTPATIENT)
Dept: OBGYN CLINIC | Facility: CLINIC | Age: 49
End: 2024-03-14

## 2024-03-14 RX ORDER — CHLORHEXIDINE GLUCONATE ORAL RINSE 1.2 MG/ML
15 SOLUTION DENTAL ONCE
OUTPATIENT
Start: 2024-03-14 | End: 2024-03-14

## 2024-03-21 NOTE — TELEPHONE ENCOUNTER
Pt came to office to pickup paperwork  Stated that update needs to be done  Dr Kaplan did update to include that he is out of work and will continue out of work aprox 4-6 wks after upcoming sx  Pt was ok w/updated note and it is scanned to chart

## 2024-03-22 ENCOUNTER — TELEPHONE (OUTPATIENT)
Age: 49
End: 2024-03-22

## 2024-03-22 NOTE — TELEPHONE ENCOUNTER
Caller: Jc Carmona    Doctor: Eusebio    Reason for call: Jc has an infected great toe nail / right foot. Is there any way we can get him in as a force on?  Thank you.    Call back#: [phone]

## 2024-04-01 ENCOUNTER — TELEPHONE (OUTPATIENT)
Age: 49
End: 2024-04-01

## 2024-04-01 NOTE — TELEPHONE ENCOUNTER
Caller: Jc     Doctor: Eusebio     Reason for call: Patient has a infected right Great toe there are no appts until May.  Please advise thank you     Call back#: [phone]

## 2024-04-04 ENCOUNTER — OFFICE VISIT (OUTPATIENT)
Dept: PODIATRY | Facility: CLINIC | Age: 49
End: 2024-04-04
Payer: COMMERCIAL

## 2024-04-04 VITALS
BODY MASS INDEX: 33.66 KG/M2 | WEIGHT: 202 LBS | DIASTOLIC BLOOD PRESSURE: 80 MMHG | HEART RATE: 88 BPM | SYSTOLIC BLOOD PRESSURE: 128 MMHG | HEIGHT: 65 IN | OXYGEN SATURATION: 100 %

## 2024-04-04 DIAGNOSIS — L60.0 INGROWN RIGHT GREATER TOENAIL: Primary | ICD-10-CM

## 2024-04-04 DIAGNOSIS — M79.674 GREAT TOE PAIN, RIGHT: ICD-10-CM

## 2024-04-04 PROCEDURE — 11730 AVULSION NAIL PLATE SIMPLE 1: CPT | Performed by: PODIATRIST

## 2024-04-04 PROCEDURE — 99213 OFFICE O/P EST LOW 20 MIN: CPT | Performed by: PODIATRIST

## 2024-04-04 NOTE — PROGRESS NOTES
"Assessment/Plan:     The patient's clinical examination today significant for persistent tender ingrown nail to the medial border of the right great toenail.  There is no erythema nor edema no active drainage nor purulence noted today.    Treatment options were discussed with the patient.  He would like to proceed with a partial nail avulsion today.  A right hallux block was performed with 3 mL of 0.25% bupivacaine plain.  Once anesthesia was achieved, a partial nail avulsion was performed to the medial nail border without complication.  The procedure was well-tolerated.  Local wound care instructions were discussed with the patient.  There is no clinical need for any oral antibiosis.    Commend follow-up in 2 to 3 weeks only if symptoms fail to fully resolve by that time.  The patient is currently scheduled for removal of hardware from the left ankle later this month.     Diagnoses and all orders for this visit:    Ingrown right greater toenail    Great toe pain, right    Other orders  -     Nail removal          Subjective:     Patient ID: Jc Carmona is a 48 y.o. male.    She presents today for follow-up of persistent tenderness to the medial aspect of his right great toenail.  He was in the office previously for the same issue.  A slant back procedure was performed which provided only temporary relief.  He still notes persistent left lateral ankle pain and is looking forward to his upcoming ankle surgery for hardware removal.      PAST MEDICAL HISTORY:  Past Medical History:   Diagnosis Date    Cancer (HCC)     colon    Diabetes mellitus (HCC)     GERD (gastroesophageal reflux disease)     Hyperlipidemia     Post concussion syndrome 05/09/2017    Pre-op examination     Sleep apnea     \"mild\"    Traumatic brain injury (HCC) 2017    R/S 2017       PAST SURGICAL HISTORY:  Past Surgical History:   Procedure Laterality Date    APPENDECTOMY      COLON SURGERY      PA RPR PRIMARY DISRUPTED LIGAMENT ANKLE " COLLATERAL Left 3/10/2023    Procedure: ANKLE LIGAMENT REPAIR OF SYNDESMOTIC LIGAMENT with steroid injection of posterior heel bursa;  Surgeon: Steve Kaplan DPM;  Location:  MAIN OR;  Service: Podiatry        ALLERGIES:  Asa [aspirin] and Penicillin g    MEDICATIONS:  Current Outpatient Medications   Medication Sig Dispense Refill    Continuous Blood Gluc Sensor (FreeStyle Dell 3 Sensor) MISC Use 1 Units every 14 (fourteen) days 2 each 5    dulaglutide (Trulicity) 4.5 MG/0.5ML injection Inject 0.5 mL (4.5 mg total) under the skin every 7 days 6 mL 1    Insulin Glargine Solostar (Lantus SoloStar) 100 UNIT/ML SOPN Inject 0.15 mL (15 Units total) under the skin daily at bedtime 24 mL 0    Insulin Pen Needle (BD Pen Needle Nancy U/F) 32G X 4 MM MISC Use twice daily 100 each 2    Lancets (OneTouch Delica Plus [id number]) MISC daily      metFORMIN (GLUCOPHAGE) 1000 MG tablet Take 1 tablet (1,000 mg total) by mouth 2 (two) times a day with meals 180 tablet 1    OneTouch Verio test strip daily      atorvastatin (LIPITOR) 10 mg tablet Take 1 tablet (10 mg total) by mouth every evening (Patient not taking: Reported on 3/13/2024) 90 tablet 3    Blood Glucose Monitoring Suppl (OneTouch Verio Flex System) w/Device KIT  (Patient not taking: Reported on 3/13/2024)      celecoxib (CeleBREX) 100 mg capsule Take 2 capsules (200 mg total) by mouth daily (Patient not taking: Reported on 3/13/2024) 28 capsule 0    cholestyramine sugar free (QUESTRAN LIGHT) 4 g packet Take 1 packet (4 g total) by mouth daily (Patient not taking: Reported on 3/13/2024) 30 packet 3    ergocalciferol (VITAMIN D2) 50,000 units Take 1 capsule (50,000 Units total) by mouth once a week for 12 doses 12 capsule 0    tadalafil (CIALIS) 10 MG tablet TAKE ONE TABLET BY MOUTH DAILY AS NEEDED FOR ERECTILE DYSFUNCTION (Patient not taking: Reported on 3/13/2024) 20 tablet 1     No current facility-administered medications for this visit.       SOCIAL  HISTORY:  Social History     Socioeconomic History    Marital status: Single     Spouse name: None    Number of children: None    Years of education: None    Highest education level: None   Occupational History    None   Tobacco Use    Smoking status: Never    Smokeless tobacco: Never   Vaping Use    Vaping status: Never Used   Substance and Sexual Activity    Alcohol use: Yes     Alcohol/week: 1.0 standard drink of alcohol     Types: 1 Cans of beer per week    Drug use: Never    Sexual activity: Yes     Partners: Female     Birth control/protection: None   Other Topics Concern    None   Social History Narrative    None     Social Determinants of Health     Financial Resource Strain: Not on file   Food Insecurity: Not on file   Transportation Needs: Not on file   Physical Activity: Not on file   Stress: Not on file   Social Connections: Not on file   Intimate Partner Violence: Not on file   Housing Stability: Not on file        Review of Systems   Constitutional: Negative.    HENT: Negative.     Eyes: Negative.    Respiratory: Negative.     Cardiovascular: Negative.    Endocrine: Negative.    Musculoskeletal: Negative.    Neurological: Negative.    Hematological: Negative.    Psychiatric/Behavioral: Negative.           Objective:     Physical Exam  Vitals reviewed.   Constitutional:       Appearance: Normal appearance.   HENT:      Head: Normocephalic and atraumatic.      Nose: Nose normal.   Eyes:      Conjunctiva/sclera: Conjunctivae normal.      Pupils: Pupils are equal, round, and reactive to light.   Cardiovascular:      Pulses:           Dorsalis pedis pulses are 2+ on the right side.   Pulmonary:      Effort: Pulmonary effort is normal.   Musculoskeletal:        Feet:    Feet:      Right foot:      Toenail Condition: Right toenails are ingrown.      Comments: The patient's clinical examination today significant for persistent tender ingrown nail to the medial border of the right great toenail.  There is no  "erythema nor edema no active drainage nor purulence noted today.  Skin:     General: Skin is warm.      Capillary Refill: Capillary refill takes less than 2 seconds.   Neurological:      General: No focal deficit present.      Mental Status: He is alert and oriented to person, place, and time.   Psychiatric:         Mood and Affect: Mood normal.         Behavior: Behavior normal.         Thought Content: Thought content normal.         Nail removal    Date/Time: 4/4/2024 10:30 AM    Performed by: Steve Kaplan DPM  Authorized by: Steve Kaplan DPM    Patient location:  Clinic  Indications / Diagnosis:  Right ingrown toenail great toe  Universal Protocol:  Consent: Verbal consent obtained.  Risks and benefits: risks, benefits and alternatives were discussed  Consent given by: patient  Time out: Immediately prior to procedure a \"time out\" was called to verify the correct patient, procedure, equipment, support staff and site/side marked as required.  Timeout called at: 4/4/2024 10:30 AM.  Patient understanding: patient states understanding of the procedure being performed  Patient consent: the patient's understanding of the procedure matches consent given  Patient identity confirmed: verbally with patient and provided demographic data    Location:     Foot:  R big toe  Pre-procedure details:     Skin preparation:  Alcohol  Anesthesia (see MAR for exact dosages):     Anesthesia method:  Nerve block    Block location:  Right hallux    Block needle gauge:  27 G    Block anesthetic:  Bupivacaine 0.25% w/o epi    Block technique:  Right hallux block    Block injection procedure:  Introduced needle and negative aspiration for blood    Block outcome:  Anesthesia achieved  Nail Removal:     Nail removed:  Partial    Nail side:  Medial    Nail bed sutured: no    Ingrown nail:     Wedge excision of skin: no      Nail matrix removed or ablated:  None  Post-procedure details:     Dressing:  4x4 sterile gauze, antibiotic " ointment and gauze roll    Patient tolerance of procedure:  Tolerated well, no immediate complications

## 2024-04-10 ENCOUNTER — TELEPHONE (OUTPATIENT)
Dept: PODIATRY | Facility: CLINIC | Age: 49
End: 2024-04-10

## 2024-04-10 NOTE — TELEPHONE ENCOUNTER
Moved 5/6/24 from morning to afternoon. Appt is for 3:30 P.M.     If that is a problem, let us know and we will see what we can do.

## 2024-04-17 ENCOUNTER — TELEPHONE (OUTPATIENT)
Dept: OBGYN CLINIC | Facility: CLINIC | Age: 49
End: 2024-04-17

## 2024-04-17 NOTE — PRE-PROCEDURE INSTRUCTIONS
Pre-Surgery Instructions:   Medication Instructions    Continuous Blood Gluc Sensor (FreeStyle Dell 3 Sensor) MISC Inst to bring supplies w/ him, due to be changed 4/25 & to contact company for extra supplies as needed    dulaglutide (Trulicity) 4.5 MG/0.5ML injection Stop taking 7 days prior to surgery.    Insulin Glargine Solostar (Lantus SoloStar) 100 UNIT/ML SOPN Take night before surgery    metFORMIN (GLUCOPHAGE) 1000 MG tablet Hold day of surgery.    Medication instructions for day surgery reviewed. Please use only a sip of water to take your instructed medications. Avoid aspirin and all over the counter vitamins, supplements and NSAIDS for one week prior to surgery per anesthesia guidelines. Tylenol is ok to take as needed.     You will receive a call one business day prior to surgery with an arrival time and hospital directions. If your surgery is scheduled on a Monday, the hospital will be calling you on the Friday prior to your surgery. If you have not heard from anyone by 8pm, please call the hospital supervisor through the hospital  at [phone]. ([place] [phone] or [place] [phone]).    Do not eat or drink anything after midnight the night before your surgery, including candy, mints, lifesavers, or chewing gum. Do not drink alcohol 24hrs before your surgery. Try not to smoke at least 24hrs before your surgery.       Follow the pre surgery showering instructions as listed in the “My Surgical Experience Booklet” or otherwise provided by your surgeon's office. Do not use a blade to shave the surgical area 1 week before surgery. It is okay to use a clean electric clippers up to 24 hours before surgery. Do not apply any lotions, creams, including makeup, cologne, deodorant, or perfumes after showering on the day of your surgery. Do not use dry shampoo, hair spray, hair gel, or any type of hair products.     No contact lenses, eye make-up, or artificial eyelashes. Remove nail polish,  including gel polish, and any artificial, gel, or acrylic nails if possible. Remove all jewelry including rings and body piercing jewelry.     Wear causal clothing that is easy to take on and off. Consider your type of surgery.    Keep any valuables, jewelry, piercings at home. Please bring any specially ordered equipment (sling, braces) if indicated.    Arrange for a responsible person to drive you to and from the hospital on the day of your surgery. Please confirm the visitor policy for the day of your procedure when you receive your phone call with an arrival time.     Call the surgeon's office with any new illnesses, exposures, or additional questions prior to surgery.    Please reference your “My Surgical Experience Booklet” for additional information to prepare for your upcoming surgery.

## 2024-04-18 ENCOUNTER — APPOINTMENT (OUTPATIENT)
Dept: LAB | Facility: CLINIC | Age: 49
End: 2024-04-18
Payer: COMMERCIAL

## 2024-04-18 DIAGNOSIS — T84.84XA PAINFUL ORTHOPAEDIC HARDWARE (HCC): ICD-10-CM

## 2024-04-18 DIAGNOSIS — R26.89 FUNCTIONAL GAIT ABNORMALITY: ICD-10-CM

## 2024-04-18 LAB
ALBUMIN SERPL BCP-MCNC: 4.2 G/DL (ref 3.5–5)
ALP SERPL-CCNC: 51 U/L (ref 34–104)
ALT SERPL W P-5'-P-CCNC: 51 U/L (ref 7–52)
ANION GAP SERPL CALCULATED.3IONS-SCNC: 8 MMOL/L (ref 4–13)
AST SERPL W P-5'-P-CCNC: 24 U/L (ref 13–39)
BASOPHILS # BLD AUTO: 0.02 THOUSANDS/ÂΜL (ref 0–0.1)
BASOPHILS NFR BLD AUTO: 0 % (ref 0–1)
BILIRUB SERPL-MCNC: 0.41 MG/DL (ref 0.2–1)
BUN SERPL-MCNC: 12 MG/DL (ref 5–25)
CALCIUM SERPL-MCNC: 9 MG/DL (ref 8.4–10.2)
CHLORIDE SERPL-SCNC: 103 MMOL/L (ref 96–108)
CO2 SERPL-SCNC: 27 MMOL/L (ref 21–32)
CREAT SERPL-MCNC: 0.81 MG/DL (ref 0.6–1.3)
EOSINOPHIL # BLD AUTO: 0.13 THOUSAND/ÂΜL (ref 0–0.61)
EOSINOPHIL NFR BLD AUTO: 2 % (ref 0–6)
ERYTHROCYTE [DISTWIDTH] IN BLOOD BY AUTOMATED COUNT: 12.8 % (ref 11.6–15.1)
GFR SERPL CREATININE-BSD FRML MDRD: 105 ML/MIN/1.73SQ M
GLUCOSE P FAST SERPL-MCNC: 129 MG/DL (ref 65–99)
HCT VFR BLD AUTO: 45.1 % (ref 36.5–49.3)
HGB BLD-MCNC: 15.2 G/DL (ref 12–17)
IMM GRANULOCYTES # BLD AUTO: 0.03 THOUSAND/UL (ref 0–0.2)
IMM GRANULOCYTES NFR BLD AUTO: 1 % (ref 0–2)
LYMPHOCYTES # BLD AUTO: 1.56 THOUSANDS/ÂΜL (ref 0.6–4.47)
LYMPHOCYTES NFR BLD AUTO: 25 % (ref 14–44)
MCH RBC QN AUTO: 29.1 PG (ref 26.8–34.3)
MCHC RBC AUTO-ENTMCNC: 33.7 G/DL (ref 31.4–37.4)
MCV RBC AUTO: 86 FL (ref 82–98)
MONOCYTES # BLD AUTO: 0.46 THOUSAND/ÂΜL (ref 0.17–1.22)
MONOCYTES NFR BLD AUTO: 7 % (ref 4–12)
NEUTROPHILS # BLD AUTO: 4.02 THOUSANDS/ÂΜL (ref 1.85–7.62)
NEUTS SEG NFR BLD AUTO: 65 % (ref 43–75)
NRBC BLD AUTO-RTO: 0 /100 WBCS
PLATELET # BLD AUTO: 232 THOUSANDS/UL (ref 149–390)
PMV BLD AUTO: 8.9 FL (ref 8.9–12.7)
POTASSIUM SERPL-SCNC: 3.8 MMOL/L (ref 3.5–5.3)
PROT SERPL-MCNC: 7 G/DL (ref 6.4–8.4)
RBC # BLD AUTO: 5.22 MILLION/UL (ref 3.88–5.62)
SODIUM SERPL-SCNC: 138 MMOL/L (ref 135–147)
WBC # BLD AUTO: 6.22 THOUSAND/UL (ref 4.31–10.16)

## 2024-04-18 PROCEDURE — 85025 COMPLETE CBC W/AUTO DIFF WBC: CPT

## 2024-04-18 PROCEDURE — 36415 COLL VENOUS BLD VENIPUNCTURE: CPT

## 2024-04-18 PROCEDURE — 80053 COMPREHEN METABOLIC PANEL: CPT

## 2024-04-19 ENCOUNTER — CONSULT (OUTPATIENT)
Dept: FAMILY MEDICINE CLINIC | Facility: CLINIC | Age: 49
End: 2024-04-19
Payer: COMMERCIAL

## 2024-04-19 VITALS
HEART RATE: 89 BPM | WEIGHT: 208 LBS | SYSTOLIC BLOOD PRESSURE: 120 MMHG | BODY MASS INDEX: 34.66 KG/M2 | TEMPERATURE: 97.8 F | DIASTOLIC BLOOD PRESSURE: 70 MMHG | RESPIRATION RATE: 16 BRPM | OXYGEN SATURATION: 96 % | HEIGHT: 65 IN

## 2024-04-19 DIAGNOSIS — E11.65 TYPE 2 DIABETES MELLITUS WITH HYPERGLYCEMIA, WITH LONG-TERM CURRENT USE OF INSULIN (HCC): ICD-10-CM

## 2024-04-19 DIAGNOSIS — Z79.4 TYPE 2 DIABETES MELLITUS WITH HYPERGLYCEMIA, WITH LONG-TERM CURRENT USE OF INSULIN (HCC): ICD-10-CM

## 2024-04-19 DIAGNOSIS — Z01.818 PREOPERATIVE CLEARANCE: Primary | ICD-10-CM

## 2024-04-19 PROCEDURE — 99214 OFFICE O/P EST MOD 30 MIN: CPT | Performed by: STUDENT IN AN ORGANIZED HEALTH CARE EDUCATION/TRAINING PROGRAM

## 2024-04-19 NOTE — H&P (VIEW-ONLY)
[facility] PRE-OPERATIVE EVALUATION  [facility]    NAME: Jc Carmona  AGE: 48 y.o. SEX: male  : 1975     DATE: 2024    [facility] Pre-Operative Evaluation      Chief Complaint: Pre-operative Evaluation     Surgery: Hardware removal, ankle  Anticipated Date of Surgery: 24  Referring Provider: No ref. provider found       History of Present Illness:     Jc Carmona is a 48 y.o. male who presents to the office today for a preoperative consultation at the request of surgeon, Dr. Kaplan, who plans on performing hardware removal on . Planned anesthesia is general. Patient has a bleeding risk of: no recent abnormal bleeding. Patient does not have objections to receiving blood products if needed. Current anti-platelet/anti-coagulation medications that the patient is prescribed includes:  none .        Assessment of Cardiac Risk:  Denies unstable or severe angina or MI in the last 6 weeks or history of stent placement in the last year   Denies decompensated heart failure (e.g. New onset heart failure, NYHA functional class IV heart failure, or worsening existing heart failure)  Denies significant arrhythmias such as high grade AV block, symptomatic ventricular arrhythmia, newly recognized ventricular tachycardia, supraventricular tachycardia with resting heart rate >100, or symptomatic bradycardia  Denies severe heart valve disease including aortic stenosis or symptomatic mitral stenosis     Exercise Capacity:  Able to walk 4 blocks without symptoms?: Yes  Able to walk 2 flights without symptoms?: Yes    Prior Anesthesia Reactions: No     Personal history of venous thromboembolic disease? No    History of steroid use for >2 weeks within last year? No         Review of Systems:     Review of Systems   Constitutional:  Negative for chills and fever.   HENT:  Negative for congestion, rhinorrhea and sinus pressure.    Respiratory:  Negative  "for chest tightness, shortness of breath and wheezing.    Cardiovascular:  Negative for chest pain and palpitations.   Gastrointestinal:  Negative for abdominal pain, nausea and vomiting.   Endocrine: Negative for polyuria.   Genitourinary:  Negative for difficulty urinating, dysuria, frequency and urgency.   Musculoskeletal:  Negative for myalgias.   Neurological:  Negative for dizziness, syncope and light-headedness.   Psychiatric/Behavioral:  Negative for dysphoric mood.        Current Problem List:     Patient Active Problem List   Diagnosis    Chronic diarrhea    De Quervain's tenosynovitis    Fatty liver    Hypertriglyceridemia    Impingement syndrome of left shoulder    Left lumbar radiculopathy    Male erectile dysfunction    Plantar warts    Paresthesias    Excessive daytime sleepiness    PETTY (obstructive sleep apnea)    Type 2 diabetes mellitus with hyperglycemia, with long-term current use of insulin ([facility])    Class 1 obesity due to excess calories with serious comorbidity and body mass index (BMI) of 34.0 to 34.9 in adult    Arthritis of right knee    Chronic pain of right knee    Abdominal wall hernia    Syndesmotic disruption of ankle, left, subsequent encounter    Calculus of gallbladder without cholecystitis without obstruction    Abdominal pain    Sacroiliitis (HCC)    Ankle syndesmosis disruption, left, subsequent encounter       Allergies:     Allergies   Allergen Reactions    Asa [Aspirin] GI Intolerance    Penicillin G Other (See Comments)     Ancef ok     \"unsure; was a baby\"       Current Medications:       Current Outpatient Medications:     Continuous Blood Gluc Sensor (FreeStyle Dell 3 Sensor) MISC, Use 1 Units every 14 (fourteen) days, Disp: 2 each, Rfl: 5    dulaglutide (Trulicity) 4.5 MG/0.5ML injection, Inject 0.5 mL (4.5 mg total) under the skin every 7 days, Disp: 6 mL, Rfl: 1    Insulin Glargine Solostar (Lantus SoloStar) 100 UNIT/ML SOPN, Inject 0.15 mL (15 Units total) under the " "skin daily at bedtime, Disp: 24 mL, Rfl: 0    Insulin Pen Needle (BD Pen Needle Nancy U/F) 32G X 4 MM MISC, Use twice daily, Disp: 100 each, Rfl: 2    Lancets (OneTouch Delica Plus [id number]) MISC, daily, Disp: , Rfl:     metFORMIN (GLUCOPHAGE) 1000 MG tablet, Take 1 tablet (1,000 mg total) by mouth 2 (two) times a day with meals, Disp: 180 tablet, Rfl: 1    OneTouch Verio test strip, daily, Disp: , Rfl:     atorvastatin (LIPITOR) 10 mg tablet, Take 1 tablet (10 mg total) by mouth every evening (Patient not taking: Reported on 3/13/2024), Disp: 90 tablet, Rfl: 3    Blood Glucose Monitoring Suppl (OneTouch Verio Flex System) w/Device KIT, , Disp: , Rfl:     tadalafil (CIALIS) 10 MG tablet, TAKE ONE TABLET BY MOUTH DAILY AS NEEDED FOR ERECTILE DYSFUNCTION (Patient not taking: Reported on 3/13/2024), Disp: 20 tablet, Rfl: 1    Past Medical History:       Past Medical History:   Diagnosis Date    Cancer (HCC)     colon    Diabetes mellitus (HCC)     GERD (gastroesophageal reflux disease)     Hyperlipidemia     Post concussion syndrome 05/09/2017    Pre-op examination     Sleep apnea     \"mild\"    Traumatic brain injury (HCC) 2017    R/S 2017        Past Surgical History:   Procedure Laterality Date    APPENDECTOMY      COLON SURGERY      COLONOSCOPY      EGD      [facility] R[facility] PRIMARY DISRUPTED LIGAMENT ANKLE COLLATERAL Left 03/10/2023    Procedure: ANKLE LIGAMENT REPAIR OF SYNDESMOTIC LIGAMENT with steroid injection of posterior heel bursa;  Surgeon: Steve Kaplan DPM;  Location:  MAIN OR;  Service: Podiatry        Family History   Problem Relation Age of Onset    Coronary artery disease Mother     Diabetes Mother     Hypertension Mother     Hyperlipidemia Mother     Coronary artery disease Father     Diabetes Father     Hypertension Father     Hyperlipidemia Father     Heart attack Father     Anemia Brother     No Known Problems Son     No Known Problems Son     No Known Problems Son         Social History " "    Socioeconomic History    Marital status: Single     Spouse name: Not on file    Number of children: Not on file    Years of education: Not on file    Highest education level: Not on file   Occupational History    Not on file   Tobacco Use    Smoking status: Never    Smokeless tobacco: Never   Vaping Use    Vaping status: Never Used   Substance and Sexual Activity    Alcohol use: Yes     Alcohol/week: 1.0 standard drink of alcohol     Types: 1 Cans of beer per week    Drug use: Never    Sexual activity: Yes     Partners: Female     Birth control/protection: None   Other Topics Concern    Not on file   Social History Narrative    Not on file     Social Determinants of Health     Financial Resource Strain: Not on file   Food Insecurity: Not on file   Transportation Needs: Not on file   Physical Activity: Not on file   Stress: Not on file   Social Connections: Not on file   Intimate Partner Violence: Not on file   Housing Stability: Not on file        Physical Exam:     /70 (BP Location: Left arm, Patient Position: Sitting, Cuff Size: Large)   Pulse 89   Temp 97.8 °F (36.6 °C)   Resp 16   Ht 5' 5\" (1.651 m)   Wt 94.3 kg (208 lb)   SpO2 96%   BMI 34.61 kg/m²     Physical Exam  Constitutional:       Appearance: Normal appearance.   HENT:      Head: Normocephalic and atraumatic.   Eyes:      Conjunctiva/sclera: Conjunctivae normal.   Cardiovascular:      Rate and Rhythm: Normal rate and regular rhythm.      Heart sounds: Normal heart sounds.   Pulmonary:      Effort: Pulmonary effort is normal.      Breath sounds: Normal breath sounds.   Abdominal:      General: There is no distension.   Musculoskeletal:         General: Normal range of motion.      Cervical back: Normal range of motion and neck supple.   Neurological:      Mental Status: He is alert and oriented to person, place, and time.   Psychiatric:         Behavior: Behavior normal.         Thought Content: Thought content normal.          Data: "     Pre-operative work-up    Laboratory Results: I have personally reviewed the pertinent laboratory results/reports      EKG:  N/A    Chest x-ray:  N/A      Previous cardiopulmonary studies within the past year:  Echocardiogram: N/A  Cardiac Catheterization: N/A  Stress Test: N/A  Pulmonary Function Testing: N/A      Assessment & Recommendations:     1. Preoperative clearance        2. Type 2 diabetes mellitus with hyperglycemia, with long-term current use of insulin (HCC)          Type 2 diabetes mellitus with hyperglycemia, with long-term current use of insulin (HCC)    Lab Results   Component Value Date    HGBA1C 7.2 (H) 03/13/2024     A1C down-trending  Following with Endo  Continue current regimen    Pre-Op Evaluation Assessment  48 y.o. male with planned surgery: Hardware removal, ankle.    Known risk factors for perioperative complications: None.        Current medications which may produce withdrawal symptoms if withheld perioperatively: none.    Pre-Op Evaluation Plan  1. Further preoperative workup as follows:   - None; no further preoperative work-up is required    2. Medication Management/Recommendations: hold lantus night before surgery      3. Prophylaxis for cardiac events with perioperative beta-blockers: not indicated.    4. Patient requires further consultation with: None    Clearance  Patient is CLEARED for surgery without any additional cardiac testing.    Patient is low risk for a low risk procedure.   Patient is medically stable for planned procedure as detailed above.     Verona Osborn MD  [address] [address]  Phone#  [phone]  Fax#  [phone]

## 2024-04-19 NOTE — ASSESSMENT & PLAN NOTE
Lab Results   Component Value Date    HGBA1C 7.2 (H) 03/13/2024     A1C down-trending  Following with Endo  Continue current regimen

## 2024-04-19 NOTE — PROGRESS NOTES
[facility] PRE-OPERATIVE EVALUATION  [facility]    NAME: Jc Carmona  AGE: 48 y.o. SEX: male  : 1975     DATE: 2024    [facility] Pre-Operative Evaluation      Chief Complaint: Pre-operative Evaluation     Surgery: Hardware removal, ankle  Anticipated Date of Surgery: 24  Referring Provider: No ref. provider found       History of Present Illness:     Jc Carmona is a 48 y.o. male who presents to the office today for a preoperative consultation at the request of surgeon, Dr. Kaplan, who plans on performing hardware removal on . Planned anesthesia is general. Patient has a bleeding risk of: no recent abnormal bleeding. Patient does not have objections to receiving blood products if needed. Current anti-platelet/anti-coagulation medications that the patient is prescribed includes:  none .        Assessment of Cardiac Risk:  Denies unstable or severe angina or MI in the last 6 weeks or history of stent placement in the last year   Denies decompensated heart failure (e.g. New onset heart failure, NYHA functional class IV heart failure, or worsening existing heart failure)  Denies significant arrhythmias such as high grade AV block, symptomatic ventricular arrhythmia, newly recognized ventricular tachycardia, supraventricular tachycardia with resting heart rate >100, or symptomatic bradycardia  Denies severe heart valve disease including aortic stenosis or symptomatic mitral stenosis     Exercise Capacity:  Able to walk 4 blocks without symptoms?: Yes  Able to walk 2 flights without symptoms?: Yes    Prior Anesthesia Reactions: No     Personal history of venous thromboembolic disease? No    History of steroid use for >2 weeks within last year? No         Review of Systems:     Review of Systems   Constitutional:  Negative for chills and fever.   HENT:  Negative for congestion, rhinorrhea and sinus pressure.    Respiratory:  Negative  "for chest tightness, shortness of breath and wheezing.    Cardiovascular:  Negative for chest pain and palpitations.   Gastrointestinal:  Negative for abdominal pain, nausea and vomiting.   Endocrine: Negative for polyuria.   Genitourinary:  Negative for difficulty urinating, dysuria, frequency and urgency.   Musculoskeletal:  Negative for myalgias.   Neurological:  Negative for dizziness, syncope and light-headedness.   Psychiatric/Behavioral:  Negative for dysphoric mood.        Current Problem List:     Patient Active Problem List   Diagnosis    Chronic diarrhea    De Quervain's tenosynovitis    Fatty liver    Hypertriglyceridemia    Impingement syndrome of left shoulder    Left lumbar radiculopathy    Male erectile dysfunction    Plantar warts    Paresthesias    Excessive daytime sleepiness    PETTY (obstructive sleep apnea)    Type 2 diabetes mellitus with hyperglycemia, with long-term current use of insulin ([facility])    Class 1 obesity due to excess calories with serious comorbidity and body mass index (BMI) of 34.0 to 34.9 in adult    Arthritis of right knee    Chronic pain of right knee    Abdominal wall hernia    Syndesmotic disruption of ankle, left, subsequent encounter    Calculus of gallbladder without cholecystitis without obstruction    Abdominal pain    Sacroiliitis (HCC)    Ankle syndesmosis disruption, left, subsequent encounter       Allergies:     Allergies   Allergen Reactions    Asa [Aspirin] GI Intolerance    Penicillin G Other (See Comments)     Ancef ok     \"unsure; was a baby\"       Current Medications:       Current Outpatient Medications:     Continuous Blood Gluc Sensor (FreeStyle Dell 3 Sensor) MISC, Use 1 Units every 14 (fourteen) days, Disp: 2 each, Rfl: 5    dulaglutide (Trulicity) 4.5 MG/0.5ML injection, Inject 0.5 mL (4.5 mg total) under the skin every 7 days, Disp: 6 mL, Rfl: 1    Insulin Glargine Solostar (Lantus SoloStar) 100 UNIT/ML SOPN, Inject 0.15 mL (15 Units total) under the " "skin daily at bedtime, Disp: 24 mL, Rfl: 0    Insulin Pen Needle (BD Pen Needle Nancy U/F) 32G X 4 MM MISC, Use twice daily, Disp: 100 each, Rfl: 2    Lancets (OneTouch Delica Plus [id number]) MISC, daily, Disp: , Rfl:     metFORMIN (GLUCOPHAGE) 1000 MG tablet, Take 1 tablet (1,000 mg total) by mouth 2 (two) times a day with meals, Disp: 180 tablet, Rfl: 1    OneTouch Verio test strip, daily, Disp: , Rfl:     atorvastatin (LIPITOR) 10 mg tablet, Take 1 tablet (10 mg total) by mouth every evening (Patient not taking: Reported on 3/13/2024), Disp: 90 tablet, Rfl: 3    Blood Glucose Monitoring Suppl (OneTouch Verio Flex System) w/Device KIT, , Disp: , Rfl:     tadalafil (CIALIS) 10 MG tablet, TAKE ONE TABLET BY MOUTH DAILY AS NEEDED FOR ERECTILE DYSFUNCTION (Patient not taking: Reported on 3/13/2024), Disp: 20 tablet, Rfl: 1    Past Medical History:       Past Medical History:   Diagnosis Date    Cancer (HCC)     colon    Diabetes mellitus (HCC)     GERD (gastroesophageal reflux disease)     Hyperlipidemia     Post concussion syndrome 05/09/2017    Pre-op examination     Sleep apnea     \"mild\"    Traumatic brain injury (HCC) 2017    R/S 2017        Past Surgical History:   Procedure Laterality Date    APPENDECTOMY      COLON SURGERY      COLONOSCOPY      EGD      [facility] RPR PRIMARY DISRUPTED LIGAMENT ANKLE COLLATERAL Left 03/10/2023    Procedure: ANKLE LIGAMENT REPAIR OF SYNDESMOTIC LIGAMENT with steroid injection of posterior heel bursa;  Surgeon: Steve Kaplan DPM;  Location:  MAIN OR;  Service: Podiatry        Family History   Problem Relation Age of Onset    Coronary artery disease Mother     Diabetes Mother     Hypertension Mother     Hyperlipidemia Mother     Coronary artery disease Father     Diabetes Father     Hypertension Father     Hyperlipidemia Father     Heart attack Father     Anemia Brother     No Known Problems Son     No Known Problems Son     No Known Problems Son         Social History " "    Socioeconomic History    Marital status: Single     Spouse name: Not on file    Number of children: Not on file    Years of education: Not on file    Highest education level: Not on file   Occupational History    Not on file   Tobacco Use    Smoking status: Never    Smokeless tobacco: Never   Vaping Use    Vaping status: Never Used   Substance and Sexual Activity    Alcohol use: Yes     Alcohol/week: 1.0 standard drink of alcohol     Types: 1 Cans of beer per week    Drug use: Never    Sexual activity: Yes     Partners: Female     Birth control/protection: None   Other Topics Concern    Not on file   Social History Narrative    Not on file     Social Determinants of Health     Financial Resource Strain: Not on file   Food Insecurity: Not on file   Transportation Needs: Not on file   Physical Activity: Not on file   Stress: Not on file   Social Connections: Not on file   Intimate Partner Violence: Not on file   Housing Stability: Not on file        Physical Exam:     /70 (BP Location: Left arm, Patient Position: Sitting, Cuff Size: Large)   Pulse 89   Temp 97.8 °F (36.6 °C)   Resp 16   Ht 5' 5\" (1.651 m)   Wt 94.3 kg (208 lb)   SpO2 96%   BMI 34.61 kg/m²     Physical Exam  Constitutional:       Appearance: Normal appearance.   HENT:      Head: Normocephalic and atraumatic.   Eyes:      Conjunctiva/sclera: Conjunctivae normal.   Cardiovascular:      Rate and Rhythm: Normal rate and regular rhythm.      Heart sounds: Normal heart sounds.   Pulmonary:      Effort: Pulmonary effort is normal.      Breath sounds: Normal breath sounds.   Abdominal:      General: There is no distension.   Musculoskeletal:         General: Normal range of motion.      Cervical back: Normal range of motion and neck supple.   Neurological:      Mental Status: He is alert and oriented to person, place, and time.   Psychiatric:         Behavior: Behavior normal.         Thought Content: Thought content normal.          Data: "     Pre-operative work-up    Laboratory Results: I have personally reviewed the pertinent laboratory results/reports      EKG:  N/A    Chest x-ray:  N/A      Previous cardiopulmonary studies within the past year:  Echocardiogram: N/A  Cardiac Catheterization: N/A  Stress Test: N/A  Pulmonary Function Testing: N/A      Assessment & Recommendations:     1. Preoperative clearance        2. Type 2 diabetes mellitus with hyperglycemia, with long-term current use of insulin (HCC)          Type 2 diabetes mellitus with hyperglycemia, with long-term current use of insulin (HCC)    Lab Results   Component Value Date    HGBA1C 7.2 (H) 03/13/2024     A1C down-trending  Following with Endo  Continue current regimen    Pre-Op Evaluation Assessment  48 y.o. male with planned surgery: Hardware removal, ankle.    Known risk factors for perioperative complications: None.        Current medications which may produce withdrawal symptoms if withheld perioperatively: none.    Pre-Op Evaluation Plan  1. Further preoperative workup as follows:   - None; no further preoperative work-up is required    2. Medication Management/Recommendations: hold lantus night before surgery      3. Prophylaxis for cardiac events with perioperative beta-blockers: not indicated.    4. Patient requires further consultation with: None    Clearance  Patient is CLEARED for surgery without any additional cardiac testing.    Patient is low risk for a low risk procedure.   Patient is medically stable for planned procedure as detailed above.     Verona Osborn MD  [address] [address]  Phone#  [phone]  Fax#  [phone]

## 2024-04-24 ENCOUNTER — ANESTHESIA EVENT (OUTPATIENT)
Dept: PERIOP | Facility: HOSPITAL | Age: 49
End: 2024-04-24
Payer: COMMERCIAL

## 2024-04-24 NOTE — ANESTHESIA PREPROCEDURE EVALUATION
Procedure:  REMOVAL HARDWARE ANKLE (Left: Ankle)    ECG: NSR    DM2 A1c 7.2 - Trulicity, insulin - last took trulicity 8 days ago  PETTY - does not wear CPAP    Denies the following: CP/SOB with exertion, asthma, COPD, stroke/TIA, seizure    Relevant Problems   CARDIO   (+) Hypertriglyceridemia      ENDO   (+) Type 2 diabetes mellitus with hyperglycemia, with long-term current use of insulin (HCC)      GI/HEPATIC   (+) Fatty liver      MUSCULOSKELETAL   (+) Arthritis of right knee   (+) Impingement syndrome of left shoulder   (+) Sacroiliitis (HCC)      NEURO/PSYCH   (+) Paresthesias      PULMONARY   (+) PETTY (obstructive sleep apnea)        Physical Exam    Airway    Mallampati score: III  TM Distance: >3 FB  Neck ROM: full     Dental       Cardiovascular      Pulmonary      Other Findings        Anesthesia Plan  ASA Score- 2     Anesthesia Type- general with ASA Monitors.         Additional Monitors:     Airway Plan: LMA.    Comment: Popliteal block .       Plan Factors-Exercise tolerance (METS): >4 METS.    Chart reviewed. EKG reviewed.  Existing labs reviewed. Patient summary reviewed.    Patient is not a current smoker.      Obstructive sleep apnea risk education given perioperatively.        Induction- intravenous.    Postoperative Plan-     Informed Consent- Anesthetic plan and risks discussed with patient.  I personally reviewed this patient with the CRNA. Discussed and agreed on the Anesthesia Plan with the CRNA..

## 2024-04-26 ENCOUNTER — ANESTHESIA (OUTPATIENT)
Dept: PERIOP | Facility: HOSPITAL | Age: 49
End: 2024-04-26
Payer: COMMERCIAL

## 2024-04-26 ENCOUNTER — APPOINTMENT (OUTPATIENT)
Dept: PERIOP | Facility: HOSPITAL | Age: 49
End: 2024-04-26
Payer: COMMERCIAL

## 2024-04-26 ENCOUNTER — APPOINTMENT (OUTPATIENT)
Dept: RADIOLOGY | Facility: HOSPITAL | Age: 49
End: 2024-04-26
Payer: COMMERCIAL

## 2024-04-26 ENCOUNTER — HOSPITAL ENCOUNTER (OUTPATIENT)
Facility: HOSPITAL | Age: 49
Setting detail: OUTPATIENT SURGERY
Discharge: HOME | End: 2024-04-26
Attending: PODIATRIST | Admitting: PODIATRIST
Payer: COMMERCIAL

## 2024-04-26 VITALS
HEIGHT: 65 IN | TEMPERATURE: 97.8 F | WEIGHT: 207 LBS | OXYGEN SATURATION: 94 % | RESPIRATION RATE: 16 BRPM | DIASTOLIC BLOOD PRESSURE: 72 MMHG | BODY MASS INDEX: 34.49 KG/M2 | SYSTOLIC BLOOD PRESSURE: 126 MMHG | HEART RATE: 70 BPM

## 2024-04-26 DIAGNOSIS — G89.18 POST-OP PAIN: ICD-10-CM

## 2024-04-26 DIAGNOSIS — S93.432D ANKLE SYNDESMOSIS DISRUPTION, LEFT, SUBSEQUENT ENCOUNTER: Primary | ICD-10-CM

## 2024-04-26 LAB — GLUCOSE SERPL-MCNC: 157 MG/DL (ref 65–140)

## 2024-04-26 PROCEDURE — 20680 REMOVAL OF IMPLANT DEEP: CPT | Performed by: PODIATRIST

## 2024-04-26 PROCEDURE — C9290 INJ, BUPIVACAINE LIPOSOME: HCPCS | Performed by: ANESTHESIOLOGY

## 2024-04-26 PROCEDURE — 99024 POSTOP FOLLOW-UP VISIT: CPT | Performed by: PODIATRIST

## 2024-04-26 PROCEDURE — 82948 REAGENT STRIP/BLOOD GLUCOSE: CPT

## 2024-04-26 PROCEDURE — 73600 X-RAY EXAM OF ANKLE: CPT

## 2024-04-26 PROCEDURE — 73610 X-RAY EXAM OF ANKLE: CPT

## 2024-04-26 RX ORDER — DEXAMETHASONE SODIUM PHOSPHATE 10 MG/ML
INJECTION, SOLUTION INTRAMUSCULAR; INTRAVENOUS AS NEEDED
Status: DISCONTINUED | OUTPATIENT
Start: 2024-04-26 | End: 2024-04-26

## 2024-04-26 RX ORDER — HYDROMORPHONE HCL/PF 1 MG/ML
0.5 SYRINGE (ML) INJECTION
Status: DISCONTINUED | OUTPATIENT
Start: 2024-04-26 | End: 2024-04-26 | Stop reason: HOSPADM

## 2024-04-26 RX ORDER — ONDANSETRON 2 MG/ML
INJECTION INTRAMUSCULAR; INTRAVENOUS AS NEEDED
Status: DISCONTINUED | OUTPATIENT
Start: 2024-04-26 | End: 2024-04-26

## 2024-04-26 RX ORDER — OXYCODONE HYDROCHLORIDE 5 MG/1
5 TABLET ORAL ONCE AS NEEDED
Status: DISCONTINUED | OUTPATIENT
Start: 2024-04-26 | End: 2024-04-26 | Stop reason: HOSPADM

## 2024-04-26 RX ORDER — CHLORHEXIDINE GLUCONATE ORAL RINSE 1.2 MG/ML
15 SOLUTION DENTAL ONCE
Status: COMPLETED | OUTPATIENT
Start: 2024-04-26 | End: 2024-04-26

## 2024-04-26 RX ORDER — ALBUTEROL SULFATE 2.5 MG/3ML
2.5 SOLUTION RESPIRATORY (INHALATION) ONCE AS NEEDED
Status: DISCONTINUED | OUTPATIENT
Start: 2024-04-26 | End: 2024-04-26 | Stop reason: HOSPADM

## 2024-04-26 RX ORDER — PROPOFOL 10 MG/ML
INJECTION, EMULSION INTRAVENOUS CONTINUOUS PRN
Status: DISCONTINUED | OUTPATIENT
Start: 2024-04-26 | End: 2024-04-26

## 2024-04-26 RX ORDER — FENTANYL CITRATE/PF 50 MCG/ML
25 SYRINGE (ML) INJECTION
Status: DISCONTINUED | OUTPATIENT
Start: 2024-04-26 | End: 2024-04-26 | Stop reason: HOSPADM

## 2024-04-26 RX ORDER — SODIUM CHLORIDE, SODIUM LACTATE, POTASSIUM CHLORIDE, CALCIUM CHLORIDE 600; 310; 30; 20 MG/100ML; MG/100ML; MG/100ML; MG/100ML
INJECTION, SOLUTION INTRAVENOUS CONTINUOUS PRN
Status: DISCONTINUED | OUTPATIENT
Start: 2024-04-26 | End: 2024-04-26

## 2024-04-26 RX ORDER — VANCOMYCIN HYDROCHLORIDE 1 G/200ML
1000 INJECTION, SOLUTION INTRAVENOUS ONCE
Status: COMPLETED | OUTPATIENT
Start: 2024-04-26 | End: 2024-04-26

## 2024-04-26 RX ORDER — PROPOFOL 10 MG/ML
INJECTION, EMULSION INTRAVENOUS AS NEEDED
Status: DISCONTINUED | OUTPATIENT
Start: 2024-04-26 | End: 2024-04-26

## 2024-04-26 RX ORDER — OXYCODONE HYDROCHLORIDE AND ACETAMINOPHEN 5; 325 MG/1; MG/1
1 TABLET ORAL EVERY 4 HOURS PRN
Qty: 10 TABLET | Refills: 0 | Status: SHIPPED | OUTPATIENT
Start: 2024-04-26

## 2024-04-26 RX ORDER — ROPIVACAINE HYDROCHLORIDE 2 MG/ML
INJECTION, SOLUTION EPIDURAL; INFILTRATION; PERINEURAL
Status: COMPLETED | OUTPATIENT
Start: 2024-04-26 | End: 2024-04-26

## 2024-04-26 RX ORDER — FENTANYL CITRATE 50 UG/ML
INJECTION, SOLUTION INTRAMUSCULAR; INTRAVENOUS AS NEEDED
Status: DISCONTINUED | OUTPATIENT
Start: 2024-04-26 | End: 2024-04-26

## 2024-04-26 RX ORDER — ONDANSETRON 2 MG/ML
4 INJECTION INTRAMUSCULAR; INTRAVENOUS ONCE AS NEEDED
Status: DISCONTINUED | OUTPATIENT
Start: 2024-04-26 | End: 2024-04-26 | Stop reason: HOSPADM

## 2024-04-26 RX ORDER — MAGNESIUM HYDROXIDE 1200 MG/15ML
LIQUID ORAL AS NEEDED
Status: DISCONTINUED | OUTPATIENT
Start: 2024-04-26 | End: 2024-04-26 | Stop reason: HOSPADM

## 2024-04-26 RX ORDER — MIDAZOLAM HYDROCHLORIDE 2 MG/2ML
INJECTION, SOLUTION INTRAMUSCULAR; INTRAVENOUS AS NEEDED
Status: DISCONTINUED | OUTPATIENT
Start: 2024-04-26 | End: 2024-04-26

## 2024-04-26 RX ORDER — LIDOCAINE HYDROCHLORIDE 10 MG/ML
INJECTION, SOLUTION EPIDURAL; INFILTRATION; INTRACAUDAL; PERINEURAL AS NEEDED
Status: DISCONTINUED | OUTPATIENT
Start: 2024-04-26 | End: 2024-04-26

## 2024-04-26 RX ADMIN — DEXAMETHASONE SODIUM PHOSPHATE 8 MG: 10 INJECTION, SOLUTION INTRAMUSCULAR; INTRAVENOUS at 10:21

## 2024-04-26 RX ADMIN — CHLORHEXIDINE GLUCONATE 0.12% ORAL RINSE 15 ML: 1.2 LIQUID ORAL at 09:45

## 2024-04-26 RX ADMIN — MIDAZOLAM HYDROCHLORIDE 2 MG: 1 INJECTION, SOLUTION INTRAMUSCULAR; INTRAVENOUS at 10:00

## 2024-04-26 RX ADMIN — FENTANYL CITRATE 25 MCG: 50 INJECTION INTRAMUSCULAR; INTRAVENOUS at 10:00

## 2024-04-26 RX ADMIN — FENTANYL CITRATE 50 MCG: 50 INJECTION INTRAMUSCULAR; INTRAVENOUS at 11:19

## 2024-04-26 RX ADMIN — PROPOFOL 100 MCG/KG/MIN: 10 INJECTION, EMULSION INTRAVENOUS at 10:20

## 2024-04-26 RX ADMIN — ONDANSETRON 4 MG: 2 INJECTION INTRAMUSCULAR; INTRAVENOUS at 11:19

## 2024-04-26 RX ADMIN — BUPIVACAINE 10 ML: 13.3 INJECTION, SUSPENSION, LIPOSOMAL INFILTRATION at 10:10

## 2024-04-26 RX ADMIN — PROPOFOL 200 MG: 10 INJECTION, EMULSION INTRAVENOUS at 10:20

## 2024-04-26 RX ADMIN — ROPIVACAINE HYDROCHLORIDE 5 ML: 2 INJECTION, SOLUTION EPIDURAL; INFILTRATION at 10:10

## 2024-04-26 RX ADMIN — SODIUM CHLORIDE, SODIUM LACTATE, POTASSIUM CHLORIDE, AND CALCIUM CHLORIDE: .6; .31; .03; .02 INJECTION, SOLUTION INTRAVENOUS at 10:18

## 2024-04-26 RX ADMIN — ROPIVACAINE HYDROCHLORIDE 5 ML: 2 INJECTION, SOLUTION EPIDURAL; INFILTRATION; PERINEURAL at 10:10

## 2024-04-26 RX ADMIN — FENTANYL CITRATE 25 MCG: 50 INJECTION INTRAMUSCULAR; INTRAVENOUS at 10:29

## 2024-04-26 RX ADMIN — VANCOMYCIN HYDROCHLORIDE 1000 MG: 1 INJECTION, SOLUTION INTRAVENOUS at 10:14

## 2024-04-26 RX ADMIN — LIDOCAINE HYDROCHLORIDE 50 MG: 10 INJECTION, SOLUTION EPIDURAL; INFILTRATION; INTRACAUDAL at 10:20

## 2024-04-26 NOTE — DISCHARGE INSTR - AVS FIRST PAGE
[facility]  Dr. Kaplan  Post-Operative Instructions    1. Take your prescribed medication as needed. You can take ibuprofen in between doses of the narcotic if needed.   2. Upon arrival at home, lie down and elevate your surgical foot on 2 pillows.  3. Remain quiet, off your feet as much as possible, for the first 24-48 hours. This is when your feet first swell and may become painful. After 48 hours you may begin limited walking following these restrictions:weight bearing as tolerated in a surgical shoe     4. Drink large quantities of water. Consume no alcohol. Continue a well-balanced diet.  5. Report any unusual discomfort or fever to this office.  6. A limited amount of discomfort and swelling is to be expected. In some cases the skin may take on a bruised appearance. The surgical solution that was applied to your foot prior to the operation is dark in color and the operation site may appear to be oozing when it actually is not.  7. A slight amount of blood is to be expected, and is no cause for alarm. Do not remove the dressings. If there is active bleeding and if the bleeding persists, add additional gauze to the bandage, apply direct pressure, elevate your feet and call this office.  8. Do not get the dressings wet. As regular bathing may be inconvenient, sponge baths are recommended. If you shower, make sure the dressing stays dry.   9. When anesthesia wears off and if any discomfort should be present, apply an ice pack directly over the operated area for 15 minute intervals for several hours or until the pain leaves. (USE IN EXCESS OF 15 MINUTES COULD CAUSE FROSTBITE). Do not use hot water bags or electric pads. A convenient icepack can be made by placing ice cubes in a plastic bag and covering this with a towel.  10. If necessary, take a mild laxative before retiring.  11. Wear your special open shoes anytime you put weight on your foot, even if it is just to walk to the bathroom and back. It will  probably be 2 or 3 weeks before you will be permitted to try regular shoes.  12. Having performed the operation, we are interested in a prompt recovery. Please cooperate by following the above instructions.  13. Please call to confirm your post-op appointment or call with any other questions.

## 2024-04-26 NOTE — OP NOTE
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]     * Painful orthopaedic hardware (HCC) [T84.84XA]    Procedure(s):  Left - REMOVAL HARDWARE ANKLE    Specimen(s):  * No specimens in log *    Estimated Blood Loss:   Minimal    Drains:  * No LDAs found *    Anesthesia Type:   General w/ Popliteal Block    Operative Indications:  Functional gait abnormality [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  After anesthesia was administered a tourniquet was placed around the patient's left lower extremity with ample oral padding.  Left lower extremity was then prepped and draped in the normal sterile manner.  A preincision timeout was completed with all parties in agreement.  Utilizing Esmarch bandage the left lower extremity was exsanguinated and the tourniquet was inflated to 300 mmHg.    Attention was then drawn to the left lateral ankle.  A 4 cm linear incision was made overlying the distal aspect of the fibula.  Blunt dissection was carried down retracting any neurovascular structures and cauterizing any bleeding vessels.  The plate overlying the fibula was visualized.  The 2 screws were removed.  The suture buttons on the fibula plate were cut.  The fibula plate was removed and passed off the table.    Attention was then drawn to the medial malleolus.  Utilizing C  arm evaluation the suture buttons were located.  A 3 cm linear incision was made over the medial malleolus.  Blunt dissection was carried down, making sure to retract any neurovascular structures.  The 2 suture buttons were identified and removed from the ankle mortise utilizing pliers.  Final C arm evaluations were noted and no additional hardware remained in the ankle.    Both incisions were copiously flushed utilizing sterile saline.  Deep closure was achieved utilizing 3-0 Vicryl in a simple suture fashion.  Skin closure was achieved utilizing 4-0 nylon in a simple suture in horizontal mattress type fashion.  A dressing consisting of Xeroform DSD and lightly wrapped Ace was applied to left lower extremity.  The tourniquet was deflated for total time 44 minutes with immediate hyperemic response.  Patient tolerated procedure well with no immediate complications.     Dr. Kaplan was present for the entire procedure.    Patient Disposition:  PACU         SIGNATURE: Ruben Haddad DPM  DATE: April 26, 2024  TIME: 11:28 AM

## 2024-04-26 NOTE — DISCHARGE SUMMARY
Discharge Summary Outpatient Procedure Podiatry -   Jc Carmona 48 y.o. male MRN: [id number]  Unit/Bed#: OR POOL Encounter: [id number]    Admission Date: 4/26/2024     Admitting Diagnosis: Functional gait abnormality [R26.89]  Painful orthopaedic hardware (HCC) [T84.84XA]    Discharge Diagnosis: same    Procedures Performed: REMOVAL HARDWARE ANKLE: 77366 (CPT®)    Complications: none    Condition at Discharge: stable    Discharge instructions/Information to patient and family:   See after visit summary for information provided to patient and family.      Provisions for Follow-Up Care/Important appointments:  See after visit summary for information related to follow-up care and any pertinent home health orders.      Discharge Medications:  See after visit summary for reconciled discharge medications provided to patient and family.

## 2024-04-26 NOTE — ANESTHESIA PROCEDURE NOTES
Peripheral Block    Patient location during procedure: holding area  Start time: 4/26/2024 10:10 AM  Reason for block: at surgeon's request and post-op pain management  Staffing  Performed by: Edy Saunders MD  Authorized by: Edy Saunders MD    Preanesthetic Checklist  Completed: patient identified, IV checked, site marked, risks and benefits discussed, surgical consent, monitors and equipment checked, pre-op evaluation and timeout performed  Peripheral Block  Prep: ChloraPrep  Patient monitoring: frequent blood pressure checks, continuous pulse oximetry and heart rate  Block type: Popliteal  Laterality: left  Injection technique: single-shot  Procedures: ultrasound guided, Ultrasound guidance required for the procedure to increase accuracy and safety of medication placement and decrease risk of complications.  Ultrasound permanent image saved  ropivacaine (NAROPIN) 0.2% injection 20 mL - Perineural   5 mL - 4/26/2024 10:10:00 AM  Needle  Needle type: Stimuplex   Needle gauge: 22 G  Needle length: 4 in  Needle localization: anatomical landmarks and ultrasound guidance  Assessment  Injection assessment: incremental injection, frequent aspiration, injected with ease, negative aspiration, negative for heart rate change, no paresthesia on injection, no symptoms of intraneural/intravenous injection and needle tip visualized at all times  Paresthesia pain: none  Post-procedure:  site cleaned  patient tolerated the procedure well with no immediate complications

## 2024-04-26 NOTE — ANESTHESIA PROCEDURE NOTES
Peripheral Block    Patient location during procedure: holding area  Start time: 4/26/2024 10:10 AM  Reason for block: at surgeon's request and post-op pain management  Staffing  Performed by: Edy Saunders MD  Authorized by: Edy Saunders MD    Preanesthetic Checklist  Completed: patient identified, IV checked, site marked, risks and benefits discussed, surgical consent, monitors and equipment checked, pre-op evaluation and timeout performed  Peripheral Block  Prep: ChloraPrep  Patient monitoring: frequent blood pressure checks, continuous pulse oximetry and heart rate  Block type: Adductor Canal  Laterality: left  Injection technique: single-shot  Procedures: ultrasound guided, Ultrasound guidance required for the procedure to increase accuracy and safety of medication placement and decrease risk of complications.  Ultrasound permanent image saved  ropivacaine (NAROPIN) 0.2% injection 20 mL - Perineural   5 mL - 4/26/2024 10:10:00 AM  Needle  Needle type: Stimuplex   Needle gauge: 22 G  Needle length: 4 in  Needle localization: anatomical landmarks and ultrasound guidance  Assessment  Injection assessment: incremental injection, frequent aspiration, injected with ease, negative aspiration, negative for heart rate change, no paresthesia on injection, no symptoms of intraneural/intravenous injection and needle tip visualized at all times  Paresthesia pain: none  Post-procedure:  site cleaned  patient tolerated the procedure well with no immediate complications

## 2024-04-26 NOTE — INTERVAL H&P NOTE
H&P reviewed. After examining the patient I find no changes in the patients condition since the H&P had been written.    Vitals:    04/26/24 0933   BP: 129/70   Pulse: 68   Resp: 20   Temp: 98.3 °F (36.8 °C)   SpO2: 95%

## 2024-04-26 NOTE — ANESTHESIA POSTPROCEDURE EVALUATION
Post-Op Assessment Note    CV Status:  Stable  Pain Score: 0    Pain management: adequate       Mental Status:  Alert and awake   Hydration Status:  Euvolemic   PONV Controlled:  Controlled   Airway Patency:  Patent     Post Op Vitals Reviewed: Yes    No anethesia notable event occurred.    Staff: Anesthesiologist, CRNA               BP   108/60   Temp   97.8   Pulse  73   Resp   16   SpO2   100

## 2024-04-29 ENCOUNTER — OFFICE VISIT (OUTPATIENT)
Dept: PODIATRY | Facility: CLINIC | Age: 49
End: 2024-04-29

## 2024-04-29 VITALS
HEIGHT: 65 IN | SYSTOLIC BLOOD PRESSURE: 136 MMHG | BODY MASS INDEX: 34.45 KG/M2 | HEART RATE: 78 BPM | OXYGEN SATURATION: 97 % | DIASTOLIC BLOOD PRESSURE: 90 MMHG

## 2024-04-29 DIAGNOSIS — T84.84XA PAINFUL ORTHOPAEDIC HARDWARE (HCC): ICD-10-CM

## 2024-04-29 DIAGNOSIS — S93.432D ANKLE SYNDESMOSIS DISRUPTION, LEFT, SUBSEQUENT ENCOUNTER: Primary | ICD-10-CM

## 2024-04-29 PROCEDURE — 99024 POSTOP FOLLOW-UP VISIT: CPT | Performed by: PODIATRIST

## 2024-04-29 NOTE — PROGRESS NOTES
Assessment/Plan:  -40-year-old male who is status post left ankle hardware removal DOS 4/26/2024.  Examination is significant for medial and lateral ankle incisions the hardware removal.  At this time sutures are well coapted, no erythema or clinical signs infection noted.  Patient had a popliteal block prior to surgery and he states has worked excellently for pain management.  He is unwilling utilizing ibuprofen for pain management this time and is currently denies any pain.  He states he is able to ambulate well with crutches has no issues with his bandages at this time.    -Personally reviewed x-ray of left ankle: Status post hardware removal no known additional hardware.  This x-rays were reviewed with patient.    -Continue pain management per ibuprofen or prescription pain medications as needed.    -Will plan to see patient again in 1 week for potential suture removal.    No problem-specific Assessment & Plan notes found for this encounter.       Diagnoses and all orders for this visit:    Ankle syndesmosis disruption, left, subsequent encounter    Painful orthopaedic hardware (HCC)          Subjective:      Patient ID: Jc Carmona is a 48 y.o. male.    48-year-old male presents for status post left ankle hardware removal.  Patient states from the nerve block to his left lower extremity has worked very well and he has not had to utilize any pain medications at this time.  He states that he is only been utilizing ibuprofen in limited quantities for some swelling at night.  Patient states that he has been ambulating well with a surgical shoe and crutches at this time.  Patient denies any new complaints.        The following portions of the patient's history were reviewed and updated as appropriate: allergies, current medications, past family history, past medical history, past social history, past surgical history, and problem list.    Review of Systems   Constitutional: Negative.    HENT: Negative.    "  Respiratory: Negative.     Cardiovascular: Negative.    Gastrointestinal: Negative.    Endocrine: Negative.    Genitourinary: Negative.    Musculoskeletal: Negative.    Skin:         Incisions on the medial and lateral aspect of the ankle   Neurological:  Positive for numbness.         Objective:      /90 (BP Location: Left arm, Patient Position: Sitting, Cuff Size: Standard)   Pulse 78   Ht 5' 5\" (1.651 m)   SpO2 97%   BMI 34.45 kg/m²          Physical Exam  Constitutional:       Appearance: He is obese.   HENT:      Nose: Nose normal.      Mouth/Throat:      Mouth: Mucous membranes are moist.   Cardiovascular:      Rate and Rhythm: Normal rate and regular rhythm.      Pulses:           Dorsalis pedis pulses are 1+ on the right side and 1+ on the left side.        Posterior tibial pulses are 1+ on the right side and 1+ on the left side.   Pulmonary:      Effort: Pulmonary effort is normal.      Breath sounds: Normal breath sounds.   Abdominal:      General: Abdomen is flat.      Palpations: Abdomen is soft.   Musculoskeletal:         General: Swelling present.   Feet:      Comments: Healing incisions on the medial and lateral aspect of the ankle. Sutures are well cohapted with no clinical of infections.   Skin:     Capillary Refill: Capillary refill takes less than 2 seconds.   Neurological:      General: No focal deficit present.           "

## 2024-05-06 ENCOUNTER — OFFICE VISIT (OUTPATIENT)
Dept: PODIATRY | Facility: CLINIC | Age: 49
End: 2024-05-06

## 2024-05-06 VITALS
OXYGEN SATURATION: 98 % | BODY MASS INDEX: 34.45 KG/M2 | SYSTOLIC BLOOD PRESSURE: 130 MMHG | HEIGHT: 65 IN | HEART RATE: 83 BPM | DIASTOLIC BLOOD PRESSURE: 82 MMHG

## 2024-05-06 DIAGNOSIS — Z98.890 POSTOPERATIVE STATE: Primary | ICD-10-CM

## 2024-05-06 DIAGNOSIS — S93.432D ANKLE SYNDESMOSIS DISRUPTION, LEFT, SUBSEQUENT ENCOUNTER: ICD-10-CM

## 2024-05-06 DIAGNOSIS — T84.84XA PAINFUL ORTHOPAEDIC HARDWARE (HCC): ICD-10-CM

## 2024-05-06 PROCEDURE — 99024 POSTOP FOLLOW-UP VISIT: CPT | Performed by: PODIATRIST

## 2024-05-06 NOTE — PROGRESS NOTES
Assessment/Plan:     The patient's postoperative visit date is relatively benign.  The incision line is well coapted without any signs of infection.  The sutures were removed today without complication.  The patient does still note tenderness along the lateral incision line.    He may return to shoe gear as tolerated at this point in time.  I do recommend a good supportive sneaker type shoe.    Follow-up in 2 weeks.  We can repeat x-rays at that point in time.       Diagnoses and all orders for this visit:    Postoperative state    Painful orthopaedic hardware (HCC)    Ankle syndesmosis disruption, left, subsequent encounter          Subjective:     Patient ID: Jc Carmona is a 48 y.o. male.    The patient presents today for follow-up status post removal of hardware from the left ankle.  He still notes some tenderness and numbness along the lateral incision line today.        Review of Systems   Constitutional: Negative.    HENT: Negative.     Eyes: Negative.    Respiratory: Negative.     Cardiovascular: Negative.    Endocrine: Negative.    Musculoskeletal: Negative.    Neurological: Negative.    Hematological: Negative.    Psychiatric/Behavioral: Negative.           Objective:     Physical Exam  Vitals reviewed.   Constitutional:       Appearance: Normal appearance.   HENT:      Head: Normocephalic and atraumatic.      Nose: Nose normal.   Eyes:      Conjunctiva/sclera: Conjunctivae normal.      Pupils: Pupils are equal, round, and reactive to light.   Cardiovascular:      Pulses:           Dorsalis pedis pulses are 2+ on the left side.        Posterior tibial pulses are 2+ on the left side.   Pulmonary:      Effort: Pulmonary effort is normal.   Feet:      Comments: The patient's postoperative visit date is relatively benign.  The incision line is well coapted without any signs of infection.  The sutures were removed today without complication.  The patient does still note tenderness along the lateral  incision line.  Skin:     General: Skin is warm.      Capillary Refill: Capillary refill takes less than 2 seconds.   Neurological:      General: No focal deficit present.      Mental Status: He is alert and oriented to person, place, and time.   Psychiatric:         Mood and Affect: Mood normal.         Behavior: Behavior normal.         Thought Content: Thought content normal.

## 2024-05-22 DIAGNOSIS — E11.65 TYPE 2 DIABETES MELLITUS WITH HYPERGLYCEMIA, WITH LONG-TERM CURRENT USE OF INSULIN (HCC): ICD-10-CM

## 2024-05-22 DIAGNOSIS — Z79.4 TYPE 2 DIABETES MELLITUS WITH HYPERGLYCEMIA, WITH LONG-TERM CURRENT USE OF INSULIN (HCC): ICD-10-CM

## 2024-05-22 RX ORDER — BLOOD-GLUCOSE SENSOR
EACH MISCELLANEOUS
Qty: 2 EACH | Refills: 5 | Status: SHIPPED | OUTPATIENT
Start: 2024-05-22

## 2024-05-28 ENCOUNTER — TELEPHONE (OUTPATIENT)
Dept: PODIATRY | Facility: CLINIC | Age: 49
End: 2024-05-28

## 2024-05-28 NOTE — TELEPHONE ENCOUNTER
The original paperwork is coming via interoffice mail to [facility] to your attention.  This has been uploaded via the [facility] leave process.  I also entered a Telephone encounter.    Patient needs originals as it is from [facility] [employer] which requires originals.  Once received & completed, send back to [facility] and patient will  originals.

## 2024-06-03 PROCEDURE — 99284 EMERGENCY DEPT VISIT MOD MDM: CPT

## 2024-06-04 ENCOUNTER — APPOINTMENT (EMERGENCY)
Dept: RADIOLOGY | Facility: HOSPITAL | Age: 49
End: 2024-06-04
Payer: COMMERCIAL

## 2024-06-04 ENCOUNTER — HOSPITAL ENCOUNTER (EMERGENCY)
Facility: HOSPITAL | Age: 49
Discharge: HOME | End: 2024-06-04
Attending: EMERGENCY MEDICINE | Admitting: EMERGENCY MEDICINE
Payer: COMMERCIAL

## 2024-06-04 VITALS
SYSTOLIC BLOOD PRESSURE: 121 MMHG | HEART RATE: 81 BPM | TEMPERATURE: 98.1 F | OXYGEN SATURATION: 96 % | RESPIRATION RATE: 20 BRPM | DIASTOLIC BLOOD PRESSURE: 69 MMHG

## 2024-06-04 DIAGNOSIS — R11.0 NAUSEA: ICD-10-CM

## 2024-06-04 DIAGNOSIS — S93.402A LEFT ANKLE SPRAIN: ICD-10-CM
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
## 2024-06-04 DIAGNOSIS — M54.50 LOW BACK PAIN: ICD-10-CM

## 2024-06-04 PROCEDURE — 73610 X-RAY EXAM OF ANKLE: CPT

## 2024-06-04 PROCEDURE — 72100 X-RAY EXAM L-S SPINE 2/3 VWS: CPT

## 2024-06-04 RX ORDER — OXYCODONE HYDROCHLORIDE AND ACETAMINOPHEN 5; 325 MG/1; MG/1
1 TABLET ORAL EVERY 4 HOURS PRN
Qty: 20 TABLET | Refills: 0 | Status: SHIPPED | OUTPATIENT
Start: 2024-06-04 | End: 2024-06-08

## 2024-06-04 RX ORDER — ONDANSETRON 4 MG/1
4 TABLET, ORALLY DISINTEGRATING ORAL ONCE
Status: COMPLETED | OUTPATIENT
Start: 2024-06-04 | End: 2024-06-04

## 2024-06-04 RX ORDER — OXYCODONE HYDROCHLORIDE AND ACETAMINOPHEN 5; 325 MG/1; MG/1
1 TABLET ORAL ONCE
Status: COMPLETED | OUTPATIENT
Start: 2024-06-04 | End: 2024-06-04

## 2024-06-04 RX ORDER — ONDANSETRON 4 MG/1
4 TABLET, ORALLY DISINTEGRATING ORAL EVERY 6 HOURS PRN
Qty: 20 TABLET | Refills: 0 | Status: SHIPPED | OUTPATIENT
Start: 2024-06-04

## 2024-06-04 RX ADMIN — ONDANSETRON 4 MG: 4 TABLET, ORALLY DISINTEGRATING ORAL at 02:12

## 2024-06-04 RX ADMIN — OXYCODONE HYDROCHLORIDE AND ACETAMINOPHEN 1 TABLET: 5; 325 TABLET ORAL at 02:12
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
## 2024-06-10 ENCOUNTER — OFFICE VISIT (OUTPATIENT)
Dept: OBGYN CLINIC | Facility: MEDICAL CENTER | Age: 49
End: 2024-06-10
Payer: COMMERCIAL

## 2024-06-10 VITALS
WEIGHT: 200 LBS | BODY MASS INDEX: 33.32 KG/M2 | DIASTOLIC BLOOD PRESSURE: 92 MMHG | HEIGHT: 65 IN | HEART RATE: 78 BPM | SYSTOLIC BLOOD PRESSURE: 148 MMHG

## 2024-06-10 DIAGNOSIS — R20.0 NUMBNESS OF LEFT FOOT: ICD-10-CM

## 2024-06-10 DIAGNOSIS — M54.50 ACUTE BILATERAL LOW BACK PAIN WITHOUT SCIATICA: Primary | ICD-10-CM

## 2024-06-10 PROCEDURE — 99214 OFFICE O/P EST MOD 30 MIN: CPT | Performed by: EMERGENCY MEDICINE

## 2024-06-10 RX ORDER — MELOXICAM 15 MG/1
15 TABLET ORAL DAILY
Qty: 30 TABLET | Refills: 0 | Status: SHIPPED | OUTPATIENT
Start: 2024-06-10

## 2024-06-10 NOTE — LETTER
Ghada 10, 2024     Patient: Jc Carmona  YOB: 1975  Date of Visit: 6/10/2024      To Whom it May Concern:    Jc Carmona is under my professional care. Jc was seen in my office on 6/10/2024.  Work excuse until next appointment in 6 weeks.  Estimated time out of work 2-3 months.      If you have any questions or concerns, please don't hesitate to call.         Sincerely,          Tucker Miller MD        CC: No Recipients
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
## 2024-06-11 ENCOUNTER — OFFICE VISIT (OUTPATIENT)
Dept: PODIATRY | Facility: CLINIC | Age: 49
End: 2024-06-11
Payer: COMMERCIAL

## 2024-06-11 VITALS
BODY MASS INDEX: 33.32 KG/M2 | WEIGHT: 200 LBS | SYSTOLIC BLOOD PRESSURE: 146 MMHG | HEART RATE: 83 BPM | OXYGEN SATURATION: 98 % | HEIGHT: 65 IN | DIASTOLIC BLOOD PRESSURE: 98 MMHG

## 2024-06-11 DIAGNOSIS — Z98.890 POST-OPERATIVE STATE: ICD-10-CM

## 2024-06-11 DIAGNOSIS — S93.492A SPRAIN OF ANTERIOR TALOFIBULAR LIGAMENT OF LEFT ANKLE, INITIAL ENCOUNTER: Primary | ICD-10-CM

## 2024-06-11 DIAGNOSIS — M25.572 ACUTE LEFT ANKLE PAIN: ICD-10-CM

## 2024-06-11 DIAGNOSIS — T84.84XA PAINFUL ORTHOPAEDIC HARDWARE (HCC): ICD-10-CM

## 2024-06-11 PROCEDURE — 99213 OFFICE O/P EST LOW 20 MIN: CPT | Performed by: PODIATRIST
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
## 2024-06-19 ENCOUNTER — EVALUATION (OUTPATIENT)
Dept: PHYSICAL THERAPY | Age: 49
End: 2024-06-19
Payer: COMMERCIAL

## 2024-06-19 ENCOUNTER — TELEPHONE (OUTPATIENT)
Dept: PODIATRY | Facility: CLINIC | Age: 49
End: 2024-06-19

## 2024-06-19 DIAGNOSIS — G89.29 CHRONIC PAIN OF LEFT ANKLE: ICD-10-CM

## 2024-06-19 DIAGNOSIS — M54.50 ACUTE BILATERAL LOW BACK PAIN WITHOUT SCIATICA: Primary | ICD-10-CM

## 2024-06-19 DIAGNOSIS — M25.572 CHRONIC PAIN OF LEFT ANKLE: ICD-10-CM

## 2024-06-19 PROCEDURE — 97110 THERAPEUTIC EXERCISES: CPT

## 2024-06-19 PROCEDURE — 97161 PT EVAL LOW COMPLEX 20 MIN: CPT

## 2024-06-19 NOTE — TELEPHONE ENCOUNTER
Caller: Jc Carmona    Doctor: Steve Kaplan DPM    Reason for call: Jc is at physical therapy now for his back.  Jc asked if a referral could be put in now so that while he is at this appointment they can evaluate his ankle.  He asked for a call back, as he would like to know if this is possible.    Call back#: [phone]
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
## 2024-06-24 ENCOUNTER — TELEPHONE (OUTPATIENT)
Age: 49
End: 2024-06-24

## 2024-06-24 ENCOUNTER — OFFICE VISIT (OUTPATIENT)
Dept: PHYSICAL THERAPY | Age: 49
End: 2024-06-24
Payer: COMMERCIAL

## 2024-06-24 DIAGNOSIS — M25.572 CHRONIC PAIN OF LEFT ANKLE: ICD-10-CM

## 2024-06-24 DIAGNOSIS — T84.84XA PAINFUL ORTHOPAEDIC HARDWARE (HCC): ICD-10-CM

## 2024-06-24 DIAGNOSIS — Z98.890 POST-OPERATIVE STATE: Primary | ICD-10-CM

## 2024-06-24 DIAGNOSIS — G89.29 CHRONIC PAIN OF LEFT ANKLE: ICD-10-CM

## 2024-06-24 DIAGNOSIS — R26.89 FUNCTIONAL GAIT ABNORMALITY: ICD-10-CM

## 2024-06-24 DIAGNOSIS — M54.50 ACUTE BILATERAL LOW BACK PAIN WITHOUT SCIATICA: Primary | ICD-10-CM

## 2024-06-24 DIAGNOSIS — R53.81 DEBILITY: ICD-10-CM

## 2024-06-24 PROCEDURE — 97140 MANUAL THERAPY 1/> REGIONS: CPT

## 2024-06-24 PROCEDURE — 97110 THERAPEUTIC EXERCISES: CPT
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
## 2024-06-24 NOTE — TELEPHONE ENCOUNTER
Caller: Jc Carmona    Doctor and/or Office: Dr. Kaplan/Ayden    #: [phone]    Escalation: Care Patient is currently at PT but there is no script from Dr. Kaplan for PT for ankle. Please return call ASAP. Thank you

## 2024-06-25 ENCOUNTER — APPOINTMENT (OUTPATIENT)
Dept: PHYSICAL THERAPY | Age: 49
End: 2024-06-25
Payer: COMMERCIAL

## 2024-06-26 ENCOUNTER — TELEPHONE (OUTPATIENT)
Age: 49
End: 2024-06-26

## 2024-06-26 ENCOUNTER — OFFICE VISIT (OUTPATIENT)
Dept: FAMILY MEDICINE CLINIC | Facility: CLINIC | Age: 49
End: 2024-06-26
Payer: COMMERCIAL

## 2024-06-26 VITALS
DIASTOLIC BLOOD PRESSURE: 86 MMHG | TEMPERATURE: 97.5 F | HEART RATE: 77 BPM | WEIGHT: 203.5 LBS | RESPIRATION RATE: 16 BRPM | OXYGEN SATURATION: 98 % | BODY MASS INDEX: 33.91 KG/M2 | SYSTOLIC BLOOD PRESSURE: 132 MMHG | HEIGHT: 65 IN

## 2024-06-26 DIAGNOSIS — L83 ACANTHOSIS NIGRICANS: ICD-10-CM

## 2024-06-26 DIAGNOSIS — E11.65 TYPE 2 DIABETES MELLITUS WITH HYPERGLYCEMIA, WITH LONG-TERM CURRENT USE OF INSULIN (HCC): Primary | ICD-10-CM

## 2024-06-26 DIAGNOSIS — M54.42 ACUTE LEFT-SIDED LOW BACK PAIN WITH LEFT-SIDED SCIATICA: ICD-10-CM

## 2024-06-26 DIAGNOSIS — Z79.4 TYPE 2 DIABETES MELLITUS WITH HYPERGLYCEMIA, WITH LONG-TERM CURRENT USE OF INSULIN (HCC): Primary | ICD-10-CM

## 2024-06-26 DIAGNOSIS — M54.16 LUMBAR RADICULOPATHY: ICD-10-CM

## 2024-06-26 DIAGNOSIS — N52.9 ERECTILE DYSFUNCTION, UNSPECIFIED ERECTILE DYSFUNCTION TYPE: ICD-10-CM

## 2024-06-26 DIAGNOSIS — Z85.038 PERSONAL HISTORY OF OTHER MALIGNANT NEOPLASM OF LARGE INTESTINE: ICD-10-CM

## 2024-06-26 DIAGNOSIS — L91.8 SKIN TAGS, MULTIPLE ACQUIRED: ICD-10-CM

## 2024-06-26 LAB — SL AMB POCT HEMOGLOBIN AIC: 6.9 (ref ?–6.5)

## 2024-06-26 PROCEDURE — 11200 RMVL SKIN TAGS UP TO&INC 15: CPT | Performed by: FAMILY MEDICINE

## 2024-06-26 PROCEDURE — 99214 OFFICE O/P EST MOD 30 MIN: CPT | Performed by: FAMILY MEDICINE

## 2024-06-26 PROCEDURE — 83036 HEMOGLOBIN GLYCOSYLATED A1C: CPT | Performed by: FAMILY MEDICINE

## 2024-06-26 RX ORDER — METHYLPREDNISOLONE 4 MG/1
TABLET ORAL
Qty: 21 EACH | Refills: 0 | Status: SHIPPED | OUTPATIENT
Start: 2024-06-26

## 2024-06-26 RX ORDER — CALCIPOTRIENE 50 UG/G
CREAM TOPICAL 2 TIMES DAILY
Qty: 60 G | Refills: 1 | Status: SHIPPED | OUTPATIENT
Start: 2024-06-26

## 2024-06-26 RX ORDER — TADALAFIL 20 MG/1
20 TABLET ORAL DAILY PRN
Qty: 30 TABLET | Refills: 5 | Status: SHIPPED | OUTPATIENT
Start: 2024-06-26 | End: 2024-12-23

## 2024-06-26 NOTE — PROGRESS NOTES
Ambulatory Visit  Name: Jc Carmona      : 1975      MRN: [id number]  Encounter Provider: Lv Torrez MD  Encounter Date: 2024   Encounter department: [facility]    Assessment & Plan   1. Type 2 diabetes mellitus with hyperglycemia, with long-term current use of insulin ([facility])  Assessment & Plan:    Lab Results   Component Value Date    HGBA1C 7.2 (H) 2024     Continue Trulicity 4.5 mg weekly.  He has had an improvement in his A1c overall.  Has had episodes of hypoglycemia in the morning.  Decrease Lantus to 15 units at bedtime and monitor blood sugars closely.  Upcoming appointment with endocrinology in September.  Orders:  -     POCT hemoglobin A1c  -     Albumin / creatinine urine ratio; Future; Expected date: 2024  -     Comprehensive metabolic panel; Future; Expected date: 2024  -     Hemoglobin A1C; Future; Expected date: 2024  -     Lipid panel; Future  2. Erectile dysfunction, unspecified erectile dysfunction type  -     tadalafil (CIALIS) 20 MG tablet; Take 1 tablet (20 mg total) by mouth daily as needed for erectile dysfunction  3. Acanthosis nigricans  -     calcipotriene (DOVONEX) 0.005 % cream; Apply topically 2 (two) times a day  4. Acute left-sided low back pain with left-sided sciatica  -     methylPREDNISolone 4 MG tablet therapy pack; Use as directed on package  5. Lumbar radiculopathy  -     MRI lumbar spine wo contrast; Future; Expected date: 2024  6. Personal history of other malignant neoplasm of large intestine  -     Ambulatory Referral to Gastroenterology; Future       Skin tags of the both armpits are catching on his close causing pain.   Plan to decrease insulin to 15 units at bedtime since patient has having hypoglycemic episodes.  Continues to have low back pain going down left leg.  Unresponsive to meloxicam.  Significant weakness in the left leg strength 3/5 did recently start physical therapy.  Will order  an MRI of the lumbar spine and start patient on Medrol Dosepak.      Skin tag removal    Date/Time: 6/26/2024 8:30 AM    Performed by: Lv Torrez MD  Authorized by: Lv Torrez MD  Universal Protocol:  Consent: Verbal consent obtained.  Consent given by: patient  Patient understanding: patient states understanding of the procedure being performed      Procedure Details - Skin Tag Destruction:     Up to 15      Body area:  Trunk    Trunk location:  R axilla  Lesion 2:     Body area:  Trunk    Trunk location:  R axilla  Lesion 3:     Body area:  Trunk    Trunk location:  R axilla  Lesion 4:     Body area:  Trunk    Trunk location:  R axilla  Lesion 5:     Body area:  Trunk    Trunk location:  L axilla  Lesion 6:     Body area:  Trunk    Trunk location:  L axilla       History of Present Illness     Patient presents with:  Follow-up: 6 months for diabetes.  Rash: Under armpits and skin tags showing up. Its been 3 months but its been getting worse and skin tags same time. Vit E oil on it, has helped. Very itchy.   medication question: Calsis has not been working for him pT states     Presents today for 6-month diabetes follow-up.  Also reporting a rash and skin tags under his armpits.  Present for the past 3 months but getting worse.  Has been putting vitamin E oil under his armpits.  Very itchy.  Also reports Cialis has not been working.    Rash  Pertinent negatives include no diarrhea, fatigue, fever or shortness of breath.     Review of Systems   Constitutional:  Negative for activity change, fatigue and fever.   Eyes:  Negative for visual disturbance.   Respiratory:  Negative for shortness of breath.    Cardiovascular:  Negative for chest pain.   Gastrointestinal:  Negative for abdominal pain, constipation, diarrhea and nausea.   Endocrine: Negative for cold intolerance and heat intolerance.   Musculoskeletal:  Positive for back pain.   Skin:  Positive for rash.        Skin tags underarms   "  Neurological:  Negative for headaches.   Psychiatric/Behavioral:  Negative for confusion.      Past Medical History:   Diagnosis Date    Cancer (HCC)     colon    Diabetes mellitus (HCC)     GERD (gastroesophageal reflux disease)     Hyperlipidemia     Post concussion syndrome 05/09/2017    Pre-op examination     Sacroiliitis (HCC)     Sleep apnea     \"mild\"    Traumatic brain injury (HCC) 2017    R/S 2017     Past Surgical History:   Procedure Laterality Date    APPENDECTOMY      COLON SURGERY      COLONOSCOPY      EGD      OR REMOVAL IMPLANT DEEP Left 4/26/2024    Procedure: REMOVAL HARDWARE ANKLE;  Surgeon: Steve Kaplan DPM;  Location: [facility];  Service: Podiatry    OR RPR PRIMARY DISRUPTED LIGAMENT ANKLE COLLATERAL Left 03/10/2023    Procedure: ANKLE LIGAMENT REPAIR OF SYNDESMOTIC LIGAMENT with steroid injection of posterior heel bursa;  Surgeon: Steve Kaplan DPM;  Location: [facility];  Service: Podiatry     Family History   Problem Relation Age of Onset    Coronary artery disease Mother     Diabetes Mother     Hypertension Mother     Hyperlipidemia Mother     Coronary artery disease Father     Diabetes Father     Hypertension Father     Hyperlipidemia Father     Heart attack Father     Anemia Brother     No Known Problems Son     No Known Problems Son     No Known Problems Son      Social History     Tobacco Use    Smoking status: Never    Smokeless tobacco: Never   Vaping Use    Vaping status: Never Used   Substance and Sexual Activity    Alcohol use: Not Currently     Alcohol/week: 1.0 standard drink of alcohol     Types: 1 Cans of beer per week    Drug use: Never    Sexual activity: Yes     Partners: Female     Birth control/protection: None     Current Outpatient Medications on File Prior to Visit   Medication Sig    Continuous Glucose Sensor (FreeStyle Dell 3 Sensor) MISC USE ONE UNIT EVERY 14 DAYS    dulaglutide (Trulicity) 4.5 MG/0.5ML injection Inject 0.5 mL (4.5 mg total) under the " "skin every 7 days    Insulin Glargine Solostar (Lantus SoloStar) 100 UNIT/ML SOPN Inject 0.15 mL (15 Units total) under the skin daily at bedtime    Insulin Pen Needle (BD Pen Needle Nancy U/F) 32G X 4 MM MISC Use twice daily    Lancets (OneTouch Delica Plus [id number]) MISC daily    meloxicam (MOBIC) 15 mg tablet Take 1 tablet (15 mg total) by mouth daily    metFORMIN (GLUCOPHAGE) 1000 MG tablet Take 1 tablet (1,000 mg total) by mouth 2 (two) times a day with meals    OneTouch Verio test strip daily    [DISCONTINUED] tadalafil (CIALIS) 10 MG tablet TAKE ONE TABLET BY MOUTH DAILY AS NEEDED FOR ERECTILE DYSFUNCTION    atorvastatin (LIPITOR) 10 mg tablet Take 1 tablet (10 mg total) by mouth every evening (Patient not taking: Reported on 3/13/2024)    Blood Glucose Monitoring Suppl (OneTouch Verio Flex System) w/Device KIT  (Patient not taking: Reported on 3/13/2024)    ondansetron (ZOFRAN-ODT) 4 mg disintegrating tablet Take 1 tablet (4 mg total) by mouth every 6 (six) hours as needed for nausea or vomiting (Patient not taking: Reported on 6/26/2024)    oxyCODONE-acetaminophen (Percocet) 5-325 mg per tablet Take 1 tablet by mouth every 4 (four) hours as needed for moderate pain for up to 10 doses Max Daily Amount: 6 tablets (Patient not taking: Reported on 4/29/2024)     Allergies   Allergen Reactions    Asa [Aspirin] GI Intolerance    Penicillin G Other (See Comments)     Ancef ok     \"unsure; was a baby\"     Immunization History   Administered Date(s) Administered    COVID-19 MODERNA VACC 0.5 ML IM 05/18/2021    Tdap 06/25/2019     Objective     /86 (BP Location: Left arm, Patient Position: Sitting, Cuff Size: Large)   Pulse 77   Temp 97.5 °F (36.4 °C) (Temporal)   Resp 16   Ht 5' 5\" (1.651 m)   Wt 92.3 kg (203 lb 8 oz)   SpO2 98%   BMI 33.86 kg/m²     Physical Exam  Vitals and nursing note reviewed.   Constitutional:       Appearance: Normal appearance. He is well-developed.   HENT:      Head: " Normocephalic and atraumatic.   Cardiovascular:      Rate and Rhythm: Normal rate and regular rhythm.      Pulses: no weak pulses.           Dorsalis pedis pulses are 2+ on the right side and 2+ on the left side.        Posterior tibial pulses are 2+ on the right side and 2+ on the left side.   Pulmonary:      Effort: Pulmonary effort is normal.      Breath sounds: Normal breath sounds.   Abdominal:      General: Bowel sounds are normal.      Palpations: Abdomen is soft.   Musculoskeletal:         General: Tenderness present.      Cervical back: Normal range of motion.   Feet:      Right foot:      Skin integrity: No ulcer, skin breakdown, erythema, warmth, callus or dry skin.      Left foot:      Skin integrity: No ulcer, skin breakdown, erythema, warmth, callus or dry skin.   Skin:     General: Skin is warm and dry.      Comments: Skin tags   Neurological:      General: No focal deficit present.      Mental Status: He is alert and oriented to person, place, and time.      Comments: Left lower extremity weakness strength 3/5   Psychiatric:         Mood and Affect: Mood normal.         Speech: Speech normal.         Behavior: Behavior normal.         Diabetic Foot Exam    Patient's shoes and socks removed.    Right Foot/Ankle   Right Foot Inspection  Skin Exam: skin normal and skin intact. No dry skin, no warmth, no callus, no erythema, no maceration, no abnormal color, no pre-ulcer, no ulcer and no callus.     Toe Exam: ROM and strength within normal limits.     Sensory   Monofilament testing: intact    Vascular  The right DP pulse is 2+. The right PT pulse is 2+.     Left Foot/Ankle  Left Foot Inspection  Skin Exam: skin normal and skin intact. No dry skin, no warmth, no erythema, no maceration, normal color, no pre-ulcer, no ulcer and no callus.     Toe Exam: ROM and strength within normal limits.     Sensory   Monofilament testing: intact    Vascular  The left DP pulse is 2+. The left PT pulse is 2+.     Assign  Risk Category  No deformity present  No loss of protective sensation  No weak pulses  Risk: 0    Administrative Statements

## 2024-06-26 NOTE — ASSESSMENT & PLAN NOTE
Lab Results   Component Value Date    HGBA1C 7.2 (H) 03/13/2024     Continue Trulicity 4.5 mg weekly.  He has had an improvement in his A1c overall.  Has had episodes of hypoglycemia in the morning.  Decrease Lantus to 15 units at bedtime and monitor blood sugars closely.  Upcoming appointment with endocrinology in September.

## 2024-06-26 NOTE — TELEPHONE ENCOUNTER
The pharmacy called to ask about the medication calcipotriene (DOVONEX) 0.005% cream. They need to know in the instructions where the patient should apply it.      Please contact the pharmacy, We try to connect the them with the office.

## 2024-06-27 ENCOUNTER — PREP FOR PROCEDURE (OUTPATIENT)
Age: 49
End: 2024-06-27

## 2024-06-27 DIAGNOSIS — Z12.11 SCREENING FOR COLON CANCER: Primary | ICD-10-CM

## 2024-06-30 DIAGNOSIS — Z79.4 TYPE 2 DIABETES MELLITUS WITH HYPERGLYCEMIA, WITH LONG-TERM CURRENT USE OF INSULIN (HCC): ICD-10-CM

## 2024-06-30 DIAGNOSIS — E11.65 TYPE 2 DIABETES MELLITUS WITH HYPERGLYCEMIA, WITH LONG-TERM CURRENT USE OF INSULIN (HCC): ICD-10-CM

## 2024-07-01 RX ORDER — INSULIN GLARGINE 100 [IU]/ML
INJECTION, SOLUTION SUBCUTANEOUS
Qty: 24 ML | Refills: 1 | Status: SHIPPED | OUTPATIENT
Start: 2024-07-01

## 2024-07-08 ENCOUNTER — OFFICE VISIT (OUTPATIENT)
Dept: PHYSICAL THERAPY | Age: 49
End: 2024-07-08
Payer: COMMERCIAL

## 2024-07-08 DIAGNOSIS — M25.572 CHRONIC PAIN OF LEFT ANKLE: ICD-10-CM

## 2024-07-08 DIAGNOSIS — M54.50 ACUTE BILATERAL LOW BACK PAIN WITHOUT SCIATICA: Primary | ICD-10-CM

## 2024-07-08 DIAGNOSIS — G89.29 CHRONIC PAIN OF LEFT ANKLE: ICD-10-CM

## 2024-07-08 PROCEDURE — 97110 THERAPEUTIC EXERCISES: CPT

## 2024-07-08 NOTE — PROGRESS NOTES
"Daily Note     Today's date: 2024  Patient name: Jc Carmona  : 1975  MRN: [id number]  Referring provider: Tucker Miller MD  Dx:   No diagnosis found.                 Subjective: pt reports pain.  4-5/10 in ankle and 4/10 in back reported.      Objective: See treatment diary below  Knee to wall test: 5 cm (normative value 10-14)      Assessment:   Pt demonstrates achilles tightness, pain.  Pt had improved pain tolerance to activities today.  He had deficits in his ankle stability but was able to progress his dynamic gait activities.   Tolerated treatment well. Patient demonstrated fatigue post treatment and would benefit from continued PT      Plan: Continue per plan of care.      Precautions: hx ankle lateral lig repair      Manuals           Ankle PROM  Gr I/ii mobs  KD                                                  Neuro Re-Ed             Heel toe walk & DB pass   3#    20 ft  5x fw/bw          Bird dog             Mt climber   3 x 15          Sciatic nerve glide                                                    Ther Ex             bridge  5\" x 3 x 8 nv          Pball rollout x 3  prn prn          Modified piriformis stretch  30\" x 8 ea nv          Supported march/mt climber  3 x 15 3 x 15          Hip hinge  3 x 15           Assisted squat  3 x 15 3x15          Ankle quadrant AROM  30x ea           Bent knee heel raise  (Start in sitting)  nv Standing    Feet together    3 x 15                                    Ther Activity                                       Gait Training                                       Modalities                                            "

## 2024-07-10 ENCOUNTER — OFFICE VISIT (OUTPATIENT)
Dept: PODIATRY | Facility: CLINIC | Age: 49
End: 2024-07-10

## 2024-07-10 VITALS
HEIGHT: 65 IN | HEART RATE: 87 BPM | OXYGEN SATURATION: 98 % | SYSTOLIC BLOOD PRESSURE: 143 MMHG | BODY MASS INDEX: 34.16 KG/M2 | WEIGHT: 205 LBS | DIASTOLIC BLOOD PRESSURE: 84 MMHG

## 2024-07-10 DIAGNOSIS — Z98.890 POST-OPERATIVE STATE: Primary | ICD-10-CM

## 2024-07-10 DIAGNOSIS — R26.89 FUNCTIONAL GAIT ABNORMALITY: ICD-10-CM

## 2024-07-10 DIAGNOSIS — R53.81 DEBILITY: ICD-10-CM

## 2024-07-10 PROCEDURE — 99024 POSTOP FOLLOW-UP VISIT: CPT | Performed by: PODIATRIST

## 2024-07-10 NOTE — PROGRESS NOTES
"Assessment/Plan:     The patient's clinical examination today significant for persistent moderate tenderness palpation to the lateral and medial aspects of the left ankle.  There is minimal edema noted today to the left lower extremity.  There is no ecchymosis nor calor.  Pedal pulses are palpable.  Mild tenderness is also noted along the peroneal tendons as they course around the fibular malleolus.  Passive range of motion of the left ankle and subtalar joints appears to be within normal limits.    The patient still reports frequent \"giving out\" of his left ankle.  From a anatomic standpoint, the ATFL and CFL appear to be stable without any excessive laxity.  We may need to investigate a neuritic or radiculopathy component.  He is being worked up currently for low back pain with a MRI of the LS spine pending.    Continue physical therapy as ordered.  Follow-up on the MRI of his LS spine.  We may need to consider EMG/NCV study of the left lower extremity.    Recommend follow-up in 4 weeks.     Diagnoses and all orders for this visit:    Post-operative state    Debility    Functional gait abnormality          Subjective:     Patient ID: Jc Carmona is a 48 y.o. male.    The patient presents today for follow-up of persistent left lower extremity pain and instability.  He still notes pain at the level of 5-6 out of 10 on the pain scale to his left ankle.  He also notes that the left ankle seems to frequently give out on him.  He has been going to physical therapy for his back and they have been addressing his ankle to a lesser degree.  Overall, he notes minimal improvement since his last visit.      PAST MEDICAL HISTORY:  Past Medical History:   Diagnosis Date    Cancer (HCC)     colon    Diabetes mellitus (HCC)     GERD (gastroesophageal reflux disease)     Hyperlipidemia     Post concussion syndrome 05/09/2017    Pre-op examination     Sacroiliitis (HCC)     Sleep apnea     \"mild\"    Traumatic brain injury " ([facility]) 2017    R/S 2017       PAST SURGICAL HISTORY:  Past Surgical History:   Procedure Laterality Date    APPENDECTOMY      COLON SURGERY      COLONOSCOPY      EGD      IN REMOVAL IMPLANT DEEP Left 4/26/2024    Procedure: REMOVAL HARDWARE ANKLE;  Surgeon: Steve Kaplan DPM;  Location: [facility];  Service: Podiatry    IN RPR PRIMARY DISRUPTED LIGAMENT ANKLE COLLATERAL Left 03/10/2023    Procedure: ANKLE LIGAMENT REPAIR OF SYNDESMOTIC LIGAMENT with steroid injection of posterior heel bursa;  Surgeon: Steve Kaplan DPM;  Location: [facility];  Service: Podiatry        ALLERGIES:  Asa [aspirin] and Penicillin g    MEDICATIONS:  Current Outpatient Medications   Medication Sig Dispense Refill    calcipotriene (DOVONEX) 0.005 % cream Apply topically 2 (two) times a day 60 g 1    Continuous Glucose Sensor (FreeStyle Dell 3 Sensor) MISC USE ONE UNIT EVERY 14 DAYS 2 each 5    dulaglutide (Trulicity) 4.5 MG/0.5ML injection Inject 0.5 mL (4.5 mg total) under the skin every 7 days 6 mL 1    Insulin Glargine Solostar (Lantus SoloStar) 100 UNIT/ML SOPN INJECT 15 UNITS (0.15ML) UNDER THE SKIN DAILY AT BEDTIME 24 mL 1    Insulin Pen Needle (BD Pen Needle Nancy U/F) 32G X 4 MM MISC Use twice daily 100 each 2    Lancets (OneTouch Delica Plus [id number]) MISC daily      meloxicam (MOBIC) 15 mg tablet Take 1 tablet (15 mg total) by mouth daily 30 tablet 0    metFORMIN (GLUCOPHAGE) 1000 MG tablet Take 1 tablet (1,000 mg total) by mouth 2 (two) times a day with meals 180 tablet 1    OneTouch Verio test strip daily      tadalafil (CIALIS) 20 MG tablet Take 1 tablet (20 mg total) by mouth daily as needed for erectile dysfunction 30 tablet 5    atorvastatin (LIPITOR) 10 mg tablet Take 1 tablet (10 mg total) by mouth every evening (Patient not taking: Reported on 3/13/2024) 90 tablet 3    Blood Glucose Monitoring Suppl (OneTouch Verio Flex System) w/Device KIT  (Patient not taking: Reported on 3/13/2024)      methylPREDNISolone 4 MG  tablet therapy pack Use as directed on package (Patient not taking: Reported on 7/10/2024) 21 each 0    ondansetron (ZOFRAN-ODT) 4 mg disintegrating tablet Take 1 tablet (4 mg total) by mouth every 6 (six) hours as needed for nausea or vomiting (Patient not taking: Reported on 6/26/2024) 20 tablet 0    oxyCODONE-acetaminophen (Percocet) 5-325 mg per tablet Take 1 tablet by mouth every 4 (four) hours as needed for moderate pain for up to 10 doses Max Daily Amount: 6 tablets (Patient not taking: Reported on 4/29/2024) 10 tablet 0     No current facility-administered medications for this visit.       SOCIAL HISTORY:  Social History     Socioeconomic History    Marital status: Single     Spouse name: None    Number of children: None    Years of education: None    Highest education level: None   Occupational History    None   Tobacco Use    Smoking status: Never    Smokeless tobacco: Never   Vaping Use    Vaping status: Never Used   Substance and Sexual Activity    Alcohol use: Not Currently     Alcohol/week: 1.0 standard drink of alcohol     Types: 1 Cans of beer per week    Drug use: Never    Sexual activity: Yes     Partners: Female     Birth control/protection: None   Other Topics Concern    None   Social History Narrative    None     Social Determinants of Health     Financial Resource Strain: Not on file   Food Insecurity: Not on file   Transportation Needs: Not on file   Physical Activity: Not on file   Stress: Not on file   Social Connections: Not on file   Intimate Partner Violence: Not on file   Housing Stability: Not on file        Review of Systems   Constitutional: Negative.    HENT: Negative.     Eyes: Negative.    Respiratory: Negative.     Cardiovascular: Negative.    Endocrine: Negative.    Musculoskeletal: Negative.    Neurological: Negative.    Hematological: Negative.    Psychiatric/Behavioral: Negative.           Objective:     Physical Exam  Vitals reviewed.   Constitutional:       Appearance:  Normal appearance.   HENT:      Head: Normocephalic and atraumatic.      Nose: Nose normal.   Eyes:      Conjunctiva/sclera: Conjunctivae normal.      Pupils: Pupils are equal, round, and reactive to light.   Cardiovascular:      Pulses:           Dorsalis pedis pulses are 2+ on the left side.        Posterior tibial pulses are 2+ on the left side.   Pulmonary:      Effort: Pulmonary effort is normal.   Feet:      Left foot:      Skin integrity: Skin integrity normal.      Comments: The patient's clinical examination today significant for persistent moderate tenderness palpation to the lateral and medial aspects of the left ankle.  There is minimal edema noted today to the left lower extremity.  There is no ecchymosis nor calor.  Pedal pulses are palpable.  Mild tenderness is also noted along the peroneal tendons as they course around the fibular malleolus.  Passive range of motion of the left ankle and subtalar joints appears to be within normal limits.  Skin:     General: Skin is warm.      Capillary Refill: Capillary refill takes less than 2 seconds.   Neurological:      General: No focal deficit present.      Mental Status: He is alert and oriented to person, place, and time.   Psychiatric:         Mood and Affect: Mood normal.         Behavior: Behavior normal.         Thought Content: Thought content normal.

## 2024-07-11 ENCOUNTER — OFFICE VISIT (OUTPATIENT)
Dept: PHYSICAL THERAPY | Age: 49
End: 2024-07-11
Payer: COMMERCIAL

## 2024-07-11 DIAGNOSIS — M25.572 CHRONIC PAIN OF LEFT ANKLE: ICD-10-CM

## 2024-07-11 DIAGNOSIS — G89.29 CHRONIC PAIN OF LEFT ANKLE: ICD-10-CM

## 2024-07-11 DIAGNOSIS — M54.50 ACUTE BILATERAL LOW BACK PAIN WITHOUT SCIATICA: Primary | ICD-10-CM

## 2024-07-11 PROCEDURE — 97112 NEUROMUSCULAR REEDUCATION: CPT

## 2024-07-11 PROCEDURE — 97110 THERAPEUTIC EXERCISES: CPT

## 2024-07-11 NOTE — PROGRESS NOTES
"Daily Note     Today's date: 2024  Patient name: Jc Carmona  : 1975  MRN: [id number]  Referring provider: Tucker Miller MD  Dx:   Encounter Diagnosis     ICD-10-CM    1. Acute bilateral low back pain without sciatica  M54.50       2. Chronic pain of left ankle  M25.572     G89.29                      Subjective: pt reports feeling tight at end of session but not too painful      Objective: See treatment diary below      Assessment: Tolerated treatment well. Patient demonstrated fatigue post treatment and would benefit from continued PT, increased burning with bridges but some relief noted after performing single KTC ex      Plan: Continue per plan of care.  Progress treatment as tolerated.       Precautions: hx ankle lateral lig repair      Manuals           Ankle PROM  Gr I/ii mobs  KD                                                  Neuro Re-Ed             Heel toe walk & DB pass   3#    20 ft  5x fw/bw Tandem amb 3# fw/bw 5x20'         Bird dog             Mt climber   3 x 15 3x15         Sciatic nerve glide                                                    Ther Ex             bridge  5\" x 3 x 8 nv 10x3\"         Pball rollout x 3  prn prn 5x5\" ea direction          Modified piriformis stretch  30\" x 8 ea nv 4x20\" ea B/L         Supported march/mt climber  3 x 15 3 x 15 3x15         Hip hinge  3 x 15           Assisted squat  3 x 15 3x15 3x15         Ankle quadrant AROM  30x ea           Bent knee heel raise  (Start in sitting)  nv Standing    Feet together    3 x 15 Standing 3x15         KTC w/ strap    10x10\" ea B/L                      Ther Activity                                       Gait Training                                       Modalities                                              "

## 2024-07-15 ENCOUNTER — OFFICE VISIT (OUTPATIENT)
Dept: PHYSICAL THERAPY | Age: 49
End: 2024-07-15
Payer: COMMERCIAL

## 2024-07-15 DIAGNOSIS — M25.572 CHRONIC PAIN OF LEFT ANKLE: ICD-10-CM

## 2024-07-15 DIAGNOSIS — M54.50 ACUTE BILATERAL LOW BACK PAIN WITHOUT SCIATICA: Primary | ICD-10-CM

## 2024-07-15 DIAGNOSIS — G89.29 CHRONIC PAIN OF LEFT ANKLE: ICD-10-CM

## 2024-07-15 PROCEDURE — 97112 NEUROMUSCULAR REEDUCATION: CPT

## 2024-07-15 PROCEDURE — 97110 THERAPEUTIC EXERCISES: CPT

## 2024-07-15 NOTE — PROGRESS NOTES
"Daily Note     Today's date: 7/15/2024  Patient name: Jc Carmona  : 1975  MRN: [id number]  Referring provider: Tucker Miller MD  Dx:   Encounter Diagnosis     ICD-10-CM    1. Acute bilateral low back pain without sciatica  M54.50       2. Chronic pain of left ankle  M25.572     G89.29                      Subjective:  Jc reports that his back is feeling the same.  He continues to have pain in his back and symptoms into his L leg.  He notes that his R knee is starting to hurt from over using his R knee.    Patient is scheduled for an MRI next week.        Objective: See treatment diary below      Assessment: Patient tolerated treatment well. Patient performed ex as noted.  Provided cues as needed to ensure good ex technique and benefit.  Patient reported no change in his pain during or post treatment.  Patient would benefit from continued PT intervention to address deficits and attain set goals.       Plan: Continue per plan of care.      Precautions: hx ankle lateral lig repair      Manuals 6/19 6/24 7/8  7/15        Ankle PROM  Gr I/ii mobs  KD                                                  Neuro Re-Ed    7/11 7/15        Heel toe walk & DB pass   3#    20 ft  5x fw/bw Tandem amb 3# fw/bw 5x20' Hold per patient        Bird dog             Mt climber   3 x 15 3x15 3x15        Sciatic nerve glide                                                    Ther Ex             bridge  5\" x 3 x 8 nv 10x3\" 10x3\"        Pball rollout x 3  prn prn 5x5\" ea direction  5x5\" ea direction        Modified piriformis stretch  30\" x 8 ea nv 4x20\" ea B/L 4x20\"  Ea b/l        Supported march/mt climber  3 x 15 3 x 15 3x15 3x15        Hip hinge  3 x 15           Assisted squat  3 x 15 3x15 3x15 3x15        Ankle quadrant AROM  30x ea           Bent knee heel raise  (Start in sitting)  nv Standing    Feet together    3 x 15 Standing 3x15 Standing  3x15          KTC w/ strap    10x10\" ea B/L 10x10\"  Ea b/l        RB     " x4'        Ther Activity                                       Gait Training                                       Modalities

## 2024-07-18 ENCOUNTER — APPOINTMENT (OUTPATIENT)
Dept: PHYSICAL THERAPY | Age: 49
End: 2024-07-18
Payer: COMMERCIAL

## 2024-07-19 ENCOUNTER — OFFICE VISIT (OUTPATIENT)
Dept: PHYSICAL THERAPY | Age: 49
End: 2024-07-19
Payer: COMMERCIAL

## 2024-07-19 DIAGNOSIS — M25.572 CHRONIC PAIN OF LEFT ANKLE: ICD-10-CM

## 2024-07-19 DIAGNOSIS — M54.50 ACUTE BILATERAL LOW BACK PAIN WITHOUT SCIATICA: Primary | ICD-10-CM

## 2024-07-19 DIAGNOSIS — G89.29 CHRONIC PAIN OF LEFT ANKLE: ICD-10-CM

## 2024-07-19 PROCEDURE — 97110 THERAPEUTIC EXERCISES: CPT

## 2024-07-19 NOTE — PROGRESS NOTES
"Daily Note     Today's date: 2024  Patient name: Jc Carmona  : 1975  MRN: [id number]  Referring provider: Tucker Miller MD  Dx:   Encounter Diagnosis     ICD-10-CM    1. Acute bilateral low back pain without sciatica  M54.50       2. Chronic pain of left ankle  M25.572     G89.29           Start Time: 1430  Stop Time: 1508  Total time in clinic (min): 38 minutes    Subjective: Pt reports he is in a significant amount of pain/discomfort today (9/10) and is unsure how much he will be able to do today in PT. He states that his pain continues to shoot into his L ankle, and that the only position that seems to relieve symptoms is laying on his left side in the fetal position.       Objective: See treatment diary below      Assessment: Held on several interventions this session due to pt reports of significant pain and discomfort. Pt able to tolerate several interventions involving lumbar flexion from R anterior pelvic tilt in hooklying (ie: KTC stretch, piriformis stretch), but was unable to perform as effectively on L side. Pt inquired about use of electrical stimulation for pain, which he reported using in the past. Educated pt on use of TENS and its potential benefits; instructed pt to see how he feels after the weekend and after his MRI to allow time for his symptom flare-up to subside. Tolerated treatment poor. Patient would benefit from continued PT      Plan: Continue per plan of care.      Precautions: hx ankle lateral lig repair      Manuals 6/19 6/24 7/8  7/15 7/19       Ankle PROM  Gr I/ii mobs  KD                                                  Neuro Re-Ed    7/11 7/15 7/19       Heel toe walk & DB pass   3#    20 ft  5x fw/bw Tandem amb 3# fw/bw 5x20' Hold per patient Hold per pt       Bird dog             Mt climber   3 x 15 3x15 3x15 Hold per pt       Sciatic nerve glide                                                    Ther Ex             bridge  5\" x 3 x 8 nv 10x3\" 10x3\"      " "  Pball rollout x 3  prn prn 5x5\" ea direction  5x5\" ea direction 5x5\" ea direction       Modified piriformis stretch  30\" x 8 ea nv 4x20\" ea B/L 4x20\"  Ea b/l 4x20\"  Ea b/l       Supported march/mt climber  3 x 15 3 x 15 3x15 3x15        Hip hinge  3 x 15           Assisted squat  3 x 15 3x15 3x15 3x15 3x15       Ankle quadrant AROM  30x ea           Bent knee heel raise  (Start in sitting)  nv Standing    Feet together    3 x 15 Standing 3x15 Standing  3x15   Hold per pt       KTC w/ strap    10x10\" ea B/L 10x10\"  Ea b/l 10x10\"  Ea b/l       RB     x4' x5'       Pt Ed      ES       Ther Activity                                       Gait Training                                       Modalities                                                  "

## 2024-07-22 ENCOUNTER — OFFICE VISIT (OUTPATIENT)
Dept: PHYSICAL THERAPY | Age: 49
End: 2024-07-22
Payer: COMMERCIAL

## 2024-07-22 ENCOUNTER — HOSPITAL ENCOUNTER (OUTPATIENT)
Dept: RADIOLOGY | Facility: HOSPITAL | Age: 49
Discharge: HOME | End: 2024-07-22
Attending: FAMILY MEDICINE
Payer: COMMERCIAL

## 2024-07-22 DIAGNOSIS — M54.16 LUMBAR RADICULOPATHY: ICD-10-CM

## 2024-07-22 DIAGNOSIS — M25.572 CHRONIC PAIN OF LEFT ANKLE: ICD-10-CM

## 2024-07-22 DIAGNOSIS — G89.29 CHRONIC PAIN OF LEFT ANKLE: ICD-10-CM

## 2024-07-22 DIAGNOSIS — M54.50 ACUTE BILATERAL LOW BACK PAIN WITHOUT SCIATICA: Primary | ICD-10-CM

## 2024-07-22 PROCEDURE — 97110 THERAPEUTIC EXERCISES: CPT

## 2024-07-22 PROCEDURE — 97140 MANUAL THERAPY 1/> REGIONS: CPT

## 2024-07-22 PROCEDURE — 72148 MRI LUMBAR SPINE W/O DYE: CPT

## 2024-07-22 NOTE — PROGRESS NOTES
"Daily Note     Today's date: 2024  Patient name: Jc Carmona  : 1975  MRN: [id number]  Referring provider: Tucker Miller MD  Dx:   Encounter Diagnosis     ICD-10-CM    1. Acute bilateral low back pain without sciatica  M54.50       2. Chronic pain of left ankle  M25.572     G89.29             Start Time: 0854  Stop Time: 0932  Total time in clinic (min): 38 minutes    Subjective: pt reports 8-9/10 pain.  Tightness in back noted.  Difficulty bending.   Objective: See treatment diary below      Assessment: Pt had limited tolerance to activities today. Pt muscle spasms L lower back appears driving factor of pain currently.  Child's pose, lumbar stretches well tolerated with improved ROM noted post.  Pt has pain with repeated extension of back without change in pain intensity or location. Tolerated treatment poor. Patient would benefit from continued PT      Plan: Continue per plan of care.      Precautions: hx ankle lateral lig repair      Manuals 6/19 6/24 7/8  7/15 7/19 7/22      Ankle PROM  Gr I/ii mobs  KD           CPA L spine, UPAs     Grii/iii       KD                                Neuro Re-Ed    7/11 7/15 7/19       Heel toe walk & DB pass   3#    20 ft  5x fw/bw Tandem amb 3# fw/bw 5x20' Hold per patient Hold per pt       Bird dog       nv      Mt climber   3 x 15 3x15 3x15 Hold per pt nv      Sciatic nerve glide                                                    Ther Ex             bridge  5\" x 3 x 8 nv 10x3\" 10x3\"        Pball rollout x 3  prn prn 5x5\" ea direction  5x5\" ea direction 5x5\" ea direction 10\" x 10      Modified piriformis stretch  30\" x 8 ea nv 4x20\" ea B/L 4x20\"  Ea b/l 4x20\"  Ea b/l 4 x 20\"      Supported march/mt climber  3 x 15 3 x 15 3x15 3x15        Hip hinge  3 x 15           Assisted squat  3 x 15 3x15 3x15 3x15 3x15 3 x 15  R knee pain, back tightness      Ankle quadrant AROM  30x ea           Bent knee heel raise  (Start in sitting)  nv Standing    Feet " "together    3 x 15 Standing 3x15 Standing  3x15   Hold per pt       KTC w/ strap    10x10\" ea B/L 10x10\"  Ea b/l 10x10\"  Ea b/l       Child's pose       30\" x 5      RB     x4' x5'       Pt Ed      ES       Ther Activity                                       Gait Training                                       Modalities                                                  "

## 2024-07-23 ENCOUNTER — OFFICE VISIT (OUTPATIENT)
Dept: OBGYN CLINIC | Facility: MEDICAL CENTER | Age: 49
End: 2024-07-23
Payer: COMMERCIAL

## 2024-07-23 VITALS
HEART RATE: 68 BPM | BODY MASS INDEX: 34.16 KG/M2 | SYSTOLIC BLOOD PRESSURE: 124 MMHG | HEIGHT: 65 IN | DIASTOLIC BLOOD PRESSURE: 88 MMHG | WEIGHT: 205 LBS

## 2024-07-23 DIAGNOSIS — R20.0 NUMBNESS OF LEFT FOOT: ICD-10-CM

## 2024-07-23 DIAGNOSIS — M54.42 ACUTE BILATERAL LOW BACK PAIN WITH LEFT-SIDED SCIATICA: Primary | ICD-10-CM

## 2024-07-23 DIAGNOSIS — R29.2 DECREASED LEFT PATELLAR REFLEX: ICD-10-CM

## 2024-07-23 DIAGNOSIS — M22.2X1 PATELLOFEMORAL DISORDER OF RIGHT KNEE: ICD-10-CM

## 2024-07-23 DIAGNOSIS — M25.561 ACUTE PAIN OF RIGHT KNEE: ICD-10-CM

## 2024-07-23 DIAGNOSIS — R29.898 WEAKNESS OF LEFT LEG: ICD-10-CM

## 2024-07-23 PROCEDURE — 99214 OFFICE O/P EST MOD 30 MIN: CPT | Performed by: EMERGENCY MEDICINE

## 2024-07-23 RX ORDER — GABAPENTIN 300 MG/1
300 CAPSULE ORAL
Qty: 30 CAPSULE | Refills: 1 | Status: SHIPPED | OUTPATIENT
Start: 2024-07-23

## 2024-07-23 NOTE — LETTER
July 23, 2024     Patient: Jc Carmona  YOB: 1975  Date of Visit: 7/23/2024      To Whom it May Concern:    Jc Carmona is under my professional care. Jc was seen in my office on 7/23/2024.  Work excuse until further notice, estimated time out of work 2-3 months.      If you have any questions or concerns, please don't hesitate to call.         Sincerely,          Tucker Miller MD        CC: No Recipients
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
## 2024-07-24 ENCOUNTER — OFFICE VISIT (OUTPATIENT)
Dept: PAIN MEDICINE | Facility: CLINIC | Age: 49
End: 2024-07-24
Payer: COMMERCIAL

## 2024-07-24 VITALS
HEIGHT: 65 IN | HEART RATE: 88 BPM | SYSTOLIC BLOOD PRESSURE: 136 MMHG | WEIGHT: 206 LBS | BODY MASS INDEX: 34.32 KG/M2 | RESPIRATION RATE: 16 BRPM | DIASTOLIC BLOOD PRESSURE: 80 MMHG

## 2024-07-24 DIAGNOSIS — M51.16 INTERVERTEBRAL DISC DISORDER WITH RADICULOPATHY OF LUMBAR REGION: Primary | ICD-10-CM

## 2024-07-24 DIAGNOSIS — R20.0 NUMBNESS OF LEFT FOOT: ICD-10-CM

## 2024-07-24 DIAGNOSIS — M47.816 LUMBAR SPONDYLOSIS: ICD-10-CM

## 2024-07-24 DIAGNOSIS — G89.4 CHRONIC PAIN SYNDROME: ICD-10-CM

## 2024-07-24 DIAGNOSIS — M54.42 ACUTE BILATERAL LOW BACK PAIN WITH LEFT-SIDED SCIATICA: ICD-10-CM

## 2024-07-24 PROCEDURE — 99214 OFFICE O/P EST MOD 30 MIN: CPT

## 2024-07-24 NOTE — PROGRESS NOTES
Assessment:  1. Intervertebral disc disorder with radiculopathy of lumbar region    2. Chronic pain syndrome    3. Lumbar spondylosis        Plan:  The patient is a 48-year-old male with a history of chronic pain secondary to low back pain, lumbar intervertebral disc disorder with radiculopathy, lumbar spondylosis and sacroiliitis who presents to the office with worsening bilateral low back pain and burning and pins and needle sensation into the left lower extremity stopping at the left foot.    At this time, I did instruct patient we can repeat a left-sided L5-S1 TFESI to help decrease inflammation and provide him relief.  Patient would like to proceed and will be scheduled.  Complete risks and benefits including bleeding, infection, tissue reaction, nerve injury and allergic reaction were discussed.  The approach was demonstrated using models and literature was provided.  Verbal and written consent was obtained.    My impressions and treatment recommendations were discussed in detail with the patient who verbalized understanding and had no further questions.  Discharge instructions were provided. I personally saw and examined the patient and I agree with the above discussed plan of care.    Orders Placed This Encounter   Procedures    FL spine and pain procedure     Dr Emanuel     Standing Status:   Future     Standing Expiration Date:   7/24/2028     Order Specific Question:   Reason for Exam:     Answer:   Left L5-S1 TFESI     Order Specific Question:   Anticoagulant hold needed?     Answer:   No     No orders of the defined types were placed in this encounter.      History of Present Illness:  Jc Carmona is a 48 y.o. male with a history of chronic pain secondary to low back pain, lumbar intervertebral disorder with radiculopathy, lumbar spondylosis and sacroiliitis.  He was last seen on 3/29/2023 where he underwent bilateral SI joint injections.  He presents to the office with worsening bilateral low  back pain and burning and pins and needle sensation into the left lower extremity stopping at the left foot.    He states his pain is worse since last office visit and constant but worse in the evening and at night.  He states his pain is worse with activity.  He rates quality of his pain is burning, throbbing, numbness, pins/needles and is currently rating his pain an 8.5/10 on a numeric scale.    Current pain medications include ibuprofen as needed which is mildly helpful.  He was started on gabapentin 300 mg at bedtime by Ortho, however he has not started this medication yet.    The patient had an MRI of his lumbar spine done on 7/22/2024, final results are not available at this time.    I have personally reviewed and/or updated the patient's past medical history, past surgical history, family history, social history, current medications, allergies, and vital signs today.     Review of Systems   Respiratory:  Negative for shortness of breath.    Cardiovascular:  Negative for chest pain.   Gastrointestinal:  Negative for constipation, diarrhea, nausea and vomiting.   Musculoskeletal:  Positive for back pain and gait problem. Negative for arthralgias, joint swelling and myalgias.   Skin:  Negative for rash.   Neurological:  Negative for dizziness, seizures and weakness.   All other systems reviewed and are negative.      Patient Active Problem List   Diagnosis    Chronic diarrhea    De Quervain's tenosynovitis    Fatty liver    Hypertriglyceridemia    Impingement syndrome of left shoulder    Left lumbar radiculopathy    Male erectile dysfunction    Plantar warts    Paresthesias    Excessive daytime sleepiness    PETTY (obstructive sleep apnea)    Type 2 diabetes mellitus with hyperglycemia, with long-term current use of insulin ([facility])    Class 1 obesity due to excess calories with serious comorbidity and body mass index (BMI) of 34.0 to 34.9 in adult    Arthritis of right knee    Chronic pain of right knee     "Abdominal wall hernia    Syndesmotic disruption of ankle, left, subsequent encounter    Calculus of gallbladder without cholecystitis without obstruction    Abdominal pain    Ankle syndesmosis disruption, left, subsequent encounter    Acute left ankle pain       Past Medical History:   Diagnosis Date    Cancer (HCC)     colon    Diabetes mellitus (HCC)     GERD (gastroesophageal reflux disease)     Hyperlipidemia     Post concussion syndrome 05/09/2017    Pre-op examination     Sacroiliitis (HCC)     Sleep apnea     \"mild\"    Traumatic brain injury (HCC) 2017    R/S 2017       Past Surgical History:   Procedure Laterality Date    APPENDECTOMY      COLON SURGERY      COLONOSCOPY      EGD      NE REMOVAL IMPLANT DEEP Left 4/26/2024    Procedure: REMOVAL HARDWARE ANKLE;  Surgeon: Steve Kaplan DPM;  Location: [facility];  Service: Podiatry    NE RPR PRIMARY DISRUPTED LIGAMENT ANKLE COLLATERAL Left 03/10/2023    Procedure: ANKLE LIGAMENT REPAIR OF SYNDESMOTIC LIGAMENT with steroid injection of posterior heel bursa;  Surgeon: Steve Kaplan DPM;  Location: [facility];  Service: Podiatry       Family History   Problem Relation Age of Onset    Coronary artery disease Mother     Diabetes Mother     Hypertension Mother     Hyperlipidemia Mother     Coronary artery disease Father     Diabetes Father     Hypertension Father     Hyperlipidemia Father     Heart attack Father     Anemia Brother     No Known Problems Son     No Known Problems Son     No Known Problems Son        Social History     Occupational History    Not on file   Tobacco Use    Smoking status: Never    Smokeless tobacco: Never   Vaping Use    Vaping status: Never Used   Substance and Sexual Activity    Alcohol use: Not Currently     Alcohol/week: 1.0 standard drink of alcohol     Types: 1 Cans of beer per week    Drug use: Never    Sexual activity: Yes     Partners: Female     Birth control/protection: None       Current Outpatient Medications on File " Prior to Visit   Medication Sig    calcipotriene (DOVONEX) 0.005 % cream Apply topically 2 (two) times a day    Continuous Glucose Sensor (FreeStyle Dell 3 Sensor) MISC USE ONE UNIT EVERY 14 DAYS    dulaglutide (Trulicity) 4.5 MG/0.5ML injection Inject 0.5 mL (4.5 mg total) under the skin every 7 days    gabapentin (Neurontin) 300 mg capsule Take 1 capsule (300 mg total) by mouth daily at bedtime    Insulin Glargine Solostar (Lantus SoloStar) 100 UNIT/ML SOPN INJECT 15 UNITS (0.15ML) UNDER THE SKIN DAILY AT BEDTIME    Insulin Pen Needle (BD Pen Needle Nancy U/F) 32G X 4 MM MISC Use twice daily    Lancets (OneTouch Delica Plus [id number]) MISC daily    meloxicam (MOBIC) 15 mg tablet Take 1 tablet (15 mg total) by mouth daily    metFORMIN (GLUCOPHAGE) 1000 MG tablet Take 1 tablet (1,000 mg total) by mouth 2 (two) times a day with meals    OneTouch Verio test strip daily    tadalafil (CIALIS) 20 MG tablet Take 1 tablet (20 mg total) by mouth daily as needed for erectile dysfunction    atorvastatin (LIPITOR) 10 mg tablet Take 1 tablet (10 mg total) by mouth every evening (Patient not taking: Reported on 3/13/2024)    Blood Glucose Monitoring Suppl (OneTouch Verio Flex System) w/Device KIT  (Patient not taking: Reported on 3/13/2024)    methylPREDNISolone 4 MG tablet therapy pack Use as directed on package (Patient not taking: Reported on 7/10/2024)    ondansetron (ZOFRAN-ODT) 4 mg disintegrating tablet Take 1 tablet (4 mg total) by mouth every 6 (six) hours as needed for nausea or vomiting (Patient not taking: Reported on 6/26/2024)    oxyCODONE-acetaminophen (Percocet) 5-325 mg per tablet Take 1 tablet by mouth every 4 (four) hours as needed for moderate pain for up to 10 doses Max Daily Amount: 6 tablets (Patient not taking: Reported on 4/29/2024)     No current facility-administered medications on file prior to visit.       Allergies   Allergen Reactions    Asa [Aspirin] GI Intolerance    Penicillin G Other (See  "Comments)     Ancef ok     \"unsure; was a baby\"       Physical Exam:    /80   Pulse 88   Resp 16   Ht 5' 5\" (1.651 m)   Wt 93.4 kg (206 lb)   BMI 34.28 kg/m²     Constitutional:normal, well developed, well nourished, alert, in no distress and non-toxic and no overt pain behavior.  Eyes:anicteric  HEENT:grossly intact  Neck:supple, symmetric, trachea midline and no masses   Pulmonary:even and unlabored  Cardiovascular:No edema or pitting edema present  Skin:Normal without rashes or lesions and well hydrated  Psychiatric:Mood and affect appropriate  Neurologic:Cranial Nerves II-XII grossly intact  Musculoskeletal:normal    Lumbar Spine Exam    Appearance:  Normal lordosis  Palpation/Tenderness:  left lumbar paraspinal tenderness  right lumbar paraspinal tenderness  Sensory:  no sensory deficits noted  Range of Motion:  Flexion:  Minimally limited  with pain  Extension:  Minimally limited  with pain  Motor Strength:  Left hip flexion:  5/5  Right hip flexion:  5/5  Left knee flexion:  5/5  Left knee extension:  5/5  Right knee flexion:  5/5  Right knee extension:  5/5  Left foot dorsiflexion:  5/5  Left foot plantar flexion:  5/5  Right foot dorsiflexion:  5/5  Right foot plantar flexion:  5/5      Imaging    "

## 2024-07-25 ENCOUNTER — OFFICE VISIT (OUTPATIENT)
Dept: PHYSICAL THERAPY | Age: 49
End: 2024-07-25
Payer: COMMERCIAL

## 2024-07-25 ENCOUNTER — TELEPHONE (OUTPATIENT)
Age: 49
End: 2024-07-25

## 2024-07-25 ENCOUNTER — TELEPHONE (OUTPATIENT)
Dept: GASTROENTEROLOGY | Facility: AMBULARY SURGERY CENTER | Age: 49
End: 2024-07-25

## 2024-07-25 DIAGNOSIS — R19.4 CHANGE IN BOWEL HABIT: Primary | ICD-10-CM

## 2024-07-25 DIAGNOSIS — M54.50 ACUTE BILATERAL LOW BACK PAIN WITHOUT SCIATICA: Primary | ICD-10-CM

## 2024-07-25 DIAGNOSIS — M25.572 CHRONIC PAIN OF LEFT ANKLE: ICD-10-CM

## 2024-07-25 DIAGNOSIS — G89.29 CHRONIC PAIN OF LEFT ANKLE: ICD-10-CM

## 2024-07-25 PROCEDURE — 97110 THERAPEUTIC EXERCISES: CPT

## 2024-07-25 RX ORDER — BISACODYL 5 MG/1
10 TABLET, DELAYED RELEASE ORAL ONCE
Qty: 2 TABLET | Refills: 0 | Status: SHIPPED | OUTPATIENT
Start: 2024-07-25 | End: 2024-07-25

## 2024-07-25 NOTE — PROGRESS NOTES
"Daily Note     Today's date: 2024  Patient name: Jc Carmona  : 1975  MRN: [id number]  Referring provider: Tucker Miller MD  Dx:   Encounter Diagnosis     ICD-10-CM    1. Acute bilateral low back pain without sciatica  M54.50       2. Chronic pain of left ankle  M25.572     G89.29               Start Time: 0846  Stop Time: 09  Total time in clinic (min): 44 minutes    Subjective: pt reports 6-7/10 pain.  Reports first day of less pain, he slept better after taking gabapentin. Tightness in back noted.  Difficulty bending.   Objective: See treatment diary below  Reported L ankle pain with leg press    Assessment:   FOTO score unchanged. Pt demonstrated improved flexibility in back with forward flexion indicating improved irritability of symptoms.  He tolerated core loading progressions.  Tolerated treatment  fairly overall . Patient would benefit from continued PT      Plan: Continue per plan of care.      Precautions: hx ankle lateral lig repair      FOTO        x     Manuals 6/19 6/24 7/8  7/15 7/19 7/22 7/25     Ankle PROM  Gr I/ii mobs  KD           CPA L spine, UPAs     Grii/iii       KD                                Neuro Re-Ed    7/11 7/15 7/19       Heel toe walk & DB pass   3#    20 ft  5x fw/bw Tandem amb 3# fw/bw 5x20' Hold per patient Hold per pt       Bird dog       nv      Mt climber   3 x 15 3x15 3x15 Hold per pt nv      Sciatic nerve glide                                                    Ther Ex             bridge  5\" x 3 x 8 nv 10x3\" 10x3\"        Pball rollout x 3  prn prn 5x5\" ea direction  5x5\" ea direction 5x5\" ea direction 10\" x 10 10\" x 10     Modified piriformis stretch  30\" x 8 ea nv 4x20\" ea B/L 4x20\"  Ea b/l 4x20\"  Ea b/l 4 x 20\" 30\" x 4 ea     Supported march/mt climber  3 x 15 3 x 15 3x15 3x15   3 x 15     Palloff press-band        Rtb  3 x 10     Hip hinge  3 x 15           Assisted squat  3 x 15 3x15 3x15 3x15 3x15 3 x 15  R knee pain, back tightness hold   " "  Leg press        115#  Anthony    3 x 10     Ankle quadrant AROM  30x ea           Bent knee heel raise  (Start in sitting)  nv Standing    Feet together    3 x 15 Standing 3x15 Standing  3x15   Hold per pt  Sitting  3 x 10  20# on knee     KTC w/ strap    10x10\" ea B/L 10x10\"  Ea b/l 10x10\"  Ea b/l       Child's pose       30\" x 5 10\"x8     RB     x4' x5'  5' L3     Pt Ed      ES       Ther Activity                                       Gait Training                                       Modalities                                                  "

## 2024-07-25 NOTE — TELEPHONE ENCOUNTER
Rosibel at [facility]  stated the test done on 7/22/24 has significant findings. Thank you.

## 2024-07-25 NOTE — TELEPHONE ENCOUNTER
Confirmed procedure date of 08/02/24 at [facility] ...    Patient requested Golytely/dulcolax to be sent to [facility] on [address].    Patient knows to hold Trulicity a week before procedure and requested prep instructions to be sent through [facility]

## 2024-07-29 ENCOUNTER — OFFICE VISIT (OUTPATIENT)
Dept: PHYSICAL THERAPY | Age: 49
End: 2024-07-29
Payer: COMMERCIAL

## 2024-07-29 DIAGNOSIS — M54.50 ACUTE BILATERAL LOW BACK PAIN WITHOUT SCIATICA: Primary | ICD-10-CM

## 2024-07-29 DIAGNOSIS — M25.572 CHRONIC PAIN OF LEFT ANKLE: ICD-10-CM

## 2024-07-29 DIAGNOSIS — G89.29 CHRONIC PAIN OF LEFT ANKLE: ICD-10-CM

## 2024-07-29 PROCEDURE — 97112 NEUROMUSCULAR REEDUCATION: CPT

## 2024-07-29 PROCEDURE — 97140 MANUAL THERAPY 1/> REGIONS: CPT

## 2024-07-29 PROCEDURE — 97110 THERAPEUTIC EXERCISES: CPT

## 2024-07-29 NOTE — PROGRESS NOTES
"Daily Note     Today's date: 2024  Patient name: Jc Carmona  : 1975  MRN: [id number]  Referring provider: Tucker Miller MD  Dx:   Encounter Diagnosis     ICD-10-CM    1. Acute bilateral low back pain without sciatica  M54.50       2. Chronic pain of left ankle  M25.572     G89.29                 Start Time: 0847  Stop Time: 09  Total time in clinic (min): 43 minutes    Subjective: pt reports 8-9/10 pain with activity.  Pain with sitting. Tightness with bending noted.   Objective: See treatment diary below  Reported L ankle pain with leg press.  Pain worse since this morning    Objective:     Pt ROM limited, painful in L spine    Assessment:   FOTO score unchanged. Pt symptoms indicate irritable pain in spine with manual pressure, without relief.  Lumbar exercises tolerated without report of severe difficulty or pain.    Tolerated treatment  fairly overall . Patient would benefit from continued PT      Plan: Continue per plan of care.      Precautions: hx ankle lateral lig repair      FOTO        x     Manuals 6/19 6/24 7/8  7/15 7/19 7/22 7/25 7/29    Ankle PROM  Gr I/ii mobs  KD           CPA L spine, UPAs     Grii/iii       KD  KD                              Neuro Re-Ed    7/11 7/15 7/19       Heel toe walk & DB pass   3#    20 ft  5x fw/bw Tandem amb 3# fw/bw 5x20' Hold per patient Hold per pt       Bird dog       nv      Mt climber   3 x 15 3x15 3x15 Hold per pt nv      Sciatic nerve glide             Sidelying open book with nerve glide         3-5\"    2 x 10    Palloff press-band         2 x 10  Rtb                   Ther Ex             bridge  5\" x 3 x 8 nv 10x3\" 10x3\"        Pball rollout x 3  prn prn 5x5\" ea direction  5x5\" ea direction 5x5\" ea direction 10\" x 10 10\" x 10 nv    Modified piriformis stretch  30\" x 8 ea nv 4x20\" ea B/L 4x20\"  Ea b/l 4x20\"  Ea b/l 4 x 20\" 30\" x 4 ea nv    Supported march/mt climber  3 x 15 3 x 15 3x15 3x15   3 x 15     Palloff press-band        " "Rtb  3 x 10     CC-retro         20#    3 x 10    Hip hinge  3 x 15           Assisted squat  3 x 15 3x15 3x15 3x15 3x15 3 x 15  R knee pain, back tightness hold     Leg press        115#  Anthony    3 x 10 75#    Anthony  4 x 10    Ankle quadrant AROM  30x ea           Bent knee heel raise  (Start in sitting)  nv Standing    Feet together    3 x 15 Standing 3x15 Standing  3x15   Hold per pt  Sitting  3 x 10  20# on knee Stand    3 x 10    KTC w/ strap    10x10\" ea B/L 10x10\"  Ea b/l 10x10\"  Ea b/l       Child's pose       30\" x 5 10\"x8     RB     x4' x5'  5' L3 5'    Pt Ed      ES       Ther Activity                                       Gait Training                                       Modalities                                                  "

## 2024-07-30 ENCOUNTER — APPOINTMENT (OUTPATIENT)
Dept: LAB | Facility: CLINIC | Age: 49
End: 2024-07-30
Payer: COMMERCIAL

## 2024-07-30 DIAGNOSIS — Z79.4 TYPE 2 DIABETES MELLITUS WITH HYPERGLYCEMIA, WITH LONG-TERM CURRENT USE OF INSULIN (HCC): ICD-10-CM

## 2024-07-30 DIAGNOSIS — E11.65 TYPE 2 DIABETES MELLITUS WITH HYPERGLYCEMIA, WITH LONG-TERM CURRENT USE OF INSULIN (HCC): ICD-10-CM

## 2024-07-30 LAB
ALBUMIN SERPL BCG-MCNC: 4.3 G/DL (ref 3.5–5)
ALP SERPL-CCNC: 57 U/L (ref 34–104)
ALT SERPL W P-5'-P-CCNC: 45 U/L (ref 7–52)
ANION GAP SERPL CALCULATED.3IONS-SCNC: 11 MMOL/L (ref 4–13)
AST SERPL W P-5'-P-CCNC: 25 U/L (ref 13–39)
BILIRUB SERPL-MCNC: 0.5 MG/DL (ref 0.2–1)
BUN SERPL-MCNC: 14 MG/DL (ref 5–25)
CALCIUM SERPL-MCNC: 9.2 MG/DL (ref 8.4–10.2)
CHLORIDE SERPL-SCNC: 106 MMOL/L (ref 96–108)
CHOLEST SERPL-MCNC: 177 MG/DL
CO2 SERPL-SCNC: 24 MMOL/L (ref 21–32)
CREAT SERPL-MCNC: 0.83 MG/DL (ref 0.6–1.3)
CREAT UR-MCNC: 231.2 MG/DL
EST. AVERAGE GLUCOSE BLD GHB EST-MCNC: 163 MG/DL
GFR SERPL CREATININE-BSD FRML MDRD: 104 ML/MIN/1.73SQ M
GLUCOSE P FAST SERPL-MCNC: 110 MG/DL (ref 65–99)
HBA1C MFR BLD: 7.3 %
HDLC SERPL-MCNC: 37 MG/DL
LDLC SERPL DIRECT ASSAY-MCNC: 94 MG/DL (ref 0–100)
MICROALBUMIN UR-MCNC: 38.4 MG/L
MICROALBUMIN/CREAT 24H UR: 17 MG/G CREATININE (ref 0–30)
POTASSIUM SERPL-SCNC: 4.2 MMOL/L (ref 3.5–5.3)
PROT SERPL-MCNC: 7.2 G/DL (ref 6.4–8.4)
SODIUM SERPL-SCNC: 141 MMOL/L (ref 135–147)
TRIGL SERPL-MCNC: 419 MG/DL
TSH SERPL DL<=0.05 MIU/L-ACNC: 1.56 UIU/ML (ref 0.45–4.5)

## 2024-07-30 PROCEDURE — 80061 LIPID PANEL: CPT

## 2024-07-30 PROCEDURE — 83036 HEMOGLOBIN GLYCOSYLATED A1C: CPT

## 2024-07-30 PROCEDURE — 3051F HG A1C>EQUAL 7.0%<8.0%: CPT

## 2024-07-30 PROCEDURE — 82570 ASSAY OF URINE CREATININE: CPT

## 2024-07-30 PROCEDURE — 36415 COLL VENOUS BLD VENIPUNCTURE: CPT

## 2024-07-30 PROCEDURE — 82043 UR ALBUMIN QUANTITATIVE: CPT

## 2024-07-30 PROCEDURE — 84443 ASSAY THYROID STIM HORMONE: CPT

## 2024-07-30 PROCEDURE — 83721 ASSAY OF BLOOD LIPOPROTEIN: CPT

## 2024-07-30 PROCEDURE — 80053 COMPREHEN METABOLIC PANEL: CPT

## 2024-08-01 ENCOUNTER — OFFICE VISIT (OUTPATIENT)
Dept: PHYSICAL THERAPY | Age: 49
End: 2024-08-01
Payer: COMMERCIAL

## 2024-08-01 DIAGNOSIS — M25.572 CHRONIC PAIN OF LEFT ANKLE: ICD-10-CM

## 2024-08-01 DIAGNOSIS — G89.29 CHRONIC PAIN OF LEFT ANKLE: ICD-10-CM

## 2024-08-01 DIAGNOSIS — M54.50 ACUTE BILATERAL LOW BACK PAIN WITHOUT SCIATICA: Primary | ICD-10-CM

## 2024-08-01 PROCEDURE — 97110 THERAPEUTIC EXERCISES: CPT

## 2024-08-01 PROCEDURE — 97112 NEUROMUSCULAR REEDUCATION: CPT

## 2024-08-01 NOTE — PROGRESS NOTES
"Daily Note     Today's date: 2024  Patient name: Jc Carmona  : 1975  MRN: [id number]  Referring provider: Tucker Miller MD  Dx:   Encounter Diagnosis     ICD-10-CM    1. Acute bilateral low back pain without sciatica  M54.50       2. Chronic pain of left ankle  M25.572     G89.29                      Subjective: Jc reports 6/10 LBP at L5 this AM. He had a day of rest yesterday, which helped the LBP.    Low back ROM is improving, he is able to put sneakers on.       Objective: See treatment diary below      Assessment: Tolerated treatment fair. Lumbar and nerve mobility tasks are helping with his LBP. Low tolerance to functional strengthening, limited sets with retro amb. Continued PT would be beneficial to improve function.          Plan: Continue per plan of care.       Precautions: hx ankle lateral lig repair      FOTO        x     Manuals 6/19 6/24 7/8  7/15 7/19 7/22 7/25 7/29 8/1   Ankle PROM  Gr I/ii mobs  KD           CPA L spine, UPAs     Grii/iii       KD  KD                              Neuro Re-Ed    7/11 7/15 7/19       Heel toe walk & DB pass   3#    20 ft  5x fw/bw Tandem amb 3# fw/bw 5x20' Hold per patient Hold per pt       Bird dog       nv      Mt climber   3 x 15 3x15 3x15 Hold per pt nv      Sciatic nerve glide             Sidelying open book with nerve glide         3-5\"    2 x 10 3-5\"    2 x 10 R   Palloff press-band         2 x 10  Rtb   2 x 10  Rtb                Ther Ex             bridge  5\" x 3 x 8 nv 10x3\" 10x3\"        Pball rollout x 3  prn prn 5x5\" ea direction  5x5\" ea direction 5x5\" ea direction 10\" x 10 10\" x 10 nv 10\" x 10   Modified piriformis stretch  30\" x 8 ea nv 4x20\" ea B/L 4x20\"  Ea b/l 4x20\"  Ea b/l 4 x 20\" 30\" x 4 ea nv 30\" x 4 ea   Supported march/mt climber  3 x 15 3 x 15 3x15 3x15   3 x 15     Palloff press-band        Rtb  3 x 10     CC-retro         20#    3 x 10 20#    1 x 10   Hip hinge  3 x 15           Assisted squat  3 x 15 3x15 3x15 3x15 " "3x15 3 x 15  R knee pain, back tightness hold     Leg press        115#  Anthony    3 x 10 75#    Anthony  4 x 10 75#    Anthony  4 x 10   Ankle quadrant AROM  30x ea           Bent knee heel raise  (Start in sitting)  nv Standing    Feet together    3 x 15 Standing 3x15 Standing  3x15   Hold per pt  Sitting  3 x 10  20# on knee Stand    3 x 10 Stand    3 x 10   KTC w/ strap    10x10\" ea B/L 10x10\"  Ea b/l 10x10\"  Ea b/l       Child's pose       30\" x 5 10\"x8  -   RB     x4' x5'  5' L3 5' 5' lvl 1   Pt Ed      ES       Ther Activity                                       Gait Training                                       Modalities                                                    "

## 2024-08-02 ENCOUNTER — ANESTHESIA (OUTPATIENT)
Dept: GASTROENTEROLOGY | Facility: AMBULARY SURGERY CENTER | Age: 49
End: 2024-08-02

## 2024-08-02 ENCOUNTER — HOSPITAL ENCOUNTER (OUTPATIENT)
Dept: GASTROENTEROLOGY | Facility: AMBULARY SURGERY CENTER | Age: 49
Setting detail: OUTPATIENT SURGERY
End: 2024-08-02
Attending: INTERNAL MEDICINE
Payer: COMMERCIAL

## 2024-08-02 ENCOUNTER — ANESTHESIA EVENT (OUTPATIENT)
Dept: GASTROENTEROLOGY | Facility: AMBULARY SURGERY CENTER | Age: 49
End: 2024-08-02

## 2024-08-02 VITALS
TEMPERATURE: 97.8 F | BODY MASS INDEX: 33.92 KG/M2 | HEIGHT: 65 IN | SYSTOLIC BLOOD PRESSURE: 122 MMHG | WEIGHT: 203.6 LBS | RESPIRATION RATE: 16 BRPM | HEART RATE: 73 BPM | DIASTOLIC BLOOD PRESSURE: 82 MMHG | OXYGEN SATURATION: 99 %

## 2024-08-02 DIAGNOSIS — K52.9 CHRONIC DIARRHEA: Primary | ICD-10-CM

## 2024-08-02 DIAGNOSIS — Z12.11 SCREENING FOR COLON CANCER: ICD-10-CM

## 2024-08-02 PROCEDURE — 88305 TISSUE EXAM BY PATHOLOGIST: CPT | Performed by: STUDENT IN AN ORGANIZED HEALTH CARE EDUCATION/TRAINING PROGRAM

## 2024-08-02 PROCEDURE — 45380 COLONOSCOPY AND BIOPSY: CPT | Performed by: INTERNAL MEDICINE

## 2024-08-02 PROCEDURE — 45385 COLONOSCOPY W/LESION REMOVAL: CPT | Performed by: INTERNAL MEDICINE

## 2024-08-02 RX ORDER — PROPOFOL 10 MG/ML
INJECTION, EMULSION INTRAVENOUS AS NEEDED
Status: DISCONTINUED | OUTPATIENT
Start: 2024-08-02 | End: 2024-08-02

## 2024-08-02 RX ORDER — CHOLESTYRAMINE 4 G/9G
1 POWDER, FOR SUSPENSION ORAL 3 TIMES DAILY PRN
Qty: 60 PACKET | Refills: 3 | Status: SHIPPED | OUTPATIENT
Start: 2024-08-02

## 2024-08-02 RX ORDER — SODIUM CHLORIDE, SODIUM LACTATE, POTASSIUM CHLORIDE, CALCIUM CHLORIDE 600; 310; 30; 20 MG/100ML; MG/100ML; MG/100ML; MG/100ML
INJECTION, SOLUTION INTRAVENOUS CONTINUOUS PRN
Status: DISCONTINUED | OUTPATIENT
Start: 2024-08-02 | End: 2024-08-02

## 2024-08-02 RX ORDER — LIDOCAINE HYDROCHLORIDE 10 MG/ML
INJECTION, SOLUTION EPIDURAL; INFILTRATION; INTRACAUDAL; PERINEURAL AS NEEDED
Status: DISCONTINUED | OUTPATIENT
Start: 2024-08-02 | End: 2024-08-02

## 2024-08-02 RX ADMIN — SODIUM CHLORIDE, SODIUM LACTATE, POTASSIUM CHLORIDE, CALCIUM CHLORIDE: 600; 310; 30; 20 INJECTION, SOLUTION INTRAVENOUS at 08:08

## 2024-08-02 RX ADMIN — LIDOCAINE HYDROCHLORIDE 50 MG: 10 INJECTION, SOLUTION EPIDURAL; INFILTRATION; INTRACAUDAL; PERINEURAL at 08:18

## 2024-08-02 RX ADMIN — PROPOFOL 30 MG: 10 INJECTION, EMULSION INTRAVENOUS at 08:29

## 2024-08-02 RX ADMIN — PROPOFOL 50 MG: 10 INJECTION, EMULSION INTRAVENOUS at 08:21

## 2024-08-02 RX ADMIN — Medication 40 MG: at 08:20

## 2024-08-02 RX ADMIN — PROPOFOL 50 MG: 10 INJECTION, EMULSION INTRAVENOUS at 08:32

## 2024-08-02 RX ADMIN — PROPOFOL 20 MG: 10 INJECTION, EMULSION INTRAVENOUS at 08:24

## 2024-08-02 RX ADMIN — PROPOFOL 120 MG: 10 INJECTION, EMULSION INTRAVENOUS at 08:18

## 2024-08-02 RX ADMIN — PROPOFOL 30 MG: 10 INJECTION, EMULSION INTRAVENOUS at 08:20

## 2024-08-02 NOTE — H&P
"History and Physical - [facility]  Jc Carmona 48 y.o. male MRN: [id number]        HPI: 48-year-old male with history of colon cancer status post resection 13 years ago.  Reports having chronic diarrhea.    Historical Information   Past Medical History:   Diagnosis Date    Cancer (HCC)     colon    Diabetes mellitus (HCC)     GERD (gastroesophageal reflux disease)     Hyperlipidemia     Post concussion syndrome 05/09/2017    Pre-op examination     Sacroiliitis (HCC)     Sleep apnea     \"mild\"    Traumatic brain injury (HCC) 2017    R/S 2017     Past Surgical History:   Procedure Laterality Date    APPENDECTOMY      COLON SURGERY      COLONOSCOPY      EGD      DC REMOVAL IMPLANT DEEP Left 4/26/2024    Procedure: REMOVAL HARDWARE ANKLE;  Surgeon: Steve Kaplan DPM;  Location: [facility];  Service: Podiatry    DC RPR PRIMARY DISRUPTED LIGAMENT ANKLE COLLATERAL Left 03/10/2023    Procedure: ANKLE LIGAMENT REPAIR OF SYNDESMOTIC LIGAMENT with steroid injection of posterior heel bursa;  Surgeon: Steve Kaplan DPM;  Location: [facility];  Service: Podiatry     Social History   Social History     Substance and Sexual Activity   Alcohol Use Not Currently    Alcohol/week: 1.0 standard drink of alcohol    Types: 1 Cans of beer per week     Social History     Substance and Sexual Activity   Drug Use Never     Social History     Tobacco Use   Smoking Status Never   Smokeless Tobacco Never     Family History   Problem Relation Age of Onset    Coronary artery disease Mother     Diabetes Mother     Hypertension Mother     Hyperlipidemia Mother     Coronary artery disease Father     Diabetes Father     Hypertension Father     Hyperlipidemia Father     Heart attack Father     Anemia Brother     No Known Problems Son     No Known Problems Son     No Known Problems Son        Meds/Allergies     Not in a hospital admission.    Allergies   Allergen Reactions    Asa [Aspirin] GI Intolerance    " "Penicillin G Other (See Comments)     Ancef ok     \"unsure; was a baby\"       Objective     Blood pressure 121/77, pulse 70, temperature (!) 96.9 °F (36.1 °C), temperature source Temporal, resp. rate 18, height 5' 5\" (1.651 m), weight 92.4 kg (203 lb 9.6 oz), SpO2 98%.    Physical Exam:    Chest- CTA  Heart- RRR  Abdomen- NT/ND  Extremities- No edema    ASSESSMENT:     History of colon cancer, diarrhea    PLAN:    Colonoscopy              "

## 2024-08-02 NOTE — ANESTHESIA POSTPROCEDURE EVALUATION
Post-Op Assessment Note    CV Status:  Stable  Pain Score: 0    Pain management: adequate       Mental Status:  Alert and awake   Hydration Status:  Euvolemic   PONV Controlled:  Controlled   Airway Patency:  Patent     Post Op Vitals Reviewed: Yes    No anethesia notable event occurred.    Staff: AMINTA               BP 99/57 (08/02/24 0839)    Temp 97.8 °F (36.6 °C) (08/02/24 0839)    Pulse 69 (08/02/24 0839)   Resp 16 (08/02/24 0839)    SpO2 96 % (08/02/24 0839)

## 2024-08-02 NOTE — ANESTHESIA PREPROCEDURE EVALUATION
Procedure:  COLONOSCOPY    ECG: NSR    DM2 A1c 7.2 - Trulicity, insulin - last took trulicity 8 days ago  PETTY - does not wear CPAP    Denies the following: CP/SOB with exertion, asthma, COPD, stroke/TIA, seizure    Relevant Problems   CARDIO   (+) Hypertriglyceridemia      ENDO   (+) Type 2 diabetes mellitus with hyperglycemia, with long-term current use of insulin (HCC)      GI/HEPATIC   (+) Fatty liver      MUSCULOSKELETAL   (+) Arthritis of right knee   (+) Impingement syndrome of left shoulder      NEURO/PSYCH   (+) Paresthesias      PULMONARY   (+) PETTY (obstructive sleep apnea)        Physical Exam    Airway    Mallampati score: III  TM Distance: >3 FB  Neck ROM: full     Dental        Cardiovascular      Pulmonary      Other Findings      Anesthesia Plan  ASA Score- 2     Anesthesia Type- IV sedation with anesthesia with ASA Monitors.         Additional Monitors:     Airway Plan:     Comment: I have seen the patient and reviewed the history.  Patient to receive IV sedation with full ASA monitors.  Risks discussed with the patient, consent signed.  I have seen the patient and reviewed the history.  Patient to receive IV sedation with full ASA monitors.  Risks discussed with the patient, consent signed.  .       Plan Factors-Exercise tolerance (METS): >4 METS.    Chart reviewed.    Patient summary reviewed.    Patient is not a current smoker.      Obstructive sleep apnea risk education given perioperatively.        Induction- intravenous.    Postoperative Plan-         Informed Consent- Anesthetic plan and risks discussed with patient.  I personally reviewed this patient with the CRNA. Discussed and agreed on the Anesthesia Plan with the CRNA..

## 2024-08-05 ENCOUNTER — OFFICE VISIT (OUTPATIENT)
Dept: PHYSICAL THERAPY | Age: 49
End: 2024-08-05
Payer: COMMERCIAL

## 2024-08-05 DIAGNOSIS — G89.29 CHRONIC PAIN OF LEFT ANKLE: ICD-10-CM

## 2024-08-05 DIAGNOSIS — M25.572 CHRONIC PAIN OF LEFT ANKLE: ICD-10-CM

## 2024-08-05 DIAGNOSIS — M54.50 ACUTE BILATERAL LOW BACK PAIN WITHOUT SCIATICA: Primary | ICD-10-CM

## 2024-08-05 PROCEDURE — 97110 THERAPEUTIC EXERCISES: CPT

## 2024-08-05 PROCEDURE — 97112 NEUROMUSCULAR REEDUCATION: CPT

## 2024-08-05 NOTE — PROGRESS NOTES
"Daily Note     Today's date: 2024  Patient name: Jc Carmona  : 1975  MRN: [id number]  Referring provider: Tucker Miller MD  Dx:   Encounter Diagnosis     ICD-10-CM    1. Acute bilateral low back pain without sciatica  M54.50       2. Chronic pain of left ankle  M25.572     G89.29             Start Time: 0800  Stop Time: 0845  Total time in clinic (min): 45 minutes    Subjective: Jc reports 6/10 LBP at L5 this AM. He had a day of rest yesterday, which helped the LBP.    Low back ROM is improving, he is able to put sneakers on.       Objective: See treatment diary below      Assessment: Tolerated treatment fair. Improved tolerance to activities today. He was able to increase loading with some of his exercises today.         Plan: Continue per plan of care.       Precautions: hx ankle lateral lig repair      FOTO        x      Manuals 6/19 6/24 7/8  7/15 7/19 7/22 7/25 7/29 8/1 8/5   Ankle PROM  Gr I/ii mobs  KD            CPA L spine, UPAs     Grii/iii       KD  KD                                 Neuro Re-Ed    7/11 7/15 7/19        Heel toe walk & DB pass   3#    20 ft  5x fw/bw Tandem amb 3# fw/bw 5x20' Hold per patient Hold per pt        Bird dog       nv       Mt climber   3 x 15 3x15 3x15 Hold per pt nv       Sciatic nerve glide              Sidelying open book with nerve glide         3-5\"    2 x 10 3-5\"    2 x 10 R hep   Palloff press-band         2 x 10  Rtb   2 x 10  Rtb nv   Tib anterior raise  Leaning on wall           3 x 10   DB pass/tandem walk           4# pass x 20 ft x 5                 Ther Ex              bridge  5\" x 3 x 8 nv 10x3\" 10x3\"         Pball rollout x 3  prn prn 5x5\" ea direction  5x5\" ea direction 5x5\" ea direction 10\" x 10 10\" x 10 nv 10\" x 10 10\" x 10    SB R   10\" x 10   Modified piriformis stretch  30\" x 8 ea nv 4x20\" ea B/L 4x20\"  Ea b/l 4x20\"  Ea b/l 4 x 20\" 30\" x 4 ea nv 30\" x 4 ea    Supported march/mt climber  3 x 15 3 x 15 3x15 3x15   3 x 15    " "  Palloff press-band        Rtb  3 x 10      CC-retro         20#    3 x 10 20#    1 x 10 25#    3 x 10   Hip hinge  3 x 15            Assisted squat  3 x 15 3x15 3x15 3x15 3x15 3 x 15  R knee pain, back tightness hold      Leg press        115#  Anthony    3 x 10 75#    Anthony  4 x 10 75#    Anthony  4 x 10 85#    3 x 10   Ankle quadrant AROM  30x ea            Bent knee heel raise  (Start in sitting)  nv Standing    Feet together    3 x 15 Standing 3x15 Standing  3x15   Hold per pt  Sitting  3 x 10  20# on knee Stand    3 x 10 Stand    3 x 10 Stand    3 x 10  nv   KTC w/ strap    10x10\" ea B/L 10x10\"  Ea b/l 10x10\"  Ea b/l        Child's pose       30\" x 5 10\"x8  -    RB     x4' x5'  5' L3 5' 5' lvl 1    Pt Ed      ES        Ther Activity                                          Gait Training                                          Modalities                                                       "

## 2024-08-06 PROCEDURE — 88305 TISSUE EXAM BY PATHOLOGIST: CPT | Performed by: STUDENT IN AN ORGANIZED HEALTH CARE EDUCATION/TRAINING PROGRAM

## 2024-08-09 ENCOUNTER — OFFICE VISIT (OUTPATIENT)
Dept: PHYSICAL THERAPY | Age: 49
End: 2024-08-09
Payer: COMMERCIAL

## 2024-08-09 DIAGNOSIS — M54.50 ACUTE BILATERAL LOW BACK PAIN WITHOUT SCIATICA: Primary | ICD-10-CM

## 2024-08-09 DIAGNOSIS — M25.572 CHRONIC PAIN OF LEFT ANKLE: ICD-10-CM

## 2024-08-09 DIAGNOSIS — G89.29 CHRONIC PAIN OF LEFT ANKLE: ICD-10-CM

## 2024-08-09 PROCEDURE — 97110 THERAPEUTIC EXERCISES: CPT

## 2024-08-09 PROCEDURE — 97112 NEUROMUSCULAR REEDUCATION: CPT

## 2024-08-09 NOTE — PROGRESS NOTES
"Daily Note     Today's date: 2024  Patient name: Jc Carmona  : 1975  MRN: [id number]  Referring provider: Tucker Miller MD  Dx:   Encounter Diagnosis     ICD-10-CM    1. Acute bilateral low back pain without sciatica  M54.50       2. Chronic pain of left ankle  M25.572     G89.29               Start Time: 0800  Stop Time: 0845  Total time in clinic (min): 45 minutes    Subjective: Jc reports 6/10 LBP at L5 this AM. He reports his gabapentin is helping.    Low back ROM is improving, he is able to put sneakers on.     Objective: See treatment diary below      Assessment: Tolerated treatment fair. Improved tolerance to activities today. He was able to increase loading with some of his exercises today. He added torsion to band movement today as a force progression without change in symptoms.       Plan: Continue per plan of care.       Precautions: hx ankle lateral lig repair      FOTO        x      Manuals              Ankle PROM  Gr I/ii mobs              CPA L spine, UPAs     Grii/iii                                          Neuro Re-Ed              Heel toe walk & DB pass See below             Bird dog              Mt climber 3 x 10             Sciatic nerve glide              Sidelying open book with nerve glide           hep   Palloff press-band Quarter turn    Cristobal    3 x 10          nv   Tib anterior raise  Leaning on wall 3x10          3 x 10   DB pass/tandem walk 3#    Tumbling mat    10x              4# pass x 20 ft x 5                 Ther Ex              bridge              Pball rollout x 3           10\" x 10    SB R   10\" x 10   Modified piriformis stretch              Supported march/mt climber              Palloff press-band              CC-retro 25#    3 x 10          25#    3 x 10   Hip hinge              Assisted squat              Leg press 85#    3 x 10          85#    3 x 10   Bent knee heel raise  (Start in sitting)           Stand    3 x 10  nv   KTC w/ strap          "     Child's pose              RB 10'             Pt Ed      ES        Ther Activity                                          Gait Training                                          Modalities

## 2024-08-12 ENCOUNTER — OFFICE VISIT (OUTPATIENT)
Dept: PHYSICAL THERAPY | Age: 49
End: 2024-08-12
Payer: COMMERCIAL

## 2024-08-12 DIAGNOSIS — M25.572 CHRONIC PAIN OF LEFT ANKLE: ICD-10-CM

## 2024-08-12 DIAGNOSIS — G89.29 CHRONIC PAIN OF LEFT ANKLE: ICD-10-CM

## 2024-08-12 DIAGNOSIS — M54.50 ACUTE BILATERAL LOW BACK PAIN WITHOUT SCIATICA: Primary | ICD-10-CM

## 2024-08-12 PROCEDURE — 97110 THERAPEUTIC EXERCISES: CPT

## 2024-08-12 PROCEDURE — 97112 NEUROMUSCULAR REEDUCATION: CPT

## 2024-08-12 NOTE — PROGRESS NOTES
"Daily Note     Today's date: 2024  Patient name: Jc Carmona  : 1975  MRN: [id number]  Referring provider: Tucker Miller MD  Dx:   Encounter Diagnosis     ICD-10-CM    1. Acute bilateral low back pain without sciatica  M54.50       2. Chronic pain of left ankle  M25.572     G89.29               Start Time: 0803  Stop Time: 0848  Total time in clinic (min): 45 minutes    Subjective: Jc reports soreness in back after trimming lawn    Low back ROM is improving, he is able to put sneakers on.     Objective: See treatment diary below      Assessment: Tolerated treatment fair. Improved tolerance to activities today. He was able to increase loading with some of his exercises today. Hip hinge with light weights added to improve tolerance to functional movements.       Plan: Continue per plan of care.       Precautions: hx ankle lateral lig repair      FOTO       x      Manuals              Ankle PROM  Gr I/ii mobs              CPA L spine, UPAs     Grii/iii                                          Neuro Re-Ed              Heel toe walk & DB pass See below             Bird dog              Mt climber 3 x 10 3x15            Sciatic nerve glide              Sidelying open book with nerve glide           hep   Palloff press-band Quarter turn    Cristobal    3 x 10 Quarter turn    Cristobal    3 x 10         nv   Tib anterior raise  Leaning on wall 3x10 3 x 10         3 x 10   DB pass/tandem walk 3#    Tumbling mat    10x     3#    Mat      10x         4# pass x 20 ft x 5                 Ther Ex              bridge              Pball rollout x 3  10\" x 10         10\" x 10    SB R   10\" x 10   Modified piriformis stretch              Supported march/mt climber  See NR            Palloff press-band  See NR            CC-retro 25#    3 x 10 25#    3 x 10         25#    3 x 10   Hip hinge              Assisted squat              Leg press 85#    3 x 10 89#    3 x 10         85#    3 x 10   KTC w/ strap          "     Child's pose              RB 10' Uprigh    5'  (Sore back)            Hip hinge  Cristobal med ball    Knee height    3 x 10            Pt Ed      ES        Ther Activity                                          Gait Training                                          Modalities

## 2024-08-15 ENCOUNTER — TELEPHONE (OUTPATIENT)
Dept: PODIATRY | Facility: CLINIC | Age: 49
End: 2024-08-15

## 2024-08-15 ENCOUNTER — OFFICE VISIT (OUTPATIENT)
Dept: PODIATRY | Facility: CLINIC | Age: 49
End: 2024-08-15
Payer: COMMERCIAL

## 2024-08-15 VITALS
BODY MASS INDEX: 33.99 KG/M2 | DIASTOLIC BLOOD PRESSURE: 90 MMHG | WEIGHT: 204 LBS | OXYGEN SATURATION: 98 % | HEART RATE: 71 BPM | SYSTOLIC BLOOD PRESSURE: 133 MMHG | HEIGHT: 65 IN

## 2024-08-15 DIAGNOSIS — R53.81 DEBILITY: Primary | ICD-10-CM

## 2024-08-15 DIAGNOSIS — S93.432D ANKLE SYNDESMOSIS DISRUPTION, LEFT, SUBSEQUENT ENCOUNTER: ICD-10-CM

## 2024-08-15 DIAGNOSIS — R26.89 FUNCTIONAL GAIT ABNORMALITY: ICD-10-CM

## 2024-08-15 PROCEDURE — 99213 OFFICE O/P EST LOW 20 MIN: CPT | Performed by: PODIATRIST

## 2024-08-15 NOTE — PROGRESS NOTES
"Assessment/Plan:     The patient's clinical examination today significant for some persistent tenderness palpation to the area of the ATFL as well as the peroneal tendons as they course around the fibular malleolus.  There is no erythema nor edema no counter ecchymosis.  Passive range of motion of the ankle joint produces mild tenderness with plantarflexion and eversion.  The lateral ankle does feel stable without any excessive motion.    Postoperatively, the patient's left ankle appears to be doing fairly well.  Edema to the left lower extremity has resolved.  Range of motion is satisfactory.  He does still have some deficits along the area the lateral ankle for which she is continuing to go to physical therapy.  He is also currently being managed for sciatica of his left lower extremity secondary to a bulging disc at L5-S1.  He does state that he had a epidural as planned.    Recommend follow-up in 4 weeks with me.       Diagnoses and all orders for this visit:    Debility    Functional gait abnormality    Ankle syndesmosis disruption, left, subsequent encounter          Subjective:     Patient ID: Jc Carmona is a 48 y.o. male.    The patient presents today for follow-up of chronic lateral left ankle pain status post repair of a syndesmotic injury.  He subsequently underwent removal of retained hardware.  He is still going to physical therapy right now for management of low back pain and sciatica.  The patient does still note some persistent tenderness to his lateral left ankle.      PAST MEDICAL HISTORY:  Past Medical History:   Diagnosis Date    Cancer (HCC)     colon    Diabetes mellitus (HCC)     GERD (gastroesophageal reflux disease)     Hyperlipidemia     Post concussion syndrome 05/09/2017    Pre-op examination     Sacroiliitis (HCC)     Sleep apnea     \"mild\"    Traumatic brain injury (HCC) 2017    R/S 2017       PAST SURGICAL HISTORY:  Past Surgical History:   Procedure Laterality Date    " APPENDECTOMY      COLON SURGERY      COLONOSCOPY      EGD      WV REMOVAL IMPLANT DEEP Left 4/26/2024    Procedure: REMOVAL HARDWARE ANKLE;  Surgeon: Steve Kaplan DPM;  Location: [facility];  Service: Podiatry    WV RPR PRIMARY DISRUPTED LIGAMENT ANKLE COLLATERAL Left 03/10/2023    Procedure: ANKLE LIGAMENT REPAIR OF SYNDESMOTIC LIGAMENT with steroid injection of posterior heel bursa;  Surgeon: Steve Kaplan DPM;  Location: [facility];  Service: Podiatry        ALLERGIES:  Asa [aspirin] and Penicillin g    MEDICATIONS:  Current Outpatient Medications   Medication Sig Dispense Refill    calcipotriene (DOVONEX) 0.005 % cream Apply topically 2 (two) times a day 60 g 1    cholestyramine (QUESTRAN) 4 g packet Take 1 packet (4 g total) by mouth 3 (three) times a day as needed (DIARRHEA) Take Questran 3 hours apart from other medication 60 packet 3    Continuous Glucose Sensor (FreeStyle Dell 3 Sensor) MISC USE ONE UNIT EVERY 14 DAYS 2 each 5    dulaglutide (Trulicity) 4.5 MG/0.5ML injection Inject 0.5 mL (4.5 mg total) under the skin every 7 days 6 mL 1    gabapentin (Neurontin) 300 mg capsule Take 1 capsule (300 mg total) by mouth daily at bedtime 30 capsule 1    Insulin Glargine Solostar (Lantus SoloStar) 100 UNIT/ML SOPN INJECT 15 UNITS (0.15ML) UNDER THE SKIN DAILY AT BEDTIME 24 mL 1    Insulin Pen Needle (BD Pen Needle Nancy U/F) 32G X 4 MM MISC Use twice daily 100 each 2    Lancets (OneTouch Delica Plus [id number]) MISC daily      meloxicam (MOBIC) 15 mg tablet Take 1 tablet (15 mg total) by mouth daily 30 tablet 0    metFORMIN (GLUCOPHAGE) 1000 MG tablet Take 1 tablet (1,000 mg total) by mouth 2 (two) times a day with meals 180 tablet 1    OneTouch Verio test strip daily      tadalafil (CIALIS) 20 MG tablet Take 1 tablet (20 mg total) by mouth daily as needed for erectile dysfunction 30 tablet 5    atorvastatin (LIPITOR) 10 mg tablet Take 1 tablet (10 mg total) by mouth every evening (Patient not taking:  Reported on 3/13/2024) 90 tablet 3    bisacodyl (DULCOLAX) 5 mg EC tablet Take 2 tablets (10 mg total) by mouth once for 1 dose 2 tablet 0    Blood Glucose Monitoring Suppl (OneTouch Verio Flex System) w/Device KIT  (Patient not taking: Reported on 3/13/2024)      methylPREDNISolone 4 MG tablet therapy pack Use as directed on package (Patient not taking: Reported on 7/10/2024) 21 each 0    ondansetron (ZOFRAN-ODT) 4 mg disintegrating tablet Take 1 tablet (4 mg total) by mouth every 6 (six) hours as needed for nausea or vomiting (Patient not taking: Reported on 6/26/2024) 20 tablet 0    oxyCODONE-acetaminophen (Percocet) 5-325 mg per tablet Take 1 tablet by mouth every 4 (four) hours as needed for moderate pain for up to 10 doses Max Daily Amount: 6 tablets (Patient not taking: Reported on 4/29/2024) 10 tablet 0    polyethylene glycol (GOLYTELY) 4000 mL solution Take 4,000 mL by mouth once for 1 dose 4000 mL 0     No current facility-administered medications for this visit.       SOCIAL HISTORY:  Social History     Socioeconomic History    Marital status: Single     Spouse name: None    Number of children: None    Years of education: None    Highest education level: None   Occupational History    None   Tobacco Use    Smoking status: Never    Smokeless tobacco: Never   Vaping Use    Vaping status: Never Used   Substance and Sexual Activity    Alcohol use: Not Currently     Alcohol/week: 1.0 standard drink of alcohol     Types: 1 Cans of beer per week    Drug use: Never    Sexual activity: Yes     Partners: Female     Birth control/protection: None   Other Topics Concern    None   Social History Narrative    None     Social Determinants of Health     Financial Resource Strain: Not on file   Food Insecurity: Not on file   Transportation Needs: Not on file   Physical Activity: Not on file   Stress: Not on file   Social Connections: Not on file   Intimate Partner Violence: Not on file   Housing Stability: Not on file         Review of Systems   Constitutional: Negative.    HENT: Negative.     Eyes: Negative.    Respiratory: Negative.     Cardiovascular: Negative.    Endocrine: Negative.    Musculoskeletal: Negative.    Neurological: Negative.    Hematological: Negative.    Psychiatric/Behavioral: Negative.           Objective:     Physical Exam  Vitals reviewed.   Constitutional:       Appearance: Normal appearance.   HENT:      Head: Normocephalic and atraumatic.      Nose: Nose normal.   Eyes:      Conjunctiva/sclera: Conjunctivae normal.      Pupils: Pupils are equal, round, and reactive to light.   Cardiovascular:      Pulses:           Dorsalis pedis pulses are 2+ on the left side.        Posterior tibial pulses are 2+ on the left side.   Pulmonary:      Effort: Pulmonary effort is normal.   Feet:      Left foot:      Skin integrity: Skin integrity normal.      Comments: The patient's clinical examination today significant for some persistent tenderness palpation to the area of the ATFL as well as the peroneal tendons as they course around the fibular malleolus.  There is no erythema nor edema no counter ecchymosis.  Passive range of motion of the ankle joint produces mild tenderness with plantarflexion and eversion.  The lateral ankle does feel stable without any excessive motion.  Skin:     General: Skin is warm.      Capillary Refill: Capillary refill takes less than 2 seconds.   Neurological:      General: No focal deficit present.      Mental Status: He is alert and oriented to person, place, and time.   Psychiatric:         Mood and Affect: Mood normal.         Behavior: Behavior normal.         Thought Content: Thought content normal.

## 2024-08-15 NOTE — TELEPHONE ENCOUNTER
At office visit today, 8/15/24, patient left [employer] Document to be completed.  Original document forwarded to PATTI Meek/[place] [facility] [place]

## 2024-08-16 ENCOUNTER — APPOINTMENT (OUTPATIENT)
Dept: PHYSICAL THERAPY | Age: 49
End: 2024-08-16
Payer: COMMERCIAL

## 2024-08-19 ENCOUNTER — OFFICE VISIT (OUTPATIENT)
Dept: PHYSICAL THERAPY | Age: 49
End: 2024-08-19
Payer: COMMERCIAL

## 2024-08-19 DIAGNOSIS — G89.29 CHRONIC PAIN OF LEFT ANKLE: ICD-10-CM

## 2024-08-19 DIAGNOSIS — M25.572 CHRONIC PAIN OF LEFT ANKLE: ICD-10-CM

## 2024-08-19 DIAGNOSIS — M54.50 ACUTE BILATERAL LOW BACK PAIN WITHOUT SCIATICA: Primary | ICD-10-CM

## 2024-08-19 PROCEDURE — 97110 THERAPEUTIC EXERCISES: CPT

## 2024-08-19 PROCEDURE — 97112 NEUROMUSCULAR REEDUCATION: CPT

## 2024-08-19 NOTE — PROGRESS NOTES
"Daily Note     Today's date: 2024  Patient name: Jc Carmona  : 1975  MRN: [id number]  Referring provider: Tucker Miller MD  Dx:   No diagnosis found.                     Subjective: Jc reports tightness main complaint today.  Ankle reported stable, not worse or better.  Objective: See treatment diary below      Assessment: Tolerated treatment fair. Improved tolerance to activities today. Ankle pain fair, with some midfoot pain reported during walking.       Plan: Continue per plan of care.       Precautions: hx ankle lateral lig repair      FOTO      x      Manuals              Ankle PROM  Gr I/ii mobs              CPA L spine, UPAs     Grii/iii                                          Neuro Re-Ed              Heel toe walk & DB pass See below             Bird dog              Mt climber 3 x 10 3x15 3 x 15           Sciatic nerve glide              Sidelying open book with nerve glide           hep   Palloff press-band Quarter turn    Cristobal    3 x 10 Quarter turn    Cristobal    3 x 10 nv        nv   Tib anterior raise  Leaning on wall 3x10 3 x 10 3 x 10        3 x 10   DB pass/tandem walk 3#    Tumbling mat    10x     3#    Mat      10x         4# pass x 20 ft x 5   SLS/foam  10\" x 10  No shoe                          Ther Ex              bridge              Pball rollout x 3  10\" x 10 10\" x 10        10\" x 10    SB R   10\" x 10   Modified piriformis stretch              Supported march/mt climber  See NR            Palloff press-band  See NR            CC-retro 25#    3 x 10 25#    3 x 10 nv        25#    3 x 10   Hip hinge              Assisted squat              Leg press 85#    3 x 10 89#    3 x 10 nv        85#    3 x 10   KTC w/ strap              Child's pose              RB 10' Uprigh    5'  (Sore back)            Hip hinge  Cristobal med ball    Knee height    3 x 10            Pt Ed      ES        Ther Activity                                          Gait Training                "                           Modalities

## 2024-08-21 ENCOUNTER — TELEPHONE (OUTPATIENT)
Dept: OBGYN CLINIC | Facility: CLINIC | Age: 49
End: 2024-08-21

## 2024-08-22 ENCOUNTER — OFFICE VISIT (OUTPATIENT)
Dept: PHYSICAL THERAPY | Age: 49
End: 2024-08-22
Payer: COMMERCIAL

## 2024-08-22 DIAGNOSIS — G89.29 CHRONIC PAIN OF LEFT ANKLE: ICD-10-CM

## 2024-08-22 DIAGNOSIS — M25.572 CHRONIC PAIN OF LEFT ANKLE: ICD-10-CM

## 2024-08-22 DIAGNOSIS — M54.50 ACUTE BILATERAL LOW BACK PAIN WITHOUT SCIATICA: Primary | ICD-10-CM

## 2024-08-22 PROCEDURE — 97140 MANUAL THERAPY 1/> REGIONS: CPT

## 2024-08-22 PROCEDURE — 97110 THERAPEUTIC EXERCISES: CPT

## 2024-08-22 PROCEDURE — 97112 NEUROMUSCULAR REEDUCATION: CPT

## 2024-08-22 NOTE — PROGRESS NOTES
"Daily Note     Today's date: 2024  Patient name: Jc Carmona  : 1975  MRN: [id number]  Referring provider: Tucker Miller MD  Dx:   Encounter Diagnosis     ICD-10-CM    1. Acute bilateral low back pain without sciatica  M54.50       2. Chronic pain of left ankle  M25.572     G89.29                 Start Time: 0802  Stop Time: 0845  Total time in clinic (min): 43 minutes    Subjective:Pt reports last visit and today some metatarsal foot pain. Notes this has bothered him since the accident.  Notes pain worse with uphill walking.     Objective: See treatment diary below    Assessment: Tolerated treatment fair. Improved tolerance to activities today. Midfoot/metatarsal pain positive squeeze test and tender with extension stretch of foot tendons.     Plan: Continue per plan of care.       Precautions: hx ankle lateral lig repair      FOTO     x      Manuals              Ankle PROM  Gr I/ii mobs    KD          CPA L spine, UPAs     Grii/iii    KD  +STM                                      Neuro Re-Ed              Heel toe walk & DB pass See below             Bird dog              Mt climber 3 x 10 3x15 3 x 15 Att x 10          Sciatic nerve glide              Sidelying open book with nerve glide           hep   Palloff press-band Quarter turn    Cristobal    3 x 10 Quarter turn    Cristobal    3 x 10 nv        nv   Tib anterior raise  Leaning on wall 3x10 3 x 10 3 x 10 nv       3 x 10   DB pass/tandem walk 3#    Tumbling mat    10x     3#    Mat      10x         4# pass x 20 ft x 5   SLS/foam  10\" x 10  No shoe  10\" x 5    pain                        Ther Ex              bridge              Pball rollout x 3  10\" x 10 10\" x 10 10\" x 10       10\" x 10    SB R   10\" x 10   Modified piriformis stretch              Supported march/mt climber  See NR            Palloff press-band  See NR            CC-retro 25#    3 x 10 25#    3 x 10 nv        25#    3 x 10   Hip hinge              Assisted squat  "             Leg press 85#    3 x 10 89#    3 x 10 nv        85#    3 x 10   KTC w/ strap              Child's pose    3-5 breathx 10          RB 10' Uprigh    5'  (Sore back)            Hip hinge  Cristobal med ball    Knee height    3 x 10            Crunch-mini    2 x 10          Pt Ed      ES        Ther Activity                                          Gait Training                                          Modalities

## 2024-08-26 ENCOUNTER — OFFICE VISIT (OUTPATIENT)
Dept: PHYSICAL THERAPY | Age: 49
End: 2024-08-26
Payer: COMMERCIAL

## 2024-08-26 DIAGNOSIS — M54.50 ACUTE BILATERAL LOW BACK PAIN WITHOUT SCIATICA: Primary | ICD-10-CM

## 2024-08-26 DIAGNOSIS — G89.29 CHRONIC PAIN OF LEFT ANKLE: ICD-10-CM

## 2024-08-26 DIAGNOSIS — M25.572 CHRONIC PAIN OF LEFT ANKLE: ICD-10-CM

## 2024-08-26 PROCEDURE — 97140 MANUAL THERAPY 1/> REGIONS: CPT

## 2024-08-26 PROCEDURE — 97112 NEUROMUSCULAR REEDUCATION: CPT

## 2024-08-26 PROCEDURE — 97110 THERAPEUTIC EXERCISES: CPT

## 2024-08-26 NOTE — PROGRESS NOTES
"Daily Note     Today's date: 2024  Patient name: Jc Carmona  : 1975  MRN: [id number]  Referring provider: Tucker Miller MD  Dx:   Encounter Diagnosis     ICD-10-CM    1. Acute bilateral low back pain without sciatica  M54.50       2. Chronic pain of left ankle  M25.572     G89.29                 Start Time: 0802  Stop Time: 0845  Total time in clinic (min): 43 minutes    Subjective: Pt reports he was on a boat all day yesterday and his back is sore from the jostling and the prolonged sitting.     Objective: See treatment diary below    Assessment: Tolerated treatment fair. Tightness main report during all exercises today. Modified session as able. Pt reports unchanged pain levels overall.     Plan: Continue per plan of care.       Precautions: hx ankle lateral lig repair      FOTO    x      Manuals              Ankle PROM  Gr I/ii mobs    KD          CPA L spine, UPAs     Grii/iii    KD  +STM KD                                     Neuro Re-Ed              Heel toe walk & DB pass See below             Bird dog              Mt climber 3 x 10 3x15 3 x 15 Att x 10 3 x 10         Sciatic nerve glide              Sidelying open book with nerve glide           hep   Palloff press-band Quarter turn    Cristobal    3 x 10 Quarter turn    Cristobal    3 x 10 nv        nv   Tib anterior raise  Leaning on wall 3x10 3 x 10 3 x 10 nv       3 x 10   DB pass/tandem walk 3#    Tumbling mat    10x     3#    Mat      10x   nv      4# pass x 20 ft x 5   SLS/foam  10\" x 10  No shoe  10\" x 5    pain nv         CC-retro     40# x 20         Ther Ex              bridge              Pball rollout x 3  10\" x 10 10\" x 10 10\" x 10 10\" x 10      10\" x 10    SB R   10\" x 10   Modified piriformis stretch              Supported march/mt climber  See NR            Palloff press-band  See NR            CC-retro 25#    3 x 10 25#    3 x 10 nv        25#    3 x 10   Hip hinge              Assisted squat            " "  Leg press 85#    3 x 10 89#    3 x 10 nv  95# x 3 x 10      85#    3 x 10   KTC w/ strap              Child's pose    3-5 breathx 10 x10         RB 10' Uprigh    5'  (Sore back)            Hip hinge  Cristobal med ball    Knee height    3 x 10            Crunch-mini    2 x 10 2 x 10  3\"         Pt Ed              Ther Activity                                          Gait Training                                          Modalities                                                       "

## 2024-08-29 ENCOUNTER — OFFICE VISIT (OUTPATIENT)
Dept: PHYSICAL THERAPY | Age: 49
End: 2024-08-29
Payer: COMMERCIAL

## 2024-08-29 DIAGNOSIS — G89.29 CHRONIC PAIN OF LEFT ANKLE: ICD-10-CM

## 2024-08-29 DIAGNOSIS — M25.572 CHRONIC PAIN OF LEFT ANKLE: ICD-10-CM

## 2024-08-29 DIAGNOSIS — M54.50 ACUTE BILATERAL LOW BACK PAIN WITHOUT SCIATICA: Primary | ICD-10-CM

## 2024-08-29 PROCEDURE — 97110 THERAPEUTIC EXERCISES: CPT

## 2024-08-29 PROCEDURE — 97112 NEUROMUSCULAR REEDUCATION: CPT

## 2024-08-29 NOTE — PROGRESS NOTES
Daily Note / Discharge     Today's date: 2024  Patient name: Jc Carmona  : 1975  MRN: [id number]  Referring provider: Tucker Miller MD  Dx:   Encounter Diagnosis     ICD-10-CM    1. Acute bilateral low back pain without sciatica  M54.50       2. Chronic pain of left ankle  M25.572     G89.29                 Start Time: 0758  Stop Time: 0840  Total time in clinic (min): 42 minutes    Subjective: Pt reports tightness in back of L leg.  Soreness in ankle is better since his boating outing.  Reports he gets up and walks a half mile every morning--reports it takes 15-16 mins  Overall he reports no improvements in his pain and tightness sensations in his back.  Reports he will have an epidural in 2 weeks and follow up with podiatry as well.     Objective: See treatment diary below  Lumbar    Standing flexion: repeated movements   Pain intensity: worse  Standing extension: repeated movements  Pain intensity: worse    Strength/Myotome Testing     Left Hip   Planes of Motion   Flexion: 4-  Extension: 4-  Abduction: 4-    Right Hip   Planes of Motion   Flexion: WFL  Extension: WFL  Abduction: WFL    Left Knee   Flexion: 4-  Extension: 4-    Right Knee   Flexion: WFL  Extension: WFL    Left Ankle/Foot   Dorsiflexion: 4-  Plantar flexion: 4-    Tests     Lumbar     Left   Positive crossed SLR.     Left Hip   Positive YE.     General Comments:    Lower quarter screen   Hips: unremarkable  Assessment:   Pt demonstrates no marked improvements in his function since starting therapy.  He has pain levels that are unchanged since starting PT.  He has tightness in spine that limits bending and prolonged postures. Pt ankle pain is unchanged with weakness reported during walking and ADLs.  Pt has not met goals and reports unchanged scores on FOTO since starting PT.   DC skilled PT at this time and refer pt to MD based on unchanged clinical presentation.     Goals  IN 2-4 WEEKS: ALL GOALS LIMITED PROGRESS  Pt will  "improve pain on VAPS by 1-2 points  Pt will demonstrate understanding, independence with HEP    In 6-8 WEEKS  Pt will demonstrate improved ROM in spine to WFL  Pt will improve pain by >50% overall indicating improved tolerance to activities  Pt will improve sitting tolerance >30 mins  Pt will be independent with walking program  Pt will improve standing tolerance >30 mins  Pt will improve FOTO score at or above prognostic value      In 2-4 weeks:  Pt will improve pain on VAPS by 1-2 points indicating improved function  Pt will demonstrate independence and understanding of HEP  Pt will demonstrate improved navigation up and down stairs independently     In 6-8 weeks:  Pt will demonstrate improved ROM >110 adequate for ascending/descending stairs  Pt will demonstrate SL heel raise test within 10% of affected side  Pt will demonstrate squat with normal mechanics  Pt will demonstrate symmetrical SLS and SLS with EC indicating      Plan: Continue per plan of care.       Precautions: hx ankle lateral lig repair      FOTO 8/8 8/12 8/19 8/22 8/26 8/29        Manuals              Ankle PROM  Gr I/ii mobs    KD          CPA L spine, UPAs     Grii/iii    KD  +STM KD                                     Neuro Re-Ed              Heel toe walk & DB pass See below             Bird dog      3 x 10        Mt climber 3 x 10 3x15 3 x 15 Att x 10 3 x 10 3 x 10        Sciatic nerve glide              Sidelying open book with nerve glide              Palloff press-band Quarter turn    Cristobal    3 x 10 Quarter turn    Cristobal    3 x 10 nv   NBOS    3 x 10        Tib anterior raise  Leaning on wall 3x10 3 x 10 3 x 10 nv  3x10        DB pass/tandem walk 3#    Tumbling mat    10x     3#    Mat      10x   nv pain        SLS/foam  10\" x 10  No shoe  10\" x 5    pain nv         CC-retro     40# x 20 30#  x20        Ther Ex              bridge              Pball rollout x 3  10\" x 10 10\" x 10 10\" x 10 10\" x 10 10\" x 10        Modified piriformis stretch  " "            Supported march/mt climber  See NR            Palloff press-band  See NR            CC-retro 25#    3 x 10 25#    3 x 10 nv           Hip hinge              Assisted squat              Leg press 85#    3 x 10 89#    3 x 10 nv  95# x 3 x 10 95#    3 x 10    55# singl  3 x 10        KTC w/ strap              Child's pose    3-5 breathx 10 x10 2 x 10  5\"        RB 10' Uprigh    5'  (Sore back)    Nu Step  6'        Hip hinge  Cristobal med ball    Knee height    3 x 10            Crunch-mini    2 x 10 2 x 10  3\"         Pt Ed              Ther Activity                                          Gait Training                                          Modalities                                                       "

## 2024-09-12 ENCOUNTER — HOSPITAL ENCOUNTER (OUTPATIENT)
Dept: RADIOLOGY | Facility: CLINIC | Age: 49
Discharge: HOME | End: 2024-09-12
Payer: COMMERCIAL

## 2024-09-12 VITALS
SYSTOLIC BLOOD PRESSURE: 117 MMHG | RESPIRATION RATE: 20 BRPM | HEART RATE: 79 BPM | DIASTOLIC BLOOD PRESSURE: 75 MMHG | OXYGEN SATURATION: 98 % | TEMPERATURE: 98 F

## 2024-09-12 DIAGNOSIS — M51.16 INTERVERTEBRAL DISC DISORDER WITH RADICULOPATHY OF LUMBAR REGION: ICD-10-CM

## 2024-09-12 PROCEDURE — 64483 NJX AA&/STRD TFRM EPI L/S 1: CPT | Performed by: ANESTHESIOLOGY

## 2024-09-12 PROCEDURE — 64484 NJX AA&/STRD TFRM EPI L/S EA: CPT | Performed by: ANESTHESIOLOGY

## 2024-09-12 RX ORDER — METHYLPREDNISOLONE ACETATE 80 MG/ML
80 INJECTION, SUSPENSION INTRA-ARTICULAR; INTRALESIONAL; INTRAMUSCULAR; PARENTERAL; SOFT TISSUE ONCE
Status: COMPLETED | OUTPATIENT
Start: 2024-09-12 | End: 2024-09-12

## 2024-09-12 RX ORDER — BUPIVACAINE HCL/PF 2.5 MG/ML
2 VIAL (ML) INJECTION ONCE
Status: COMPLETED | OUTPATIENT
Start: 2024-09-12 | End: 2024-09-12

## 2024-09-12 RX ORDER — 0.9 % SODIUM CHLORIDE 0.9 %
4 VIAL (ML) INJECTION ONCE
Status: COMPLETED | OUTPATIENT
Start: 2024-09-12 | End: 2024-09-12

## 2024-09-12 RX ADMIN — Medication 4 ML: at 11:25

## 2024-09-12 RX ADMIN — METHYLPREDNISOLONE ACETATE 80 MG: 80 INJECTION, SUSPENSION INTRA-ARTICULAR; INTRALESIONAL; INTRAMUSCULAR; PARENTERAL; SOFT TISSUE at 11:27

## 2024-09-12 RX ADMIN — IOHEXOL 1 ML: 300 INJECTION, SOLUTION INTRAVENOUS at 11:27

## 2024-09-12 RX ADMIN — BUPIVACAINE HYDROCHLORIDE 2 ML: 2.5 INJECTION, SOLUTION EPIDURAL; INFILTRATION; INTRACAUDAL at 11:27

## 2024-09-12 NOTE — DISCHARGE INSTR - LAB
Epidural Steroid Injection   WHAT YOU NEED TO KNOW:   An epidural steroid injection (MARIE) is a procedure to inject steroid medicine into the epidural space. The epidural space is between your spinal cord and vertebrae. Steroids reduce inflammation and fluid buildup in your spine that may be causing pain. You may be given pain medicine along with the steroids.          ACTIVITY  Do not drive or operate machinery today.  No strenuous activity today - bending, lifting, etc.  You may resume normal activites starting tomorrow - start slowly and as tolerated.  You may shower today, but no tub baths or hot tubs.  You may have numbness for several hours from the local anesthetic. Please use caution and common sense, especially with weight-bearing activities.    CARE OF THE INJECTION SITE  If you have soreness or pain, apply ice to the area today (20 minutes on/20 minutes off).  Starting tomorrow, you may use warm, moist heat or ice if needed.  You may have an increase or change in your discomfort for 36-48 hours after your treatment.  Apply ice and continue with any pain medication you have been prescribed.  Notify the [facility] if you have any of the following: redness, drainage, swelling, headache, stiff neck or fever above 100°F.    SPECIAL INSTRUCTIONS  Our office will contact you in approximately 14 days for a progress report.    MEDICATIONS  Continue to take all routine medications.  Our office may have instructed you to hold some medications.    As no general anesthesia was used in today's procedure, you should not experience any side effects related to anesthesia.     If you are diabetic, the steroids used in today's injection may temporarily increase your blood sugar levels after the first few days after your injection. Please keep a close eye on your sugars and alert the doctor who manages your diabetes if your sugars are significantly high from your baseline or you are symptomatic.     If you have a  problem specifically related to your procedure, please call our office at [phone].  Problems not related to your procedure should be directed to your primary care physician.

## 2024-09-12 NOTE — H&P
"History of Present Illness: The patient is a 49 y.o. male who presents with complaints of left lower back and leg pain and is here today for left L5-S1 transforaminal epidural steroid injection    Past Medical History:   Diagnosis Date    Cancer (HCC)     colon    Diabetes mellitus (HCC)     GERD (gastroesophageal reflux disease)     Hyperlipidemia     Post concussion syndrome 05/09/2017    Pre-op examination     Sacroiliitis (HCC)     Sleep apnea     \"mild\"    Traumatic brain injury (HCC) 2017    R/S 2017       Past Surgical History:   Procedure Laterality Date    APPENDECTOMY      COLON SURGERY      COLONOSCOPY      EGD      [facility] REMOVAL IMPLANT DEEP Left 4/26/2024    Procedure: REMOVAL HARDWARE ANKLE;  Surgeon: Steve Kaplan DPM;  Location: [facility];  Service: Podiatry    [facility] RPR PRIMARY DISRUPTED LIGAMENT ANKLE COLLATERAL Left 03/10/2023    Procedure: ANKLE LIGAMENT REPAIR OF SYNDESMOTIC LIGAMENT with steroid injection of posterior heel bursa;  Surgeon: Steve Kaplan DPM;  Location: [facility];  Service: Podiatry         Current Outpatient Medications:     atorvastatin (LIPITOR) 10 mg tablet, Take 1 tablet (10 mg total) by mouth every evening (Patient not taking: Reported on 3/13/2024), Disp: 90 tablet, Rfl: 3    bisacodyl (DULCOLAX) 5 mg EC tablet, Take 2 tablets (10 mg total) by mouth once for 1 dose, Disp: 2 tablet, Rfl: 0    Blood Glucose Monitoring Suppl (OneTouch Verio Flex System) w/Device KIT, , Disp: , Rfl:     calcipotriene (DOVONEX) 0.005 % cream, Apply topically 2 (two) times a day, Disp: 60 g, Rfl: 1    cholestyramine (QUESTRAN) 4 g packet, Take 1 packet (4 g total) by mouth 3 (three) times a day as needed (DIARRHEA) Take Questran 3 hours apart from other medication, Disp: 60 packet, Rfl: 3    Continuous Glucose Sensor (FreeStyle Dell 3 Sensor) MISC, USE ONE UNIT EVERY 14 DAYS, Disp: 2 each, Rfl: 5    dulaglutide (Trulicity) 4.5 MG/0.5ML injection, Inject 0.5 mL (4.5 mg total) under the skin " every 7 days, Disp: 6 mL, Rfl: 1    gabapentin (Neurontin) 300 mg capsule, Take 1 capsule (300 mg total) by mouth daily at bedtime, Disp: 30 capsule, Rfl: 1    Insulin Glargine Solostar (Lantus SoloStar) 100 UNIT/ML SOPN, INJECT 15 UNITS (0.15ML) UNDER THE SKIN DAILY AT BEDTIME, Disp: 24 mL, Rfl: 1    Insulin Pen Needle (BD Pen Needle Nancy U/F) 32G X 4 MM MISC, Use twice daily, Disp: 100 each, Rfl: 2    Lancets (OneTouch Delica Plus [id number]) MISC, daily, Disp: , Rfl:     meloxicam (MOBIC) 15 mg tablet, Take 1 tablet (15 mg total) by mouth daily, Disp: 30 tablet, Rfl: 0    metFORMIN (GLUCOPHAGE) 1000 MG tablet, Take 1 tablet (1,000 mg total) by mouth 2 (two) times a day with meals, Disp: 180 tablet, Rfl: 1    methylPREDNISolone 4 MG tablet therapy pack, Use as directed on package (Patient not taking: Reported on 7/10/2024), Disp: 21 each, Rfl: 0    ondansetron (ZOFRAN-ODT) 4 mg disintegrating tablet, Take 1 tablet (4 mg total) by mouth every 6 (six) hours as needed for nausea or vomiting (Patient not taking: Reported on 6/26/2024), Disp: 20 tablet, Rfl: 0    OneTouch Verio test strip, daily, Disp: , Rfl:     oxyCODONE-acetaminophen (Percocet) 5-325 mg per tablet, Take 1 tablet by mouth every 4 (four) hours as needed for moderate pain for up to 10 doses Max Daily Amount: 6 tablets (Patient not taking: Reported on 4/29/2024), Disp: 10 tablet, Rfl: 0    polyethylene glycol (GOLYTELY) 4000 mL solution, Take 4,000 mL by mouth once for 1 dose, Disp: 4000 mL, Rfl: 0    tadalafil (CIALIS) 20 MG tablet, Take 1 tablet (20 mg total) by mouth daily as needed for erectile dysfunction, Disp: 30 tablet, Rfl: 5    Current Facility-Administered Medications:     bupivacaine (PF) (MARCAINE) 0.25 % injection 2 mL, 2 mL, Epidural, Once, Vicente Emanuel MD    iohexol (OMNIPAQUE) 300 mg/mL injection 1 mL, 1 mL, Epidural, Once, Vicente Emanuel MD    lidocaine (PF) (XYLOCAINE-MPF) 2 % injection 4 mL, 4 mL, Infiltration, Once, Vicente  "MD Adelfo    methylPREDNISolone acetate (DEPO-MEDROL) injection 80 mg, 80 mg, Epidural, Once, Vicente Emanuel MD    sodium chloride (PF) 0.9 % injection 4 mL, 4 mL, Infiltration, Once, Vicente Emanuel MD    Allergies   Allergen Reactions    Asa [Aspirin] GI Intolerance    Penicillin G Other (See Comments)     Ancef ok     \"unsure; was a baby\"       Physical Exam:   Vitals:    09/12/24 1109   BP: 121/79   Pulse: 80   Resp: 20   Temp: 98 °F (36.7 °C)   SpO2: 98%     General: Awake, Alert, Oriented x 3, Mood and affect appropriate  Respiratory: Respirations even and unlabored  Cardiovascular: Peripheral pulses intact; no edema  Musculoskeletal Exam: Left lower back and leg pain    ASA Score: 3    Patient/Chart Verification  Patient ID Verified: Verbal  ID Band Applied: No  Consents Confirmed: Procedural, To be obtained in the Pre-Procedure area  H&P( within 30 days) Verified: Yes  Interval H&P(within 24 hr) Complete (required for Outpatients and Surgery Admit only): To be obtained in the Procedural area  Allergies Reviewed: Yes  Anticoag/NSAID held?: NA  Currently on antibiotics?: No    Assessment:   1. Intervertebral disc disorder with radiculopathy of lumbar region        Plan: Left L5-S1 TFESI    "

## 2024-09-16 ENCOUNTER — OFFICE VISIT (OUTPATIENT)
Dept: PODIATRY | Facility: CLINIC | Age: 49
End: 2024-09-16
Payer: COMMERCIAL

## 2024-09-16 VITALS
HEIGHT: 65 IN | HEART RATE: 78 BPM | DIASTOLIC BLOOD PRESSURE: 90 MMHG | BODY MASS INDEX: 33.99 KG/M2 | WEIGHT: 204 LBS | SYSTOLIC BLOOD PRESSURE: 133 MMHG | OXYGEN SATURATION: 96 %

## 2024-09-16 DIAGNOSIS — S93.432D ANKLE SYNDESMOSIS DISRUPTION, LEFT, SUBSEQUENT ENCOUNTER: Primary | ICD-10-CM

## 2024-09-16 DIAGNOSIS — G58.8 NEUROMA DIGITAL NERVE: ICD-10-CM

## 2024-09-16 DIAGNOSIS — Z98.890 POST-OPERATIVE STATE: ICD-10-CM

## 2024-09-16 PROCEDURE — 99213 OFFICE O/P EST LOW 20 MIN: CPT | Performed by: PODIATRIST

## 2024-09-16 PROCEDURE — 20550 NJX 1 TENDON SHEATH/LIGAMENT: CPT | Performed by: PODIATRIST

## 2024-09-16 RX ORDER — TESTOSTERONE CYPIONATE 200 MG/ML
2 INJECTION INTRAMUSCULAR
Status: SHIPPED | OUTPATIENT
Start: 2024-09-16

## 2024-09-16 RX ORDER — BUPIVACAINE HYDROCHLORIDE 2.5 MG/ML
0.5 INJECTION, SOLUTION INFILTRATION; PERINEURAL
Status: SHIPPED | OUTPATIENT
Start: 2024-09-16

## 2024-09-16 RX ADMIN — TESTOSTERONE CYPIONATE 2 MG: 200 INJECTION INTRAMUSCULAR at 08:00

## 2024-09-16 RX ADMIN — BUPIVACAINE HYDROCHLORIDE 0.5 ML: 2.5 INJECTION, SOLUTION INFILTRATION; PERINEURAL at 08:00

## 2024-09-16 NOTE — PROGRESS NOTES
Podiatry Clinic Visit  Jc Carmona 49 y.o. male MRN: [id number]  Encounter: [id number]    Assessment & Plan        Diagnoses and all orders for this visit:    Ankle syndesmosis disruption, left, subsequent encounter  -     Ambulatory Referral to Physical Therapy; Future    Post-operative state  -     Ambulatory Referral to Physical Therapy; Future    Neuroma digital nerve    Other orders  -     Foot/lower extremity injection           Plan:  Patient was examined, evaluated, and treated with all questions and concerns addressed.  Patient presents with symptoms consistent with interdigital neuroma.  Steroid injection administered to left third interspace.  See procedure note below.  Encouraged patient to wear supportive shoe gear  Patient still with lingering tenderness to lateral aspect of left ankle.  New prescription dispensed for physical therapy  Patient was re-appointed for 4 weeks    - Dr. Kaplan  was available/present for entirety of patient encounter and present for all procedures.    Foot/lower extremity injection    Performed by: Lamar Potts DPM  Authorized by: Lamar Potts DPM    Procedure:     Verbal consent obtained?: Yes      Consent given by:  Patient    Patient states understanding of procedure being performed: Yes      Patient identity confirmed:  Verbally with patient    Supporting Documentation:     Indications:  Pain    Procedure Details:                Ethyl Chloride was applied      Needle size: 25 G G    Approach:  Dorsal    Laterality:  Left    Injection Information:       Medications:  0.5 mL bupivacaine 0.25 %; 2 mg dexamethasone 120 mg/30 mL    Comments:      Following verbal consent and sterile prep, injection consisting of 0.5 cc 0.25% Marcaine plain and 0.5 cc dexamethasone injected to left third interdigital space.  This procedure was well-tolerated by patient and performed without incident.        History of Present Illness     HPI: Jc Carmona is a 49 y.o. male  "who presents for postoperative follow-up after left syndesmotic repair and removal of hardware.  Patient reports mild tenderness remaining at the lateral aspect of his ankle.  Patient has been going to physical therapy to work on balance, strength, range of motion and states that this is helping.  Patient requests a new prescription for physical therapy.  Patient reports his left ankle swells throughout the day and is worse at night.  Patient has new concern of numbness and tingling at the ball of his foot, most localized in the third interdigital space.  Patient reports this pain has been about 3 months in duration and is worse with walking.  Patient denies trying any conservative therapies.  The patient has no further podiatric complaints at this time.    Review of Systems   Constitutional: Negative.    HENT: Negative.    Eyes: Negative.    Respiratory: Negative.    Cardiovascular: Negative.    Gastrointestinal: Negative.    Musculoskeletal: Negative  Skin: Negative  Neurological: Negative.        Historical Information   Past Medical History:   Diagnosis Date    Cancer (HCC)     colon    Diabetes mellitus (HCC)     GERD (gastroesophageal reflux disease)     Hyperlipidemia     Post concussion syndrome 05/09/2017    Pre-op examination     Sacroiliitis (HCC)     Sleep apnea     \"mild\"    Traumatic brain injury (HCC) 2017    R/S 2017     Past Surgical History:   Procedure Laterality Date    APPENDECTOMY      COLON SURGERY      COLONOSCOPY      EGD      [place] REMOVAL IMPLANT DEEP Left 4/26/2024    Procedure: REMOVAL HARDWARE ANKLE;  Surgeon: Steve Kaplan DPM;  Location:  MAIN OR;  Service: Podiatry    [place] RPR PRIMARY DISRUPTED LIGAMENT ANKLE COLLATERAL Left 03/10/2023    Procedure: ANKLE LIGAMENT REPAIR OF SYNDESMOTIC LIGAMENT with steroid injection of posterior heel bursa;  Surgeon: Steve Kaplan DPM;  Location:  MAIN OR;  Service: Podiatry     Social History   Social History     Substance and Sexual " "Activity   Alcohol Use Not Currently    Alcohol/week: 1.0 standard drink of alcohol    Types: 1 Cans of beer per week     Social History     Substance and Sexual Activity   Drug Use Never     Social History     Tobacco Use   Smoking Status Never   Smokeless Tobacco Never     Family History:   Family History   Problem Relation Age of Onset    Coronary artery disease Mother     Diabetes Mother     Hypertension Mother     Hyperlipidemia Mother     Coronary artery disease Father     Diabetes Father     Hypertension Father     Hyperlipidemia Father     Heart attack Father     Anemia Brother     No Known Problems Son     No Known Problems Son     No Known Problems Son        Meds/Allergies   Not in a hospital admission.  Allergies   Allergen Reactions    Asa [Aspirin] GI Intolerance    Penicillin G Other (See Comments)     Ancef ok     \"unsure; was a baby\"       Objective     Current Vitals:   Blood Pressure: 133/90 (09/16/24 0748)  Pulse: 78 (09/16/24 0748)  Height: 5' 5\" (165.1 cm) (09/16/24 0748)  Weight - Scale: 92.5 kg (204 lb) (09/16/24 0748)  SpO2: 96 % (09/16/24 0748)        /90 (BP Location: Left arm, Patient Position: Sitting, Cuff Size: Standard)   Pulse 78   Ht 5' 5\" (1.651 m)   Wt 92.5 kg (204 lb)   SpO2 96%   BMI 33.95 kg/m²       Lower Extremity Exam:    Foot Exam    Musculoskeletal: Manual muscle testing 5/5 for all compartments of the bilateral lower extremity.  Mild pain to lateral ankle with resisted eversion on the left side.  Mild pain along course of peroneal tendons posterior to the lateral malleolus, mild pain with palpation of ATFL on the left side.     Vascular: Pedal pulses palpable.  Capillary refill to digits x 10 within normal limits.  Digital hair present     Dermatological:  No open Lesions. Skin of the LE is normal texture, temperature, turgor B/L. Interdigital maceration is not present.        Neurologic:  Gross sensation is intact. Monofilament sensation is Intact. Sharp " sensation is present.   Pain and recreation of symptoms with palpation of plantar aspect of left third interdigital space.  Mild pain with lateral compression of foot

## 2024-09-26 ENCOUNTER — TELEPHONE (OUTPATIENT)
Dept: RADIOLOGY | Facility: MEDICAL CENTER | Age: 49
End: 2024-09-26

## 2024-09-26 ENCOUNTER — TELEPHONE (OUTPATIENT)
Dept: OBGYN CLINIC | Facility: HOSPITAL | Age: 49
End: 2024-09-26

## 2024-09-26 DIAGNOSIS — M51.16 INTERVERTEBRAL DISC DISORDER WITH RADICULOPATHY OF LUMBAR REGION: Primary | ICD-10-CM

## 2024-09-26 NOTE — TELEPHONE ENCOUNTER
Pt reports 15 % improvement post injection  Pain level  12/10    Patient also states Leg constant Numbness/Tingling and feels cold.

## 2024-09-27 ENCOUNTER — EVALUATION (OUTPATIENT)
Dept: PHYSICAL THERAPY | Age: 49
End: 2024-09-27
Payer: COMMERCIAL

## 2024-09-27 DIAGNOSIS — Z98.890 POST-OPERATIVE STATE: ICD-10-CM

## 2024-09-27 DIAGNOSIS — M25.572 CHRONIC PAIN OF LEFT ANKLE: Primary | ICD-10-CM

## 2024-09-27 DIAGNOSIS — G89.29 CHRONIC PAIN OF LEFT ANKLE: Primary | ICD-10-CM

## 2024-09-27 DIAGNOSIS — S93.432D ANKLE SYNDESMOSIS DISRUPTION, LEFT, SUBSEQUENT ENCOUNTER: ICD-10-CM

## 2024-09-27 PROCEDURE — 97161 PT EVAL LOW COMPLEX 20 MIN: CPT

## 2024-09-27 PROCEDURE — 97110 THERAPEUTIC EXERCISES: CPT

## 2024-09-27 NOTE — PROGRESS NOTES
PT Evaluation     Today's date: 2024  Patient name: Jc Carmona  : 1975  MRN: [id number]  Referring provider: Lamar Potts DPM  Dx:   Encounter Diagnosis     ICD-10-CM    1. Chronic pain of left ankle  M25.572     G89.29       2. Ankle syndesmosis disruption, left, subsequent encounter  S93.432D                      Assessment  Impairments: abnormal or restricted ROM, impaired physical strength, lacks appropriate home exercise program, pain with function, poor posture  and poor body mechanics    Assessment details: Pt is a 48 y/o male who presents with L ankle pain. No further referral is necessary at this time. Pt symptoms consistent with referring diagnoses.  Pt has slight restriction of DF motion and pain with passive inv.  Pt deficits limit standing and walking for prolonged periods. Pt is experiencing pain, decreased strength, and decreased ROM. Pt has a positive prognosis. Pt would benefit from PT to address these impairments leading to increased functional capacity and improved quality of life.      In 2-4 weeks:  Pt will improve pain on VAPS by 1-2 points indicating improved function  Pt will demonstrate independence and understanding of HEP  Pt will demonstrate improved navigation up and down stairs independently     In 6-8 weeks:  Pt will demonstrate improved ROM >110 adequate for ascending/descending stairs  Pt will demonstrate SL heel raise test within 10% of affected side  Pt will demonstrate squat with normal mechanics  Pt will demonstrate symmetrical SLS and SLS with EC indicating improved proprioception        Understanding of Dx/Px/POC: good     Prognosis: good    Plan  Patient would benefit from: skilled physical therapy  Planned modality interventions: cryotherapy and thermotherapy: hydrocollator packs    Planned therapy interventions: neuromuscular re-education, patient education, stretching, strengthening, therapeutic activities, therapeutic exercise, therapeutic  "training, home exercise program and graded activity    Frequency: Twice a week for 6 weeks.  Treatment plan discussed with: patient        Subjective Evaluation    History of Present Illness  Mechanism of injury: Pt saw Dr. Kaplan--notes pain in \"ball of foot\" --not better since injection.  Reports numb, tingly, cold in L LE.  Injection in back didn't     Pt has not seen ortho for his back yet. Dr. Kaplan sent him back for treatment of his ankle.   Pt has been wearing sneakers, unable to put slides back on comfortably.     Pt reports he has been trying to walk--about a half mile to a mile.       Pain  Current pain ratin  At worst pain ratin  Location: top of foot and midfoot          Objective     Palpation     Additional Palpation Details  TTP lateral ankle-ATFL region    Strength/Myotome Testing     Left Ankle/Foot   Normal strength  Eversion: 4+    Additional Strength Details  Toe strength WNL    Tests   Left Ankle/Foot   Positive for interdigital neuroma.   Negative for eversion talar tilt and syndesmosis squeeze.     Additional Tests Details  (-) Coy syndesmosis test    General Comments:      Ankle/Foot Comments   Pain with passive inversion  When distracted, same joint excursion was not painful (+) Diana signs             Precautions: see chart    Access Code: [email or web address]  URL: [email or web address]  Date: 2024  Prepared by: Yves Delgadillo    Exercises  - Gastroc Stretch on Wall  - 1 x daily - 7 x weekly - 3 sets - 10 reps  - Seated Ankle Inversion Eversion PROM  - 1 x daily - 7 x weekly - 3 sets - 10 reps  - Standing Ankle Dorsiflexion Stretch on Chair  - 1 x daily - 7 x weekly - 3 sets - 10 reps      Manuals                                                                 Neuro Re-Ed                                                                                                        Ther Ex                                                                                            "                          Ther Activity                                       Gait Training                                       Modalities

## 2024-09-30 ENCOUNTER — OFFICE VISIT (OUTPATIENT)
Dept: PHYSICAL THERAPY | Age: 49
End: 2024-09-30
Payer: COMMERCIAL

## 2024-09-30 DIAGNOSIS — G89.29 CHRONIC PAIN OF LEFT ANKLE: Primary | ICD-10-CM

## 2024-09-30 DIAGNOSIS — M25.572 CHRONIC PAIN OF LEFT ANKLE: Primary | ICD-10-CM

## 2024-09-30 DIAGNOSIS — Z98.890 POST-OPERATIVE STATE: ICD-10-CM

## 2024-09-30 DIAGNOSIS — S93.432D ANKLE SYNDESMOSIS DISRUPTION, LEFT, SUBSEQUENT ENCOUNTER: ICD-10-CM

## 2024-09-30 PROCEDURE — 97112 NEUROMUSCULAR REEDUCATION: CPT

## 2024-09-30 PROCEDURE — 97110 THERAPEUTIC EXERCISES: CPT

## 2024-09-30 PROCEDURE — 97140 MANUAL THERAPY 1/> REGIONS: CPT

## 2024-09-30 NOTE — PROGRESS NOTES
"Daily Note     Today's date: 2024  Patient name: Jc Carmona  : 1975  MRN: [id number]  Referring provider: Steve Kaplan DPM  Dx:   Encounter Diagnosis     ICD-10-CM    1. Chronic pain of left ankle  M25.572     G89.29       2. Ankle syndesmosis disruption, left, subsequent encounter  S93.432D       3. Post-operative state  Z98.890                      Subjective: pt reports pain this morning, stretching helps somewhat, pt reports some discomfort with SLS       Objective: See treatment diary below      Assessment: ex progressions as per PT POC, pt joseph fairly well with intermittent discomfort at times      Plan: Continue per plan of care.      Precautions: see chart    Access Code: [email or web address]  URL: [email or web address]  Date: 2024  Prepared by: Yves Delgadillo    Exercises  - Gastroc Stretch on Wall  - 1 x daily - 7 x weekly - 3 sets - 10 reps  - Seated Ankle Inversion Eversion PROM  - 1 x daily - 7 x weekly - 3 sets - 10 reps  - Standing Ankle Dorsiflexion Stretch on Chair  - 1 x daily - 7 x weekly - 3 sets - 10 reps      Manuals            PROM L foot/ankle  VK                                                  Neuro Re-Ed             Tandem stance on foam  2x30\" ea           SLS  2x30\" ea B/L                                                                            Ther Ex             RB  10'           Standing gastroc st  10x10\"           Ankle TB (4)  Gtb 2x15 ea           B HR  2x10           Leg press  85# 2x10                                                  Ther Activity                                       Gait Training                                       Modalities                                            "

## 2024-10-01 NOTE — TELEPHONE ENCOUNTER
S/w Pt, advised of the same. Pt agreeable and verbalized understanding. Please place referral for Dr. Bolanos in chart.

## 2024-10-03 ENCOUNTER — TELEPHONE (OUTPATIENT)
Age: 49
End: 2024-10-03

## 2024-10-03 NOTE — TELEPHONE ENCOUNTER
Spoke to Pt and clarified that he wanted copies of the court papers. Printed copies and supplied Pt with the number for Medical Records if additional information is needed.

## 2024-10-03 NOTE — TELEPHONE ENCOUNTER
Caller: Jc Carmona    Doctor and/or Office: Dr. Kaplan/[facility]    #: [phone]    Escalation: Disability forms Patient wants to come in Monday to  papers that he says are already completed by Dr. Kaplan for his domestic relations court hearing stating he is out of work and the dates out. Is this completed? Please return call to patient to let him know if they're done as he states. Thank you

## 2024-10-04 ENCOUNTER — OFFICE VISIT (OUTPATIENT)
Dept: PHYSICAL THERAPY | Age: 49
End: 2024-10-04
Payer: COMMERCIAL

## 2024-10-04 DIAGNOSIS — G89.29 CHRONIC PAIN OF LEFT ANKLE: Primary | ICD-10-CM

## 2024-10-04 DIAGNOSIS — S93.432D ANKLE SYNDESMOSIS DISRUPTION, LEFT, SUBSEQUENT ENCOUNTER: ICD-10-CM

## 2024-10-04 DIAGNOSIS — Z98.890 POST-OPERATIVE STATE: ICD-10-CM

## 2024-10-04 DIAGNOSIS — M25.572 CHRONIC PAIN OF LEFT ANKLE: Primary | ICD-10-CM

## 2024-10-04 PROCEDURE — 97112 NEUROMUSCULAR REEDUCATION: CPT

## 2024-10-04 PROCEDURE — 97140 MANUAL THERAPY 1/> REGIONS: CPT

## 2024-10-04 PROCEDURE — 97110 THERAPEUTIC EXERCISES: CPT

## 2024-10-04 NOTE — PROGRESS NOTES
"Daily Note     Today's date: 10/4/2024  Patient name: Jc Carmona  : 1975  MRN: [id number]  Referring provider: Steve Kaplan DPM  Dx:   Encounter Diagnosis     ICD-10-CM    1. Chronic pain of left ankle  M25.572     G89.29       2. Ankle syndesmosis disruption, left, subsequent encounter  S93.432D       3. Post-operative state  Z98.890                      Subjective: pt reports feeling about the same since last session      Objective: See treatment diary below      Assessment: ex progressions challenging but joseph fairly well, min soreness noted with weight progressions      Plan: Continue per plan of care.      Precautions: see chart    Access Code: [email or web address]  URL: [email or web address]  Date: 2024  Prepared by: Yves Delgadillo    Exercises  - Gastroc Stretch on Wall  - 1 x daily - 7 x weekly - 3 sets - 10 reps  - Seated Ankle Inversion Eversion PROM  - 1 x daily - 7 x weekly - 3 sets - 10 reps  - Standing Ankle Dorsiflexion Stretch on Chair  - 1 x daily - 7 x weekly - 3 sets - 10 reps      Manuals  10          PROM L foot/ankle  VK VK                                                 Neuro Re-Ed  9/30 10/4          Tandem stance on foam  2x30\" ea 2x30\" ea           SLS  2x30\" ea B/L Foam 2x30\" ea BL                                                                           Ther Ex   10          RB  10' 5'          Standing gastroc st  10x10\" 1x10\"          Ankle TB (4)  Gtb 2x15 ea Gtb 2x15 ea           B HR  2x10 2x15          Leg press  85# 2x10 89# 2x15                                                 Ther Activity                                       Gait Training                                       Modalities                                              " normal (ped)...

## 2024-10-07 ENCOUNTER — HOSPITAL ENCOUNTER (OUTPATIENT)
Dept: RADIOLOGY | Facility: HOSPITAL | Age: 49
Discharge: HOME | End: 2024-10-07
Attending: ORTHOPAEDIC SURGERY
Payer: COMMERCIAL

## 2024-10-07 ENCOUNTER — OFFICE VISIT (OUTPATIENT)
Dept: OBGYN CLINIC | Facility: HOSPITAL | Age: 49
End: 2024-10-07
Attending: ANESTHESIOLOGY
Payer: COMMERCIAL

## 2024-10-07 ENCOUNTER — OFFICE VISIT (OUTPATIENT)
Dept: PHYSICAL THERAPY | Age: 49
End: 2024-10-07
Payer: COMMERCIAL

## 2024-10-07 VITALS
HEIGHT: 65 IN | SYSTOLIC BLOOD PRESSURE: 127 MMHG | WEIGHT: 206 LBS | HEART RATE: 77 BPM | DIASTOLIC BLOOD PRESSURE: 83 MMHG | BODY MASS INDEX: 34.32 KG/M2

## 2024-10-07 DIAGNOSIS — M25.572 CHRONIC PAIN OF LEFT ANKLE: Primary | ICD-10-CM

## 2024-10-07 DIAGNOSIS — S93.432D ANKLE SYNDESMOSIS DISRUPTION, LEFT, SUBSEQUENT ENCOUNTER: ICD-10-CM

## 2024-10-07 DIAGNOSIS — G89.29 CHRONIC LOW BACK PAIN WITH LEFT-SIDED SCIATICA, UNSPECIFIED BACK PAIN LATERALITY: Primary | ICD-10-CM

## 2024-10-07 DIAGNOSIS — M51.16 INTERVERTEBRAL DISC DISORDER WITH RADICULOPATHY OF LUMBAR REGION: ICD-10-CM

## 2024-10-07 DIAGNOSIS — Z01.818 PRE-OP EXAM: ICD-10-CM

## 2024-10-07 DIAGNOSIS — Z98.890 POST-OPERATIVE STATE: ICD-10-CM

## 2024-10-07 DIAGNOSIS — G89.29 CHRONIC PAIN OF LEFT ANKLE: Primary | ICD-10-CM

## 2024-10-07 DIAGNOSIS — M54.42 CHRONIC LOW BACK PAIN WITH LEFT-SIDED SCIATICA, UNSPECIFIED BACK PAIN LATERALITY: Primary | ICD-10-CM

## 2024-10-07 DIAGNOSIS — R52 PAIN: ICD-10-CM

## 2024-10-07 PROCEDURE — 97112 NEUROMUSCULAR REEDUCATION: CPT

## 2024-10-07 PROCEDURE — 99244 OFF/OP CNSLTJ NEW/EST MOD 40: CPT | Performed by: ORTHOPAEDIC SURGERY

## 2024-10-07 PROCEDURE — 71046 X-RAY EXAM CHEST 2 VIEWS: CPT

## 2024-10-07 PROCEDURE — 97140 MANUAL THERAPY 1/> REGIONS: CPT

## 2024-10-07 PROCEDURE — 97110 THERAPEUTIC EXERCISES: CPT

## 2024-10-07 PROCEDURE — 72100 X-RAY EXAM L-S SPINE 2/3 VWS: CPT
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
## 2024-10-07 NOTE — PROGRESS NOTES
"Daily Note     Today's date: 10/7/2024  Patient name: Jc Carmona  : 1975  MRN: [id number]  Referring provider: Steve Kaplan DPM  Dx:   Encounter Diagnosis     ICD-10-CM    1. Chronic pain of left ankle  M25.572     G89.29       2. Ankle syndesmosis disruption, left, subsequent encounter  S93.432D       3. Post-operative state  Z98.890                      Subjective: pt reports he helped son build deck over the weekend but didn't do much but assist him, he  had a little pain at times      Objective: See treatment diary below      Assessment: Tolerated treatment well. Patient demonstrated fatigue post treatment and would benefit from continued PT      Plan: Continue per plan of care.      Precautions: see chart    Access Code: [email or web address]  URL: [email or web address]  Date: 2024  Prepared by: Yves Delgadillo    Exercises  - Gastroc Stretch on Wall  - 1 x daily - 7 x weekly - 3 sets - 10 reps  - Seated Ankle Inversion Eversion PROM  - 1 x daily - 7 x weekly - 3 sets - 10 reps  - Standing Ankle Dorsiflexion Stretch on Chair  - 1 x daily - 7 x weekly - 3 sets - 10 reps      Manuals 9/27 9/30 10/4 10/7         PROM L foot/ankle  VK VK VK                                                Neuro Re-Ed  9/30 10/4 10/7         Tandem stance on foam  2x30\" ea 2x30\" ea  2x45\" ea          SLS  2x30\" ea B/L Foam 2x30\" ea BL 2x45\" ea foam         Tandem amb on Airex balance beam frwd/bkwd    @ bar 4x         Sidestepping w/TB    Ytb 4x20'                                                Ther Ex  9/30 10/4 10/7         RB  10' 5' 10'         Standing gastroc st  10x10\" 1x10\" 10x10\"         Ankle TB (4)  Gtb 2x15 ea Gtb 2x15 ea  Btb 2x15         B HR  2x10 2x15 2x15         Leg press  85# 2x10 89# 2x15 95# 3x10                                                Ther Activity                                       Gait Training                                       Modalities                                 "
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
## 2024-10-11 ENCOUNTER — OFFICE VISIT (OUTPATIENT)
Dept: PHYSICAL THERAPY | Age: 49
End: 2024-10-11
Payer: COMMERCIAL

## 2024-10-11 ENCOUNTER — TELEPHONE (OUTPATIENT)
Dept: OBGYN CLINIC | Facility: CLINIC | Age: 49
End: 2024-10-11

## 2024-10-11 DIAGNOSIS — M25.572 CHRONIC PAIN OF LEFT ANKLE: Primary | ICD-10-CM

## 2024-10-11 DIAGNOSIS — Z98.890 POST-OPERATIVE STATE: ICD-10-CM

## 2024-10-11 DIAGNOSIS — G89.29 CHRONIC PAIN OF LEFT ANKLE: Primary | ICD-10-CM

## 2024-10-11 DIAGNOSIS — S93.432D ANKLE SYNDESMOSIS DISRUPTION, LEFT, SUBSEQUENT ENCOUNTER: ICD-10-CM

## 2024-10-11 PROCEDURE — 97112 NEUROMUSCULAR REEDUCATION: CPT

## 2024-10-11 PROCEDURE — 97110 THERAPEUTIC EXERCISES: CPT

## 2024-10-11 NOTE — PROGRESS NOTES
"Daily Note     Today's date: 10/11/2024  Patient name: Jc Carmona  : 1975  MRN: [id number]  Referring provider: Steve Kaplan DPM  Dx:   Encounter Diagnosis     ICD-10-CM    1. Chronic pain of left ankle  M25.572     G89.29       2. Ankle syndesmosis disruption, left, subsequent encounter  S93.432D       3. Post-operative state  Z98.890                      Subjective: pt notes numbness in leg persists.  He reports he is going to get a surgery for his back in January.  He notes a gall bladder surgery in a couple weeks.      Objective: See treatment diary below      Assessment: Pt demonstrates improvements in tolerance to activities.  Minor adjustments for balance throughout. Tolerated treatment well. Patient demonstrated fatigue post treatment and would benefit from continued PT      Plan: Continue per plan of care.      Precautions: see chart    Access Code: [email or web address]  URL: [email or web address]  Date: 2024  Prepared by: Yves Delgadillo    Exercises  - Gastroc Stretch on Wall  - 1 x daily - 7 x weekly - 3 sets - 10 reps  - Seated Ankle Inversion Eversion PROM  - 1 x daily - 7 x weekly - 3 sets - 10 reps  - Standing Ankle Dorsiflexion Stretch on Chair  - 1 x daily - 7 x weekly - 3 sets - 10 reps      Manuals 9/27 9/30 10/4 10/7 10/11        PROM L foot/ankle  VK VK VK                                                Neuro Re-Ed  9/30 10/4 10/7         Tandem stance on foam  2x30\" ea 2x30\" ea  2x45\" ea  2 x 45\"  Ball toss hand to hand        SLS  2x30\" ea B/L Foam 2x30\" ea BL 2x45\" ea foam 2 x 45\"        Tandem amb on Airex balance beam frwd/bkwd    @ bar 4x nv        Sidestepping w/TB    Ytb 4x20' Cristobal tb    2 x 15        Mb toss  Stride     Cristobal mb    2 x 15                                  Ther Ex  9/30 10/4 10/7         RB  10' 5' 10' 10'  L3        Standing gastroc st  10x10\" 1x10\" 10x10\" 10\" x 10        Ankle TB (4)  Gtb 2x15 ea Gtb 2x15 ea  Btb 2x15 nv        B HR  2x10 2x15 " 2x15 3 x 10        Leg press  85# 2x10 89# 2x15 95# 3x10 99#  3 x 10                                               Ther Activity                                       Gait Training                                       Modalities

## 2024-10-11 NOTE — TELEPHONE ENCOUNTER
Hi all,  The original paperwork is coming via inter office to [facility] to your attention.  I also entered a telephone encounter  Patient needs the originals as it is from [facility] which requires originals.  Once received and completed, send back to [facility] and patient will  originals.

## 2024-10-14 ENCOUNTER — APPOINTMENT (OUTPATIENT)
Dept: PHYSICAL THERAPY | Age: 49
End: 2024-10-14
Payer: COMMERCIAL

## 2024-10-15 ENCOUNTER — APPOINTMENT (OUTPATIENT)
Dept: PHYSICAL THERAPY | Age: 49
End: 2024-10-15
Payer: COMMERCIAL

## 2024-10-15 RX ORDER — CHLORHEXIDINE GLUCONATE ORAL RINSE 1.2 MG/ML
15 SOLUTION DENTAL ONCE
OUTPATIENT
Start: 2024-10-15 | End: 2024-10-15

## 2024-10-15 RX ORDER — CEFAZOLIN SODIUM 2 G/50ML
2000 SOLUTION INTRAVENOUS ONCE
OUTPATIENT
Start: 2024-10-15 | End: 2024-10-15

## 2024-10-17 ENCOUNTER — TELEPHONE (OUTPATIENT)
Dept: OBGYN CLINIC | Facility: CLINIC | Age: 49
End: 2024-10-17

## 2024-10-17 NOTE — TELEPHONE ENCOUNTER
Spoke to patient to notify him that his original Court of Common Ple[employer] form is available in the [facility] Office for pickup.

## 2024-10-18 ENCOUNTER — APPOINTMENT (OUTPATIENT)
Dept: PHYSICAL THERAPY | Age: 49
End: 2024-10-18
Payer: COMMERCIAL

## 2024-10-21 ENCOUNTER — OFFICE VISIT (OUTPATIENT)
Dept: PHYSICAL THERAPY | Age: 49
End: 2024-10-21
Payer: COMMERCIAL

## 2024-10-21 DIAGNOSIS — G89.29 CHRONIC PAIN OF LEFT ANKLE: Primary | ICD-10-CM

## 2024-10-21 DIAGNOSIS — S93.432D ANKLE SYNDESMOSIS DISRUPTION, LEFT, SUBSEQUENT ENCOUNTER: ICD-10-CM

## 2024-10-21 DIAGNOSIS — Z98.890 POST-OPERATIVE STATE: ICD-10-CM

## 2024-10-21 DIAGNOSIS — M25.572 CHRONIC PAIN OF LEFT ANKLE: Primary | ICD-10-CM

## 2024-10-21 PROCEDURE — 97110 THERAPEUTIC EXERCISES: CPT | Performed by: PHYSICAL THERAPY ASSISTANT

## 2024-10-21 PROCEDURE — 97112 NEUROMUSCULAR REEDUCATION: CPT | Performed by: PHYSICAL THERAPY ASSISTANT

## 2024-10-21 NOTE — PROGRESS NOTES
"Daily Note     Today's date: 10/21/2024  Patient name: Jc Carmona  : 1975  MRN: [id number]  Referring provider: Steve Kaplan DPM  Dx:   Encounter Diagnosis     ICD-10-CM    1. Chronic pain of left ankle  M25.572     G89.29       2. Ankle syndesmosis disruption, left, subsequent encounter  S93.432D       3. Post-operative state  Z98.890                      Subjective: Pt states he is having his gallbladder removed tomorrow.  Reports pain in ankle continues but states it is hard to determine if pain is from ankle or LB.     Objective: See treatment diary below      Assessment: Pt tolerated treatment well. Patient demonstrated fatigue post treatment, exhibited good technique with therapeutic exercises, and would benefit from continued PT      Plan: Continue per plan of care.      Precautions: see chart    Access Code: [email or web address]  URL: [email or web address]  Date: 2024  Prepared by: Yves Delgadillo    Exercises  - Gastroc Stretch on Wall  - 1 x daily - 7 x weekly - 3 sets - 10 reps  - Seated Ankle Inversion Eversion PROM  - 1 x daily - 7 x weekly - 3 sets - 10 reps  - Standing Ankle Dorsiflexion Stretch on Chair  - 1 x daily - 7 x weekly - 3 sets - 10 reps      Manuals 9/27 9/30 10/4 10/7 10/11 10/17       PROM L foot/ankle  VK VK VK                                                Neuro Re-Ed  9/30 10/4 10/7         Tandem stance on foam  2x30\" ea 2x30\" ea  2x45\" ea  2 x 45\"  Ball toss hand to hand 2x45\" ball toss hand to hand       SLS  2x30\" ea B/L Foam 2x30\" ea BL 2x45\" ea foam 2 x 45\" 2x45\"       Tandem amb on Airex balance beam frwd/bkwd    @ bar 4x nv @ bar x6 ea fwd/bkwd       Sidestepping w/TB    Ytb 4x20' Cristobal tb    2 x 15 Blue TB 25'x4       Mb toss  Stride     Cristobal mb    2 x 15 Blue MB     2x15                                 Ther Ex  9/30 10/4 10/7  10/21       RB  10' 5' 10' 10'  L3 10' L3       Standing gastroc st  10x10\" 1x10\" 10x10\" 10\" x 10 10\"x10       Ankle TB (4)  " Gtb 2x15 ea Gtb 2x15 ea  Btb 2x15 nv        B HR  2x10 2x15 2x15 3 x 10 3x10       Leg press  85# 2x10 89# 2x15 95# 3x10 99#  3 x 10 95# 3x10                                              Ther Activity                                       Gait Training                                       Modalities

## 2024-10-23 ENCOUNTER — TELEPHONE (OUTPATIENT)
Age: 49
End: 2024-10-23

## 2024-10-23 NOTE — TELEPHONE ENCOUNTER
Patient called to inform just got home from hospital ([facility]) 10/23/2024 had Gallbladder removed on 10/21/2024.

## 2024-10-25 ENCOUNTER — APPOINTMENT (OUTPATIENT)
Dept: PHYSICAL THERAPY | Age: 49
End: 2024-10-25
Payer: COMMERCIAL

## 2024-10-28 ENCOUNTER — APPOINTMENT (OUTPATIENT)
Dept: PHYSICAL THERAPY | Age: 49
End: 2024-10-28
Payer: COMMERCIAL

## 2024-10-29 ENCOUNTER — TELEPHONE (OUTPATIENT)
Dept: FAMILY MEDICINE CLINIC | Facility: CLINIC | Age: 49
End: 2024-10-29

## 2024-10-29 NOTE — TELEPHONE ENCOUNTER
Patient called on 10/29/24 to schedule TCM. Patient was discharged on 10/23/24. Schedule patient for TCM on 11/4/24 questions need to be completed.

## 2024-10-29 NOTE — TELEPHONE ENCOUNTER
Patient returning call to office. Called clerical line at office and spoke with Satish Ang transferred patient to her in the office to assist with scheduling TCM

## 2024-11-04 ENCOUNTER — TELEPHONE (OUTPATIENT)
Age: 49
End: 2024-11-04

## 2024-11-04 ENCOUNTER — OFFICE VISIT (OUTPATIENT)
Dept: FAMILY MEDICINE CLINIC | Facility: CLINIC | Age: 49
End: 2024-11-04
Payer: COMMERCIAL

## 2024-11-04 VITALS
HEIGHT: 65 IN | OXYGEN SATURATION: 98 % | BODY MASS INDEX: 35.49 KG/M2 | SYSTOLIC BLOOD PRESSURE: 139 MMHG | DIASTOLIC BLOOD PRESSURE: 69 MMHG | TEMPERATURE: 99.1 F | WEIGHT: 213 LBS | HEART RATE: 95 BPM

## 2024-11-04 DIAGNOSIS — S81.012D LACERATION OF SKIN OF LEFT KNEE, SUBSEQUENT ENCOUNTER: ICD-10-CM

## 2024-11-04 DIAGNOSIS — S22.42XD CLOSED FRACTURE OF MULTIPLE RIBS OF LEFT SIDE WITH ROUTINE HEALING, SUBSEQUENT ENCOUNTER: Primary | ICD-10-CM

## 2024-11-04 DIAGNOSIS — Z90.49 S/P CHOLECYSTECTOMY: ICD-10-CM

## 2024-11-04 PROCEDURE — 99495 TRANSJ CARE MGMT MOD F2F 14D: CPT | Performed by: FAMILY MEDICINE

## 2024-11-04 RX ORDER — SENNOSIDES 8.6 MG
650 CAPSULE ORAL 3 TIMES DAILY PRN
COMMUNITY
Start: 2024-10-22

## 2024-11-04 RX ORDER — TRAMADOL HYDROCHLORIDE 50 MG/1
50 TABLET ORAL EVERY 8 HOURS PRN
COMMUNITY
Start: 2024-10-23

## 2024-11-04 RX ORDER — IBUPROFEN 800 MG/1
TABLET, FILM COATED ORAL
COMMUNITY
Start: 2024-10-22 | End: 2025-10-22

## 2024-11-04 RX ORDER — FAMOTIDINE 20 MG/1
20 TABLET, FILM COATED ORAL
COMMUNITY
Start: 2024-10-22 | End: 2025-10-22

## 2024-11-04 RX ORDER — METHOCARBAMOL 500 MG/1
500 TABLET, FILM COATED ORAL
COMMUNITY
Start: 2024-11-03

## 2024-11-04 RX ORDER — SENNOSIDES 8.6 MG
TABLET ORAL
COMMUNITY
Start: 2024-10-22 | End: 2025-10-22

## 2024-11-04 RX ORDER — OXYCODONE AND ACETAMINOPHEN 5; 325 MG/1; MG/1
1 TABLET ORAL EVERY 4 HOURS PRN
Qty: 60 TABLET | Refills: 0 | Status: SHIPPED | OUTPATIENT
Start: 2024-11-04 | End: 2024-11-14

## 2024-11-04 NOTE — ASSESSMENT & PLAN NOTE
S/p cholecystectomy through [facility].  Tolerated procedure well.  Overall doing well

## 2024-11-04 NOTE — TELEPHONE ENCOUNTER
Patient calls in, because the pharmacy will not fill his oxycodone.  Insurance kicked it back.    They told the patient he can not fill RX for three days.  He is requesting Dr. Torrez call the Pharmacy.    Please advise.
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
## 2024-11-05 DIAGNOSIS — E11.65 TYPE 2 DIABETES MELLITUS WITH HYPERGLYCEMIA, WITH LONG-TERM CURRENT USE OF INSULIN (HCC): ICD-10-CM

## 2024-11-05 DIAGNOSIS — Z79.4 TYPE 2 DIABETES MELLITUS WITH HYPERGLYCEMIA, WITH LONG-TERM CURRENT USE OF INSULIN (HCC): ICD-10-CM

## 2024-11-05 NOTE — TELEPHONE ENCOUNTER
I called pharmacy and they stated that he needed a prior authorization with the insurance.  I called the patient and he stated that he is getting the oxy filled at the [facility] pharmacy today and does not need this script.    He did say that he will need medication for nausea.  Once he starts the pain meds he will need this.  He would like to have this sent to the [facility].

## 2024-11-06 ENCOUNTER — TELEPHONE (OUTPATIENT)
Age: 49
End: 2024-11-06

## 2024-11-06 ENCOUNTER — NURSE TRIAGE (OUTPATIENT)
Age: 49
End: 2024-11-06

## 2024-11-06 DIAGNOSIS — N48.1 BALANITIS: Primary | ICD-10-CM

## 2024-11-06 RX ORDER — NYSTATIN AND TRIAMCINOLONE ACETONIDE 100000; 1 [USP'U]/G; MG/G
OINTMENT TOPICAL 2 TIMES DAILY
Qty: 30 G | Refills: 0 | Status: SHIPPED | OUTPATIENT
Start: 2024-11-06
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
## 2024-11-06 NOTE — TELEPHONE ENCOUNTER
Refill provided.  If balanitis is persistent he should make follow-up appointment with office as he has not been seen in over a year.
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
## 2024-11-07 ENCOUNTER — TELEPHONE (OUTPATIENT)
Age: 49
End: 2024-11-07
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
## 2024-11-08 ENCOUNTER — NURSE TRIAGE (OUTPATIENT)
Age: 49
End: 2024-11-08
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
## 2024-11-15 ENCOUNTER — HOSPITAL ENCOUNTER (OUTPATIENT)
Dept: RADIOLOGY | Facility: HOSPITAL | Age: 49
End: 2024-11-15
Attending: PODIATRIST
Payer: COMMERCIAL

## 2024-11-15 ENCOUNTER — OFFICE VISIT (OUTPATIENT)
Dept: PODIATRY | Facility: CLINIC | Age: 49
End: 2024-11-15
Payer: COMMERCIAL

## 2024-11-15 VITALS
OXYGEN SATURATION: 100 % | BODY MASS INDEX: 35.49 KG/M2 | HEIGHT: 65 IN | WEIGHT: 213 LBS | SYSTOLIC BLOOD PRESSURE: 138 MMHG | DIASTOLIC BLOOD PRESSURE: 69 MMHG

## 2024-11-15 DIAGNOSIS — S93.492D SPRAIN OF ANTERIOR TALOFIBULAR LIGAMENT OF LEFT ANKLE, SUBSEQUENT ENCOUNTER: Primary | ICD-10-CM

## 2024-11-15 DIAGNOSIS — S93.492D SPRAIN OF ANTERIOR TALOFIBULAR LIGAMENT OF LEFT ANKLE, SUBSEQUENT ENCOUNTER: ICD-10-CM

## 2024-11-15 PROCEDURE — 73610 X-RAY EXAM OF ANKLE: CPT

## 2024-11-15 PROCEDURE — 99213 OFFICE O/P EST LOW 20 MIN: CPT | Performed by: PODIATRIST
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
## 2024-11-17 DIAGNOSIS — E11.65 TYPE 2 DIABETES MELLITUS WITH HYPERGLYCEMIA, WITH LONG-TERM CURRENT USE OF INSULIN (HCC): ICD-10-CM

## 2024-11-17 DIAGNOSIS — Z79.4 TYPE 2 DIABETES MELLITUS WITH HYPERGLYCEMIA, WITH LONG-TERM CURRENT USE OF INSULIN (HCC): ICD-10-CM

## 2024-11-18 RX ORDER — DULAGLUTIDE 4.5 MG/.5ML
INJECTION, SOLUTION SUBCUTANEOUS
Qty: 6 ML | Refills: 1 | Status: SHIPPED | OUTPATIENT
Start: 2024-11-18

## 2024-12-11 ENCOUNTER — TELEPHONE (OUTPATIENT)
Dept: FAMILY MEDICINE CLINIC | Facility: CLINIC | Age: 49
End: 2024-12-11

## 2024-12-11 ENCOUNTER — OFFICE VISIT (OUTPATIENT)
Dept: FAMILY MEDICINE CLINIC | Facility: CLINIC | Age: 49
End: 2024-12-11
Payer: COMMERCIAL

## 2024-12-11 VITALS
WEIGHT: 211 LBS | HEIGHT: 65 IN | SYSTOLIC BLOOD PRESSURE: 136 MMHG | HEART RATE: 87 BPM | TEMPERATURE: 97.9 F | BODY MASS INDEX: 35.16 KG/M2 | RESPIRATION RATE: 20 BRPM | OXYGEN SATURATION: 95 % | DIASTOLIC BLOOD PRESSURE: 80 MMHG

## 2024-12-11 DIAGNOSIS — J06.9 UPPER RESPIRATORY TRACT INFECTION, UNSPECIFIED TYPE: Primary | ICD-10-CM

## 2024-12-11 DIAGNOSIS — R05.9 COUGH, UNSPECIFIED TYPE: ICD-10-CM

## 2024-12-11 PROCEDURE — 99214 OFFICE O/P EST MOD 30 MIN: CPT

## 2024-12-11 RX ORDER — AZITHROMYCIN 250 MG/1
TABLET, FILM COATED ORAL
Qty: 6 TABLET | Refills: 0 | Status: SHIPPED | OUTPATIENT
Start: 2024-12-11 | End: 2024-12-15

## 2024-12-11 RX ORDER — BENZONATATE 100 MG/1
100 CAPSULE ORAL 3 TIMES DAILY PRN
Qty: 20 CAPSULE | Refills: 0 | Status: SHIPPED | OUTPATIENT
Start: 2024-12-11

## 2024-12-11 RX ORDER — LIDOCAINE PAIN RELIEF 40 MG/1000MG
PATCH TOPICAL
COMMUNITY
Start: 2024-11-29
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
## 2024-12-12 ENCOUNTER — TELEPHONE (OUTPATIENT)
Age: 49
End: 2024-12-12

## 2024-12-12 NOTE — TELEPHONE ENCOUNTER
"Caller: Jc    Doctor: Kelechi    Reason for call: Patient called stating he needs paperwork filled out for domestic relations for being out of work. Patient needs whichever Dr \"took him out of work\" to fill out the paperwork. Patient needs to return paperwork to the [facility] by Monday 12/16/2024 at noon, so patient is asking to please expedite. Please return patients call as to which Dr can fill this out and what office to bring it to. Thank you.     Call back#: [phone]  "

## 2024-12-12 NOTE — TELEPHONE ENCOUNTER
Called pt and informed him that paperwork that was dropped off was completed and he may pick it up between office hours either today or tomorrow. Pt verbalized understanding.

## 2024-12-20 ENCOUNTER — OFFICE VISIT (OUTPATIENT)
Dept: OBGYN CLINIC | Facility: HOSPITAL | Age: 49
End: 2024-12-20
Payer: COMMERCIAL

## 2024-12-20 VITALS — BODY MASS INDEX: 34.66 KG/M2 | WEIGHT: 208 LBS | HEIGHT: 65 IN

## 2024-12-20 DIAGNOSIS — M54.42 CHRONIC LOW BACK PAIN WITH LEFT-SIDED SCIATICA, UNSPECIFIED BACK PAIN LATERALITY: Primary | ICD-10-CM

## 2024-12-20 DIAGNOSIS — G89.29 CHRONIC LOW BACK PAIN WITH LEFT-SIDED SCIATICA, UNSPECIFIED BACK PAIN LATERALITY: Primary | ICD-10-CM

## 2024-12-20 PROCEDURE — 99215 OFFICE O/P EST HI 40 MIN: CPT | Performed by: ORTHOPAEDIC SURGERY
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
## 2025-01-03 ENCOUNTER — APPOINTMENT (OUTPATIENT)
Dept: LAB | Facility: HOSPITAL | Age: 50
End: 2025-01-03
Payer: COMMERCIAL

## 2025-01-03 ENCOUNTER — LAB REQUISITION (OUTPATIENT)
Dept: LAB | Facility: HOSPITAL | Age: 50
End: 2025-01-03
Payer: COMMERCIAL

## 2025-01-03 ENCOUNTER — OFFICE VISIT (OUTPATIENT)
Dept: PODIATRY | Facility: CLINIC | Age: 50
End: 2025-01-03
Payer: COMMERCIAL

## 2025-01-03 VITALS
HEART RATE: 83 BPM | OXYGEN SATURATION: 96 % | HEIGHT: 65 IN | SYSTOLIC BLOOD PRESSURE: 127 MMHG | DIASTOLIC BLOOD PRESSURE: 93 MMHG | WEIGHT: 208 LBS | BODY MASS INDEX: 34.66 KG/M2

## 2025-01-03 DIAGNOSIS — Z01.818 OTHER SPECIFIED PRE-OPERATIVE EXAMINATION: ICD-10-CM

## 2025-01-03 DIAGNOSIS — M51.16 INTERVERTEBRAL DISC DISORDER WITH RADICULOPATHY OF LUMBAR REGION: ICD-10-CM

## 2025-01-03 DIAGNOSIS — Z01.818 ENCOUNTER FOR OTHER PREPROCEDURAL EXAMINATION: ICD-10-CM

## 2025-01-03 DIAGNOSIS — G89.29 CHRONIC LOW BACK PAIN WITH LEFT-SIDED SCIATICA, UNSPECIFIED BACK PAIN LATERALITY: ICD-10-CM

## 2025-01-03 DIAGNOSIS — M54.42 CHRONIC LOW BACK PAIN WITH LEFT-SIDED SCIATICA, UNSPECIFIED BACK PAIN LATERALITY: ICD-10-CM

## 2025-01-03 DIAGNOSIS — S93.492D SPRAIN OF ANTERIOR TALOFIBULAR LIGAMENT OF LEFT ANKLE, SUBSEQUENT ENCOUNTER: Primary | ICD-10-CM

## 2025-01-03 DIAGNOSIS — Z01.818 PRE-OP EXAM: ICD-10-CM

## 2025-01-03 DIAGNOSIS — M25.572 ACUTE LEFT ANKLE PAIN: ICD-10-CM

## 2025-01-03 DIAGNOSIS — L74.513 HYPERHIDROSIS OF FEET: ICD-10-CM

## 2025-01-03 LAB
ABO GROUP BLD: NORMAL
ALBUMIN SERPL BCG-MCNC: 4.4 G/DL (ref 3.5–5)
ALP SERPL-CCNC: 77 U/L (ref 34–104)
ALT SERPL W P-5'-P-CCNC: 62 U/L (ref 7–52)
ANION GAP SERPL CALCULATED.3IONS-SCNC: 10 MMOL/L (ref 4–13)
APTT PPP: 28 SECONDS (ref 23–34)
AST SERPL W P-5'-P-CCNC: 37 U/L (ref 13–39)
ATRIAL RATE: 70 BPM
BASOPHILS # BLD AUTO: 0.02 THOUSANDS/ΜL (ref 0–0.1)
BASOPHILS NFR BLD AUTO: 0 % (ref 0–1)
BILIRUB SERPL-MCNC: 0.44 MG/DL (ref 0.2–1)
BLD GP AB SCN SERPL QL: NEGATIVE
BUN SERPL-MCNC: 11 MG/DL (ref 5–25)
CALCIUM SERPL-MCNC: 9.1 MG/DL (ref 8.4–10.2)
CHLORIDE SERPL-SCNC: 102 MMOL/L (ref 96–108)
CO2 SERPL-SCNC: 26 MMOL/L (ref 21–32)
CREAT SERPL-MCNC: 0.84 MG/DL (ref 0.6–1.3)
EOSINOPHIL # BLD AUTO: 0.12 THOUSAND/ΜL (ref 0–0.61)
EOSINOPHIL NFR BLD AUTO: 2 % (ref 0–6)
ERYTHROCYTE [DISTWIDTH] IN BLOOD BY AUTOMATED COUNT: 12.1 % (ref 11.6–15.1)
GFR SERPL CREATININE-BSD FRML MDRD: 102 ML/MIN/1.73SQ M
GLUCOSE P FAST SERPL-MCNC: 152 MG/DL (ref 65–99)
HCT VFR BLD AUTO: 44 % (ref 36.5–49.3)
HGB BLD-MCNC: 15 G/DL (ref 12–17)
IMM GRANULOCYTES # BLD AUTO: 0.04 THOUSAND/UL (ref 0–0.2)
IMM GRANULOCYTES NFR BLD AUTO: 1 % (ref 0–2)
INR PPP: 1 (ref 0.85–1.19)
LYMPHOCYTES # BLD AUTO: 1.45 THOUSANDS/ΜL (ref 0.6–4.47)
LYMPHOCYTES NFR BLD AUTO: 23 % (ref 14–44)
MCH RBC QN AUTO: 29.1 PG (ref 26.8–34.3)
MCHC RBC AUTO-ENTMCNC: 34.1 G/DL (ref 31.4–37.4)
MCV RBC AUTO: 85 FL (ref 82–98)
MONOCYTES # BLD AUTO: 0.41 THOUSAND/ΜL (ref 0.17–1.22)
MONOCYTES NFR BLD AUTO: 6 % (ref 4–12)
NEUTROPHILS # BLD AUTO: 4.34 THOUSANDS/ΜL (ref 1.85–7.62)
NEUTS SEG NFR BLD AUTO: 68 % (ref 43–75)
NRBC BLD AUTO-RTO: 0 /100 WBCS
P AXIS: 0 DEGREES
PLATELET # BLD AUTO: 238 THOUSANDS/UL (ref 149–390)
PMV BLD AUTO: 8.5 FL (ref 8.9–12.7)
POTASSIUM SERPL-SCNC: 3.4 MMOL/L (ref 3.5–5.3)
PR INTERVAL: 182 MS
PROT SERPL-MCNC: 7.5 G/DL (ref 6.4–8.4)
PROTHROMBIN TIME: 13.6 SECONDS (ref 12.3–15)
QRS AXIS: -10 DEGREES
QRSD INTERVAL: 108 MS
QT INTERVAL: 388 MS
QTC INTERVAL: 419 MS
RBC # BLD AUTO: 5.16 MILLION/UL (ref 3.88–5.62)
RH BLD: POSITIVE
SODIUM SERPL-SCNC: 138 MMOL/L (ref 135–147)
SPECIMEN EXPIRATION DATE: NORMAL
T WAVE AXIS: 18 DEGREES
VENTRICULAR RATE: 70 BPM
WBC # BLD AUTO: 6.38 THOUSAND/UL (ref 4.31–10.16)

## 2025-01-03 PROCEDURE — 86900 BLOOD TYPING SEROLOGIC ABO: CPT

## 2025-01-03 PROCEDURE — 86901 BLOOD TYPING SEROLOGIC RH(D): CPT

## 2025-01-03 PROCEDURE — 85025 COMPLETE CBC W/AUTO DIFF WBC: CPT

## 2025-01-03 PROCEDURE — 99213 OFFICE O/P EST LOW 20 MIN: CPT | Performed by: PODIATRIST

## 2025-01-03 PROCEDURE — 36415 COLL VENOUS BLD VENIPUNCTURE: CPT

## 2025-01-03 PROCEDURE — 93010 ELECTROCARDIOGRAM REPORT: CPT | Performed by: INTERNAL MEDICINE

## 2025-01-03 PROCEDURE — 85610 PROTHROMBIN TIME: CPT

## 2025-01-03 PROCEDURE — 85730 THROMBOPLASTIN TIME PARTIAL: CPT

## 2025-01-03 PROCEDURE — 80053 COMPREHEN METABOLIC PANEL: CPT

## 2025-01-03 PROCEDURE — 86850 RBC ANTIBODY SCREEN: CPT

## 2025-01-03 NOTE — PROGRESS NOTES
Assessment/Plan:     The patient's clinical examination today significant for mild to moderate tenderness palpation to the area of the ATFL and CFL to the lateral left ankle.  There is no erythema nor edema no counter ecchymosis noted today.  Passive range of motion of the ankle joint is within normal limits.  Muscle power of the ankle joint is within normal limits in all directions.  Pulses palpable.  There are no open lesions.    The patient did reinjure his left ankle last week when he sprained it once again.  He rates his pain at a 7 out of 10 on the pain scale today.  He was fitted for a lace up ankle brace today for light splinting of the ankle for the next 3 to 4 weeks as needed.  He is scheduled for a lower back surgery with Dr. Bolanos later this month.    Recommend follow-up in 6 weeks to recheck the left ankle.  At present, he is unable to work due to his chronic low back pain..     Diagnoses and all orders for this visit:    Sprain of anterior talofibular ligament of left ankle, subsequent encounter  -     Ankle Cude ankle/Ankle Brace    Hyperhidrosis of feet  -     aluminum chloride (DRYSOL) 20 % external solution; Apply topically daily at bedtime    Acute left ankle pain  -     Ankle Cude ankle/Ankle Brace          Subjective:     Patient ID: Jc Carmona is a 49 y.o. male.    The patient presents today for follow-up of chronic left ankle pain.  The patient does note that he sprained his ankle once again last week.  He does note that his left lower extremity does feel fairly numb and he ended up rolling his lateral left ankle once again.  He has been elevating it and icing it and states that the swelling has come down.  He still rates his pain at a 7 out of 10 on the pain scale today.    PAST MEDICAL HISTORY:  Past Medical History:   Diagnosis Date   • Cancer (HCC)     colon   • Diabetes mellitus (HCC)    • GERD (gastroesophageal reflux disease)    • Hyperlipidemia    • Post concussion syndrome  "05/09/2017   • Pre-op examination 2/15/2023   • Sacroiliitis (HCC)    • Sleep apnea     \"mild\"   • Traumatic brain injury (HCC) 2017    R/S 2017       PAST SURGICAL HISTORY:  Past Surgical History:   Procedure Laterality Date   • APPENDECTOMY     • CHOLECYSTECTOMY  10/22/2024   • COLON SURGERY     • COLONOSCOPY     • EGD     • MA REMOVAL IMPLANT DEEP Left 04/26/2024    Procedure: REMOVAL HARDWARE ANKLE;  Surgeon: Steve Kaplan DPM;  Location: [facility];  Service: Podiatry   • MA RPR PRIMARY DISRUPTED LIGAMENT ANKLE COLLATERAL Left 03/10/2023    Procedure: ANKLE LIGAMENT REPAIR OF SYNDESMOTIC LIGAMENT with steroid injection of posterior heel bursa;  Surgeon: Steve Kaplan DPM;  Location: [facility];  Service: Podiatry        ALLERGIES:  Asa [aspirin] and Penicillin g    MEDICATIONS:  Current Outpatient Medications   Medication Sig Dispense Refill   • acetaminophen (TYLENOL) 650 mg CR tablet Take 650 mg by mouth 3 (three) times a day as needed     • aluminum chloride (DRYSOL) 20 % external solution Apply topically daily at bedtime 35 mL 0   • benzonatate (TESSALON PERLES) 100 mg capsule Take 1 capsule (100 mg total) by mouth 3 (three) times a day as needed for cough 20 capsule 0   • Continuous Glucose Sensor (FreeStyle Dell 3 Sensor) MISC USE ONE UNIT EVERY 14 DAYS 2 each 5   • famotidine (PEPCID) 20 mg tablet Take 20 mg by mouth     • ibuprofen (MOTRIN) 800 mg tablet Take 1 tablet by mouth 3 times a day for 3 days, then 3 times a day as needed for pain for 4 days.     • Insulin Glargine Solostar (Lantus SoloStar) 100 UNIT/ML SOPN INJECT 15 UNITS (0.15ML) UNDER THE SKIN DAILY AT BEDTIME 24 mL 1   • Insulin Pen Needle (BD Pen Needle Nancy U/F) 32G X 4 MM MISC Use twice daily 100 each 2   • Lancets (OneTouch Delica Plus [id number]) MISC daily     • Lidocaine Pain Relief 4 % PTCH PLACE ONE PATCH ON THE SKIN DAILY     • metFORMIN (GLUCOPHAGE) 1000 MG tablet TAKE ONE TABLET BY MOUTH TWICE A DAY WITH MEALS 180 " tablet 1   • methocarbamol (ROBAXIN) 500 mg tablet Take 500 mg by mouth     • naloxone (NARCAN) 4 mg/0.1 mL nasal spray Administer 1 spray into a nostril. If no response after 2-3 minutes, give another dose in the other nostril using a new spray. 1 each 1   • nystatin-triamcinolone (MYCOLOG-II) ointment Apply topically 2 (two) times a day 30 g 0   • OneTouch Verio test strip daily     • Senokot 8.6 MG tablet Take 2 tablets by mouth 2 times a day for 3 days, then 2 times a day as needed for constipation for 4 days.     • traMADol (ULTRAM) 50 mg tablet Take 50 mg by mouth every 8 (eight) hours as needed     • Trulicity 4.5 MG/0.5ML SOAJ INJECT 0.5ML UNDER THE SKIN EVERY 7 DAYS 6 mL 1   • atorvastatin (LIPITOR) 10 mg tablet Take 1 tablet (10 mg total) by mouth every evening (Patient not taking: Reported on 3/13/2024) 90 tablet 3   • bisacodyl (DULCOLAX) 5 mg EC tablet Take 2 tablets (10 mg total) by mouth once for 1 dose (Patient not taking: Reported on 12/11/2024) 2 tablet 0   • Blood Glucose Monitoring Suppl (OneTouch Verio Flex System) w/Device KIT  (Patient not taking: Reported on 3/13/2024)     • calcipotriene (DOVONEX) 0.005 % cream Apply topically 2 (two) times a day (Patient not taking: Reported on 11/4/2024) 60 g 1   • cholestyramine (QUESTRAN) 4 g packet Take 1 packet (4 g total) by mouth 3 (three) times a day as needed (DIARRHEA) Take Questran 3 hours apart from other medication (Patient not taking: Reported on 11/15/2024) 60 packet 3   • gabapentin (Neurontin) 300 mg capsule Take 1 capsule (300 mg total) by mouth daily at bedtime (Patient not taking: Reported on 11/4/2024) 30 capsule 1   • meloxicam (MOBIC) 15 mg tablet Take 1 tablet (15 mg total) by mouth daily (Patient not taking: Reported on 12/20/2024) 30 tablet 0   • methylPREDNISolone 4 MG tablet therapy pack Use as directed on package (Patient not taking: Reported on 7/10/2024) 21 each 0   • ondansetron (ZOFRAN-ODT) 4 mg disintegrating tablet Take 1  tablet (4 mg total) by mouth every 6 (six) hours as needed for nausea or vomiting (Patient not taking: Reported on 6/26/2024) 20 tablet 0   • polyethylene glycol (GOLYTELY) 4000 mL solution Take 4,000 mL by mouth once for 1 dose (Patient not taking: Reported on 12/11/2024) 4000 mL 0   • tadalafil (CIALIS) 20 MG tablet Take 1 tablet (20 mg total) by mouth daily as needed for erectile dysfunction 30 tablet 5     Current Facility-Administered Medications   Medication Dose Route Frequency Provider Last Rate Last Admin   • bupivacaine (MARCAINE) 0.25 % injection 0.5 mL  0.5 mL Injection     0.5 mL at 09/16/24 0800   • dexamethasone (DECADRON) injection 2 mg  2 mg Intra-articular     2 mg at 09/16/24 0800       SOCIAL HISTORY:  Social History     Socioeconomic History   • Marital status: Single     Spouse name: None   • Number of children: None   • Years of education: None   • Highest education level: None   Occupational History   • None   Tobacco Use   • Smoking status: Never   • Smokeless tobacco: Never   Vaping Use   • Vaping status: Never Used   Substance and Sexual Activity   • Alcohol use: Not Currently     Alcohol/week: 1.0 standard drink of alcohol     Types: 1 Cans of beer per week   • Drug use: Never   • Sexual activity: Yes     Partners: Female     Birth control/protection: None   Other Topics Concern   • None   Social History Narrative   • None     Social Drivers of Health     Financial Resource Strain: Low Risk  (11/2/2024)    Received from [facility]    Overall Financial Resource Strain (CARDIA)    • Difficulty of Paying Living Expenses: Not very hard   Food Insecurity: No Food Insecurity (11/2/2024)    Received from [facility]    Hunger Vital Sign    • Worried About Running Out of Food in the Last Year: Never true    • Ran Out of Food in the Last Year: Never true   Transportation Needs: No Transportation Needs (11/2/2024)    Received from [facility]     PRAPARE - Transportation    • Lack of Transportation (Medical): No    • Lack of Transportation (Non-Medical): No   Physical Activity: Not on file   Stress: Not on file   Social Connections: Not on file   Intimate Partner Violence: Not At Risk (11/2/2024)    Received from [facility]    Humiliation, Afraid, Rape, and Kick questionnaire    • Fear of Current or Ex-Partner: No    • Emotionally Abused: No    • Physically Abused: No    • Sexually Abused: No   Housing Stability: Low Risk  (11/2/2024)    Received from [facility]    Housing Stability Vital Sign    • Unable to Pay for Housing in the Last Year: No    • Number of Times Moved in the Last Year: 0    • Homeless in the Last Year: No        Review of Systems   Constitutional: Negative.    HENT: Negative.     Eyes: Negative.    Respiratory: Negative.     Cardiovascular: Negative.    Endocrine: Negative.    Musculoskeletal: Negative.    Neurological: Negative.    Hematological: Negative.    Psychiatric/Behavioral: Negative.           Objective:     Physical Exam  Vitals reviewed.   Constitutional:       Appearance: Normal appearance.   HENT:      Head: Normocephalic and atraumatic.      Nose: Nose normal.   Eyes:      Conjunctiva/sclera: Conjunctivae normal.      Pupils: Pupils are equal, round, and reactive to light.   Cardiovascular:      Pulses:           Dorsalis pedis pulses are 2+ on the left side.        Posterior tibial pulses are 2+ on the left side.   Pulmonary:      Effort: Pulmonary effort is normal.   Musculoskeletal:        Feet:    Feet:      Left foot:      Skin integrity: Skin integrity normal.      Comments: The patient's clinical examination today significant for mild to moderate tenderness palpation to the area of the ATFL and CFL to the lateral left ankle.  There is no erythema nor edema no counter ecchymosis noted today.  Passive range of motion of the ankle joint is within normal limits.  Muscle power of the  ankle joint is within normal limits in all directions.  Pulses palpable.  There are no open lesions.  Skin:     General: Skin is warm.      Capillary Refill: Capillary refill takes less than 2 seconds.   Neurological:      General: No focal deficit present.      Mental Status: He is alert and oriented to person, place, and time.   Psychiatric:         Mood and Affect: Mood normal.         Behavior: Behavior normal.         Thought Content: Thought content normal.

## 2025-01-06 ENCOUNTER — OFFICE VISIT (OUTPATIENT)
Dept: FAMILY MEDICINE CLINIC | Facility: CLINIC | Age: 50
End: 2025-01-06
Payer: COMMERCIAL

## 2025-01-06 VITALS
TEMPERATURE: 97.7 F | SYSTOLIC BLOOD PRESSURE: 124 MMHG | HEART RATE: 77 BPM | RESPIRATION RATE: 18 BRPM | BODY MASS INDEX: 34.82 KG/M2 | WEIGHT: 209 LBS | OXYGEN SATURATION: 98 % | HEIGHT: 65 IN | DIASTOLIC BLOOD PRESSURE: 78 MMHG

## 2025-01-06 DIAGNOSIS — E11.65 TYPE 2 DIABETES MELLITUS WITH HYPERGLYCEMIA, WITH LONG-TERM CURRENT USE OF INSULIN (HCC): ICD-10-CM

## 2025-01-06 DIAGNOSIS — Z79.4 TYPE 2 DIABETES MELLITUS WITH HYPERGLYCEMIA, WITH LONG-TERM CURRENT USE OF INSULIN (HCC): ICD-10-CM

## 2025-01-06 DIAGNOSIS — E78.1 HYPERTRIGLYCERIDEMIA: ICD-10-CM

## 2025-01-06 DIAGNOSIS — Z01.818 PRE-OP EXAMINATION: Primary | ICD-10-CM

## 2025-01-06 DIAGNOSIS — M51.16 INTERVERTEBRAL DISC DISORDER WITH RADICULOPATHY OF LUMBAR REGION: ICD-10-CM

## 2025-01-06 DIAGNOSIS — E66.09 CLASS 1 OBESITY DUE TO EXCESS CALORIES WITH SERIOUS COMORBIDITY AND BODY MASS INDEX (BMI) OF 34.0 TO 34.9 IN ADULT: ICD-10-CM

## 2025-01-06 DIAGNOSIS — E66.811 CLASS 1 OBESITY DUE TO EXCESS CALORIES WITH SERIOUS COMORBIDITY AND BODY MASS INDEX (BMI) OF 34.0 TO 34.9 IN ADULT: ICD-10-CM

## 2025-01-06 PROBLEM — Z98.890 POST-OPERATIVE STATE: Status: RESOLVED | Noted: 2024-09-16 | Resolved: 2025-01-06

## 2025-01-06 LAB
ABO GROUP BLD: NORMAL
BLD GP AB SCN SERPL QL: NEGATIVE
RH BLD: POSITIVE
SPECIMEN EXPIRATION DATE: NORMAL

## 2025-01-06 PROCEDURE — 99214 OFFICE O/P EST MOD 30 MIN: CPT | Performed by: FAMILY MEDICINE

## 2025-01-06 RX ORDER — ATORVASTATIN CALCIUM 10 MG/1
10 TABLET, FILM COATED ORAL EVERY EVENING
Qty: 90 TABLET | Refills: 3 | Status: SHIPPED | OUTPATIENT
Start: 2025-01-06 | End: 2026-01-01

## 2025-01-06 NOTE — PROGRESS NOTES
Pre-operative Clearance  Name: Jc Carmona      : 1975      MRN: [id number]  Encounter Provider: Lv Torrez MD  Encounter Date: 2025   Encounter department: [facility]    Assessment & Plan  Pre-op examination         Type 2 diabetes mellitus with hyperglycemia, with long-term current use of insulin (HCC)    Lab Results   Component Value Date    HGBA1C 7.3 (H) 2024       Orders:    atorvastatin (LIPITOR) 10 mg tablet; Take 1 tablet (10 mg total) by mouth every evening (Patient not taking: Reported on 2025)    Albumin / creatinine urine ratio; Future    Comprehensive metabolic panel; Future    Hemoglobin A1C; Future    Class 1 obesity due to excess calories with serious comorbidity and body mass index (BMI) of 34.0 to 34.9 in adult           Intervertebral disc disorder with radiculopathy of lumbar region         Hypertriglyceridemia    Orders:    Lipid panel; Future          Pre-operative Clearance:     Revised Cardiac Risk Index:  RCI RISK CLASS III (2 risk factors, risk of major cardiac complications approximately 3.6%)    Clearance:  Patient is medically optimized (CLEARED) for proposed surgery without any additional cardiac testing.       EKG completed.    Medication Instructions:   - Avoid herbs or non-directed vitamins one week prior to surgery    - Avoid aspirin containing medications or non-steroidal anti-inflammatory drugs one week preceding surgery    - May take tylenol for pain up until the night before surgery    - Corticosteroids: Continue to take this medication on your normal schedule.  - H2 Blocker: Continue to take this medication on your normal schedule.  - Hyperlipidemia meds: Continue to take this medication on your normal schedule.  - Opioids: Continue to take this medication on your normal schedule.      Medicine Instructions for Adults with Diabetes (NO Bowel Prep)    Follow these instructions when a BOWEL PREP is NOT required for your  procedure or surgery!    NOTE:  GLP Agonists taken weekly: do not take in the 7 days before your procedure. **Bariatric surgery: do not take 4 weeks prior to your procedure.    SGLT-2 Inhibitors: do not take in the 4 days before your procedure    On the Day Before Surgery/Procedure  If you are having a procedure (e.g., Colonoscopy) or surgery which DOES NOT require a bowel prep, follow the directions below based on the type of medicine you take for your diabetes.  Type of Medicine You Take Examples What to Do   Pre-Mixed Insulin Intermediate  Iaujdmk15/25, Gufenqf41/30, Novolog 70/30, Regular Insulin Take 1/2 your regular dose the evening before our procedure.   Rapid/Fast Acting  Insulin and/or Long-Acting Insulin Humalog U200, NovoLog, Apidra,  Lantus, Levemir, Tresiba, Toujeo,  Fias, Basaglar Take your FULL regular dose the day before procedure.   Oral Diabetic Medicines (sulfonylurea) Glipizide/Glimepiride/  Glucotrol Take your regular dose with dinner the evening before your procedure.   Other Oral Diabetic Medicines Metformin, Glucophage, Glucophage  XR, Riomet, Glumetza, Actose,  Avandia, Gl set, Prandin Take your regular dose with dinner the evening before your procedure   GLP Agonists Adlyxin, Byetta, Bydureon,  Ozempic, Soliqua, Tanzeum,  Trulicity, Victoza, Saxenda,  Rybelsus, Wegovy, Mounjaro, Zepbound If taken daily, take as normal  If taken weekly, do not take this medicine for 7 days before your procedure including the day of the procedure (resume taking after the procedure). **Bariatric surgery: do not take 4 weeks prior to procedure   SGLT-2 Inhibitors Jardiance, Invokana, Farxiga, Steglatro, Brenzavvy, Qtern, Segluromet Glyxambi, Synjardy, Synjardy XR, Invokamet, InvokametXR, Trijary XR, Xigduo X Do not take for 4 days before your procedure including the day of the procedure (resume taking after the procedure)   This educational material has been approved by the Patient Education Advisory  Committee.    On the Day of Surgery/Procedure  Follow the directions below based on the type of medicine you take for your diabetes.  Type of Medicine You Take  Examples What to Do   Long-Acting Insulin Lantus, Levemir, Tresiba,  Toujeo, Basaglar, Semglee If you normally take your Long Acting Insulin in the morning, take the full dose as scheduled.   GLP-I Agonists Adlyxin, Byetta, Bydureon,  Ozempic, Soliqua, Tanzeum,  Trulicity, Victoza, Saxenda,  Rybelsus, Mounjaro Do NOT take this medicine on the day of your procedure (resume taking after the procedure)   Except for the morning Long-Acting Insulin, DO NOT take ANY diabetic medicine on the day of your procedure unless you were instructed by the doctor who manages your diabetes medicines.  Continue to check your blood sugars.  If you have an insulin pump, ask your endocrinologist for instructions at least 3 days before your procedure. NOTE: If you are not able to ask your endocrinologist in advance, on the day of the procedure set your insulin pump to your basal rate only. Bring your insulin pump supplies to the hospital.        Depression Screening and Follow-up Plan:   Patient's depression screening was negative with an [place]  Depression Scale score of  .     History of Present Illness     Patient presents today for preop clearance.  He is scheduled for laminectomy lumbar/thoracic with discectomy left-sided L5-S1 compression with discectomy  EKG completed  Preop blood work completed  He is suffering with chronic pain in his low back radiating down his leg.    Pre-op Exam  Surgery: Laminectomy lumbar/thoracic with discectomy left-sided L5-S1 compression with discectomy  Anticipated Date of Surgery: 1/15/2025  Surgeon: Dr. Bolanos    Previous history of bleeding disorders or clots?: No  Previous Anesthesia reaction?: No  Prolonged steroid use in the last 6 months?: No    Assessment of Cardiac Risk:   - Unstable or severe angina or MI in the last 6  "weeks or history of stent placement in the last year?: No   - Decompensated heart failure (e.g. New onset heart failure, NYHA  Class IV heart failure, or worsening existing heart failure)?: No  - Significant arrhythmias such as high grade AV block, symptomatic ventricular arrhythmia, newly recognized ventricular tachycardia, supraventricular tachycardia with resting heart rate >100, or symptomatic bradycardia?: No  - Severe heart valve disease including aortic stenosis or symptomatic mitral stenosis?: No      Pre-operative Risk Factors:  Elevated-risk surgery: Yes    History of cerebrovascular disease: No    History of ischemic heart disease: No  Pre-operative treatment with insulin: Yes  Pre-operative creatinine >2 mg/dL: No    History of congestive heart failure: No    [place] Activity Status Index (DASI):   DASI Total Score: 28.7  METs: 6.3    Medications of Perioperative Concern:   NSAIDs, Metformin, Insulin and GLP agonists    Review of Systems   Constitutional:  Negative for activity change, fatigue and fever.   Eyes:  Negative for visual disturbance.   Respiratory:  Negative for shortness of breath.    Cardiovascular:  Negative for chest pain.   Gastrointestinal:  Negative for abdominal pain, constipation, diarrhea and nausea.   Endocrine: Negative for cold intolerance and heat intolerance.   Musculoskeletal:  Positive for back pain.   Skin:  Negative for rash.   Neurological:  Negative for headaches.   Psychiatric/Behavioral:  Negative for confusion.      Past Medical History   Past Medical History:   Diagnosis Date    Cancer (HCC)     colon    Colon polyp     Diabetes mellitus (HCC)     GERD (gastroesophageal reflux disease)     Hyperlipidemia     Post concussion syndrome 05/09/2017    Pre-op examination 02/15/2023    Sacroiliitis (HCC)     Sleep apnea     \"mild\"    Traumatic brain injury (HCC) 2017    R/S 2017    Weakness of left leg     numbness/tingling    Wears glasses      Past Surgical History: "   Procedure Laterality Date    APPENDECTOMY      CHOLECYSTECTOMY  10/22/2024    COLON SURGERY      COLONOSCOPY      EGD      NM REMOVAL IMPLANT DEEP Left 04/26/2024    Procedure: REMOVAL HARDWARE ANKLE;  Surgeon: Steve Kaplan DPM;  Location:  MAIN OR;  Service: Podiatry    NM RPR PRIMARY DISRUPTED LIGAMENT ANKLE COLLATERAL Left 03/10/2023    Procedure: ANKLE LIGAMENT REPAIR OF SYNDESMOTIC LIGAMENT with steroid injection of posterior heel bursa;  Surgeon: Steve Kaplan DPM;  Location:  MAIN OR;  Service: Podiatry     Family History   Problem Relation Age of Onset    Coronary artery disease Mother     Diabetes Mother     Hypertension Mother     Hyperlipidemia Mother     Coronary artery disease Father     Diabetes Father     Hypertension Father     Hyperlipidemia Father     Heart attack Father     Anemia Brother     No Known Problems Son     No Known Problems Son     No Known Problems Son      Social History     Tobacco Use    Smoking status: Never    Smokeless tobacco: Never   Vaping Use    Vaping status: Never Used   Substance and Sexual Activity    Alcohol use: Not Currently     Alcohol/week: 1.0 standard drink of alcohol     Types: 1 Cans of beer per week    Drug use: Never    Sexual activity: Yes     Partners: Female     Birth control/protection: None     Current Outpatient Medications on File Prior to Visit   Medication Sig    acetaminophen (TYLENOL) 650 mg CR tablet Take 650 mg by mouth 3 (three) times a day as needed (Patient not taking: Reported on 1/6/2025)    cholestyramine (QUESTRAN) 4 g packet Take 1 packet (4 g total) by mouth 3 (three) times a day as needed (DIARRHEA) Take Questran 3 hours apart from other medication    Continuous Glucose Sensor (FreeStyle Dell 3 Sensor) MISC USE ONE UNIT EVERY 14 DAYS    famotidine (PEPCID) 20 mg tablet Take 20 mg by mouth if needed    Insulin Glargine Solostar (Lantus SoloStar) 100 UNIT/ML SOPN INJECT 15 UNITS (0.15ML) UNDER THE SKIN DAILY AT BEDTIME     Insulin Pen Needle (BD Pen Needle Nancy U/F) 32G X 4 MM MISC Use twice daily    Lancets (OneTouch Delica Plus [id number]) MISC daily    Lidocaine Pain Relief 4 % PTCH if needed    meloxicam (MOBIC) 15 mg tablet Take 1 tablet (15 mg total) by mouth daily (Patient not taking: Reported on 1/6/2025)    metFORMIN (GLUCOPHAGE) 1000 MG tablet TAKE ONE TABLET BY MOUTH TWICE A DAY WITH MEALS    methocarbamol (ROBAXIN) 500 mg tablet Take 500 mg by mouth daily as needed    OneTouch Verio test strip daily    tadalafil (CIALIS) 20 MG tablet Take 1 tablet (20 mg total) by mouth daily as needed for erectile dysfunction    traMADol (ULTRAM) 50 mg tablet Take 50 mg by mouth every 8 (eight) hours as needed (Patient not taking: Reported on 1/6/2025)    Trulicity 4.5 MG/0.5ML SOAJ INJECT 0.5ML UNDER THE SKIN EVERY 7 DAYS (Patient taking differently: Monday)    [DISCONTINUED] aluminum chloride (DRYSOL) 20 % external solution Apply topically daily at bedtime    [DISCONTINUED] atorvastatin (LIPITOR) 10 mg tablet Take 1 tablet (10 mg total) by mouth every evening    [DISCONTINUED] benzonatate (TESSALON PERLES) 100 mg capsule Take 1 capsule (100 mg total) by mouth 3 (three) times a day as needed for cough    [DISCONTINUED] bisacodyl (DULCOLAX) 5 mg EC tablet Take 2 tablets (10 mg total) by mouth once for 1 dose    [DISCONTINUED] Blood Glucose Monitoring Suppl (OneTouch Verio Flex System) w/Device KIT     [DISCONTINUED] calcipotriene (DOVONEX) 0.005 % cream Apply topically 2 (two) times a day    [DISCONTINUED] gabapentin (Neurontin) 300 mg capsule Take 1 capsule (300 mg total) by mouth daily at bedtime (Patient not taking: Reported on 11/4/2024)    [DISCONTINUED] ibuprofen (MOTRIN) 800 mg tablet Take 1 tablet by mouth 3 times a day for 3 days, then 3 times a day as needed for pain for 4 days.    [DISCONTINUED] methylPREDNISolone 4 MG tablet therapy pack Use as directed on package (Patient not taking: Reported on 7/10/2024)    [DISCONTINUED]  "naloxone (NARCAN) 4 mg/0.1 mL nasal spray Administer 1 spray into a nostril. If no response after 2-3 minutes, give another dose in the other nostril using a new spray.    [DISCONTINUED] nystatin-triamcinolone (MYCOLOG-II) ointment Apply topically 2 (two) times a day    [DISCONTINUED] ondansetron (ZOFRAN-ODT) 4 mg disintegrating tablet Take 1 tablet (4 mg total) by mouth every 6 (six) hours as needed for nausea or vomiting (Patient not taking: Reported on 6/26/2024)    [DISCONTINUED] polyethylene glycol (GOLYTELY) 4000 mL solution Take 4,000 mL by mouth once for 1 dose (Patient not taking: Reported on 12/11/2024)    [DISCONTINUED] Senokot 8.6 MG tablet Take 2 tablets by mouth 2 times a day for 3 days, then 2 times a day as needed for constipation for 4 days.     Allergies   Allergen Reactions    Asa [Aspirin] GI Intolerance    Penicillin G Other (See Comments)     Ancef ok     \"unsure; was a baby\"     Objective   /78 (BP Location: Left arm, Patient Position: Sitting, Cuff Size: Standard)   Pulse 77   Temp 97.7 °F (36.5 °C) (Temporal)   Resp 18   Ht 5' 5\" (1.651 m)   Wt 94.8 kg (209 lb)   SpO2 98%   BMI 34.78 kg/m²     Physical Exam  Vitals and nursing note reviewed.   Constitutional:       Appearance: Normal appearance. He is well-developed.   HENT:      Head: Normocephalic and atraumatic.   Cardiovascular:      Rate and Rhythm: Normal rate and regular rhythm.   Pulmonary:      Effort: Pulmonary effort is normal.      Breath sounds: Normal breath sounds.   Abdominal:      General: Bowel sounds are normal.      Palpations: Abdomen is soft.   Musculoskeletal:      Cervical back: Normal range of motion.   Skin:     General: Skin is warm.   Neurological:      General: No focal deficit present.      Mental Status: He is alert.   Psychiatric:         Mood and Affect: Mood normal.         Speech: Speech normal.           Lv Torrez MD  "

## 2025-01-13 ENCOUNTER — APPOINTMENT (OUTPATIENT)
Dept: LAB | Facility: HOSPITAL | Age: 50
End: 2025-01-13
Attending: FAMILY MEDICINE
Payer: COMMERCIAL

## 2025-01-13 DIAGNOSIS — E11.65 TYPE 2 DIABETES MELLITUS WITH HYPERGLYCEMIA, WITH LONG-TERM CURRENT USE OF INSULIN (HCC): ICD-10-CM

## 2025-01-13 DIAGNOSIS — E78.1 HYPERTRIGLYCERIDEMIA: ICD-10-CM

## 2025-01-13 DIAGNOSIS — Z79.4 TYPE 2 DIABETES MELLITUS WITH HYPERGLYCEMIA, WITH LONG-TERM CURRENT USE OF INSULIN (HCC): ICD-10-CM

## 2025-01-13 LAB
ALBUMIN SERPL BCG-MCNC: 4.8 G/DL (ref 3.5–5)
ALP SERPL-CCNC: 96 U/L (ref 34–104)
ALT SERPL W P-5'-P-CCNC: 94 U/L (ref 7–52)
ANION GAP SERPL CALCULATED.3IONS-SCNC: 9 MMOL/L (ref 4–13)
AST SERPL W P-5'-P-CCNC: 46 U/L (ref 13–39)
BILIRUB SERPL-MCNC: 0.67 MG/DL (ref 0.2–1)
BUN SERPL-MCNC: 14 MG/DL (ref 5–25)
CALCIUM SERPL-MCNC: 9.9 MG/DL (ref 8.4–10.2)
CHLORIDE SERPL-SCNC: 100 MMOL/L (ref 96–108)
CHOLEST SERPL-MCNC: 209 MG/DL (ref ?–200)
CO2 SERPL-SCNC: 29 MMOL/L (ref 21–32)
CREAT SERPL-MCNC: 0.91 MG/DL (ref 0.6–1.3)
CREAT UR-MCNC: 172.9 MG/DL
EST. AVERAGE GLUCOSE BLD GHB EST-MCNC: 194 MG/DL
GFR SERPL CREATININE-BSD FRML MDRD: 98 ML/MIN/1.73SQ M
GLUCOSE P FAST SERPL-MCNC: 173 MG/DL (ref 65–99)
HBA1C MFR BLD: 8.4 %
HDLC SERPL-MCNC: 37 MG/DL
MICROALBUMIN UR-MCNC: 241.4 MG/L
MICROALBUMIN/CREAT 24H UR: 140 MG/G CREATININE (ref 0–30)
NONHDLC SERPL-MCNC: 172 MG/DL
POTASSIUM SERPL-SCNC: 3.9 MMOL/L (ref 3.5–5.3)
PROT SERPL-MCNC: 8.3 G/DL (ref 6.4–8.4)
SODIUM SERPL-SCNC: 138 MMOL/L (ref 135–147)
TRIGL SERPL-MCNC: 696 MG/DL (ref ?–150)

## 2025-01-13 PROCEDURE — 82043 UR ALBUMIN QUANTITATIVE: CPT

## 2025-01-13 PROCEDURE — 80061 LIPID PANEL: CPT

## 2025-01-13 PROCEDURE — 36415 COLL VENOUS BLD VENIPUNCTURE: CPT

## 2025-01-13 PROCEDURE — 83036 HEMOGLOBIN GLYCOSYLATED A1C: CPT

## 2025-01-13 PROCEDURE — 82570 ASSAY OF URINE CREATININE: CPT

## 2025-01-13 PROCEDURE — 80053 COMPREHEN METABOLIC PANEL: CPT

## 2025-01-14 ENCOUNTER — TELEPHONE (OUTPATIENT)
Dept: OBGYN CLINIC | Facility: HOSPITAL | Age: 50
End: 2025-01-14

## 2025-01-14 DIAGNOSIS — E11.65 TYPE 2 DIABETES MELLITUS WITH HYPERGLYCEMIA, WITH LONG-TERM CURRENT USE OF INSULIN (HCC): Primary | ICD-10-CM

## 2025-01-14 DIAGNOSIS — Z79.4 TYPE 2 DIABETES MELLITUS WITH HYPERGLYCEMIA, WITH LONG-TERM CURRENT USE OF INSULIN (HCC): Primary | ICD-10-CM

## 2025-01-14 NOTE — TELEPHONE ENCOUNTER
Spoke with patient over the phone. He has a lumbar decompression/discectomy surgery scheduled with Dr. Bolanos tomorrow, 1/15. However, his recent HbA1c on 1/13/25 was 8.4. Will unfortunately need to cancel his surgery for tomorrow. His HbA1c will  need to be better optimized and within an acceptable range in order to safely proceed with surgery.    Patient verbalized understanding and will plan to obtain repeat HbA1c in 6 weeks and notify the ortho office once this is completed. Lab order for repeat HbA1c was placed in patient's chart.    Susana Valdez PA-C

## 2025-01-15 ENCOUNTER — RESULTS FOLLOW-UP (OUTPATIENT)
Dept: FAMILY MEDICINE CLINIC | Facility: CLINIC | Age: 50
End: 2025-01-15

## 2025-01-16 ENCOUNTER — TELEPHONE (OUTPATIENT)
Age: 50
End: 2025-01-16

## 2025-01-16 NOTE — TELEPHONE ENCOUNTER
Caller: Patient    Doctor: Cici    Reason for call: Patient is calling to reschedule his surgery with Dr Bolanos that was canceled    Call back#: [phone]

## 2025-01-31 ENCOUNTER — TELEPHONE (OUTPATIENT)
Dept: FAMILY MEDICINE CLINIC | Facility: CLINIC | Age: 50
End: 2025-01-31

## 2025-02-03 ENCOUNTER — OFFICE VISIT (OUTPATIENT)
Dept: FAMILY MEDICINE CLINIC | Facility: CLINIC | Age: 50
End: 2025-02-03
Payer: COMMERCIAL

## 2025-02-03 ENCOUNTER — TELEPHONE (OUTPATIENT)
Age: 50
End: 2025-02-03

## 2025-02-03 VITALS
HEIGHT: 65 IN | RESPIRATION RATE: 18 BRPM | TEMPERATURE: 97.5 F | WEIGHT: 209.5 LBS | SYSTOLIC BLOOD PRESSURE: 124 MMHG | HEART RATE: 68 BPM | OXYGEN SATURATION: 97 % | BODY MASS INDEX: 34.91 KG/M2 | DIASTOLIC BLOOD PRESSURE: 76 MMHG

## 2025-02-03 DIAGNOSIS — E66.09 CLASS 1 OBESITY DUE TO EXCESS CALORIES WITH SERIOUS COMORBIDITY AND BODY MASS INDEX (BMI) OF 34.0 TO 34.9 IN ADULT: ICD-10-CM

## 2025-02-03 DIAGNOSIS — E11.65 TYPE 2 DIABETES MELLITUS WITH HYPERGLYCEMIA, WITH LONG-TERM CURRENT USE OF INSULIN (HCC): Primary | ICD-10-CM

## 2025-02-03 DIAGNOSIS — E66.811 CLASS 1 OBESITY DUE TO EXCESS CALORIES WITH SERIOUS COMORBIDITY AND BODY MASS INDEX (BMI) OF 34.0 TO 34.9 IN ADULT: ICD-10-CM

## 2025-02-03 DIAGNOSIS — Z79.4 TYPE 2 DIABETES MELLITUS WITH HYPERGLYCEMIA, WITH LONG-TERM CURRENT USE OF INSULIN (HCC): Primary | ICD-10-CM

## 2025-02-03 PROCEDURE — 99214 OFFICE O/P EST MOD 30 MIN: CPT | Performed by: FAMILY MEDICINE

## 2025-02-03 NOTE — TELEPHONE ENCOUNTER
Patient called 8:49 am stated will be 5 mins late for appointment- clerical unavailable at this time, LOLITA

## 2025-02-03 NOTE — PROGRESS NOTES
Name: Jc Carmona      : 1975      MRN: [id number]  Encounter Provider: Lv Torrez MD  Encounter Date: 2/3/2025   Encounter department: [facility]  :  Assessment & Plan  Type 2 diabetes mellitus with hyperglycemia, with long-term current use of insulin ([facility])    Lab Results   Component Value Date    HGBA1C 8.4 (H) 2025   A1c increased to 8.4.  Currently uncontrolled.  Last saw endocrinology 2024.  At that time his regimen was 1000 mg twice daily of metformin, 15 units of Lantus and Trulicity 4.5 mg weekly.  With increase in A1c plan to increase Lantus to 20 units at bedtime.  Monitor blood sugars closely over the next 2 weeks.  Have him send us a [email or web address] message.  Can consider increasing further if needed.  Repeat A1c.  Surgery was canceled due to elevated A1c.  They recommend following back up with endocrinology.  He did miss his last appointment.         Class 1 obesity due to excess calories with serious comorbidity and body mass index (BMI) of 34.0 to 34.9 in adult                  History of Present Illness     Patient presents with:  Follow-up: Discuss lab results    Uncontrolled diabetic.  Attributes increased A1c due to diet.  He canceled his last appointment with endocrinology.  Limited exercise.  His most recent surgery was canceled due to elevated A1c.  He is unsure if he wants to meet with endocrinology again.  He is aware he needs to make dietary changes.      Review of Systems   Constitutional:  Negative for activity change, fatigue and fever.   Eyes:  Negative for visual disturbance.   Respiratory:  Negative for shortness of breath.    Cardiovascular:  Negative for chest pain.   Gastrointestinal:  Negative for abdominal pain, constipation, diarrhea and nausea.   Endocrine: Negative for cold intolerance and heat intolerance.   Musculoskeletal:  Negative for back pain.   Skin:  Negative for rash.   Neurological:  Negative for headaches.  "  Psychiatric/Behavioral:  Negative for confusion.        Objective   /76 (BP Location: Right arm, Patient Position: Sitting, Cuff Size: Standard)   Pulse 68   Temp 97.5 °F (36.4 °C) (Temporal)   Resp 18   Ht 5' 5\" (1.651 m)   Wt 95 kg (209 lb 8 oz)   SpO2 97%   BMI 34.86 kg/m²      Physical Exam  Vitals and nursing note reviewed.   Constitutional:       Appearance: Normal appearance. He is well-developed.   HENT:      Head: Normocephalic and atraumatic.   Cardiovascular:      Rate and Rhythm: Normal rate and regular rhythm.   Pulmonary:      Effort: Pulmonary effort is normal.      Breath sounds: Normal breath sounds.   Abdominal:      General: Bowel sounds are normal.      Palpations: Abdomen is soft.   Musculoskeletal:      Cervical back: Normal range of motion.   Skin:     General: Skin is warm.   Neurological:      General: No focal deficit present.      Mental Status: He is alert.   Psychiatric:         Mood and Affect: Mood normal.         Speech: Speech normal.         "

## 2025-02-03 NOTE — ASSESSMENT & PLAN NOTE
Lab Results   Component Value Date    HGBA1C 8.4 (H) 01/13/2025   A1c increased to 8.4.  Currently uncontrolled.  Last saw endocrinology March 2024.  At that time his regimen was 1000 mg twice daily of metformin, 15 units of Lantus and Trulicity 4.5 mg weekly.  With increase in A1c plan to increase Lantus to 20 units at bedtime.  Monitor blood sugars closely over the next 2 weeks.  Have him send us a [employer] message.  Can consider increasing further if needed.  Repeat A1c.  Surgery was canceled due to elevated A1c.  They recommend following back up with endocrinology.  He did miss his last appointment.

## 2025-03-10 ENCOUNTER — TELEPHONE (OUTPATIENT)
Age: 50
End: 2025-03-10

## 2025-03-10 NOTE — TELEPHONE ENCOUNTER
Patient asked if he needs to go for labwork prior to apt tomorrow. A1C is the only lab in his chart.

## 2025-03-11 ENCOUNTER — OFFICE VISIT (OUTPATIENT)
Dept: FAMILY MEDICINE CLINIC | Facility: CLINIC | Age: 50
End: 2025-03-11
Payer: COMMERCIAL

## 2025-03-11 VITALS
SYSTOLIC BLOOD PRESSURE: 122 MMHG | TEMPERATURE: 97.5 F | BODY MASS INDEX: 35.07 KG/M2 | HEIGHT: 65 IN | RESPIRATION RATE: 18 BRPM | DIASTOLIC BLOOD PRESSURE: 78 MMHG | HEART RATE: 80 BPM | WEIGHT: 210.5 LBS | OXYGEN SATURATION: 97 %

## 2025-03-11 DIAGNOSIS — Z00.00 ANNUAL PHYSICAL EXAM: Primary | ICD-10-CM

## 2025-03-11 DIAGNOSIS — Z79.4 TYPE 2 DIABETES MELLITUS WITH HYPERGLYCEMIA, WITH LONG-TERM CURRENT USE OF INSULIN (HCC): ICD-10-CM

## 2025-03-11 DIAGNOSIS — E11.65 TYPE 2 DIABETES MELLITUS WITH HYPERGLYCEMIA, WITH LONG-TERM CURRENT USE OF INSULIN (HCC): ICD-10-CM

## 2025-03-11 DIAGNOSIS — Z23 ENCOUNTER FOR IMMUNIZATION: ICD-10-CM

## 2025-03-11 LAB — SL AMB POCT HEMOGLOBIN AIC: 8 (ref ?–6.5)

## 2025-03-11 PROCEDURE — 90472 IMMUNIZATION ADMIN EACH ADD: CPT | Performed by: FAMILY MEDICINE

## 2025-03-11 PROCEDURE — 90656 IIV3 VACC NO PRSV 0.5 ML IM: CPT | Performed by: FAMILY MEDICINE

## 2025-03-11 PROCEDURE — 99396 PREV VISIT EST AGE 40-64: CPT | Performed by: FAMILY MEDICINE

## 2025-03-11 PROCEDURE — 83036 HEMOGLOBIN GLYCOSYLATED A1C: CPT | Performed by: FAMILY MEDICINE

## 2025-03-11 PROCEDURE — 90471 IMMUNIZATION ADMIN: CPT | Performed by: FAMILY MEDICINE

## 2025-03-11 PROCEDURE — 90677 PCV20 VACCINE IM: CPT | Performed by: FAMILY MEDICINE

## 2025-03-11 NOTE — PROGRESS NOTES
Adult Annual Physical  Name: Jc Carmona      : 1975      MRN: [id number]  Encounter Provider: Lv Torrez MD  Encounter Date: 3/11/2025   Encounter department: [facility]    Assessment & Plan  Annual physical exam         Type 2 diabetes mellitus with hyperglycemia, with long-term current use of insulin ([facility])    Lab Results   Component Value Date    HGBA1C 8.0 (A) 2025   Metformin 1000 mg BID   Lantus 20 units   Trulicity 4.5 mg weekly   Patient needs new glucometer.  Per patient he will be able to pick this up on Friday.  He needs a refill on his insulin which will be picking up again on Friday.  He is not currently following with endocrinology.  His recent back surgery was postponed due to elevated A1c.  Goal of less than 7.    Orders:    Ambulatory referral to clinical pharmacy; Future    POCT hemoglobin A1c    Encounter for immunization    Orders:    Pneumococcal Conjugate Vaccine 20-valent (Pcv20)    influenza vaccine preservative-free 0.5 mL IM (Fluzone, Afluria, Fluarix, Flulaval)    Preventive Screenings:  - Diabetes Screening: has diabetes and screening up-to-date  - Cholesterol Screening: has hyperlipidemia and screening up-to-date   - Hepatitis C screening: screening up-to-date   - HIV screening: screening up-to-date   - Colon cancer screening: has history of colon cancer and screening up-to-date   - Lung cancer screening: screening not indicated     Immunizations:    - Risks/benefits immunizations discussed      Counseling/Anticipatory Guidance:    - Dental health: discussed importance of regular tooth brushing, flossing, and dental visits.   - Diet: discussed recommendations for a healthy/well-balanced diet.   - Exercise: the importance of regular exercise/physical activity was discussed. Recommend exercise 3-5 times per week for at least 30 minutes.   - Injury prevention: discussed safety/seat belts, safety helmets, smoke detectors, carbon monoxide  detectors, and smoking near bedding or upholstery.          History of Present Illness     Adult Annual Physical:  Patient presents for annual physical. Patient had a dell 3 glucometer.  Unfortunately this broke.  His new glucometer will be ready this Friday.  Does state he is compliant with his medications..     Diet and Physical Activity:  - Diet/Nutrition: well balanced diet.  - Exercise: walking and 3-4 times a week on average.    Depression Screening:  - PHQ-2 Score: 0    General Health:  - Sleep: sleeps well.  - Hearing: normal hearing bilateral ears.  - Vision: no vision problems and most recent eye exam > 1 year ago.  - Dental: regular dental visits.    Review of Systems   Constitutional:  Negative for activity change, fatigue and fever.   Eyes:  Negative for visual disturbance.   Respiratory:  Negative for shortness of breath.    Cardiovascular:  Negative for chest pain.   Gastrointestinal:  Negative for abdominal pain, constipation, diarrhea and nausea.   Endocrine: Negative for cold intolerance and heat intolerance.   Musculoskeletal:  Positive for back pain (Pain goes down the left leg).   Skin:  Negative for rash.   Neurological:  Negative for headaches.   Psychiatric/Behavioral:  Negative for confusion.      Medical History Reviewed by provider this encounter:  Tobacco  Allergies  Meds  Problems  Med Hx  Surg Hx  Fam Hx     .  Current Outpatient Medications on File Prior to Visit   Medication Sig Dispense Refill    acetaminophen (TYLENOL) 650 mg CR tablet Take 650 mg by mouth 3 (three) times a day as needed      atorvastatin (LIPITOR) 10 mg tablet Take 1 tablet (10 mg total) by mouth every evening 90 tablet 3    cholestyramine (QUESTRAN) 4 g packet Take 1 packet (4 g total) by mouth 3 (three) times a day as needed (DIARRHEA) Take Questran 3 hours apart from other medication 60 packet 3    Continuous Glucose Sensor (FreeStyle Dell 3 Sensor) MISC USE ONE UNIT EVERY 14 DAYS 2 each 5    famotidine  "(PEPCID) 20 mg tablet Take 20 mg by mouth if needed      Insulin Glargine Solostar (Lantus SoloStar) 100 UNIT/ML SOPN INJECT 15 UNITS (0.15ML) UNDER THE SKIN DAILY AT BEDTIME 24 mL 1    Insulin Pen Needle (BD Pen Needle Nancy U/F) 32G X 4 MM MISC Use twice daily 100 each 2    Lancets (OneTouch Delica Plus [id number]) MISC daily      Lidocaine Pain Relief 4 % PTCH if needed      meloxicam (MOBIC) 15 mg tablet Take 1 tablet (15 mg total) by mouth daily 30 tablet 0    metFORMIN (GLUCOPHAGE) 1000 MG tablet TAKE ONE TABLET BY MOUTH TWICE A DAY WITH MEALS 180 tablet 1    methocarbamol (ROBAXIN) 500 mg tablet Take 500 mg by mouth daily as needed      OneTouch Verio test strip daily      tadalafil (CIALIS) 20 MG tablet Take 1 tablet (20 mg total) by mouth daily as needed for erectile dysfunction 30 tablet 5    traMADol (ULTRAM) 50 mg tablet Take 50 mg by mouth every 8 (eight) hours as needed      Trulicity 4.5 MG/0.5ML SOAJ INJECT 0.5ML UNDER THE SKIN EVERY 7 DAYS 6 mL 1     Current Facility-Administered Medications on File Prior to Visit   Medication Dose Route Frequency Provider Last Rate Last Admin    bupivacaine (MARCAINE) 0.25 % injection 0.5 mL  0.5 mL Injection     0.5 mL at 09/16/24 0800    dexamethasone (DECADRON) injection 2 mg  2 mg Intra-articular     2 mg at 09/16/24 0800      Social History     Tobacco Use    Smoking status: Never    Smokeless tobacco: Never   Vaping Use    Vaping status: Never Used   Substance and Sexual Activity    Alcohol use: Not Currently     Alcohol/week: 1.0 standard drink of alcohol     Types: 1 Cans of beer per week    Drug use: Never    Sexual activity: Yes     Partners: Female     Birth control/protection: None       Objective   /78 (BP Location: Left arm, Patient Position: Sitting, Cuff Size: Standard)   Pulse 80   Temp 97.5 °F (36.4 °C) (Temporal)   Resp 18   Ht 5' 5\" (1.651 m)   Wt 95.5 kg (210 lb 8 oz)   SpO2 97%   BMI 35.03 kg/m²     Physical Exam  Vitals and nursing " note reviewed.   Constitutional:       Appearance: Normal appearance. He is well-developed.   HENT:      Head: Normocephalic and atraumatic.   Cardiovascular:      Rate and Rhythm: Normal rate and regular rhythm.      Pulses: no weak pulses.           Dorsalis pedis pulses are 2+ on the right side and 2+ on the left side.        Posterior tibial pulses are 2+ on the right side and 2+ on the left side.   Pulmonary:      Effort: Pulmonary effort is normal.      Breath sounds: Normal breath sounds.   Abdominal:      General: Bowel sounds are normal.      Palpations: Abdomen is soft.   Musculoskeletal:      Cervical back: Normal range of motion.   Feet:      Right foot:      Skin integrity: No ulcer, skin breakdown, erythema, warmth, callus or dry skin.      Left foot:      Skin integrity: No ulcer, skin breakdown, erythema, warmth, callus or dry skin.   Skin:     General: Skin is warm.   Neurological:      General: No focal deficit present.      Mental Status: He is alert.   Psychiatric:         Mood and Affect: Mood normal.         Speech: Speech normal.         Patient's shoes and socks removed.    Right Foot/Ankle   Right Foot Inspection  Skin Exam: skin normal and skin intact. No dry skin, no warmth, no callus, no erythema, no maceration, no abnormal color, no pre-ulcer, no ulcer and no callus.     Toe Exam: ROM and strength within normal limits.     Sensory   Monofilament testing: intact    Vascular  The right DP pulse is 2+. The right PT pulse is 2+.     Left Foot/Ankle  Left Foot Inspection  Skin Exam: skin normal and skin intact. No dry skin, no warmth, no erythema, no maceration, normal color, no pre-ulcer, no ulcer and no callus.     Toe Exam: ROM and strength within normal limits.     Sensory   Monofilament testing: intact    Vascular  The left DP pulse is 2+. The left PT pulse is 2+.     Assign Risk Category  No deformity present  No loss of protective sensation  No weak pulses  Risk: 0

## 2025-03-11 NOTE — PATIENT INSTRUCTIONS
"Patient Education     Routine physical for adults   The Basics   Written by the doctors and editors at [facility]   What is a physical? -- A physical is a routine visit, or \"check-up,\" with your doctor. You might also hear it called a \"wellness visit\" or \"preventive visit.\"  During each visit, the doctor will:   Ask about your physical and mental health   Ask about your habits, behaviors, and lifestyle   Do an exam   Give you vaccines if needed   Talk to you about any medicines you take   Give advice about your health   Answer your questions  Getting regular check-ups is an important part of taking care of your health. It can help your doctor find and treat any problems you have. But it's also important for preventing health problems.  A routine physical is different from a \"sick visit.\" A sick visit is when you see a doctor because of a health concern or problem. Since physicals are scheduled ahead of time, you can think about what you want to ask the doctor.  How often should I get a physical? -- It depends on your age and health. In general, for people age 21 years and older:   If you are younger than 50 years, you might be able to get a physical every 3 years.   If you are 50 years or older, your doctor might recommend a physical every year.  If you have an ongoing health condition, like diabetes or high blood pressure, your doctor will probably want to see you more often.  What happens during a physical? -- In general, each visit will include:   Physical exam - The doctor or nurse will check your height, weight, heart rate, and blood pressure. They will also look at your eyes and ears. They will ask about how you are feeling and whether you have any symptoms that bother you.   Medicines - It's a good idea to bring a list of all the medicines you take to each doctor visit. Your doctor will talk to you about your medicines and answer any questions. Tell them if you are having any side effects that bother you. You " "should also tell them if you are having trouble paying for any of your medicines.   Habits and behaviors - This includes:   Your diet   Your exercise habits   Whether you smoke, drink alcohol, or use drugs   Whether you are sexually active   Whether you feel safe at home  Your doctor will talk to you about things you can do to improve your health and lower your risk of health problems. They will also offer help and support. For example, if you want to quit smoking, they can give you advice and might prescribe medicines. If you want to improve your diet or get more physical activity, they can help you with this, too.   Lab tests, if needed - The tests you get will depend on your age and situation. For example, your doctor might want to check your:   Cholesterol   Blood sugar   Iron level   Vaccines - The recommended vaccines will depend on your age, health, and what vaccines you already had. Vaccines are very important because they can prevent certain serious or deadly infections.   Discussion of screening - \"Screening\" means checking for diseases or other health problems before they cause symptoms. Your doctor can recommend screening based on your age, risk, and preferences. This might include tests to check for:   Cancer, such as breast, prostate, cervical, ovarian, colorectal, prostate, lung, or skin cancer   Sexually transmitted infections, such as chlamydia and gonorrhea   Mental health conditions like depression and anxiety  Your doctor will talk to you about the different types of screening tests. They can help you decide which screenings to have. They can also explain what the results might mean.   Answering questions - The physical is a good time to ask the doctor or nurse questions about your health. If needed, they can refer you to other doctors or specialists, too.  Adults older than 65 years often need other care, too. As you get older, your doctor will talk to you about:   How to prevent falling at " home   Hearing or vision tests   Memory testing   How to take your medicines safely   Making sure that you have the help and support you need at home  All topics are updated as new evidence becomes available and our peer review process is complete.  This topic retrieved from [employer] on: May 02, 2024.  Topic 669222 Version 1.0  Release: 32.4.3 - C32.122  © 2024 UpToDate, Inc. and/or its affiliates. All rights reserved.  Consumer Information Use and Disclaimer   Disclaimer: This generalized information is a limited summary of diagnosis, treatment, and/or medication information. It is not meant to be comprehensive and should be used as a tool to help the user understand and/or assess potential diagnostic and treatment options. It does NOT include all information about conditions, treatments, medications, side effects, or risks that may apply to a specific patient. It is not intended to be medical advice or a substitute for the medical advice, diagnosis, or treatment of a health care provider based on the health care provider's examination and assessment of a patient's specific and unique circumstances. Patients must speak with a health care provider for complete information about their health, medical questions, and treatment options, including any risks or benefits regarding use of medications. This information does not endorse any treatments or medications as safe, effective, or approved for treating a specific patient. UpToDate, Inc. and its affiliates disclaim any warranty or liability relating to this information or the use thereof.The use of this information is governed by the Terms of Use, available at https://www.woltersLocalbaseuwer.com/en/know/clinical-effectiveness-terms. 2024© UpToDate, Inc. and its affiliates and/or licensors. All rights reserved.  Copyright   © 2024 UpToDate, Inc. and/or its affiliates. All rights reserved.

## 2025-03-11 NOTE — ASSESSMENT & PLAN NOTE
Lab Results   Component Value Date    HGBA1C 8.0 (A) 03/11/2025   Metformin 1000 mg BID   Lantus 20 units   Trulicity 4.5 mg weekly   Patient needs new glucometer.  Per patient he will be able to pick this up on Friday.  He needs a refill on his insulin which will be picking up again on Friday.  He is not currently following with endocrinology.  His recent back surgery was postponed due to elevated A1c.  Goal of less than 7.    Orders:    Ambulatory referral to clinical pharmacy; Future    POCT hemoglobin A1c

## 2025-03-12 ENCOUNTER — TELEPHONE (OUTPATIENT)
Dept: INTERNAL MEDICINE CLINIC | Facility: CLINIC | Age: 50
End: 2025-03-12

## 2025-03-12 NOTE — TELEPHONE ENCOUNTER
Please contact patient to schedule Clinical Pharmacist Appointment     Reason for appointment: diabetes // get him set up with CGM to lower A1C for surgery  When to schedule with Pharmacist: next couple weeks  What should the patient bring to the appointment: blood sugars    Appointment Department:  [facility]  Pharmacist patient will be seeing: Brianna Johnson Pharmacist  Visit Type Preference (ie Phone, Video, In-person): either  [facility]  Virtual visits will be completed via [employer] or Phone - please clarify patient preference in appointment notes    Please respond to this note to keep track of the number of patient outreaches.     Please try to reach out to patient on 2 separate days

## 2025-03-25 ENCOUNTER — OFFICE VISIT (OUTPATIENT)
Dept: PODIATRY | Facility: CLINIC | Age: 50
End: 2025-03-25
Payer: COMMERCIAL

## 2025-03-25 VITALS — OXYGEN SATURATION: 98 % | HEIGHT: 65 IN | BODY MASS INDEX: 35.32 KG/M2 | HEART RATE: 88 BPM | WEIGHT: 212 LBS

## 2025-03-25 DIAGNOSIS — S93.492D SPRAIN OF ANTERIOR TALOFIBULAR LIGAMENT OF LEFT ANKLE, SUBSEQUENT ENCOUNTER: Primary | ICD-10-CM

## 2025-03-25 DIAGNOSIS — M76.72 PERONEAL TENDINITIS OF LEFT LOWER EXTREMITY: ICD-10-CM

## 2025-03-25 DIAGNOSIS — M25.372 ANKLE INSTABILITY, LEFT: ICD-10-CM

## 2025-03-25 PROCEDURE — 99213 OFFICE O/P EST LOW 20 MIN: CPT | Performed by: PODIATRIST

## 2025-03-25 NOTE — PROGRESS NOTES
:  Assessment & Plan  Sprain of anterior talofibular ligament of left ankle, subsequent encounter    Orders:    MRI ankle/heel left wo contrast; Future    Peroneal tendinitis of left lower extremity    Orders:    MRI ankle/heel left wo contrast; Future    Ankle instability, left    Orders:    MRI ankle/heel left wo contrast; Future    The patient's clinical examination today significant for persistent tenderness palpation along the distal segment of the peroneal tendons as it coursed around the fibular malleolus.  There is minimal tenderness with palpation to the syndesmotic ligament.  There is minimal tenderness palpation of the sinus tarsi today.  There is minimal tenderness palpation along the medial ankle.  The patient's range of motion does appear to be stable without any excessive varus rotation.  The patient still notes a persistent sensation of instability and the sensation that her his ankle wants to give out on him.    His symptomatology is concerning for pathology to the peroneal tendons.  His prior MRI from 2023 was reviewed and the peroneal tendons at that time were noted to be normal.  However point tenderness is noted along the segment of the peroneal's as they course around the fibular groove, and his consistent feeling of weakness in his ankle giving out can certainly be consistent with a tear of the peroneal tendons.  The ATFL appears to be intact and clinically appears quite stable.    Recommend follow-up in 3 to 4 weeks to review MRI results.    History of Present Illness     Jc Carmona is a 49 y.o. male   The patient presents today with a chief complaint of persistent instability in his left ankle.  He he states that he recently rolled his ankle once again.  Tenderness is still noted along the outside of his ankle, pointing to the area of the lateral left ankle.  He is utilizing his lace up ankle brace today.  He also notes that his back surgery was canceled in January due to an  "elevated hemoglobin A1c.  He is working to get that back down so that he can proceed with his surgery.  He does note persistent low back pain as well.      Review of Systems   Constitutional: Negative.    HENT: Negative.     Eyes: Negative.    Respiratory: Negative.     Cardiovascular: Negative.    Endocrine: Negative.    Musculoskeletal: Negative.    Neurological: Negative.    Hematological: Negative.    Psychiatric/Behavioral: Negative.       Objective   Pulse 88   Ht 5' 5\" (1.651 m)   Wt 96.2 kg (212 lb)   SpO2 98%   BMI 35.28 kg/m²      Physical Exam  Constitutional:       Appearance: Normal appearance.   HENT:      Head: Normocephalic and atraumatic.   Cardiovascular:      Pulses:           Dorsalis pedis pulses are 2+ on the left side.        Posterior tibial pulses are 2+ on the left side.   Pulmonary:      Effort: Pulmonary effort is normal.   Musculoskeletal:        Feet:    Feet:      Left foot:      Skin integrity: Skin integrity normal.      Comments: The patient's clinical examination today significant for persistent tenderness palpation along the distal segment of the peroneal tendons as it coursed around the fibular malleolus.  There is minimal tenderness with palpation to the syndesmotic ligament.  There is minimal tenderness palpation of the sinus tarsi today.  There is minimal tenderness palpation along the medial ankle.  The patient's range of motion does appear to be stable without any excessive varus rotation.  The patient still notes a persistent sensation of instability and the sensation that her his ankle wants to give out on him.  Skin:     General: Skin is warm and dry.      Capillary Refill: Capillary refill takes less than 2 seconds.   Neurological:      General: No focal deficit present.      Mental Status: He is alert and oriented to person, place, and time.   Psychiatric:         Mood and Affect: Mood normal.           "

## 2025-03-26 ENCOUNTER — OFFICE VISIT (OUTPATIENT)
Dept: INTERNAL MEDICINE CLINIC | Facility: CLINIC | Age: 50
End: 2025-03-26

## 2025-03-26 DIAGNOSIS — E11.65 TYPE 2 DIABETES MELLITUS WITH HYPERGLYCEMIA, WITH LONG-TERM CURRENT USE OF INSULIN (HCC): ICD-10-CM

## 2025-03-26 DIAGNOSIS — Z79.4 TYPE 2 DIABETES MELLITUS WITH HYPERGLYCEMIA, WITH LONG-TERM CURRENT USE OF INSULIN (HCC): ICD-10-CM

## 2025-03-26 PROCEDURE — PBNCHG PB NO CHARGE PLACEHOLDER: Performed by: PHARMACIST

## 2025-03-26 NOTE — ASSESSMENT & PLAN NOTE
Lab Results   Component Value Date    HGBA1C 8.0 (A) 03/11/2025   Reviewed Dell, GMI x 14 days 7.2 - not reliable but headed in right direction  Needs <7 for surgery  Switch Trulicity 4.5 to Ozempic 1mg (insurance requires trial both trulicity and ozempic before mounjaro)  Try ozempic x 1 month, if insufficient glycemic response THEN change to Mounjaro  Needs ACE/ARB  Needs Triglycerides addressed  Needs higher statin intensity    Orders:    Ambulatory referral to clinical pharmacy    semaglutide, 1 mg/dose, (Ozempic) 4 mg/3 mL injection pen; Inject 0.75 mL (1 mg total) under the skin once a week

## 2025-03-26 NOTE — PROGRESS NOTES
[facility] Clinical Pharmacy Services  Brianna Johnson, Pharmacist      Assessment/ Plan     Assessment & Plan  Type 2 diabetes mellitus with hyperglycemia, with long-term current use of insulin ([facility])    Lab Results   Component Value Date    HGBA1C 8.0 (A) 03/11/2025   Reviewed Dell, GMI x 14 days 7.2 - not reliable but headed in right direction  Needs <7 for surgery  Switch Trulicity 4.5 to Ozempic 1mg (insurance requires trial both trulicity and ozempic before mounjaro)  Try ozempic x 1 month, if insufficient glycemic response THEN change to Mounjaro  Needs ACE/ARB  Needs Triglycerides addressed  Needs higher statin intensity    Orders:    Ambulatory referral to clinical pharmacy    semaglutide, 1 mg/dose, (Ozempic) 4 mg/3 mL injection pen; Inject 0.75 mL (1 mg total) under the skin once a week      Follow-up: 5 weeks, after switch Trulicity to Ozempic    Subjective       Medication Adherence/ Tolerability/ Cost:  Patient denies side effects, no issues with cost, 0 missed doses in last two week  acetaminophen  atorvastatin  BD Pen Needle Nancy U/F Misc  bupivacaine  cholestyramine  dexamethasone  famotidine  FreeStyle Dell 3 Sensor Misc  Lantus SoloStar Sopn  Lidocaine Pain Relief Ptch  meloxicam  metFORMIN  methocarbamol  OneTouch Delica Plus [id number] Misc  OneTouch Verio Strp  semaglutide (1 mg/dose)  traMADol  Objective       Blood Sugar Readings  CGM Report scanned into chart        ASCVD Risk:  The 10-year ASCVD risk score (Leah ROBLERO, et al., 2019) is: 8.1%    Values used to calculate the score:      Age: 49 years      Sex: Male      Is Non- : No      Diabetic: Yes      Tobacco smoker: No      Systolic Blood Pressure: 122 mmHg      Is BP treated: No      HDL Cholesterol: 37 mg/dL      Total Cholesterol: 209 mg/dL     Vitals:  There were no vitals filed for this visit.    Eye Exam:    Lab Results   Component Value Date    LEFTDIABRET None 12/21/2023    RIGHTDIABRET None 12/21/2023        Labs:  Lab Results   Component Value Date    SODIUM 138 01/13/2025    K 3.9 01/13/2025    EGFR 98 01/13/2025    CREATININE 0.91 01/13/2025    GLUF 173 (H) 01/13/2025    MICROALBCRE 140 (H) 01/13/2025       Lab Results   Component Value Date    HGBA1C 8.0 (A) 03/11/2025    HGBA1C 8.4 (H) 01/13/2025    HGBA1C 7.3 (H) 07/30/2024         Pharmacist Tracking Tool     Pharmacist Tracking Tool  Reason For Outreach: Embedded Pharmacist  Demographics:  Intervention Method: In Person  Type of Intervention: New  Topics Addressed: Diabetes  Pharmacologic Interventions: Medication Initiation, Medication Discontinuation, Medication Conversion, Prevent or Manage AGGIE, and Med Rec  Non-Pharmacologic Interventions: Adherence addressed, Care coordination, Chart update, Cost, Disease state education, Home Monitoring, Labs, Medication Monitoring, Care Gap, Medication/Device education, and Personal CGM  Time:  Direct Patient Care:  45  mins  Care Coordination:  30  mins  Recommendation Recipient: Patient/Caregiver  Outcome: Accepted

## 2025-04-01 ENCOUNTER — HOSPITAL ENCOUNTER (OUTPATIENT)
Dept: RADIOLOGY | Facility: IMAGING CENTER | Age: 50
Discharge: HOME | End: 2025-04-01
Payer: COMMERCIAL

## 2025-04-01 DIAGNOSIS — S93.492D SPRAIN OF ANTERIOR TALOFIBULAR LIGAMENT OF LEFT ANKLE, SUBSEQUENT ENCOUNTER: ICD-10-CM

## 2025-04-01 DIAGNOSIS — M76.72 PERONEAL TENDINITIS OF LEFT LOWER EXTREMITY: ICD-10-CM

## 2025-04-01 DIAGNOSIS — M25.372 ANKLE INSTABILITY, LEFT: ICD-10-CM

## 2025-04-01 PROCEDURE — 73721 MRI JNT OF LWR EXTRE W/O DYE: CPT

## 2025-04-07 DIAGNOSIS — E11.65 TYPE 2 DIABETES MELLITUS WITH HYPERGLYCEMIA, WITH LONG-TERM CURRENT USE OF INSULIN (HCC): ICD-10-CM

## 2025-04-07 DIAGNOSIS — Z79.4 TYPE 2 DIABETES MELLITUS WITH HYPERGLYCEMIA, WITH LONG-TERM CURRENT USE OF INSULIN (HCC): ICD-10-CM

## 2025-04-08 ENCOUNTER — OFFICE VISIT (OUTPATIENT)
Dept: PODIATRY | Facility: CLINIC | Age: 50
End: 2025-04-08
Payer: COMMERCIAL

## 2025-04-08 ENCOUNTER — RA CDI HCC (OUTPATIENT)
Dept: OTHER | Facility: HOSPITAL | Age: 50
End: 2025-04-08

## 2025-04-08 VITALS — BODY MASS INDEX: 34.66 KG/M2 | HEART RATE: 94 BPM | HEIGHT: 65 IN | WEIGHT: 208 LBS | OXYGEN SATURATION: 97 %

## 2025-04-08 DIAGNOSIS — M25.372 ANKLE INSTABILITY, LEFT: ICD-10-CM

## 2025-04-08 DIAGNOSIS — M25.572 SINUS TARSI SYNDROME OF LEFT FOOT: Primary | ICD-10-CM

## 2025-04-08 DIAGNOSIS — M79.672 LEFT FOOT PAIN: ICD-10-CM

## 2025-04-08 PROCEDURE — 99213 OFFICE O/P EST LOW 20 MIN: CPT | Performed by: PODIATRIST

## 2025-04-08 RX ORDER — PEN NEEDLE, DIABETIC 32GX 5/32"
NEEDLE, DISPOSABLE MISCELLANEOUS 2 TIMES DAILY
Qty: 100 EACH | Refills: 2 | Status: SHIPPED | OUTPATIENT
Start: 2025-04-08

## 2025-04-08 NOTE — PROGRESS NOTES
HCC coding opportunities          Chart Reviewed number of suggestions sent to Provider: 1   K76.0 Found on Problem list; review and document as applicable.   [facility]      Patients Insurance        Commercial Insurance: [email or web address]

## 2025-04-09 ENCOUNTER — OFFICE VISIT (OUTPATIENT)
Dept: FAMILY MEDICINE CLINIC | Facility: CLINIC | Age: 50
End: 2025-04-09
Payer: COMMERCIAL

## 2025-04-09 VITALS
TEMPERATURE: 98 F | HEIGHT: 65 IN | DIASTOLIC BLOOD PRESSURE: 98 MMHG | HEART RATE: 83 BPM | SYSTOLIC BLOOD PRESSURE: 136 MMHG | BODY MASS INDEX: 34.74 KG/M2 | WEIGHT: 208.5 LBS | OXYGEN SATURATION: 96 % | RESPIRATION RATE: 16 BRPM

## 2025-04-09 DIAGNOSIS — R53.83 OTHER FATIGUE: Primary | ICD-10-CM

## 2025-04-09 DIAGNOSIS — E11.65 TYPE 2 DIABETES MELLITUS WITH HYPERGLYCEMIA, WITH LONG-TERM CURRENT USE OF INSULIN (HCC): ICD-10-CM

## 2025-04-09 DIAGNOSIS — Z85.038 HISTORY OF COLON CANCER: ICD-10-CM

## 2025-04-09 DIAGNOSIS — K52.9 CHRONIC DIARRHEA: ICD-10-CM

## 2025-04-09 DIAGNOSIS — Z79.4 TYPE 2 DIABETES MELLITUS WITH HYPERGLYCEMIA, WITH LONG-TERM CURRENT USE OF INSULIN (HCC): ICD-10-CM

## 2025-04-09 DIAGNOSIS — G47.33 OSA (OBSTRUCTIVE SLEEP APNEA): ICD-10-CM

## 2025-04-09 PROCEDURE — 99214 OFFICE O/P EST MOD 30 MIN: CPT | Performed by: FAMILY MEDICINE

## 2025-04-09 NOTE — PROGRESS NOTES
Name: Jc Carmona      : 1975      MRN: [id number]  Encounter Provider: Lv Torrez MD  Encounter Date: 2025   Encounter department: [facility] PR[facility]  :  Assessment & Plan  Other fatigue  Worsening fatigue specifically after eating.  Previous diagnosis of sleep apnea but was told this was mild.  Testing was over 8 years ago.  Fatigue is worse after eating in the middle of the day.  Orders:    Comprehensive metabolic panel; Future    TSH, 3rd generation with Free T4 reflex; Future    TSH, 3rd generation with Free T4 reflex; Future    Type 2 diabetes mellitus with hyperglycemia, with long-term current use of insulin ([facility])    Lab Results   Component Value Date    HGBA1C 8.0 (A) 2025     Scheduled follow-up with clinical pharmacy  He will be starting Ozempic next week.  Monitoring blood sugars closely at home and notes an improvement.  Currently taking metformin extended release 500 mg daily.  Would like to ultimately get patient off metformin with chronic diarrhea.  Plan to repeat A1c in 3-month    Orders:    CBC and differential; Future    Comprehensive metabolic panel; Future    PETTY (obstructive sleep apnea)  Previous diagnosis of sleep apnea.  He is now experiencing excessive daytime fatigue.  Would like patient to meet with sleep medicine and have another sleep study.  Orders:    Ambulatory Referral to Sleep Medicine; Future    Chronic diarrhea  Remains on Questran.  Previously discussed stopping metformin but he would like to continue.  His A1c is currently not at goal.  He has an upcoming appointment with gastroenterology.  Colonoscopy in pleated last year.  Could trial IBgard         History of colon cancer    Orders:    Iron Panel (Includes Ferritin, Iron Sat%, Iron, and TIBC); Future           History of Present Illness   Patient presents with:  Follow-up: 4w  After eating gets tired and weak every times he eats.     Patient presents today for 4-week follow-up.   "He is reporting excessive daytime fatigue worse after he eats.  Previously diagnosed with sleep apnea.  Does not wear CPAP at this time.  Patient states after he eats he sometimes has to pull over to nap.  He does have underlying chronic diarrhea.  Currently following with clinical pharmacist for diabetes.  Remains on Questran for his diarrhea.      Review of Systems   Constitutional:  Positive for fatigue. Negative for activity change and fever.   Eyes:  Negative for visual disturbance.   Respiratory:  Negative for shortness of breath.    Cardiovascular:  Negative for chest pain.   Gastrointestinal:  Negative for abdominal pain, constipation, diarrhea and nausea.   Endocrine: Negative for cold intolerance and heat intolerance.   Musculoskeletal:  Negative for back pain.   Skin:  Negative for rash.   Neurological:  Negative for headaches.   Psychiatric/Behavioral:  Negative for confusion.        Objective   /98 (BP Location: Left arm, Patient Position: Sitting, Cuff Size: Large)   Pulse 83   Temp 98 °F (36.7 °C) (Temporal)   Resp 16   Ht 5' 5\" (1.651 m)   Wt 94.6 kg (208 lb 8 oz)   SpO2 96%   BMI 34.70 kg/m²      Physical Exam  Vitals and nursing note reviewed.   Constitutional:       Appearance: Normal appearance. He is well-developed.   HENT:      Head: Normocephalic and atraumatic.   Cardiovascular:      Rate and Rhythm: Normal rate and regular rhythm.   Pulmonary:      Effort: Pulmonary effort is normal.      Breath sounds: Normal breath sounds.   Abdominal:      General: Bowel sounds are normal.      Palpations: Abdomen is soft.   Musculoskeletal:      Cervical back: Normal range of motion.   Skin:     General: Skin is warm.   Neurological:      General: No focal deficit present.      Mental Status: He is alert.   Psychiatric:         Mood and Affect: Mood normal.         Speech: Speech normal.         "

## 2025-04-09 NOTE — ASSESSMENT & PLAN NOTE
Previous diagnosis of sleep apnea.  He is now experiencing excessive daytime fatigue.  Would like patient to meet with sleep medicine and have another sleep study.  Orders:    Ambulatory Referral to Sleep Medicine; Future

## 2025-04-09 NOTE — ASSESSMENT & PLAN NOTE
Lab Results   Component Value Date    HGBA1C 8.0 (A) 03/11/2025     Scheduled follow-up with clinical pharmacy  He will be starting Ozempic next week.  Monitoring blood sugars closely at home and notes an improvement.  Currently taking metformin extended release 500 mg daily.  Would like to ultimately get patient off metformin with chronic diarrhea.  Plan to repeat A1c in 3-month    Orders:    CBC and differential; Future    Comprehensive metabolic panel; Future

## 2025-04-09 NOTE — ASSESSMENT & PLAN NOTE
Remains on Questran.  Previously discussed stopping metformin but he would like to continue.  His A1c is currently not at goal.  He has an upcoming appointment with gastroenterology.  Colonoscopy in pleated last year.  Could trial IBgard

## 2025-04-28 ENCOUNTER — OFFICE VISIT (OUTPATIENT)
Dept: INTERNAL MEDICINE CLINIC | Facility: CLINIC | Age: 50
End: 2025-04-28

## 2025-04-28 DIAGNOSIS — E11.65 TYPE 2 DIABETES MELLITUS WITH HYPERGLYCEMIA, WITH LONG-TERM CURRENT USE OF INSULIN (HCC): Primary | ICD-10-CM

## 2025-04-28 DIAGNOSIS — Z79.4 TYPE 2 DIABETES MELLITUS WITH HYPERGLYCEMIA, WITH LONG-TERM CURRENT USE OF INSULIN (HCC): Primary | ICD-10-CM

## 2025-04-28 PROCEDURE — PBNCHG PB NO CHARGE PLACEHOLDER: Performed by: PHARMACIST

## 2025-04-28 RX ORDER — TIRZEPATIDE 10 MG/.5ML
10 INJECTION, SOLUTION SUBCUTANEOUS WEEKLY
Qty: 2 ML | Refills: 0 | Status: SHIPPED | OUTPATIENT
Start: 2025-04-28

## 2025-04-28 NOTE — PROGRESS NOTES
[facility] Clinical Pharmacy Services  Brianna Johnson, Pharmacist      Assessment/ Plan     Assessment & Plan  Type 2 diabetes mellitus with hyperglycemia, with long-term current use of insulin ([facility])    Lab Results   Component Value Date    HGBA1C 8.0 (A) 03/11/2025   Dell shows improvement with portion control and exercise  For further glycemic lowering and weight loss will switch Ozempic to Mounjaro  Tried and failed both Trulicity and Ozempic for prior auth  Goal A1C<7 for surgery    Orders:    Tirzepatide (Mounjaro) 10 MG/0.5ML SOAJ; Inject 10 mg under the skin once a week      Follow-up: 5 weeks, after switch Ozempic to Mounjaro    Subjective       Medication Adherence/ Tolerability/ Cost:  Patient denies side effects, no issues with cost, 0 missed doses in last two week  acetaminophen  atorvastatin  BD Pen Needle Nancy 2nd Gen Misc  bupivacaine  cholestyramine  dexamethasone  famotidine  FreeStyle Dell 3 Sensor Misc  Lantus SoloStar Sopn  Lidocaine Pain Relief Ptch  meloxicam  metFORMIN  methocarbamol  Mounjaro Soaj  OneTouch Delica Plus [id number] Misc  OneTouch Verio Strp  traMADol  Objective       Blood Sugar Readings  CGM Report scanned into chart        ASCVD Risk:  The 10-year ASCVD risk score (Leah ROBLERO, et al., 2019) is: 9.7%    Values used to calculate the score:      Age: 49 years      Sex: Male      Is Non- : No      Diabetic: Yes      Tobacco smoker: No      Systolic Blood Pressure: 136 mmHg      Is BP treated: No      HDL Cholesterol: 37 mg/dL      Total Cholesterol: 209 mg/dL     Vitals:  There were no vitals filed for this visit.    Eye Exam:    Lab Results   Component Value Date    LEFTDIABRET None 12/21/2023    RIGHTDIABRET None 12/21/2023       Labs:  Lab Results   Component Value Date    SODIUM 138 01/13/2025    K 3.9 01/13/2025    EGFR 98 01/13/2025    CREATININE 0.91 01/13/2025    GLUF 173 (H) 01/13/2025    MICROALBCRE 140 (H) 01/13/2025       Lab Results    Component Value Date    HGBA1C 8.0 (A) 03/11/2025    HGBA1C 8.4 (H) 01/13/2025    HGBA1C 7.3 (H) 07/30/2024         Pharmacist Tracking Tool     Pharmacist Tracking Tool  Reason For Outreach: Embedded Pharmacist  Demographics:  Intervention Method: In Person  Type of Intervention: Follow-Up  Topics Addressed: Diabetes  Pharmacologic Interventions: Medication Initiation, Medication Discontinuation, Medication Conversion, and Prevent or Manage AGGIE  Non-Pharmacologic Interventions: Adherence addressed, Care coordination, Chart update, Cost, Disease state education, Home Monitoring, Labs, Medication Monitoring, Care Gap, Medication/Device education, and Personal CGM  Time:  Direct Patient Care:  45  mins  Care Coordination:  30  mins  Recommendation Recipient: Patient/Caregiver  Outcome: Accepted

## 2025-04-28 NOTE — ASSESSMENT & PLAN NOTE
Lab Results   Component Value Date    HGBA1C 8.0 (A) 03/11/2025   Dell shows improvement with portion control and exercise  For further glycemic lowering and weight loss will switch Ozempic to Mounjaro  Tried and failed both Trulicity and Ozempic for prior auth  Goal A1C<7 for surgery    Orders:    Tirzepatide (Mounjaro) 10 MG/0.5ML SOAJ; Inject 10 mg under the skin once a week

## 2025-04-29 ENCOUNTER — TELEPHONE (OUTPATIENT)
Age: 50
End: 2025-04-29

## 2025-04-29 NOTE — TELEPHONE ENCOUNTER
PA for Mounjaro 10mg/0.5ml SUBMITTED to [employer]     via    []CMM-KEY:   [x]Surescripts-Case ID # [id number]   []Availity-Auth ID # NDC #   []Faxed to plan   []Other website   []Phone call Case ID #     []PA sent as URGENT    All office notes, labs and other pertaining documents and studies sent. Clinical questions answered. Awaiting determination from insurance company.     Turnaround time for your insurance to make a decision on your Prior Authorization can take 7-21 business days.

## 2025-04-30 NOTE — TELEPHONE ENCOUNTER
PA for Mounjaro 10mg/0.5ml APPROVED     Date(s) approved 4/29/25-4/29/26    Case #    Patient advised by          [x]MyChart Message  []Phone call   []LMOM  [x]L/M to call office as no active Communication consent on file  []Unable to leave detailed message as VM not approved on Communication consent       Pharmacy advised by    [x]Fax  []Phone call  []Secure Chat    Specialty Pharmacy    []     Approval letter scanned into Media

## 2025-05-20 ENCOUNTER — TELEPHONE (OUTPATIENT)
Dept: INTERNAL MEDICINE CLINIC | Facility: CLINIC | Age: 50
End: 2025-05-20

## 2025-05-20 NOTE — TELEPHONE ENCOUNTER
Call placed to patient to cancel his appointment on 6/16 with Brianna and reschedule his pharmacy appointment with Rosa Grajeda. Please reschedule is return call is made.

## 2025-06-04 ENCOUNTER — TELEPHONE (OUTPATIENT)
Age: 50
End: 2025-06-04

## 2025-06-04 NOTE — TELEPHONE ENCOUNTER
Patient was gardening and has poison ivy on his forearms. Offered appointment but he just wanted to know what he can use to stop the spread. Please advise.

## 2025-06-10 DIAGNOSIS — E11.65 TYPE 2 DIABETES MELLITUS WITH HYPERGLYCEMIA, WITH LONG-TERM CURRENT USE OF INSULIN (HCC): ICD-10-CM

## 2025-06-10 DIAGNOSIS — Z79.4 TYPE 2 DIABETES MELLITUS WITH HYPERGLYCEMIA, WITH LONG-TERM CURRENT USE OF INSULIN (HCC): ICD-10-CM

## 2025-06-10 RX ORDER — TIRZEPATIDE 10 MG/.5ML
INJECTION, SOLUTION SUBCUTANEOUS
Qty: 2 ML | Refills: 0 | Status: SHIPPED | OUTPATIENT
Start: 2025-06-10 | End: 2025-06-16

## 2025-06-10 NOTE — PROGRESS NOTES
Assessment/Plan:      There are no diagnoses linked to this encounter.      Subjective:     Patient ID: Jc Carmona is a 48 y.o. male.    HPI    Review of Systems      Objective:     Physical Exam       

## 2025-06-11 ENCOUNTER — OFFICE VISIT (OUTPATIENT)
Dept: PODIATRY | Facility: CLINIC | Age: 50
End: 2025-06-11
Payer: COMMERCIAL

## 2025-06-11 VITALS — BODY MASS INDEX: 32.82 KG/M2 | OXYGEN SATURATION: 98 % | HEIGHT: 65 IN | WEIGHT: 197 LBS | HEART RATE: 84 BPM

## 2025-06-11 DIAGNOSIS — M79.672 LEFT FOOT PAIN: ICD-10-CM

## 2025-06-11 DIAGNOSIS — M25.572 SINUS TARSI SYNDROME OF LEFT FOOT: Primary | ICD-10-CM

## 2025-06-11 PROCEDURE — 99213 OFFICE O/P EST LOW 20 MIN: CPT | Performed by: PODIATRIST

## 2025-06-13 RX ORDER — ACYCLOVIR 800 MG/1
TABLET ORAL
Qty: 2 EACH | Refills: 5 | Status: SHIPPED | OUTPATIENT
Start: 2025-06-13 | End: 2025-06-16

## 2025-06-16 ENCOUNTER — OFFICE VISIT (OUTPATIENT)
Dept: FAMILY MEDICINE CLINIC | Facility: CLINIC | Age: 50
End: 2025-06-16

## 2025-06-16 VITALS — BODY MASS INDEX: 32.95 KG/M2 | WEIGHT: 198 LBS

## 2025-06-16 DIAGNOSIS — Z79.4 TYPE 2 DIABETES MELLITUS WITH HYPERGLYCEMIA, WITH LONG-TERM CURRENT USE OF INSULIN (HCC): Primary | ICD-10-CM

## 2025-06-16 DIAGNOSIS — E78.1 HYPERTRIGLYCERIDEMIA: ICD-10-CM

## 2025-06-16 DIAGNOSIS — E11.65 TYPE 2 DIABETES MELLITUS WITH HYPERGLYCEMIA, WITH LONG-TERM CURRENT USE OF INSULIN (HCC): Primary | ICD-10-CM

## 2025-06-16 DIAGNOSIS — G47.33 OSA (OBSTRUCTIVE SLEEP APNEA): ICD-10-CM

## 2025-06-16 PROCEDURE — PBNCHG PB NO CHARGE PLACEHOLDER: Performed by: PHARMACIST

## 2025-06-16 RX ORDER — HYDROCHLOROTHIAZIDE 12.5 MG/1
1 CAPSULE ORAL
Qty: 6 EACH | Refills: 3 | Status: SHIPPED | OUTPATIENT
Start: 2025-06-16

## 2025-06-16 RX ORDER — TIRZEPATIDE 12.5 MG/.5ML
12.5 INJECTION, SOLUTION SUBCUTANEOUS WEEKLY
Qty: 2 ML | Refills: 0 | Status: SHIPPED | OUTPATIENT
Start: 2025-06-16

## 2025-06-16 NOTE — ASSESSMENT & PLAN NOTE
Reviewed benefit of taking statin, patient voiced understanding and will take daily.     Will have PCP order lipid panel for patient to update with next labs

## 2025-06-16 NOTE — PROGRESS NOTES
[facility] Clinical Pharmacy Services  Corrie Maxwell    Administrative Statements   Encounter provider Corrie Maxwell         Assessment/ Plan     Assessment & Plan  Type 2 diabetes mellitus with hyperglycemia, with long-term current use of insulin ([facility])    Lab Results   Component Value Date    HGBA1C 8.0 (A) 03/11/2025     Most recent A1c above goal   CGM shows at goal TIR; hypoglycemia not present.     Medications:  Mounjaro: patient would like to increase dose to improve insulin sensitivity and reduce insulin requirements. Patient aware to monitor for change in diarrhea signs/symptoms   Lantus: reduce to 16 units once daily   Metformin: stop  Home Monitoring: convert to Dell 3+; reviewed with patient that he should contact [employer] to receive replacement sensor   Lifestyle Modifications:encouraged patient to continue with lifestyle modifications.          Hypertriglyceridemia  Reviewed benefit of taking statin, patient voiced understanding and will take daily.     Will have PCP order lipid panel for patient to update with next labs        PETTY (obstructive sleep apnea)    Patient to call and get sleep study scheduled          Follow-up: PCP next month; PharmD in 2 months      Subjective   HPI    Medication Adherence/ Tolerability/ Cost:  Atorvastatin: does not take due to forgetting   Lantus SoloStar Sopn: 20 units once daily - in morning  metFORMIN; takes on average 5x/week when he feels blood sugar readings are high  Mounjaro: took today, takes on Mondays  traMADol    Review of Systems   Constitutional:  Positive for appetite change (decreased). Negative for fatigue and unexpected weight change (decreased due to medication use).   Gastrointestinal:  Negative for constipation, diarrhea, nausea and vomiting.        No change in diarrhea since starting Mounjaro; loose BM approx 10 times/day     2. Lifestyle:   Has been eating more fruits lately  Beverages: used to have several Mountain  Dews per day, now has 1/2 can very rarely    3. Home monitoring devices  Continuous Glucose Monitor: Yes, Brand: Dell 3 - has not used sensor since 6/4 as he requires new sensor    Hypoglycemia: The patient had 0 episodes/symptoms of hypoglycemia.      Objective       Blood Sugar Readings  CGM Report scanned into chart    ASCVD Risk:  The 10-year ASCVD risk score (Leah ROBLERO, et al., 2019) is: 9.7%    Values used to calculate the score:      Age: 49 years      Clincally relevant sex: Male      Is Non- : No      Diabetic: Yes      Tobacco smoker: No      Systolic Blood Pressure: 136 mmHg      Is BP treated: No      HDL Cholesterol: 37 mg/dL      Total Cholesterol: 209 mg/dL     Vitals:  There were no vitals filed for this visit.    Eye Exam:    Lab Results   Component Value Date    LEFTDIABRET None 12/21/2023    RIGHTDIABRET None 12/21/2023       Labs:  Lab Results   Component Value Date    SODIUM 138 01/13/2025    K 3.9 01/13/2025    EGFR 98 01/13/2025    CREATININE 0.91 01/13/2025    GLUF 173 (H) 01/13/2025    MICROALBCRE 140 (H) 01/13/2025       Lab Results   Component Value Date    HGBA1C 8.0 (A) 03/11/2025    HGBA1C 8.4 (H) 01/13/2025    HGBA1C 7.3 (H) 07/30/2024     Lab Results   Component Value Date    CHOLESTEROL 209 (H) 01/13/2025    CHOLESTEROL 177 07/30/2024    CHOLESTEROL 165 12/14/2023     Lab Results   Component Value Date    HDL 37 (L) 01/13/2025    HDL 37 (L) 07/30/2024    HDL 33 (L) 12/14/2023     Lab Results   Component Value Date    TRIG 696 (H) 01/13/2025    TRIG 419 (H) 07/30/2024    TRIG 422 (H) 12/14/2023     Lab Results   Component Value Date    NONHDLC 172 01/13/2025    NONHDLC 132 12/14/2023    NONHDLC 167 02/13/2023         Pharmacist Tracking Tool     Pharmacist Tracking Tool  Reason For Outreach: Embedded Pharmacist  Demographics:  Intervention Method: In Person  Type of Intervention: Follow-Up  Topics Addressed: Diabetes and Dyslipidemia  Pharmacologic  Interventions: Dose or Frequency Adjusted  Non-Pharmacologic Interventions: Personal CGM  Time:  Direct Patient Care: 20 mins  Care Coordination: 10 mins  Recommendation Recipient: Patient/Caregiver  Outcome: Accepted

## 2025-06-16 NOTE — ASSESSMENT & PLAN NOTE
Lab Results   Component Value Date    HGBA1C 8.0 (A) 03/11/2025     Most recent A1c above goal   CGM shows at goal TIR; hypoglycemia not present.     Medications:  Mounjaro: patient would like to increase dose to improve insulin sensitivity and reduce insulin requirements. Patient aware to monitor for change in diarrhea signs/symptoms   Lantus: reduce to 16 units once daily   Metformin: stop  Home Monitoring: convert to Dell 3+; reviewed with patient that he should contact [employer] to receive replacement sensor   Lifestyle Modifications:encouraged patient to continue with lifestyle modifications.

## 2025-06-16 NOTE — PROGRESS NOTES
":  Assessment & Plan  Sinus tarsi syndrome of left foot         Left foot pain           The patient's clinical examination today is relatively benign.  There is some residual, persistent tenderness to palpation along the lateral aspect of the sinus tarsi of the left foot.  However it does seem to be relatively mild and improved compared to his prior visit.  Pulses are palpable.  There are no open lesions.  Overall, the left ankle and foot appears clinically stable.    The patient can transition out of the ankle brace on an as-needed basis at this point in time he is using in the brace on as-needed basis.  Continue home exercises.  From a clinical standpoint, he is cleared for activities as tolerated regarding his left lower extremity.    Recommend follow-up in 6 months or as needed.    History of Present Illness     Jc Carmona is a 49 y.o. male   The patient presents today for follow-up of left lateral foot and ankle pain secondary to sinus tarsi syndrome stemming from a recent sprain/strain.  He has noticed an improvement since his last visit, though he does note some acute persisting, occasional tenderness which shoots through the area of the subtalar joint on his left foot.      Review of Systems   Constitutional: Negative.    Respiratory: Negative.     Cardiovascular: Negative.    Skin: Negative.    Psychiatric/Behavioral: Negative.       Objective   Pulse 84   Ht 5' 5\" (1.651 m)   Wt 89.4 kg (197 lb)   SpO2 98%   BMI 32.78 kg/m²      Physical Exam  Constitutional:       Appearance: Normal appearance.   HENT:      Head: Normocephalic and atraumatic.     Eyes:      Conjunctiva/sclera: Conjunctivae normal.       Cardiovascular:      Pulses:           Dorsalis pedis pulses are 2+ on the left side.        Posterior tibial pulses are 2+ on the left side.   Pulmonary:      Effort: Pulmonary effort is normal.     Musculoskeletal:        Feet:    Feet:      Left foot:      Skin integrity: Skin integrity " normal.      Comments: The patient's clinical examination today is relatively benign.  There is some residual, persistent tenderness to palpation along the lateral aspect of the sinus tarsi of the left foot.  However it does seem to be relatively mild and improved compared to his prior visit.  Pulses are palpable.  There are no open lesions.  Overall, the left ankle and foot appears clinically stable.      Skin:     General: Skin is warm and dry.      Capillary Refill: Capillary refill takes less than 2 seconds.     Neurological:      General: No focal deficit present.      Mental Status: He is alert and oriented to person, place, and time.     Psychiatric:         Mood and Affect: Mood normal.

## 2025-06-16 NOTE — TELEPHONE ENCOUNTER
Pending the following meds/labs based on PharmD appointment today. See PharmD appointment note for further details.     Medications:   - Mounjaro 12.5mg pens  - Dell 3+ sensors    Labs:   - A1c  - Lipid  - BMP  - Microalb  *please add in any additional lab orders you would like patient to complete*

## 2025-06-18 ENCOUNTER — VBI (OUTPATIENT)
Dept: ADMINISTRATIVE | Facility: OTHER | Age: 50
End: 2025-06-18

## 2025-06-18 NOTE — TELEPHONE ENCOUNTER
06/18/25 1:31 PM     Chart reviewed for Diabetic Eye Exam was/were submitted to the patient's insurance.     Shelli Sosa   [facility]

## 2025-06-23 ENCOUNTER — OFFICE VISIT (OUTPATIENT)
Dept: URGENT CARE | Facility: MEDICAL CENTER | Age: 50
End: 2025-06-23
Payer: COMMERCIAL

## 2025-06-23 VITALS
TEMPERATURE: 98 F | OXYGEN SATURATION: 98 % | DIASTOLIC BLOOD PRESSURE: 80 MMHG | RESPIRATION RATE: 18 BRPM | SYSTOLIC BLOOD PRESSURE: 128 MMHG | HEART RATE: 90 BPM

## 2025-06-23 DIAGNOSIS — L03.114 CELLULITIS OF LEFT UPPER EXTREMITY: Primary | ICD-10-CM

## 2025-06-23 PROCEDURE — 99214 OFFICE O/P EST MOD 30 MIN: CPT | Performed by: PHYSICIAN ASSISTANT

## 2025-06-23 RX ORDER — SULFAMETHOXAZOLE AND TRIMETHOPRIM 800; 160 MG/1; MG/1
1 TABLET ORAL EVERY 12 HOURS SCHEDULED
Qty: 14 TABLET | Refills: 0 | Status: SHIPPED | OUTPATIENT
Start: 2025-06-23 | End: 2025-06-30

## 2025-06-24 NOTE — PROGRESS NOTES
[facility]        NAME: Jc Carmona is a 49 y.o. male  : 1975    MRN: [id number]  DATE: 2025  TIME: 8:09 PM    Assessment and Plan   Cellulitis of left upper extremity [L03.114]  1. Cellulitis of left upper extremity  sulfamethoxazole-trimethoprim (BACTRIM DS) 800-160 mg per tablet            Patient Instructions     Cellulitis left upper extremity  Bactrim as directed  Follow up with PCP in 3-5 days.  Proceed to  ER if symptoms worsen.    Chief Complaint     Chief Complaint   Patient presents with   • Insect Bite     Friday night patient was stung to left arm; redness/swelling streaking up arm          History of Present Illness       49-year-old male who presents complaining of painful rash to the left forearm.  Patient states that he was bit by an insect 3 days ago and since yesterday the area has become red, painful, warm.  Denies fever, chills.    Insect Bite  Associated symptoms include a rash. Pertinent negatives include no chest pain or coughing.       Review of Systems   Review of Systems   Constitutional: Negative.    HENT: Negative.     Eyes: Negative.    Respiratory: Negative.  Negative for apnea, cough, choking, chest tightness, shortness of breath, wheezing and stridor.    Cardiovascular: Negative.  Negative for chest pain.   Skin:  Positive for rash.         Current Medications     Current Medications[1]    Current Allergies     Allergies as of 2025 - Reviewed 2025   Allergen Reaction Noted   • Asa [aspirin] GI Intolerance 2017   • Penicillin g Other (See Comments) 2019            The following portions of the patient's history were reviewed and updated as appropriate: allergies, current medications, past family history, past medical history, past social history, past surgical history and problem list.     Past Medical History[2]    Past Surgical History[3]    Family History[4]      Medications have been verified.        Objective   /80    Pulse 90   Temp 98 °F (36.7 °C) (Temporal)   Resp 18   SpO2 98%        Physical Exam     Physical Exam  Constitutional:       General: He is not in acute distress.     Appearance: He is well-developed. He is not diaphoretic.     Cardiovascular:      Rate and Rhythm: Normal rate and regular rhythm.      Heart sounds: Normal heart sounds.   Pulmonary:      Effort: Pulmonary effort is normal. No respiratory distress.      Breath sounds: Normal breath sounds. No wheezing or rales.   Chest:      Chest wall: No tenderness.     Musculoskeletal:      Cervical back: Normal range of motion and neck supple.   Lymphadenopathy:      Cervical: No cervical adenopathy.     Skin:                            [1]    Current Outpatient Medications:   •  sulfamethoxazole-trimethoprim (BACTRIM DS) 800-160 mg per tablet, Take 1 tablet by mouth every 12 (twelve) hours for 7 days, Disp: 14 tablet, Rfl: 0  •  acetaminophen (TYLENOL) 650 mg CR tablet, Take 650 mg by mouth as needed in the morning and 650 mg as needed at noon and 650 mg as needed in the evening., Disp: , Rfl:   •  atorvastatin (LIPITOR) 10 mg tablet, Take 1 tablet (10 mg total) by mouth every evening (Patient not taking: Reported on 6/16/2025), Disp: 90 tablet, Rfl: 3  •  BD Pen Needle Nancy 2nd Gen 32G X 4 MM MISC, USE TWICE DAILY, Disp: 100 each, Rfl: 2  •  cholestyramine (QUESTRAN) 4 g packet, Take 1 packet (4 g total) by mouth 3 (three) times a day as needed (DIARRHEA) Take Questran 3 hours apart from other medication, Disp: 60 packet, Rfl: 3  •  Continuous Glucose Sensor (FreeStyle Dell 3 Plus Sensor) MISC, Use 1 each every 15 (fifteen) days, Disp: 6 each, Rfl: 3  •  famotidine (PEPCID) 20 mg tablet, Take 20 mg by mouth if needed, Disp: , Rfl:   •  Insulin Glargine Solostar (Lantus SoloStar) 100 UNIT/ML SOPN, INJECT 15 UNITS (0.15ML) UNDER THE SKIN DAILY AT BEDTIME, Disp: 24 mL, Rfl: 1  •  Lidocaine Pain Relief 4 % PTCH, if needed, Disp: , Rfl:   •  methocarbamol  "(ROBAXIN) 500 mg tablet, Take 500 mg by mouth daily as needed, Disp: , Rfl:   •  tadalafil (CIALIS) 20 MG tablet, Take 1 tablet (20 mg total) by mouth daily as needed for erectile dysfunction, Disp: 30 tablet, Rfl: 5  •  Tirzepatide (Mounjaro) 12.5 MG/0.5ML SOAJ, Inject 12.5 mg under the skin once a week, Disp: 2 mL, Rfl: 0    Current Facility-Administered Medications:   •  bupivacaine (MARCAINE) 0.25 % injection 0.5 mL, 0.5 mL, Injection, , , 0.5 mL at 09/16/24 0800  •  dexamethasone (DECADRON) injection 2 mg, 2 mg, Intra-articular, , , 2 mg at 09/16/24 0800  [2]  Past Medical History:  Diagnosis Date   • Bronchitis    • Cancer (HCC)     colon   • Colon polyp    • Diabetes mellitus (HCC)    • GERD (gastroesophageal reflux disease)    • Hyperlipidemia    • Post concussion syndrome 05/09/2017   • Pre-op examination 02/15/2023   • Sacroiliitis (HCC)    • Sleep apnea     \"mild\"   • Traumatic brain injury (HCC) 2017    R/S 2017   • Weakness of left leg     numbness/tingling   • Wears glasses    [3]  Past Surgical History:  Procedure Laterality Date   • APPENDECTOMY     • CHOLECYSTECTOMY  10/22/2024   • COLON SURGERY     • COLONOSCOPY     • EGD     • FOOT SURGERY     • OH REMOVAL IMPLANT DEEP Left 04/26/2024    Procedure: REMOVAL HARDWARE ANKLE;  Surgeon: Steve Kaplan DPM;  Location: [facility];  Service: Podiatry   • OH RPR PRIMARY DISRUPTED LIGAMENT ANKLE COLLATERAL Left 03/10/2023    Procedure: ANKLE LIGAMENT REPAIR OF SYNDESMOTIC LIGAMENT with steroid injection of posterior heel bursa;  Surgeon: Steve Kaplan DPM;  Location: [facility];  Service: Podiatry   [4]  Family History  Problem Relation Name Age of Onset   • Coronary artery disease Mother Scarlett llanes    • Diabetes Mother Scarlett llanes    • Hypertension Mother Scarlett llanes    • Hyperlipidemia Mother Scarlett llanes    • Coronary artery disease Father Don llanes    • Diabetes Father Don llanes    • Hypertension Father Don llanes    • " Hyperlipidemia Father Don llanes    • Heart attack Father Don llanes    • Anemia Brother     • No Known Problems Son     • No Known Problems Son     • No Known Problems Son

## 2025-06-24 NOTE — PATIENT INSTRUCTIONS
Cellulitis left upper extremity  Bactrim as directed  Follow up with PCP in 3-5 days.  Proceed to  ER if symptoms worsen.

## 2025-07-07 DIAGNOSIS — E11.65 TYPE 2 DIABETES MELLITUS WITH HYPERGLYCEMIA, WITH LONG-TERM CURRENT USE OF INSULIN (HCC): ICD-10-CM

## 2025-07-07 DIAGNOSIS — Z79.4 TYPE 2 DIABETES MELLITUS WITH HYPERGLYCEMIA, WITH LONG-TERM CURRENT USE OF INSULIN (HCC): ICD-10-CM

## 2025-07-08 ENCOUNTER — APPOINTMENT (OUTPATIENT)
Dept: LAB | Facility: CLINIC | Age: 50
End: 2025-07-08
Payer: COMMERCIAL

## 2025-07-08 DIAGNOSIS — Z85.038 HISTORY OF COLON CANCER: ICD-10-CM

## 2025-07-08 DIAGNOSIS — E11.65 TYPE 2 DIABETES MELLITUS WITH HYPERGLYCEMIA, WITH LONG-TERM CURRENT USE OF INSULIN (HCC): ICD-10-CM

## 2025-07-08 DIAGNOSIS — R53.83 OTHER FATIGUE: ICD-10-CM

## 2025-07-08 DIAGNOSIS — Z79.4 TYPE 2 DIABETES MELLITUS WITH HYPERGLYCEMIA, WITH LONG-TERM CURRENT USE OF INSULIN (HCC): ICD-10-CM

## 2025-07-08 LAB
ALBUMIN SERPL BCG-MCNC: 4.4 G/DL (ref 3.5–5)
ALP SERPL-CCNC: 66 U/L (ref 34–104)
ALT SERPL W P-5'-P-CCNC: 27 U/L (ref 7–52)
ANION GAP SERPL CALCULATED.3IONS-SCNC: 9 MMOL/L (ref 4–13)
AST SERPL W P-5'-P-CCNC: 21 U/L (ref 13–39)
BASOPHILS # BLD AUTO: 0.04 THOUSANDS/ÂΜL (ref 0–0.1)
BASOPHILS NFR BLD AUTO: 1 % (ref 0–1)
BILIRUB SERPL-MCNC: 0.6 MG/DL (ref 0.2–1)
BUN SERPL-MCNC: 14 MG/DL (ref 5–25)
CALCIUM SERPL-MCNC: 9.3 MG/DL (ref 8.4–10.2)
CHLORIDE SERPL-SCNC: 106 MMOL/L (ref 96–108)
CHOLEST SERPL-MCNC: 152 MG/DL (ref ?–200)
CO2 SERPL-SCNC: 25 MMOL/L (ref 21–32)
CREAT SERPL-MCNC: 0.82 MG/DL (ref 0.6–1.3)
CREAT UR-MCNC: 230.8 MG/DL
EOSINOPHIL # BLD AUTO: 0.1 THOUSAND/ÂΜL (ref 0–0.61)
EOSINOPHIL NFR BLD AUTO: 2 % (ref 0–6)
ERYTHROCYTE [DISTWIDTH] IN BLOOD BY AUTOMATED COUNT: 13 % (ref 11.6–15.1)
EST. AVERAGE GLUCOSE BLD GHB EST-MCNC: 134 MG/DL
FERRITIN SERPL-MCNC: 123 NG/ML (ref 30–336)
GFR SERPL CREATININE-BSD FRML MDRD: 103 ML/MIN/1.73SQ M
GLUCOSE P FAST SERPL-MCNC: 105 MG/DL (ref 65–99)
HBA1C MFR BLD: 6.3 %
HCT VFR BLD AUTO: 44.7 % (ref 36.5–49.3)
HDLC SERPL-MCNC: 37 MG/DL
HGB BLD-MCNC: 15.2 G/DL (ref 12–17)
IMM GRANULOCYTES # BLD AUTO: 0.02 THOUSAND/UL (ref 0–0.2)
IMM GRANULOCYTES NFR BLD AUTO: 0 % (ref 0–2)
IRON SATN MFR SERPL: 21 % (ref 15–50)
IRON SERPL-MCNC: 81 UG/DL (ref 50–212)
LDLC SERPL CALC-MCNC: 61 MG/DL (ref 0–100)
LYMPHOCYTES # BLD AUTO: 1.46 THOUSANDS/ÂΜL (ref 0.6–4.47)
LYMPHOCYTES NFR BLD AUTO: 25 % (ref 14–44)
MCH RBC QN AUTO: 29 PG (ref 26.8–34.3)
MCHC RBC AUTO-ENTMCNC: 34 G/DL (ref 31.4–37.4)
MCV RBC AUTO: 85 FL (ref 82–98)
MICROALBUMIN UR-MCNC: 24.5 MG/L
MICROALBUMIN/CREAT 24H UR: 11 MG/G CREATININE (ref 0–30)
MONOCYTES # BLD AUTO: 0.41 THOUSAND/ÂΜL (ref 0.17–1.22)
MONOCYTES NFR BLD AUTO: 7 % (ref 4–12)
NEUTROPHILS # BLD AUTO: 3.94 THOUSANDS/ÂΜL (ref 1.85–7.62)
NEUTS SEG NFR BLD AUTO: 65 % (ref 43–75)
NONHDLC SERPL-MCNC: 115 MG/DL
NRBC BLD AUTO-RTO: 0 /100 WBCS
PLATELET # BLD AUTO: 218 THOUSANDS/UL (ref 149–390)
PMV BLD AUTO: 9.4 FL (ref 8.9–12.7)
POTASSIUM SERPL-SCNC: 3.5 MMOL/L (ref 3.5–5.3)
PROT SERPL-MCNC: 7.5 G/DL (ref 6.4–8.4)
RBC # BLD AUTO: 5.25 MILLION/UL (ref 3.88–5.62)
SODIUM SERPL-SCNC: 140 MMOL/L (ref 135–147)
TIBC SERPL-MCNC: 392 UG/DL (ref 250–450)
TRANSFERRIN SERPL-MCNC: 280 MG/DL (ref 203–362)
TRIGL SERPL-MCNC: 270 MG/DL (ref ?–150)
TSH SERPL DL<=0.05 MIU/L-ACNC: 1.18 UIU/ML (ref 0.45–4.5)
UIBC SERPL-MCNC: 311 UG/DL (ref 155–355)
WBC # BLD AUTO: 5.97 THOUSAND/UL (ref 4.31–10.16)

## 2025-07-08 PROCEDURE — 84443 ASSAY THYROID STIM HORMONE: CPT

## 2025-07-08 PROCEDURE — 82728 ASSAY OF FERRITIN: CPT

## 2025-07-08 PROCEDURE — 83036 HEMOGLOBIN GLYCOSYLATED A1C: CPT

## 2025-07-08 PROCEDURE — 82570 ASSAY OF URINE CREATININE: CPT

## 2025-07-08 PROCEDURE — 85025 COMPLETE CBC W/AUTO DIFF WBC: CPT

## 2025-07-08 PROCEDURE — 83540 ASSAY OF IRON: CPT

## 2025-07-08 PROCEDURE — 82043 UR ALBUMIN QUANTITATIVE: CPT

## 2025-07-08 PROCEDURE — 36415 COLL VENOUS BLD VENIPUNCTURE: CPT

## 2025-07-08 PROCEDURE — 80061 LIPID PANEL: CPT

## 2025-07-08 PROCEDURE — 83550 IRON BINDING TEST: CPT

## 2025-07-08 PROCEDURE — 80053 COMPREHEN METABOLIC PANEL: CPT

## 2025-07-08 RX ORDER — TIRZEPATIDE 12.5 MG/.5ML
12.5 INJECTION, SOLUTION SUBCUTANEOUS WEEKLY
Qty: 6 ML | Refills: 1 | Status: SHIPPED | OUTPATIENT
Start: 2025-07-08

## 2025-07-09 ENCOUNTER — OFFICE VISIT (OUTPATIENT)
Dept: FAMILY MEDICINE CLINIC | Facility: CLINIC | Age: 50
End: 2025-07-09
Payer: COMMERCIAL

## 2025-07-09 ENCOUNTER — TELEPHONE (OUTPATIENT)
Age: 50
End: 2025-07-09

## 2025-07-09 VITALS
OXYGEN SATURATION: 97 % | TEMPERATURE: 97.2 F | BODY MASS INDEX: 32.28 KG/M2 | SYSTOLIC BLOOD PRESSURE: 118 MMHG | HEART RATE: 92 BPM | WEIGHT: 194 LBS | DIASTOLIC BLOOD PRESSURE: 78 MMHG | RESPIRATION RATE: 16 BRPM

## 2025-07-09 DIAGNOSIS — E11.65 TYPE 2 DIABETES MELLITUS WITH HYPERGLYCEMIA, WITH LONG-TERM CURRENT USE OF INSULIN (HCC): Primary | ICD-10-CM

## 2025-07-09 DIAGNOSIS — E66.811 CLASS 1 OBESITY DUE TO EXCESS CALORIES WITH SERIOUS COMORBIDITY AND BODY MASS INDEX (BMI) OF 34.0 TO 34.9 IN ADULT: ICD-10-CM

## 2025-07-09 DIAGNOSIS — Z79.4 TYPE 2 DIABETES MELLITUS WITH HYPERGLYCEMIA, WITH LONG-TERM CURRENT USE OF INSULIN (HCC): Primary | ICD-10-CM

## 2025-07-09 DIAGNOSIS — R68.82 LOW LIBIDO: ICD-10-CM

## 2025-07-09 DIAGNOSIS — E66.09 CLASS 1 OBESITY DUE TO EXCESS CALORIES WITH SERIOUS COMORBIDITY AND BODY MASS INDEX (BMI) OF 34.0 TO 34.9 IN ADULT: ICD-10-CM

## 2025-07-09 DIAGNOSIS — L60.0 INGROWN TOENAIL: ICD-10-CM

## 2025-07-09 DIAGNOSIS — N52.9 ERECTILE DYSFUNCTION, UNSPECIFIED ERECTILE DYSFUNCTION TYPE: ICD-10-CM

## 2025-07-09 PROCEDURE — 99214 OFFICE O/P EST MOD 30 MIN: CPT | Performed by: FAMILY MEDICINE

## 2025-07-09 RX ORDER — ATORVASTATIN CALCIUM 10 MG/1
10 TABLET, FILM COATED ORAL EVERY EVENING
Qty: 90 TABLET | Refills: 3 | Status: SHIPPED | OUTPATIENT
Start: 2025-07-09 | End: 2026-07-04

## 2025-07-09 RX ORDER — TADALAFIL 20 MG/1
20 TABLET ORAL DAILY PRN
Qty: 30 TABLET | Refills: 5 | Status: SHIPPED | OUTPATIENT
Start: 2025-07-09 | End: 2026-01-05

## 2025-07-09 NOTE — ASSESSMENT & PLAN NOTE
{If prescribing weight loss medication, click here to fill out prior auth smartform and then hit F2 with this smartlist to insert prior auth documentation (Optional):[id number]}

## 2025-07-09 NOTE — PROGRESS NOTES
Name: Jc Carmona      : 1975      MRN: [id number]  Encounter Provider: Lv Torrez MD  Encounter Date: 2025   Encounter department: [facility]  :  Assessment & Plan  Type 2 diabetes mellitus with hyperglycemia, with long-term current use of insulin (Formerly [facility])    Lab Results   Component Value Date    HGBA1C 6.3 (H) 2025     Significant proved in A1c.  Continue following with clinical pharmacy  Continue to hold metformin.  Continue Mounjaro 12.5 mg weekly.  Repeat lab work in 6 months.  Lantus 16 units at bedtime.  Counseled on appropriate diet and exercise.  Patient will provide us with updated eye exam  Orders:    Albumin / creatinine urine ratio; Future    Comprehensive metabolic panel; Future    Hemoglobin A1C; Future    Lipid panel; Future    atorvastatin (LIPITOR) 10 mg tablet; Take 1 tablet (10 mg total) by mouth every evening    Class 1 obesity due to excess calories with serious comorbidity and body mass index (BMI) of 34.0 to 34.9 in adult           Erectile dysfunction, unspecified erectile dysfunction type  Ongoing erectile dysfunction.  Continue Cialis 20 mg daily.  Counseled appropriate diet and exercise.  Tolerating well without side effects  Orders:    tadalafil (CIALIS) 20 MG tablet; Take 1 tablet (20 mg total) by mouth daily as needed for erectile dysfunction    Ingrown toenail  Patient has ingrown toenail on left foot.  Recommend follow-up with podiatry for treatment.  Continue Epsom salt soaks.  No indication for antibiotics at this time.       Low libido    Orders:    Testosterone; Future      Eye exam up-to-date.  He will get us the report       History of Present Illness   Patient presents with:  Follow-up: End aug foot doc   3 m follow up           Review of Systems   Constitutional:  Negative for activity change, fatigue and fever.   Eyes:  Negative for visual disturbance.   Respiratory:  Negative for shortness of breath.    Cardiovascular:   Negative for chest pain.   Gastrointestinal:  Negative for abdominal pain, constipation, diarrhea and nausea.   Endocrine: Negative for cold intolerance and heat intolerance.   Musculoskeletal:  Negative for back pain.   Skin:  Negative for rash.   Neurological:  Negative for headaches.   Psychiatric/Behavioral:  Negative for confusion.        Objective   /78 (BP Location: Left arm, Patient Position: Sitting, Cuff Size: Large)   Pulse 92   Temp (!) 97.2 °F (36.2 °C) (Temporal)   Resp 16   Wt 88 kg (194 lb)   SpO2 97%   BMI 32.28 kg/m²      Physical Exam  Vitals and nursing note reviewed.   Constitutional:       Appearance: Normal appearance. He is well-developed.   HENT:      Head: Normocephalic and atraumatic.     Cardiovascular:      Rate and Rhythm: Normal rate and regular rhythm.   Pulmonary:      Effort: Pulmonary effort is normal.      Breath sounds: Normal breath sounds.   Abdominal:      General: Bowel sounds are normal.      Palpations: Abdomen is soft.     Musculoskeletal:      Cervical back: Normal range of motion.   Feet:      Comments: Left big toe ingrown toenail.  Mild tenderness to palpation.  No erythema or discharge    Skin:     General: Skin is warm.     Neurological:      General: No focal deficit present.      Mental Status: He is alert.     Psychiatric:         Mood and Affect: Mood normal.         Speech: Speech normal.

## 2025-07-09 NOTE — ASSESSMENT & PLAN NOTE
Ongoing erectile dysfunction.  Continue Cialis 20 mg daily.  Counseled appropriate diet and exercise.  Tolerating well without side effects  Orders:    tadalafil (CIALIS) 20 MG tablet; Take 1 tablet (20 mg total) by mouth daily as needed for erectile dysfunction

## 2025-07-09 NOTE — ASSESSMENT & PLAN NOTE
Lab Results   Component Value Date    HGBA1C 6.3 (H) 07/08/2025     Significant proved in A1c.  Continue following with clinical pharmacy  Continue to hold metformin.  Continue Mounjaro 12.5 mg weekly.  Repeat lab work in 6 months.  Lantus 16 units at bedtime.  Counseled on appropriate diet and exercise.  Patient will provide us with updated eye exam  Orders:    Albumin / creatinine urine ratio; Future    Comprehensive metabolic panel; Future    Hemoglobin A1C; Future    Lipid panel; Future    atorvastatin (LIPITOR) 10 mg tablet; Take 1 tablet (10 mg total) by mouth every evening

## 2025-07-09 NOTE — TELEPHONE ENCOUNTER
Hello,    Please advise if a forced appointment can be accommodated for the patient: Leon    Call back #: [phone]    Insurance: [email or web address]    Reason for appointment: Infected ingrown toe nail    Requested doctor and/or location: Dr Kaplan      Thank you.

## 2025-07-10 ENCOUNTER — TELEPHONE (OUTPATIENT)
Age: 50
End: 2025-07-10

## 2025-07-10 NOTE — TELEPHONE ENCOUNTER
Caller: Jc Carmona    Doctor: Dr. Kaplan    Reason for call: Jc has an infected ingrown nail.  Right foot great toe.  Can he be forced on?   Thank you.    Call back#: [phone]

## 2025-07-10 NOTE — TELEPHONE ENCOUNTER
MARISOL calling back regarding yesterdays force on request for infected ingrown nail.  Patient is Type II diabetic    Please advise ASAP  Thank you    [phone]

## 2025-07-14 ENCOUNTER — OFFICE VISIT (OUTPATIENT)
Dept: PODIATRY | Facility: CLINIC | Age: 50
End: 2025-07-14
Payer: COMMERCIAL

## 2025-07-14 VITALS — OXYGEN SATURATION: 98 % | HEART RATE: 76 BPM | HEIGHT: 65 IN | BODY MASS INDEX: 32.32 KG/M2 | WEIGHT: 194 LBS

## 2025-07-14 DIAGNOSIS — L60.0 INGROWN RIGHT GREATER TOENAIL: Primary | ICD-10-CM

## 2025-07-14 DIAGNOSIS — M79.674 GREAT TOE PAIN, RIGHT: ICD-10-CM

## 2025-07-14 PROCEDURE — 99213 OFFICE O/P EST LOW 20 MIN: CPT | Performed by: PODIATRIST

## 2025-07-14 PROCEDURE — 11730 AVULSION NAIL PLATE SIMPLE 1: CPT | Performed by: PODIATRIST

## 2025-07-14 NOTE — PROGRESS NOTES
"Name: Jc Carmona      : 1975      MRN: [id number]  Encounter Provider: Steve Kaplan DPM  Encounter Date: 2025   Encounter department: [facility]  :  Assessment & Plan  Ingrown right greater toenail       The patient's clinical examination today significant for moderate tenderness to palpation along the medial aspect of the right hallucal nail plate.  There is no active drainage no purulence.  There is mild edema noted along the medial nail fold.  Pedal pulses palpable.    The patient has recently completed a course of oral antibiotic therapy which has reduced redness and swelling to the right hallux.  He was agreeable to proceeding with a partial nail avulsion today.  A right hallux block was performed with 3 mL of 0.25% bupivacaine plain.  After esthesia was achieved, a partial nail avulsion was performed to the medial nail border without complication.  A compressive antimicrobial dressing was applied.  Wound care instructions were discussed with the patient.  There is no clinical need for any further antibiosis.    He can follow-up with me on an as-needed basis.  Great toe pain, right             History of Present Illness   HPI  Jc Carmona is a 49 y.o. male who presents today with a chief complaint of a painful right ingrown toenail.  He did notice some redness and swelling a couple of weeks ago and was started on a course of antibiotic therapy by his PCP which has resolved majority of swelling and discoloration.  He still notes tenderness associate with the nail plate.  History obtained from: patient    Review of Systems    PAST MEDICAL HISTORY:  Past Medical History[1]    PAST SURGICAL HISTORY:  Past Surgical History[2]     ALLERGIES:  Asa [aspirin] and Penicillin g    MEDICATIONS:  Current Medications[3]    SOCIAL HISTORY:  Social History[4]      Objective   Pulse 76   Ht 5' 5\" (1.651 m)   Wt 88 kg (194 lb)   SpO2 98%   BMI 32.28 kg/m²    " "  Physical Exam  Constitutional:       Appearance: Normal appearance.   HENT:      Head: Normocephalic and atraumatic.     Eyes:      Conjunctiva/sclera: Conjunctivae normal.       Cardiovascular:      Pulses:           Dorsalis pedis pulses are 2+ on the right side.        Posterior tibial pulses are 2+ on the right side.   Pulmonary:      Effort: Pulmonary effort is normal.   Feet:      Right foot:      Toenail Condition: Right toenails are ingrown.      Comments: The patient's clinical examination today significant for moderate tenderness to palpation along the medial aspect of the right hallucal nail plate.  There is no active drainage no purulence.  There is mild edema noted along the medial nail fold.  Pedal pulses palpable.    Skin:     General: Skin is warm and dry.      Capillary Refill: Capillary refill takes less than 2 seconds.     Neurological:      General: No focal deficit present.      Mental Status: He is alert and oriented to person, place, and time.     Psychiatric:         Mood and Affect: Mood normal.       Nail removal    Date/Time: 7/14/2025 8:45 AM    Performed by: Steve Kaplan DPM  Authorized by: Steve Kaplan DPM    Patient location:  Clinic  Indications / Diagnosis:  Painful ingrown nail right hallux    Universal Protocol:  procedure performed by consultantConsent: Verbal consent obtained  Risks and benefits: risks, benefits and alternatives were discussed  Consent given by: patient  Time out: Immediately prior to procedure a \"time out\" was called to verify the correct patient, procedure, equipment, support staff and site/side marked as required.  Timeout called at: 7/14/2025 8:45 AM.  Patient understanding: patient states understanding of the procedure being performed  Patient identity confirmed: verbally with patient and provided demographic data    Location:     Foot:  R big toe  Pre-procedure details:     Skin preparation:  Alcohol    Preparation: Patient was prepped and " "draped in the usual sterile fashion    Anesthesia (see MAR for exact dosages):     Anesthesia method:  Nerve block    Block location:  Right hallux    Block needle gauge:  25 G    Block anesthetic:  Bupivacaine 0.25% w/o epi    Block technique:  Right hallux block    Block injection procedure:  Anatomic landmarks identified and introduced needle  Nail Removal:     Nail removed:  Partial    Nail side:  Medial    Nail bed sutured: no    Ingrown nail:     Wedge excision of skin: no      Nail matrix removed or ablated:  None  Post-procedure details:     Dressing:  4x4 sterile gauze, antibiotic ointment and gauze roll    Patient tolerance of procedure:  Tolerated well, no immediate complications             [1]   Past Medical History:  Diagnosis Date    Bronchitis     Cancer (HCC)     colon    Colon polyp     Diabetes mellitus (HCC)     GERD (gastroesophageal reflux disease)     Hyperlipidemia     Post concussion syndrome 05/09/2017    Pre-op examination 02/15/2023    Sacroiliitis (HCC)     Sleep apnea     \"mild\"    Traumatic brain injury (HCC) 2017    R/S 2017    Weakness of left leg     numbness/tingling    Wears glasses    [2]   Past Surgical History:  Procedure Laterality Date    APPENDECTOMY      CHOLECYSTECTOMY  10/22/2024    COLON SURGERY      COLONOSCOPY      EGD      FOOT SURGERY      LA REMOVAL IMPLANT DEEP Left 04/26/2024    Procedure: REMOVAL HARDWARE ANKLE;  Surgeon: Steve Kaplan DPM;  Location:  MAIN OR;  Service: Podiatry    LA RPR PRIMARY DISRUPTED LIGAMENT ANKLE COLLATERAL Left 03/10/2023    Procedure: ANKLE LIGAMENT REPAIR OF SYNDESMOTIC LIGAMENT with steroid injection of posterior heel bursa;  Surgeon: Steve Kaplan DPM;  Location: [facility];  Service: Podiatry   [3]   Current Outpatient Medications   Medication Sig Dispense Refill    acetaminophen (TYLENOL) 650 mg CR tablet Take 650 mg by mouth as needed in the morning and 650 mg as needed at noon and 650 mg as needed in the evening.      " atorvastatin (LIPITOR) 10 mg tablet Take 1 tablet (10 mg total) by mouth every evening 90 tablet 3    BD Pen Needle Nancy 2nd Gen 32G X 4 MM MISC USE TWICE DAILY 100 each 2    cholestyramine (QUESTRAN) 4 g packet Take 1 packet (4 g total) by mouth 3 (three) times a day as needed (DIARRHEA) Take Questran 3 hours apart from other medication 60 packet 3    Continuous Glucose Sensor (FreeStyle Dell 3 Plus Sensor) MISC Use 1 each every 15 (fifteen) days 6 each 3    famotidine (PEPCID) 20 mg tablet Take 20 mg by mouth if needed      Insulin Glargine Solostar (Lantus SoloStar) 100 UNIT/ML SOPN INJECT 15 UNITS (0.15ML) UNDER THE SKIN DAILY AT BEDTIME 24 mL 1    Lidocaine Pain Relief 4 % PTCH if needed      tadalafil (CIALIS) 20 MG tablet Take 1 tablet (20 mg total) by mouth daily as needed for erectile dysfunction 30 tablet 5    Tirzepatide (Mounjaro) 12.5 MG/0.5ML SOAJ INJECT 12.5 MG UNDER THE SKIN ONCE A WEEK 6 mL 1    methocarbamol (ROBAXIN) 500 mg tablet Take 500 mg by mouth daily as needed       Current Facility-Administered Medications   Medication Dose Route Frequency Provider Last Rate Last Admin    bupivacaine (MARCAINE) 0.25 % injection 0.5 mL  0.5 mL Injection     0.5 mL at 09/16/24 0800    dexamethasone (DECADRON) injection 2 mg  2 mg Intra-articular     2 mg at 09/16/24 0800   [4]   Social History  Socioeconomic History    Marital status: Single   Tobacco Use    Smoking status: Never    Smokeless tobacco: Never   Vaping Use    Vaping status: Never Used   Substance and Sexual Activity    Alcohol use: Not Currently     Alcohol/week: 1.0 standard drink of alcohol     Types: 1 Cans of beer per week    Drug use: Never    Sexual activity: Yes     Partners: Female     Birth control/protection: None     Social Drivers of Health     Financial Resource Strain: Low Risk  (11/2/2024)    [facility]    Overall Financial Resource Strain (CARDIA)     Difficulty of Paying Living Expenses: Not  very hard   Food Insecurity: No Food Insecurity (11/2/2024)    Received from [facility]    Hunger Vital Sign     Within the past 12 months, you worried that your food would run out before you got the money to buy more.: Never true     Within the past 12 months, the food you bought just didn't last and you didn't have money to get more.: Never true   Transportation Needs: No Transportation Needs (11/2/2024)    Received from [facility]    PRAPARE - Transportation     Lack of Transportation (Medical): No     Lack of Transportation (Non-Medical): No   Intimate Partner Violence: Not At Risk (11/2/2024)    Received from [facility]    Humiliation, Afraid, Rape, and Kick questionnaire     Within the last year, have you been afraid of your partner or ex-partner?: No     Within the last year, have you been humiliated or emotionally abused in other ways by your partner or ex-partner?: No     Within the last year, have you been kicked, hit, slapped, or otherwise physically hurt by your partner or ex-partner?: No     Within the last year, have you been raped or forced to have any kind of sexual activity by your partner or ex-partner?: No   Housing Stability: Low Risk  (11/2/2024)    Received from [facility]    Housing Stability Vital Sign     In the last 12 months, was there a time when you were not able to pay the mortgage or rent on time?: No     In the past 12 months, how many times have you moved where you were living?: 0     At any time in the past 12 months, were you homeless or living in a shelter (including now)?: No

## 2025-07-18 ENCOUNTER — TELEPHONE (OUTPATIENT)
Age: 50
End: 2025-07-18

## 2025-07-18 NOTE — TELEPHONE ENCOUNTER
Caller: Patient    Doctor: Dr. Bolanos    Reason for call: Patient is calling to request a letter for domestic relations stating he still put of work and he going to be getting surgery (he did set up an appt to have the surgery). Patient would like to pick it up in the office because they only accept originals.    Call back#: [phone]

## 2025-08-06 ENCOUNTER — OFFICE VISIT (OUTPATIENT)
Dept: GASTROENTEROLOGY | Facility: CLINIC | Age: 50
End: 2025-08-06
Payer: COMMERCIAL

## 2025-08-06 VITALS
WEIGHT: 193.6 LBS | SYSTOLIC BLOOD PRESSURE: 132 MMHG | HEART RATE: 84 BPM | TEMPERATURE: 97.9 F | DIASTOLIC BLOOD PRESSURE: 82 MMHG | HEIGHT: 65 IN | BODY MASS INDEX: 32.26 KG/M2 | RESPIRATION RATE: 18 BRPM | OXYGEN SATURATION: 98 %

## 2025-08-06 DIAGNOSIS — Z85.038 HISTORY OF COLON CANCER: ICD-10-CM

## 2025-08-06 DIAGNOSIS — K52.9 CHRONIC DIARRHEA: ICD-10-CM

## 2025-08-06 DIAGNOSIS — K21.9 GASTROESOPHAGEAL REFLUX DISEASE WITHOUT ESOPHAGITIS: ICD-10-CM

## 2025-08-06 DIAGNOSIS — K58.0 IRRITABLE BOWEL SYNDROME WITH DIARRHEA: Primary | ICD-10-CM

## 2025-08-06 PROCEDURE — 99214 OFFICE O/P EST MOD 30 MIN: CPT | Performed by: STUDENT IN AN ORGANIZED HEALTH CARE EDUCATION/TRAINING PROGRAM

## 2025-08-06 RX ORDER — OMEPRAZOLE 40 MG/1
40 CAPSULE, DELAYED RELEASE ORAL DAILY
Qty: 30 CAPSULE | Refills: 11 | Status: SHIPPED | OUTPATIENT
Start: 2025-08-06

## 2025-08-06 RX ORDER — CHOLESTYRAMINE LIGHT 4 G/5.7G
4 POWDER, FOR SUSPENSION ORAL 2 TIMES DAILY PRN
Qty: 60 PACKET | Refills: 11 | Status: SHIPPED | OUTPATIENT
Start: 2025-08-06

## 2025-08-06 RX ORDER — DICYCLOMINE HYDROCHLORIDE 10 MG/1
10 CAPSULE ORAL 4 TIMES DAILY PRN
Qty: 120 CAPSULE | Refills: 11 | Status: SHIPPED | OUTPATIENT
Start: 2025-08-06

## 2025-08-11 ENCOUNTER — TELEPHONE (OUTPATIENT)
Dept: FAMILY MEDICINE CLINIC | Facility: CLINIC | Age: 50
End: 2025-08-11

## (undated) DEVICE — INTENDED FOR TISSUE SEPARATION, AND OTHER PROCEDURES THAT REQUIRE A SHARP SURGICAL BLADE TO PUNCTURE OR CUT.: Brand: BARD-PARKER ® CARBON RIB-BACK BLADES

## (undated) DEVICE — COVER LIGHT HANDLE RIGID -2/PK

## (undated) DEVICE — CHLORAPREP HI-LITE 26ML ORANGE

## (undated) DEVICE — ANCHOR SUT TAK-BB THRD
Type: IMPLANTABLE DEVICE | Site: ANKLE | Status: NON-FUNCTIONAL
Removed: 2023-03-10

## (undated) DEVICE — SUT VICRYL 3-0 SH 27 IN J416H

## (undated) DEVICE — TUBING SUCTION 5MM X 12 FT

## (undated) DEVICE — ARTHREX DRILL BIT 2.5MM

## (undated) DEVICE — PADDING CAST 4 IN  COTTON STRL

## (undated) DEVICE — GLOVE SRG BIOGEL ECLIPSE 7.5

## (undated) DEVICE — GLOVE INDICATOR PI UNDERGLOVE SZ 7.5 BLUE

## (undated) DEVICE — DRAPE C-ARM X-RAY

## (undated) DEVICE — DRILL BIT 2.5MM CALIB

## (undated) DEVICE — CUFF TOURNIQUET 30 X 4 IN QUICK CONNECT DISP 1BLA

## (undated) DEVICE — SURGICAL CLIPPER BLADE GENERAL USE

## (undated) DEVICE — 10FR FRAZIER SUCTION HANDLE: Brand: CARDINAL HEALTH

## (undated) DEVICE — SUT ETHILON 4-0 PS-2 18 IN 1667H

## (undated) DEVICE — GLOVE INDICATOR PI UNDERGLOVE SZ 8 BLUE

## (undated) DEVICE — U-DRAPE: Brand: CONVERTORS

## (undated) DEVICE — GLOVE SRG BIOGEL 7.5

## (undated) DEVICE — ACE WRAP 4 IN STERILE

## (undated) DEVICE — PLUMEPEN PRO 10FT

## (undated) DEVICE — GROUNDING PAD UNIVERSAL SLW

## (undated) DEVICE — GLOVE SRG BIOGEL 8

## (undated) DEVICE — STERILE BETHLEHEM PLASTIC HAND: Brand: CARDINAL HEALTH

## (undated) DEVICE — SUT VICRYL 4-0 SH 27 IN J415H

## (undated) DEVICE — DRAPE C-ARMOUR

## (undated) DEVICE — LIGHT HANDLE COVER SLEEVE DISP BLUE STELLAR

## (undated) DEVICE — ACE WRAP 4 IN UNSTERILE

## (undated) DEVICE — NEEDLE 25G X 1 1/2

## (undated) DEVICE — ACE WRAP 6 IN UNSTERILE

## (undated) DEVICE — NEPTUNE E-SEP SMOKE EVACUATION PENCIL, COATED, 70MM BLADE, PUSH BUTTON SWITCH: Brand: NEPTUNE E-SEP

## (undated) DEVICE — NON-STERILE REUSABLE TOURNIQUET CUFF SINGLE BLADDER, DUAL PORT AND QUICK CONNECT CONNECTOR: Brand: COLOR CUFF

## (undated) DEVICE — PAD CAST 4 IN COTTON NON STERILE